# Patient Record
Sex: FEMALE | Race: BLACK OR AFRICAN AMERICAN | Employment: OTHER | URBAN - METROPOLITAN AREA
[De-identification: names, ages, dates, MRNs, and addresses within clinical notes are randomized per-mention and may not be internally consistent; named-entity substitution may affect disease eponyms.]

---

## 2017-02-01 ENCOUNTER — OFFICE VISIT (OUTPATIENT)
Dept: FAMILY MEDICINE CLINIC | Age: 67
End: 2017-02-01

## 2017-02-01 VITALS
OXYGEN SATURATION: 100 % | DIASTOLIC BLOOD PRESSURE: 76 MMHG | SYSTOLIC BLOOD PRESSURE: 159 MMHG | RESPIRATION RATE: 12 BRPM | BODY MASS INDEX: 20.93 KG/M2 | WEIGHT: 122.6 LBS | HEIGHT: 64 IN | TEMPERATURE: 96.4 F | HEART RATE: 65 BPM

## 2017-02-01 DIAGNOSIS — I10 ESSENTIAL HYPERTENSION: Primary | ICD-10-CM

## 2017-02-01 DIAGNOSIS — Z23 ENCOUNTER FOR IMMUNIZATION: ICD-10-CM

## 2017-02-01 DIAGNOSIS — I73.9 PAD (PERIPHERAL ARTERY DISEASE) (HCC): ICD-10-CM

## 2017-02-01 RX ORDER — IBUPROFEN 200 MG
200 TABLET ORAL
Qty: 40 TAB | Refills: 3 | Status: SHIPPED | OUTPATIENT
Start: 2017-02-01 | End: 2017-05-09 | Stop reason: ALTCHOICE

## 2017-02-01 RX ORDER — IBUPROFEN 200 MG
200 TABLET ORAL
COMMUNITY
End: 2017-02-01 | Stop reason: SDUPTHER

## 2017-02-01 RX ORDER — GABAPENTIN 100 MG/1
100 CAPSULE ORAL DAILY
Qty: 30 CAP | Refills: 6 | Status: SHIPPED | OUTPATIENT
Start: 2017-02-01 | End: 2017-05-22 | Stop reason: SDUPTHER

## 2017-02-01 NOTE — PROGRESS NOTES
HISTORY OF PRESENT ILLNESS  Lore An is a 77 y.o. female. HPI   DMtype II    Compliant w/ meds, having nodiabetic diet, and not doing much of daily exercise, obtains home glucose monitoring averaging  140's  . No Rf needed for today. Denies any tingling sensation, polyurea and polydipsia, last a1c was unknown . HTN  Today pt present for Bp check and the patient stating that so far there has been a Compliancy w/ the bp meds, having had the low salt diet and try to the best of pt's knowledge to avoid smoked meats, the patient has been active life style and does do the daily walking, today the pt denies Chest Pain, has no legs swelling no lightheadedness,    Hypercholestremia  Patient has no personal no family history of early heart disease with the patient's parents and siblings, unaware of patient's last lipoprotein profile, blood sugar, TSH, liver and renal function tests,    the patient eats out but not more than average,   and currently, there is no muscle nor abdominal pain, And patient fasting today, the patient is compliant w/ meds, in addition to the intake of daily baby aspirin      Current Outpatient Prescriptions   Medication Sig Dispense Refill    ibuprofen (MOTRIN) 200 mg tablet Take 1 Tab by mouth every eight (8) hours as needed for Pain. 40 Tab 3    gabapentin (NEURONTIN) 100 mg capsule Take 1 Cap by mouth daily. 30 Cap 6    pneumococcal 13 annalisa conj dip (PREVNAR-13) 0.5 mL syrg injection 0.5 mL by IntraMUSCular route once for 1 dose. 0.5 mL 0    clopidogrel (PLAVIX) 75 mg tab TAKE 1 TABLET EVERY DAY 30 Tab 0    glucose blood VI test strips (ACCU-CHEK SMARTVIEW TEST STRIP) strip Check blood sugar twice a day 200 Strip 3    Lancets (ACCU-CHEK FASTCLIX) misc Check blood sugar twice a day 200 Each 3    latanoprost (XALATAN) 0.005 % ophthalmic solution Administer 1 Drop to both eyes nightly.  2.5 mL 1    dorzolamide-timolol (COSOPT) 22.3-6.8 mg/mL ophthalmic solution   0    brimonidine (ALPHAGAN) 0.15 % ophthalmic solution Administer 1 Drop to both eyes two (2) times a day. 15 mL 6    hydrochlorothiazide (HYDRODIURIL) 25 mg tablet Take 1 Tab by mouth daily. 90 Tab 3    allopurinol (ZYLOPRIM) 300 mg tablet Take 1 Tab by mouth daily. 90 Tab 3    metFORMIN (GLUCOPHAGE) 1,000 mg tablet Take 1 Tab by mouth daily. 180 Tab 3    Blood-Glucose Meter monitoring kit accu chek gabino smartview meter 1 Kit 0    cilostazol (PLETAL) 100 mg tablet TAKE 1 TABLET BEFORE BREAKFAST AND DINNER 180 Tab 0    atorvastatin (LIPITOR) 80 mg tablet Take 1 Tab by mouth daily. 90 Tab 3    metoprolol (LOPRESSOR) 25 mg tablet Take 0.5 Tabs by mouth two (2) times a day.  80 Tab 3     No Known Allergies  Past Medical History   Diagnosis Date    [facility]     Diabetes mellitus type 2, controlled ([facility]     Gallstones      asymptomatic    GERD (gastroesophageal reflux disease)      dx with resolution of symptoms on ppi    Hypercholesterolemia     PAD (peripheral artery disease) ([facility] 2/20/2013     right SFA angioplasty and athrectomy    Primary open angle glaucoma     Type II diabetes mellitus, uncontrolled ([facility] 2/1/2017     Past Surgical History   Procedure Laterality Date    Vascular surgery procedure unlist  April 2016     Family History   Problem Relation Age of Onset    Hypertension Mother     Diabetes Mother     Diabetes Son      Social History   Substance Use Topics    Smoking status: Current Some Day Smoker     Types: Cigarettes     Last attempt to quit: 3/13/2013    Smokeless tobacco: Not on file      Comment: estimate is one pack per week    Alcohol use 12.6 oz/week     21 Cans of beer per week      Comment: 2 to 3 beers per day per patient      Lab Results  Component Value Date/Time   WBC 5.4 04/02/2014 12:48 PM   Hemoglobin (POC) 12.3 05/24/2016 10:53 AM   HGB 11.1 04/02/2014 12:48 PM   HCT 33.0 04/02/2014 12:48 PM   PLATELET 606 97/28/0920 12:48 PM   MCV 89.9 04/02/2014 12:48 PM       Lab Results  Component Value Date/Time   Hemoglobin A1c 4.9 05/24/2016 11:52 AM   Hemoglobin A1c 5.7 10/26/2015 10:42 AM   Hemoglobin A1c 5.7 09/15/2014 04:31 PM   Glucose 83 10/26/2015 10:42 AM   Glucose  non fasting 07/16/2015 09:33 AM   Microalbumin/Creat ratio (mg/g creat) 8 07/03/2014 10:16 AM   Microalb/Creat ratio (ug/mg creat.) 9.1 10/26/2015 10:42 AM   Microalbumin,urine random 0.68 07/03/2014 10:16 AM   LDL, calculated 97.6 05/24/2016 11:52 AM   Creatinine 0.81 10/26/2015 10:42 AM      Lab Results  Component Value Date/Time   Cholesterol, total 208 05/24/2016 11:52 AM   HDL Cholesterol 90 05/24/2016 11:52 AM   LDL, calculated 97.6 05/24/2016 11:52 AM   Triglyceride 102 05/24/2016 11:52 AM   CHOL/HDL Ratio 2.3 05/24/2016 11:52 AM       Lab Results  Component Value Date/Time   GFR est AA 88 10/26/2015 10:42 AM   GFR est non-AA 76 10/26/2015 10:42 AM   Creatinine 0.81 10/26/2015 10:42 AM   BUN 7 10/26/2015 10:42 AM   Sodium 132 10/26/2015 10:42 AM   Potassium 4.4 10/26/2015 10:42 AM   Chloride 91 10/26/2015 10:42 AM   CO2 23 10/26/2015 10:42 AM      Lab Results  Component Value Date/Time   TSH 0.76 04/02/2014 12:48 PM         Review of Systems   Constitutional: Negative for chills and fever. HENT: Negative for ear pain and nosebleeds. Eyes: Negative for blurred vision, pain and discharge. Respiratory: Negative for shortness of breath. Cardiovascular: Negative for chest pain and leg swelling. Gastrointestinal: Negative for constipation, diarrhea, nausea and vomiting. Genitourinary: Negative for frequency. Musculoskeletal: Positive for back pain and myalgias. Negative for joint pain. Skin: Negative for itching and rash. Neurological: Negative for headaches. Psychiatric/Behavioral: Negative for depression. The patient is not nervous/anxious. Physical Exam   Constitutional: She is oriented to person, place, and time.  She appears well-developed and well-nourished. HENT:   Head: Normocephalic and atraumatic. Eyes: Conjunctivae and EOM are normal.   Neck: Normal range of motion. Neck supple. Cardiovascular: Normal rate, regular rhythm and normal heart sounds. No murmur heard. Pulmonary/Chest: Effort normal and breath sounds normal.   Abdominal: Soft. Bowel sounds are normal. She exhibits no distension. Musculoskeletal: Normal range of motion. She exhibits tenderness. She exhibits no edema. Neurological: She is alert and oriented to person, place, and time. Skin: No erythema. Psychiatric: Her behavior is normal.   Nursing note and vitals reviewed. ASSESSMENT and PLAN  Shannon Tristan was seen today for establish care. Diagnoses and all orders for this visit:    Essential hypertension  -     CBC W/O DIFF  -     TSH 3RD GENERATION  -     METABOLIC PANEL, COMPREHENSIVE  -     LIPID PANEL  -     HEMOGLOBIN A1C WITH EAG  -     MICROALBUMIN, UR, RAND W/ MICROALBUMIN/CREA RATIO  -     pneumococcal 13 annalisa conj dip (PREVNAR-13) 0.5 mL syrg injection; 0.5 mL by IntraMUSCular route once for 1 dose. PAD (peripheral artery disease) ([facility])  -     ibuprofen (MOTRIN) 200 mg tablet; Take 1 Tab by mouth every eight (8) hours as needed for Pain.  -     REFERRAL TO VASCULAR SURGERY  -     CBC W/O DIFF  -     TSH 3RD GENERATION  -     METABOLIC PANEL, COMPREHENSIVE  -     LIPID PANEL  -     HEMOGLOBIN A1C WITH EAG  -     MICROALBUMIN, UR, RAND W/ MICROALBUMIN/CREA RATIO  -     pneumococcal 13 annalisa conj dip (PREVNAR-13) 0.5 mL syrg injection; 0.5 mL by IntraMUSCular route once for 1 dose. Uncontrolled type 2 diabetes mellitus with complication, without long-term current use of insulin ([facility])  -     gabapentin (NEURONTIN) 100 mg capsule;  Take 1 Cap by mouth daily.  -     CBC W/O DIFF  -     TSH 3RD GENERATION  -     METABOLIC PANEL, COMPREHENSIVE  -     LIPID PANEL  -     HEMOGLOBIN A1C WITH EAG  -     MICROALBUMIN, UR, RAND W/ MICROALBUMIN/CREA RATIO  - pneumococcal 13 annalisa conj dip (PREVNAR-13) 0.5 mL syrg injection; 0.5 mL by IntraMUSCular route once for 1 dose. Encounter for immunization  -     CBC W/O DIFF  -     TSH 3RD GENERATION  -     METABOLIC PANEL, COMPREHENSIVE  -     LIPID PANEL  -     HEMOGLOBIN A1C WITH EAG  -     MICROALBUMIN, UR, RAND W/ MICROALBUMIN/CREA RATIO  -     pneumococcal 13 annalisa conj dip (PREVNAR-13) 0.5 mL syrg injection; 0.5 mL by IntraMUSCular route once for 1 dose.   -     INFLUENZA VIRUS VACCINE, HIGH DOSE SEASONAL, PRESERVATIVE FREE    Other orders  -     Cancel: AMB POC URINALYSIS DIP STICK AUTO W/O MICRO  -     Cancel: AMB POC HEMOGLOBIN A1C

## 2017-02-01 NOTE — PATIENT INSTRUCTIONS

## 2017-02-01 NOTE — MR AVS SNAPSHOT
Visit Information Date & Time Provider Department Dept. Phone Encounter #  
 2/1/2017  2:00 PM Joe Moody MD [address] OFFICE-[facility] [phone] [id number] Upcoming Health Maintenance Date Due  
 GLAUCOMA SCREENING Q2Y 2/10/2015 OSTEOPOROSIS SCREENING (DEXA) 2/10/2015 COLONOSCOPY 6/1/2015 EYE EXAM RETINAL OR DILATED Q1 9/15/2015 INFLUENZA AGE 9 TO ADULT 8/1/2016 BREAST CANCER SCRN MAMMOGRAM 10/9/2016 MICROALBUMIN Q1 10/26/2016 HEMOGLOBIN A1C Q6M 11/24/2016 FOOT EXAM Q1 1/25/2017 Pneumococcal 65+ Low/Medium Risk (2 of 2 - PCV13) 2/4/2017 LIPID PANEL Q1 5/24/2017 MEDICARE YEARLY EXAM 5/25/2017 DTaP/Tdap/Td series (2 - Td) 5/8/2025 Allergies as of 2/1/2017  Review Complete On: 2/1/2017 By: Naseem Rose LPN No Known Allergies Current Immunizations  Reviewed on 2/1/2017 Name Date Influenza High Dose Vaccine PF  Incomplete Influenza Vaccine 10/26/2015 Influenza Vaccine (Quad) PF 10/26/2015 Pneumococcal Polysaccharide (PPSV-23) 2/4/2016, 10/26/2015 Tdap 5/8/2015 Zoster Vaccine, Live 2/1/2016 Reviewed by Joe Moody MD on 2/1/2017 at  2:49 PM  
 Reviewed by Joe Moody MD on 2/1/2017 at  2:51 PM  
 Reviewed by Joe Moody MD on 2/1/2017 at  2:51 PM  
You Were Diagnosed With   
  
 Codes Comments Essential hypertension    -  Primary ICD-10-CM: I10 
ICD-9-CM: 401.9 PAD (peripheral artery disease) (HCC)     ICD-10-CM: I73.9 ICD-9-CM: 443.9 Uncontrolled type 2 diabetes mellitus with complication, without long-term current use of insulin (HCC)     ICD-10-CM: E11.8, E11.65 ICD-9-CM: 250.82 Encounter for immunization     ICD-10-CM: T33 ICD-9-CM: V03.89 Vitals BP Pulse Temp Resp Height(growth percentile) Weight(growth percentile) 159/76 (BP 1 Location: Left arm, BP Patient Position: At rest) 65 96.4 °F (35.8 °C) (Oral) 12 5' 3.75\" (1.619 m) 122 lb 9.6 oz (55.6 kg) SpO2 BMI OB Status Smoking Status 100% 21.21 kg/m2 Postmenopausal Current Some Day Smoker Vitals History BMI and BSA Data Body Mass Index Body Surface Area  
 21.21 kg/m 2 1.58 m 2 Preferred Pharmacy Pharmacy Name Phone [facility]dal[facility] [address] [place], [place] [address] [facility] [address] [place] [address] [phone] Your Updated Medication List  
  
   
This list is accurate as of: 2/1/17  2:55 PM.  Always use your most recent med list.  
  
  
  
  
 allopurinol 300 mg tablet Commonly known as:  Omega Needle Take 1 Tab by mouth daily. atorvastatin 80 mg tablet Commonly known as:  LIPITOR Take 1 Tab by mouth daily. Blood-Glucose Meter monitoring kit  
accu chek gabino smartview meter  
  
 brimonidine 0.15 % ophthalmic solution Commonly known as:  Savannah Russ Administer 1 Drop to both eyes two (2) times a day. cilostazol 100 mg tablet Commonly known as:  PLETAL  
TAKE 1 TABLET BEFORE BREAKFAST AND DINNER  
  
 clopidogrel 75 mg Tab Commonly known as:  PLAVIX TAKE 1 TABLET EVERY DAY  
  
 dorzolamide-timolol 22.3-6.8 mg/mL ophthalmic solution Commonly known as:  COSOPT  
  
 gabapentin 100 mg capsule Commonly known as:  NEURONTIN Take 1 Cap by mouth daily. glucose blood VI test strips strip Commonly known as:  [address] Check blood sugar twice a day  
  
 hydroCHLOROthiazide 25 mg tablet Commonly known as:  HYDRODIURIL Take 1 Tab by mouth daily. ibuprofen 200 mg tablet Commonly known as:  MOTRIN Take 1 Tab by mouth every eight (8) hours as needed for Pain. Lancets Misc Commonly known as:  ACCU-CHEK FASTCLIX Check blood sugar twice a day  
  
 latanoprost 0.005 % ophthalmic solution Commonly known as:  Raf Pry Administer 1 Drop to both eyes nightly. metFORMIN 1,000 mg tablet Commonly known as:  GLUCOPHAGE Take 1 Tab by mouth daily. metoprolol tartrate 25 mg tablet Commonly known as:  LOPRESSOR Take 0.5 Tabs by mouth two (2) times a day. pneumococcal 13 annalisa conj dip 0.5 mL Syrg injection Commonly known as:  PREVNAR-13  
0.5 mL by IntraMUSCular route once for 1 dose. Prescriptions Printed Refills  
 pneumococcal 13 annalisa conj dip (PREVNAR-13) 0.5 mL syrg injection 0 Si.5 mL by IntraMUSCular route once for 1 dose. Class: Print Route: IntraMUSCular Prescriptions Sent to Pharmacy Refills  
 ibuprofen (MOTRIN) 200 mg tablet 3 Sig: Take 1 Tab by mouth every eight (8) hours as needed for Pain. Class: Normal  
 Pharmacy: [address] [place] [address] Ph #: [phone] Route: Oral  
 gabapentin (NEURONTIN) 100 mg capsule 6 Sig: Take 1 Cap by mouth daily. Class: Normal  
 Pharmacy: [address] [place] [address] Ph #: [phone] Route: Oral  
  
We Performed the Following CBC W/O DIFF [32501 CPT(R)] HEMOGLOBIN A1C WITH EAG [13348 CPT(R)] INFLUENZA VIRUS VACCINE, HIGH DOSE SEASONAL, PRESERVATIVE FREE [23189 CPT(R)] LIPID PANEL [44136 CPT(R)] METABOLIC PANEL, COMPREHENSIVE [92122 CPT(R)] MICROALBUMIN, UR, RAND W/ MICROALBUMIN/CREA RATIO [id number] CPT(R)] REFERRAL TO VASCULAR SURGERY [CNH425 Custom] Comments:  
 Please evaluate patient for stent placement follow up appoinment with Dr. Topher Alberts. TSH 3RD GENERATION [22392 CPT(R)] Referral Information Referral ID Referred By Referred To  
  
 [id number] Peggy Feliciano Not Available Visits Status Start Date End Date 1 New Request 17 If your referral has a status of pending review or denied, additional information will be sent to support the outcome of this decision. Patient Instructions Learning About Benefits From Quitting Smoking How does quitting smoking make you healthier? If you're thinking about quitting smoking, you may have a few reasons to be smoke-free. Your health may be one of them. · When you quit smoking, you lower your risks for cancer, lung disease, heart attack, stroke, blood vessel disease, and blindness from macular degeneration. · When you're smoke-free, you get sick less often, and you heal faster. You are less likely to get colds, flu, bronchitis, and pneumonia. · As a nonsmoker, you may find that your mood is better and you are less stressed. When and how will you feel healthier? Quitting has real health benefits that start from day 1 of being smoke-free. And the longer you stay smoke-free, the healthier you get and the better you feel. The first hours · After just 20 minutes, your blood pressure and heart rate go down. That means there's less stress on your heart and blood vessels. · Within 12 hours, the level of carbon monoxide in your blood drops back to normal. That makes room for more oxygen. With more oxygen in your body, you may notice that you have more energy than when you smoked. After 2 weeks · Your lungs start to work better. · Your risk of heart attack starts to drop. After 1 month · When your lungs are clear, you cough less and breathe deeper, so it's easier to be active. · Your sense of taste and smell return. That means you can enjoy food more than you have since you started smoking. Over the years · After 1 year, your risk of heart disease is half what it would be if you kept smoking. · After 5 years, your risk of stroke starts to shrink. Within a few years after that, it's about the same as if you'd never smoked. · After 10 years, your risk of dying from lung cancer is cut by about half. And your risk for many other types of cancer is lower too. How would quitting help others in your life? When you quit smoking, you improve the health of everyone who now breathes in your smoke. · Their heart, lung, and cancer risks drop, much like yours. · They are sick less. For babies and small children, living smoke-free means they're less likely to have ear infections, pneumonia, and bronchitis. · If you're a woman who is or will be pregnant someday, quitting smoking means a healthier . · Children who are close to you are less likely to become adult smokers. Where can you learn more? Go to http://ines-jojo.info/. Enter [phone] 72 11 in the search box to learn more about \"Learning About Benefits From Quitting Smoking. \" Current as of: May 26, 2016 Content Version: 11.1 © 8239-5387 [employer]. Care instructions adapted under license by [employer] (which disclaims liability or warranty for this information). If you have questions about a medical condition or this instruction, always ask your healthcare professional. [place] [address] any warranty or liability for your use of this information. Introducing [facility]! Ivy Che introduces [employer] patient portal. Now you can access parts of your medical record, email your doctor's office, and request medication refills online. 1. In your internet browser, go to [email or web address] 2. Click on the First Time User? Click Here link in the Sign In box. You will see the New Member Sign Up page. 3. Enter your [employer] Access Code exactly as it appears below. You will not need to use this code after youve completed the sign-up process. If you do not sign up before the expiration date, you must request a new code. · [employer] Access Code: Derek Trinidad Expires: 2017  2:55 PM 
 
4. Enter the last four digits of your Social Security Number (xxxx) and Date of Birth (mm/dd/yyyy) as indicated and click Submit. You will be taken to the next sign-up page. 5. Create a [employer] ID.  This will be your [employer] login ID and cannot be changed, so think of one that is secure and easy to remember. 6. Create a [address] password. You can change your password at any time. 7. Enter your Password Reset Question and Answer. This can be used at a later time if you forget your password. 8. Enter your e-mail address. You will receive e-mail notification when new information is available in [address] 9. Click Sign Up. You can now view and download portions of your medical record. 10. Click the Download Summary menu link to download a portable copy of your medical information. If you have questions, please visit the Frequently Asked Questions section of the [employer] website. Remember, [employer] [address] is NOT to be used for urgent needs. For medical emergencies, dial 911. Now available from your iPhone and Android! Please provide this summary of care documentation to your next provider. Your primary care clinician is listed as Luigi Rodriguez. If you have any questions after today's visit, please call [phone].

## 2017-02-01 NOTE — PROGRESS NOTES
Lakisha Oiler      Name and  verified      Chief Complaint   Patient presents with   [facility] Patient

## 2017-02-02 LAB
ALBUMIN SERPL-MCNC: 4.3 G/DL (ref 3.6–4.8)
ALBUMIN/CREAT UR: 14.3 MG/G CREAT (ref 0–30)
ALBUMIN/GLOB SERPL: 1.8 {RATIO} (ref 1.1–2.5)
ALP SERPL-CCNC: 73 IU/L (ref 39–117)
ALT SERPL-CCNC: 11 IU/L (ref 0–32)
AST SERPL-CCNC: 22 IU/L (ref 0–40)
BILIRUB SERPL-MCNC: 0.2 MG/DL (ref 0–1.2)
BUN SERPL-MCNC: 9 MG/DL (ref 8–27)
BUN/CREAT SERPL: 13 (ref 11–26)
CALCIUM SERPL-MCNC: 10.3 MG/DL (ref 8.7–10.3)
CHLORIDE SERPL-SCNC: 102 MMOL/L (ref 96–106)
CHOLEST SERPL-MCNC: 226 MG/DL (ref 100–199)
CO2 SERPL-SCNC: 22 MMOL/L (ref 18–29)
CREAT SERPL-MCNC: 0.7 MG/DL (ref 0.57–1)
CREAT UR-MCNC: 58.9 MG/DL
ERYTHROCYTE [DISTWIDTH] IN BLOOD BY AUTOMATED COUNT: 14.3 % (ref 12.3–15.4)
EST. AVERAGE GLUCOSE BLD GHB EST-MCNC: 103 MG/DL
GLOBULIN SER CALC-MCNC: 2.4 G/DL (ref 1.5–4.5)
GLUCOSE SERPL-MCNC: 93 MG/DL (ref 65–99)
HBA1C MFR BLD: 5.2 % (ref 4.8–5.6)
HCT VFR BLD AUTO: 35.7 % (ref 34–46.6)
HDLC SERPL-MCNC: 78 MG/DL
HGB BLD-MCNC: 11.6 G/DL (ref 11.1–15.9)
LDLC SERPL CALC-MCNC: 118 MG/DL (ref 0–99)
MCH RBC QN AUTO: 32.1 PG (ref 26.6–33)
MCHC RBC AUTO-ENTMCNC: 32.5 G/DL (ref 31.5–35.7)
MCV RBC AUTO: 99 FL (ref 79–97)
MICROALBUMIN UR-MCNC: 8.4 UG/ML
PLATELET # BLD AUTO: 277 X10E3/UL (ref 150–379)
POTASSIUM SERPL-SCNC: 4.2 MMOL/L (ref 3.5–5.2)
PROT SERPL-MCNC: 6.7 G/DL (ref 6–8.5)
RBC # BLD AUTO: 3.61 X10E6/UL (ref 3.77–5.28)
SODIUM SERPL-SCNC: 140 MMOL/L (ref 134–144)
TRIGL SERPL-MCNC: 149 MG/DL (ref 0–149)
TSH SERPL DL<=0.005 MIU/L-ACNC: 0.93 UIU/ML (ref 0.45–4.5)
VLDLC SERPL CALC-MCNC: 30 MG/DL (ref 5–40)
WBC # BLD AUTO: 6 X10E3/UL (ref 3.4–10.8)

## 2017-02-20 ENCOUNTER — OFFICE VISIT (OUTPATIENT)
Dept: FAMILY MEDICINE CLINIC | Age: 67
End: 2017-02-20

## 2017-02-20 VITALS
HEART RATE: 54 BPM | DIASTOLIC BLOOD PRESSURE: 64 MMHG | BODY MASS INDEX: 21.27 KG/M2 | RESPIRATION RATE: 12 BRPM | HEIGHT: 64 IN | TEMPERATURE: 96.4 F | WEIGHT: 124.6 LBS | SYSTOLIC BLOOD PRESSURE: 140 MMHG | OXYGEN SATURATION: 100 %

## 2017-02-20 DIAGNOSIS — E11.9 TYPE 2 DIABETES MELLITUS WITHOUT COMPLICATION, WITHOUT LONG-TERM CURRENT USE OF INSULIN (HCC): Primary | ICD-10-CM

## 2017-02-20 DIAGNOSIS — I10 ESSENTIAL HYPERTENSION: ICD-10-CM

## 2017-02-20 DIAGNOSIS — Z12.39 SCREENING FOR BREAST CANCER: ICD-10-CM

## 2017-02-20 DIAGNOSIS — Z72.0 TOBACCO ABUSE DISORDER: ICD-10-CM

## 2017-02-20 DIAGNOSIS — Z87.891 PERSONAL HISTORY OF NICOTINE DEPENDENCE: ICD-10-CM

## 2017-02-20 RX ORDER — IBUPROFEN 600 MG/1
600 TABLET ORAL
Qty: 60 TAB | Refills: 1 | Status: SHIPPED | OUTPATIENT
Start: 2017-02-20 | End: 2017-12-14 | Stop reason: SDUPTHER

## 2017-02-20 RX ORDER — METFORMIN HYDROCHLORIDE 500 MG/1
250 TABLET ORAL
Qty: 90 TAB | Refills: 3
Start: 2017-02-20 | End: 2018-04-05 | Stop reason: ALTCHOICE

## 2017-02-20 RX ORDER — TRAMADOL HYDROCHLORIDE AND ACETAMINOPHEN 37.5; 325 MG/1; MG/1
1 TABLET ORAL
Qty: 60 TAB | Refills: 0 | Status: SHIPPED | OUTPATIENT
Start: 2017-02-20 | End: 2017-10-16 | Stop reason: ALTCHOICE

## 2017-02-20 NOTE — PROGRESS NOTES
Nona Lares        Name and  verified        Chief Complaint   Patient presents with    Results     2 week f/u

## 2017-02-20 NOTE — MR AVS SNAPSHOT
Visit Information Date & Time Provider Department Dept. Phone Encounter #  
 2/20/2017 12:15 PM Junie Wong MD [address] [facility] [phone] [id number] Upcoming Health Maintenance Date Due  
 GLAUCOMA SCREENING Q2Y 2/10/2015 OSTEOPOROSIS SCREENING (DEXA) 2/10/2015 COLONOSCOPY 6/1/2015 EYE EXAM RETINAL OR DILATED Q1 9/15/2015 BREAST CANCER SCRN MAMMOGRAM 10/9/2016 FOOT EXAM Q1 1/25/2017 Pneumococcal 65+ Low/Medium Risk (2 of 2 - PCV13) 2/4/2017 MEDICARE YEARLY EXAM 5/25/2017 HEMOGLOBIN A1C Q6M 8/1/2017 MICROALBUMIN Q1 2/1/2018 LIPID PANEL Q1 2/1/2018 DTaP/Tdap/Td series (2 - Td) 5/8/2025 Allergies as of 2/20/2017  Review Complete On: 2/20/2017 By: Asia Evans LPN No Known Allergies Current Immunizations  Reviewed on 2/1/2017 Name Date Influenza High Dose Vaccine PF 2/1/2017 Influenza Vaccine 10/26/2015 Influenza Vaccine (Quad) PF 10/26/2015 Pneumococcal Polysaccharide (PPSV-23) 2/4/2016, 10/26/2015 Tdap 5/8/2015 Zoster Vaccine, Live 2/1/2016 Not reviewed this visit You Were Diagnosed With   
  
 Codes Comments Type 2 diabetes mellitus without complication, without long-term current use of insulin (HCC)    -  Primary ICD-10-CM: E11.9 ICD-9-CM: 250.00 Screening for breast cancer     ICD-10-CM: Z12.39 
ICD-9-CM: V76.10 Tobacco abuse disorder     ICD-10-CM: Z72.0 ICD-9-CM: 305.1 Essential hypertension     ICD-10-CM: I10 
ICD-9-CM: 401.9 Personal history of nicotine dependence     ICD-10-CM: M48.784 ICD-9-CM: V15.82 Vitals BP Pulse Temp Resp Height(growth percentile) Weight(growth percentile) 140/64 (BP 1 Location: Left arm, BP Patient Position: At rest) (!) 54 96.4 °F (35.8 °C) (Oral) 12 5' 3.75\" (1.619 m) 124 lb 9.6 oz (56.5 kg) SpO2 BMI OB Status Smoking Status 100% 21.56 kg/m2 Postmenopausal Current Some Day Smoker Vitals History BMI and BSA Data Body Mass Index Body Surface Area  
 21.56 kg/m 2 1.59 m 2 Preferred Pharmacy Pharmacy Name Phone [facility]dal[facility] [address] [place] [address] [phone] Your Updated Medication List  
  
   
This list is accurate as of: 2/20/17  1:41 PM.  Always use your most recent med list.  
  
  
  
  
 allopurinol 300 mg tablet Commonly known as:  Clementeen Hurt Take 1 Tab by mouth daily. atorvastatin 80 mg tablet Commonly known as:  LIPITOR Take 1 Tab by mouth daily. Blood-Glucose Meter monitoring kit  
accu chek gabino smartview meter  
  
 brimonidine 0.15 % ophthalmic solution Commonly known as:  Myrl Hockey Administer 1 Drop to both eyes two (2) times a day. cilostazol 100 mg tablet Commonly known as:  PLETAL  
TAKE 1 TABLET BEFORE BREAKFAST AND DINNER  
  
 clopidogrel 75 mg Tab Commonly known as:  PLAVIX TAKE 1 TABLET EVERY DAY  
  
 dorzolamide-timolol 22.3-6.8 mg/mL ophthalmic solution Commonly known as:  COSOPT  
  
 gabapentin 100 mg capsule Commonly known as:  NEURONTIN Take 1 Cap by mouth daily. glucose blood VI test strips strip Commonly known as:  [address] Check blood sugar twice a day  
  
 hydroCHLOROthiazide 25 mg tablet Commonly known as:  HYDRODIURIL Take 1 Tab by mouth daily. * ibuprofen 200 mg tablet Commonly known as:  MOTRIN Take 1 Tab by mouth every eight (8) hours as needed for Pain. * ibuprofen 600 mg tablet Commonly known as:  MOTRIN Take 1 Tab by mouth every eight (8) hours as needed for Pain. Lancets Misc Commonly known as:  ACCU-CHEK FASTCLIX Check blood sugar twice a day  
  
 latanoprost 0.005 % ophthalmic solution Commonly known as:  Miguelito Abreu Administer 1 Drop to both eyes nightly. * metFORMIN 1,000 mg tablet Commonly known as:  GLUCOPHAGE Take 1 Tab by mouth daily. * metFORMIN 500 mg tablet Commonly known as:  GLUCOPHAGE Take 0.5 Tabs by mouth daily (with breakfast). metoprolol tartrate 25 mg tablet Commonly known as:  LOPRESSOR Take 0.5 Tabs by mouth two (2) times a day. traMADol-acetaminophen 37.5-325 mg per tablet Commonly known as:  ULTRACET Take 1 Tab by mouth every eight (8) hours as needed for Pain. Max Daily Amount: 3 Tabs. * Notice: This list has 4 medication(s) that are the same as other medications prescribed for you. Read the directions carefully, and ask your doctor or other care provider to review them with you. Prescriptions Printed Refills  
 ibuprofen (MOTRIN) 600 mg tablet 1 Sig: Take 1 Tab by mouth every eight (8) hours as needed for Pain. Class: Print Route: Oral  
 traMADol-acetaminophen (ULTRACET) 37.5-325 mg per tablet 0 Sig: Take 1 Tab by mouth every eight (8) hours as needed for Pain. Max Daily Amount: 3 Tabs. Class: Print Route: Oral  
  
To-Do List   
 02/20/2017 Imaging:  CT LOW DOSE LUNG CANCER SCREENING   
  
 02/20/2017 Imaging:  BILL MAMMO BI SCREENING INCL CAD Referral Information Referral ID Referred By Referred To  
  
 [id number] Not Available Visits Status Start Date End Date 1 New Request 2/20/17 2/20/18 If your referral has a status of pending review or denied, additional information will be sent to support the outcome of this decision. Introducing \[facility]\"" & HEALTH SERVICES! Donna Neumann introduces [employer] patient portal. Now you can access parts of your medical record, email your doctor's office, and request medication refills online. 1. In your internet browser, go to [email or web address] 2. Click on the First Time User? Click Here link in the Sign In box. You will see the New Member Sign Up page. 3. Enter your [employer] Access Code exactly as it appears below.  You will not need to use this code after youve completed the sign-up process. If you do not sign up before the expiration date, you must request a new code. · DG[employer] Access Code: Jannette Turner Expires: 5/2/2017  2:55 PM 
 
4. Enter the last four digits of your Social Security Number (xxxx) and Date of Birth (mm/dd/yyyy) as indicated and click Submit. You will be taken to the next sign-up page. 5. Create a DGSE ID. This will be your DG[employer] login ID and cannot be changed, so think of one that is secure and easy to remember. 6. Create a DGSE password. You can change your password at any time. 7. Enter your Password Reset Question and Answer. This can be used at a later time if you forget your password. 8. Enter your e-mail address. You will receive e-mail notification when new information is available in [address] 9. Click Sign Up. You can now view and download portions of your medical record. 10. Click the Download Summary menu link to download a portable copy of your medical information. If you have questions, please visit the Frequently Asked Questions section of the DGSE website. Remember, [employer] is NOT to be used for urgent needs. For medical emergencies, dial 911. Now available from your iPhone and Android! Please provide this summary of care documentation to your next provider. Your primary care clinician is listed as Georgi Swan. If you have any questions after today's visit, please call [phone].

## 2017-02-20 NOTE — PROGRESS NOTES
HISTORY OF PRESENT ILLNESS  Gera Mendiola is a 79 y.o. female. HPI   Present with past medical history of hypertension,  not insulin dependent diabetic state hypercholesterolemia peripheral arterial disease,  in addition pt states that he had history of stroke in 2007, did not seek attention till few months after the slurred speech, but had a positive CT scan of the head indication of cerebral vascular accident in beginning of  2008,   2 yrs ago stopped , recently restarted 2 cigs/day,   Patient states that before 2015 she did choose to smoke more than 20 years but never exceeded a third of a pack per day during those. Willing to and trying so hard to get rid of the cigarettes as we speak,   Elevated MCV, low RBC, has if etoh use, 5x per weeks, 3 glasses of red anthony,     Patient with history of 2 years of diabetic state, patient currently taking 1000 mg of metformin once daily has been doing this for last 2 years, in addition patient had normal mitral albumin level,  last glucose reading fasting state was 93 in addition her hemoglobin A1c was checked which was at 5.2% patient denies any no polyuria no polydipsia at this time, tingling sensation numbness    Also patient was given Lipitor 80 mg once nightly unfortunately has not been taking statin for last couple of years her good cholesterol level was at 78 which is great for her in addition her bad cholesterol was at 112 also normal triglyceride level and slightly elevation of her total cholesterol stating that she has been taking her daily Plavix  Current Outpatient Prescriptions   Medication Sig Dispense Refill    ibuprofen (MOTRIN) 200 mg tablet Take 1 Tab by mouth every eight (8) hours as needed for Pain. 40 Tab 3    gabapentin (NEURONTIN) 100 mg capsule Take 1 Cap by mouth daily.  30 Cap 6    clopidogrel (PLAVIX) 75 mg tab TAKE 1 TABLET EVERY DAY 30 Tab 0    glucose blood VI test strips (ACCU-CHEK SMARTVIEW TEST STRIP) strip Check blood sugar twice a day 200 Strip 3    Lancets (ACCU-CHEK FASTCLIX) misc Check blood sugar twice a day 200 Each 3    latanoprost (XALATAN) 0.005 % ophthalmic solution Administer 1 Drop to both eyes nightly. 2.5 mL 1    dorzolamide-timolol (COSOPT) 22.3-6.8 mg/mL ophthalmic solution   0    brimonidine (ALPHAGAN) 0.15 % ophthalmic solution Administer 1 Drop to both eyes two (2) times a day. 15 mL 6    hydrochlorothiazide (HYDRODIURIL) 25 mg tablet Take 1 Tab by mouth daily. 90 Tab 3    allopurinol (ZYLOPRIM) 300 mg tablet Take 1 Tab by mouth daily. 90 Tab 3    metFORMIN (GLUCOPHAGE) 1,000 mg tablet Take 1 Tab by mouth daily. 180 Tab 3    Blood-Glucose Meter monitoring kit accu chek gabino smartview meter 1 Kit 0    cilostazol (PLETAL) 100 mg tablet TAKE 1 TABLET BEFORE BREAKFAST AND DINNER 180 Tab 0    atorvastatin (LIPITOR) 80 mg tablet Take 1 Tab by mouth daily. 90 Tab 3    metoprolol (LOPRESSOR) 25 mg tablet Take 0.5 Tabs by mouth two (2) times a day.  80 Tab 3     No Known Allergies  Past Medical History   Diagnosis Date    Cerebrovascular accident (stroke) ([address]     Diabetes mellitus type 2, controlled ([address]     Gallstones      asymptomatic    GERD (gastroesophageal reflux disease)      dx with resolution of symptoms on ppi    Hypercholesterolemia     PAD (peripheral artery disease) ([address] 2/20/2013     right SFA angioplasty and athrectomy    Primary open angle glaucoma     Type II diabetes mellitus, uncontrolled ([address] 2/1/2017     Past Surgical History   Procedure Laterality Date    Vascular surgery procedure unlist  April 2016     Family History   Problem Relation Age of Onset    Hypertension Mother     Diabetes Mother     Diabetes Son      Social History   Substance Use Topics    Smoking status: Current Some Day Smoker     Types: Cigarettes     Last attempt to quit: 3/13/2013    Smokeless tobacco: Not on file      Comment: estimate is one pack per week    Alcohol use 12.6 oz/week     21 Cans of beer per week      Comment: 2 to 3 beers per day per patient      Lab Results  Component Value Date/Time   WBC 6.0 02/01/2017 03:05 PM   Hemoglobin (POC) 12.3 05/24/2016 10:53 AM   HGB 11.6 02/01/2017 03:05 PM   HCT 35.7 02/01/2017 03:05 PM   PLATELET 076 16/51/0841 03:05 PM   MCV 99 02/01/2017 03:05 PM       Lab Results  Component Value Date/Time   Hemoglobin A1c 5.2 02/01/2017 03:05 PM   Hemoglobin A1c 4.9 05/24/2016 11:52 AM   Hemoglobin A1c 5.7 10/26/2015 10:42 AM   Glucose 93 02/01/2017 03:05 PM   Glucose  non fasting 07/16/2015 09:33 AM   Microalbumin/Creat ratio (mg/g creat) 8 07/03/2014 10:16 AM   Microalb/Creat ratio (ug/mg creat.) 14.3 02/01/2017 03:16 PM   Microalbumin,urine random 0.68 07/03/2014 10:16 AM   LDL, calculated 118 02/01/2017 03:05 PM   Creatinine 0.70 02/01/2017 03:05 PM      Lab Results  Component Value Date/Time   Cholesterol, total 226 02/01/2017 03:05 PM   HDL Cholesterol 78 02/01/2017 03:05 PM   LDL, calculated 118 02/01/2017 03:05 PM   Triglyceride 149 02/01/2017 03:05 PM   CHOL/HDL Ratio 2.3 05/24/2016 11:52 AM       Lab Results  Component Value Date/Time   ALT (SGPT) 11 02/01/2017 03:05 PM   AST (SGOT) 22 02/01/2017 03:05 PM   Alk. phosphatase 73 02/01/2017 03:05 PM   Bilirubin, direct 0.0 03/08/2011 10:16 AM   Bilirubin, total 0.2 02/01/2017 03:05 PM       Lab Results  Component Value Date/Time   GFR est  02/01/2017 03:05 PM   GFR est non-AA 91 02/01/2017 03:05 PM   Creatinine 0.70 02/01/2017 03:05 PM   BUN 9 02/01/2017 03:05 PM   Sodium 140 02/01/2017 03:05 PM   Potassium 4.2 02/01/2017 03:05 PM   Chloride 102 02/01/2017 03:05 PM   CO2 22 02/01/2017 03:05 PM      Lab Results  Component Value Date/Time   TSH 0.928 02/01/2017 03:05 PM         Review of Systems   Constitutional: Negative for chills and fever. HENT: Negative for ear pain and nosebleeds. Eyes: Negative for blurred vision, pain and discharge. Respiratory: Negative for shortness of breath.     Cardiovascular: Negative for chest pain and leg swelling. Gastrointestinal: Negative for constipation, diarrhea, nausea and vomiting. Genitourinary: Negative for frequency. Musculoskeletal: Negative for joint pain. Skin: Negative for itching and rash. Neurological: Negative for headaches. Psychiatric/Behavioral: Negative for depression. The patient is not nervous/anxious. Physical Exam   Constitutional: She is oriented to person, place, and time. She appears well-developed and well-nourished. HENT:   Head: Normocephalic and atraumatic. Eyes: Conjunctivae and EOM are normal.   Neck: Normal range of motion. Neck supple. No JVD present. Cardiovascular: Normal rate, regular rhythm and normal heart sounds. No murmur heard. Pulmonary/Chest: Effort normal and breath sounds normal. No stridor. Abdominal: Soft. Bowel sounds are normal. She exhibits no distension and no mass. There is no tenderness. Musculoskeletal: Normal range of motion. She exhibits no edema. Nl pulses, nl visual inspection nl monoF, +dystrophy and elongated Nails   Lymphadenopathy:     She has no cervical adenopathy. Neurological: She is alert and oriented to person, place, and time. Skin: No erythema. Psychiatric: Her behavior is normal.   Nursing note and vitals reviewed. ASSESSMENT and PLAN  Elvin Astudillo was seen today for results. Diagnoses and all orders for this visit:    Type 2 diabetes mellitus without complication, without long-term current use of insulin (HCC)  -     REFERRAL TO OPHTHALMOLOGY  -      DIABETES FOOT EXAM    Screening for breast cancer  -     [facility] MAMMO BI SCREENING INCL CAD; Future    Tobacco abuse disorder  -     CT LOW DOSE LUNG CANCER SCREENING; Future    Essential hypertension    Personal history of nicotine dependence   -     CT LOW DOSE LUNG CANCER SCREENING; Future    Other orders  -     metFORMIN (GLUCOPHAGE) 500 mg tablet; Take 0.5 Tabs by mouth daily (with breakfast).   -     ibuprofen (MOTRIN) 600 mg tablet; Take 1 Tab by mouth every eight (8) hours as needed for Pain. -     traMADol-acetaminophen (ULTRACET) 37.5-325 mg per tablet; Take 1 Tab by mouth every eight (8) hours as needed for Pain. Max Daily Amount: 3 Tabs. Contwith an active life style mod, for which includes to do list such as the light start of physical activities with a daily brisk walking 30 minutes most days of the week,    Try to avoid fatty fast foods, have and continue with low-fat low-cholesterol diet,   adding fatty fish such as Tuna, Mackerel, Shedd to the diet 3-4 times per week   and finally have a low-salt and K rich food intake,   all mentioned has to be done at least most days of the weeks for a better outcome,   The needed labs will be repeated in 3-6 months.     Handout also given to the patient for a better understanding,   Patient agreed with today's recommendation

## 2017-03-23 DIAGNOSIS — I10 ESSENTIAL HYPERTENSION: ICD-10-CM

## 2017-03-23 NOTE — TELEPHONE ENCOUNTER
Pt states she needs both of her eye drops refilled. Also needs her BP medication hydrochlorothiazide (HYDRODIURIL) 25 mg tablet as well. Send to [facility] on [address]     Pt# [phone]

## 2017-03-23 NOTE — TELEPHONE ENCOUNTER
Returned call to pt. bp medication pended at this time. Informed pt that she needs tocontact her eye doctor for her glaucoma eye drops.   Pt stated understanding

## 2017-03-24 RX ORDER — HYDROCHLOROTHIAZIDE 25 MG/1
25 TABLET ORAL DAILY
Qty: 90 TAB | Refills: 3 | Status: SHIPPED | OUTPATIENT
Start: 2017-03-24 | End: 2018-04-05 | Stop reason: SDUPTHER

## 2017-03-27 ENCOUNTER — HOSPITAL ENCOUNTER (OUTPATIENT)
Dept: MAMMOGRAPHY | Age: 67
Discharge: HOME | End: 2017-03-27
Attending: FAMILY MEDICINE
Payer: MEDICARE

## 2017-03-27 DIAGNOSIS — Z12.39 SCREENING FOR BREAST CANCER: ICD-10-CM

## 2017-03-27 PROCEDURE — 77067 SCR MAMMO BI INCL CAD: CPT

## 2017-04-18 ENCOUNTER — HOSPITAL ENCOUNTER (OUTPATIENT)
Dept: ULTRASOUND IMAGING | Age: 67
Discharge: HOME | End: 2017-04-18
Attending: FAMILY MEDICINE
Payer: MEDICARE

## 2017-04-18 ENCOUNTER — HOSPITAL ENCOUNTER (OUTPATIENT)
Dept: MAMMOGRAPHY | Age: 67
Discharge: HOME | End: 2017-04-18
Attending: FAMILY MEDICINE
Payer: MEDICARE

## 2017-04-18 DIAGNOSIS — R92.8 ABNORMAL MAMMOGRAM: ICD-10-CM

## 2017-04-18 DIAGNOSIS — N63.0 LUMP OR MASS IN BREAST: Primary | ICD-10-CM

## 2017-04-18 PROCEDURE — 76642 ULTRASOUND BREAST LIMITED: CPT

## 2017-04-18 PROCEDURE — 77065 DX MAMMO INCL CAD UNI: CPT

## 2017-04-18 NOTE — PROGRESS NOTES
4/18/2017 @ 9051 called and spoke with Wei Zuñiga to place pt. On ultrasound (room 3) schedule for 4/26/2017 at 11 a.m. - order in [facility] per Dr. Jenni Richmond. Called pt. To inform her about appt. Time and date (pt requested either next Wednesday, Thursday or Friday) and pt. ok'd appt.

## 2017-04-18 NOTE — PROGRESS NOTES
Received call from [facility] radiology,  Ultrasound needed for abn mammogram findings,  Left breast mass. Order placed for left breast ultrasound with biopsy, per Verbal Order from Dr. Christian Lewis on 4/18/2017 due to left breast mass.

## 2017-04-26 ENCOUNTER — HOSPITAL ENCOUNTER (OUTPATIENT)
Dept: ULTRASOUND IMAGING | Age: 67
Discharge: HOME | End: 2017-04-26
Attending: FAMILY MEDICINE
Payer: MEDICARE

## 2017-04-26 ENCOUNTER — HOSPITAL ENCOUNTER (OUTPATIENT)
Dept: MAMMOGRAPHY | Age: 67
Discharge: HOME | End: 2017-04-26
Attending: FAMILY MEDICINE
Payer: MEDICARE

## 2017-04-26 VITALS
HEART RATE: 70 BPM | HEIGHT: 63 IN | RESPIRATION RATE: 16 BRPM | TEMPERATURE: 98.5 F | WEIGHT: 121 LBS | BODY MASS INDEX: 21.44 KG/M2 | OXYGEN SATURATION: 100 %

## 2017-04-26 DIAGNOSIS — N63.0 LUMP OR MASS IN BREAST: ICD-10-CM

## 2017-04-26 DIAGNOSIS — Z87.898 HX OF ABNORMAL MAMMOGRAM: ICD-10-CM

## 2017-04-26 PROCEDURE — 88360 TUMOR IMMUNOHISTOCHEM/MANUAL: CPT | Performed by: RADIOLOGY

## 2017-04-26 PROCEDURE — 88305 TISSUE EXAM BY PATHOLOGIST: CPT | Performed by: RADIOLOGY

## 2017-04-26 PROCEDURE — 77065 DX MAMMO INCL CAD UNI: CPT

## 2017-04-26 PROCEDURE — 74011000250 HC RX REV CODE- 250: Performed by: FAMILY MEDICINE

## 2017-04-26 PROCEDURE — 19083 BX BREAST 1ST LESION US IMAG: CPT

## 2017-04-26 RX ORDER — LIDOCAINE HYDROCHLORIDE 10 MG/ML
5 INJECTION, SOLUTION EPIDURAL; INFILTRATION; INTRACAUDAL; PERINEURAL
Status: COMPLETED | OUTPATIENT
Start: 2017-04-26 | End: 2017-04-26

## 2017-04-26 RX ORDER — LIDOCAINE HYDROCHLORIDE AND EPINEPHRINE 10; 10 MG/ML; UG/ML
10 INJECTION, SOLUTION INFILTRATION; PERINEURAL ONCE
Status: COMPLETED | OUTPATIENT
Start: 2017-04-26 | End: 2017-04-26

## 2017-04-26 RX ADMIN — LIDOCAINE HYDROCHLORIDE AND EPINEPHRINE 10 ML: 10; 10 INJECTION, SOLUTION INFILTRATION; PERINEURAL at 11:00

## 2017-04-26 RX ADMIN — LIDOCAINE HYDROCHLORIDE 5 ML: 10 INJECTION, SOLUTION EPIDURAL; INFILTRATION; INTRACAUDAL; PERINEURAL at 11:00

## 2017-04-26 NOTE — PROGRESS NOTES
Pt. Ambulated to stereo ready room for procedure. Waiting for Dr. Fiona Em to talk with patient about procedure.

## 2017-04-26 NOTE — PROGRESS NOTES
Discharge instructions reviewed with patient with good understanding. Pt. Signed hard copy of discharge instructions and copy given to patient upon leaving. Steri strips, tegaderm and ice pack to biopsy site upon leaving. Pt. Discharge stable. Patient ambulate to waiting room with nurse at side.

## 2017-04-26 NOTE — PROGRESS NOTES
Dr. Das Qualia in to talk with patient about procedure. Dr. Yousif Dean assessed and evaluated patient for procedure. Consent signed.

## 2017-04-28 ENCOUNTER — TELEPHONE (OUTPATIENT)
Dept: FAMILY MEDICINE CLINIC | Age: 67
End: 2017-04-28

## 2017-04-28 NOTE — PROGRESS NOTES
Pt. Returned call. Pt. Confirmed new appointment set up for  5/2/2017 @ 9:00am  With Dr. Trae Conti, [address] [facility] [place] [address]. for new patient consult. Pt. Is to arrive @ 8:30am for new patient consult.

## 2017-04-28 NOTE — TELEPHONE ENCOUNTER
Result reviewed not pleasant please inform the patient that we will be available for her 24 hours a day 7 days a week if she has any concern or any question

## 2017-04-28 NOTE — PROGRESS NOTES
Final pathology report posted. Dr. Olive Carmen called and notified. Patient called by Dr. Olive Carmen regarding her positive pathology results. Will set up appointment with surgeon shortly.

## 2017-04-28 NOTE — PROGRESS NOTES
Spoke with [facility] @ [address] office. New patient appointment set up for Tuesday, 5/2/2017 @ 9am. Pt. Is to arrive by 8:40am for new patient paperwork, etc.Attempted to call patient, no answer; Left message for patient to call back to verify appointment.

## 2017-04-28 NOTE — TELEPHONE ENCOUNTER
Yumi Mosley phoned with result (Carcinoma) of Left Breast Biopsy and stated radiologists spoke with patient she stated will schedule appointment with vascular surge consuelo and inform patient.  Will sent to Dr. Mook Wiley

## 2017-05-02 ENCOUNTER — OFFICE VISIT (OUTPATIENT)
Dept: SURGERY | Age: 67
End: 2017-05-02

## 2017-05-02 VITALS
HEART RATE: 71 BPM | WEIGHT: 124 LBS | HEIGHT: 63 IN | SYSTOLIC BLOOD PRESSURE: 153 MMHG | BODY MASS INDEX: 21.97 KG/M2 | DIASTOLIC BLOOD PRESSURE: 75 MMHG | OXYGEN SATURATION: 97 %

## 2017-05-02 DIAGNOSIS — C50.312 BREAST CANCER OF LOWER-INNER QUADRANT OF LEFT FEMALE BREAST (HCC): Primary | ICD-10-CM

## 2017-05-02 NOTE — MR AVS SNAPSHOT
Visit Information Date & Time Provider Department Dept. Phone Encounter #  
 5/2/2017  9:00 AM Pamela Lucas MD [facility] [place] [id number] Your Appointments 5/22/2017  9:45 AM  
ROUTINE CARE with Long Funk MD  
[address] [facility] ([address]) Appt Note: 3 mo f/u  
 [address] [place] [address] [id number] [phone] [place] [phone] Upcoming Health Maintenance Date Due  
 GLAUCOMA SCREENING Q2Y 2/10/2015 OSTEOPOROSIS SCREENING (DEXA) 2/10/2015 COLONOSCOPY 6/1/2015 EYE EXAM RETINAL OR DILATED Q1 9/15/2015 MEDICARE YEARLY EXAM 5/25/2017 HEMOGLOBIN A1C Q6M 8/1/2017 INFLUENZA AGE 9 TO ADULT 8/1/2017 MICROALBUMIN Q1 2/1/2018 LIPID PANEL Q1 2/1/2018 FOOT EXAM Q1 2/20/2018 BREAST CANCER SCRN MAMMOGRAM 4/18/2019 DTaP/Tdap/Td series (2 - Td) 5/8/2025 Allergies as of 5/2/2017  Review Complete On: 5/2/2017 By: Pamela Lucas MD  
 No Known Allergies Current Immunizations  Reviewed on 2/1/2017 Name Date Influenza High Dose Vaccine PF 2/1/2017 Influenza Vaccine 10/26/2015 Influenza Vaccine (Quad) PF 10/26/2015 Pneumococcal Polysaccharide (PPSV-23) 2/4/2016, 10/26/2015 Tdap 5/8/2015 Zoster Vaccine, Live 2/1/2016 Not reviewed this visit You Were Diagnosed With   
  
 Codes Comments Breast cancer of lower-inner quadrant of left female [facility]    -  Primary ICD-10-CM: E96.001 ICD-9-CM: 174.3 Vitals BP Pulse Height(growth percentile) Weight(growth percentile) SpO2 BMI  
 153/75 (BP 1 Location: Right arm, BP Patient Position: Sitting) 71 5' 3\" (1.6 m) 124 lb (56.2 kg) 97% 21.97 kg/m2 OB Status Smoking Status Postmenopausal Current Some Day Smoker Vitals History BMI and BSA Data Body Mass Index Body Surface Area 21.97 kg/m 2 1.58 m 2 Preferred Pharmacy Pharmacy Name Phone [facility] [id number], [address] [facility] [phone] Your Updated Medication List  
  
   
This list is accurate as of: 5/2/17  9:47 AM.  Always use your most recent med list.  
  
  
  
  
 allopurinol 300 mg tablet Commonly known as:  Jackqulyn Dooley Take 1 Tab by mouth daily. atorvastatin 80 mg tablet Commonly known as:  LIPITOR Take 1 Tab by mouth daily. Blood-Glucose Meter monitoring kit  
accu chek gabino smartview meter  
  
 brimonidine 0.15 % ophthalmic solution Commonly known as:  Marlise Ruiz Administer 1 Drop to both eyes two (2) times a day. cilostazol 100 mg tablet Commonly known as:  PLETAL  
TAKE 1 TABLET BEFORE BREAKFAST AND DINNER  
  
 clopidogrel 75 mg Tab Commonly known as:  PLAVIX TAKE 1 TABLET EVERY DAY  
  
 dorzolamide-timolol 22.3-6.8 mg/mL ophthalmic solution Commonly known as:  COSOPT  
  
 gabapentin 100 mg capsule Commonly known as:  NEURONTIN Take 1 Cap by mouth daily. glucose blood VI test strips strip Commonly known as:  [address] Check blood sugar twice a day  
  
 hydroCHLOROthiazide 25 mg tablet Commonly known as:  HYDRODIURIL Take 1 Tab by mouth daily. * ibuprofen 200 mg tablet Commonly known as:  MOTRIN Take 1 Tab by mouth every eight (8) hours as needed for Pain. * ibuprofen 600 mg tablet Commonly known as:  MOTRIN Take 1 Tab by mouth every eight (8) hours as needed for Pain. Lancets Misc Commonly known as:  ACCU-CHEK FASTCLIX Check blood sugar twice a day  
  
 latanoprost 0.005 % ophthalmic solution Commonly known as:  Torres Marcos Administer 1 Drop to both eyes nightly. * metFORMIN 1,000 mg tablet Commonly known as:  GLUCOPHAGE Take 1 Tab by mouth daily. * metFORMIN 500 mg tablet Commonly known as:  GLUCOPHAGE  
 Take 0.5 Tabs by mouth daily (with breakfast). metoprolol tartrate 25 mg tablet Commonly known as:  LOPRESSOR Take 0.5 Tabs by mouth two (2) times a day. traMADol-acetaminophen 37.5-325 mg per tablet Commonly known as:  ULTRACET Take 1 Tab by mouth every eight (8) hours as needed for Pain. Max Daily Amount: 3 Tabs. * Notice: This list has 4 medication(s) that are the same as other medications prescribed for you. Read the directions carefully, and ask your doctor or other care provider to review them with you. To-Do List   
 05/02/2017 Imaging:  MRI BREAST BI W WO CONT   
  
 05/08/2017 10:00 AM  
  Appointment with [facility] CT 1 at [facility] RAD CT ([phone]) NON-CONTRAST STUDY: 1. Bring any non [facility] facility films/images pertaining to the area of interest with you on the day of appointment. 2. Check in at registration at least 30 minutes before appt time unless you were instructed to do otherwise. 3. If you have to drink oral contrast please pick it up any weekday prior to your appointment, if you cannot please check in 2 hrs  before appt time. Introducing [facility]! Emmie Douglas introduces [employer] patient portal. Now you can access parts of your medical record, email your doctor's office, and request medication refills online. 1. In your internet browser, go to [email or web address] 2. Click on the First Time User? Click Here link in the Sign In box. You will see the New Member Sign Up page. 3. Enter your [employer] Access Code exactly as it appears below. You will not need to use this code after youve completed the sign-up process. If you do not sign up before the expiration date, you must request a new code. · [employer] Access Code: [email or web address] Expires: 5/2/2017  3:55 PM 
 
4. Enter the last four digits of your Social Security Number (xxxx) and Date of Birth (mm/dd/yyyy) as indicated and click Submit.  You will be taken to the next sign-up page. 5. Create a [email or web address] ID. This will be your [email or web address] login ID and cannot be changed, so think of one that is secure and easy to remember. 6. Create a [email or web address] password. You can change your password at any time. 7. Enter your Password Reset Question and Answer. This can be used at a later time if you forget your password. 8. Enter your e-mail address. You will receive e-mail notification when new information is available in [address]. 9. Click Sign Up. You can now view and download portions of your medical record. 10. Click the Download Summary menu link to download a portable copy of your medical information. If you have questions, please visit the Frequently Asked Questions section of the [email or web address] website. Remember, [email or web address] is NOT to be used for urgent needs. For medical emergencies, dial 911. Now available from your iPhone and Android! Please provide this summary of care documentation to your next provider. Your primary care clinician is listed as Darion Olson. If you have any questions after today's visit, please call [phone].

## 2017-05-02 NOTE — PROGRESS NOTES
HISTORY OF PRESENT ILLNESS  Tray Chung is a 79 y.o. female who comes in for consultation by Bryan Meneses MD for a new breast cancer  HPI  She went for screening mammogram 3/27/2017 demonstrating a density in the medial aspect of the left breast.   She underwent dx mammogram and US demonstrating a 1.0 x 0.9 x 0.8 cm at 9:00, 2 cm medial to the nipple in the left breast.  US core bx demonstrated a G1 IDC ER pos MD pos Her2/jud unamplified tumor. She denies prior breast issues and had not had a mammogram in two years. She denies skin changes, feeling any masses, nipple changes or drainage, breast pain, unexplained weight loss or bone pain. She had menarche at 15, first of 5 pregnancies at 12, menopause at 48 and did not take HRT. She denies family hx of breast or ovarian cancer.        Past Medical History:   Diagnosis Date    Arthritis     Cerebrovascular accident (stroke) ([address]     Diabetes mellitus type 2, controlled ([address]     Gallstones     asymptomatic    GERD (gastroesophageal reflux disease)     dx with resolution of symptoms on ppi    Hypercholesterolemia     PAD (peripheral artery disease) ([address] 2/20/2013    right SFA angioplasty and athrectomy    Primary open angle glaucoma     Type II diabetes mellitus, uncontrolled ([address] 2/1/2017     Past Surgical History:   Procedure Laterality Date    VASCULAR SURGERY PROCEDURE UNLIST  April 2016     Family History   Problem Relation Age of Onset    Hypertension Mother     Diabetes Mother     Diabetes Son      Social History   Substance Use Topics    Smoking status: Current Some Day Smoker     Types: Cigarettes     Last attempt to quit: 3/13/2013    Smokeless tobacco: None      Comment: estimate is one pack per week    Alcohol use 12.6 oz/week     21 Cans of beer per week      Comment: 2 to 3 beers per day per patient     Current Outpatient Prescriptions   Medication Sig    hydroCHLOROthiazide (HYDRODIURIL) 25 mg tablet Take 1 Tab by mouth daily.  ibuprofen (MOTRIN) 600 mg tablet Take 1 Tab by mouth every eight (8) hours as needed for Pain.  traMADol-acetaminophen (ULTRACET) 37.5-325 mg per tablet Take 1 Tab by mouth every eight (8) hours as needed for Pain. Max Daily Amount: 3 Tabs.  ibuprofen (MOTRIN) 200 mg tablet Take 1 Tab by mouth every eight (8) hours as needed for Pain.  gabapentin (NEURONTIN) 100 mg capsule Take 1 Cap by mouth daily.  clopidogrel (PLAVIX) 75 mg tab TAKE 1 TABLET EVERY DAY    glucose blood VI test strips (ACCU-CHEK SMARTVIEW TEST STRIP) strip Check blood sugar twice a day    Lancets (ACCU-CHEK FASTCLIX) misc Check blood sugar twice a day    latanoprost (XALATAN) 0.005 % ophthalmic solution Administer 1 Drop to both eyes nightly.  brimonidine (ALPHAGAN) 0.15 % ophthalmic solution Administer 1 Drop to both eyes two (2) times a day.  allopurinol (ZYLOPRIM) 300 mg tablet Take 1 Tab by mouth daily.  Blood-Glucose Meter monitoring kit accu chek gabino smartview meter    metFORMIN (GLUCOPHAGE) 500 mg tablet Take 0.5 Tabs by mouth daily (with breakfast).  cilostazol (PLETAL) 100 mg tablet TAKE 1 TABLET BEFORE BREAKFAST AND DINNER    dorzolamide-timolol (COSOPT) 22.3-6.8 mg/mL ophthalmic solution     atorvastatin (LIPITOR) 80 mg tablet Take 1 Tab by mouth daily.  metoprolol (LOPRESSOR) 25 mg tablet Take 0.5 Tabs by mouth two (2) times a day.  metFORMIN (GLUCOPHAGE) 1,000 mg tablet Take 1 Tab by mouth daily. No current facility-administered medications for this visit. No Known Allergies    Review of Systems   Constitutional: Negative for chills, diaphoresis, fever, malaise/fatigue and weight loss. HENT: Negative for congestion, ear pain and sore throat. Eyes: Negative for blurred vision and pain. Respiratory: Negative for cough, hemoptysis, sputum production, shortness of breath, wheezing and stridor.     Cardiovascular: Negative for chest pain, palpitations, orthopnea, claudication, leg swelling and PND. Gastrointestinal: Negative for abdominal pain, blood in stool, constipation, diarrhea, heartburn, melena, nausea and vomiting. Genitourinary: Negative for dysuria, flank pain, frequency, hematuria and urgency. Musculoskeletal: Positive for joint pain. Negative for back pain, myalgias and neck pain. Skin: Negative for itching and rash. Neurological: Negative for dizziness, tremors, focal weakness, seizures, weakness and headaches. Hx stroke without residual effect   Endo/Heme/Allergies: Negative for polydipsia. Psychiatric/Behavioral: Negative for depression and memory loss. The patient is not nervous/anxious. Visit Vitals    /75 (BP 1 Location: Right arm, BP Patient Position: Sitting)    Pulse 71    Ht 5' 3\" (1.6 m)    Wt 56.2 kg (124 lb)    SpO2 97%    BMI 21.97 kg/m2       Physical Exam   Constitutional: She is oriented to person, place, and time. She appears well-developed and well-nourished. No distress. HENT:   Head: Normocephalic and atraumatic. Mouth/Throat: Oropharynx is clear and moist. No oropharyngeal exudate. Eyes: Conjunctivae and EOM are normal. Pupils are equal, round, and reactive to light. No scleral icterus. Neck: Normal range of motion. Neck supple. No JVD present. No tracheal deviation present. No thyromegaly present. Cardiovascular: Normal rate and regular rhythm. Exam reveals no gallop and no friction rub. No murmur heard. Pulmonary/Chest: Effort normal and breath sounds normal. No respiratory distress. She has no wheezes. She has no rales. Right breast exhibits no inverted nipple, no mass, no nipple discharge, no skin change and no tenderness. Left breast exhibits mass (fullness at 0900 ) and tenderness. Left breast exhibits no inverted nipple, no nipple discharge and no skin change. Breasts are symmetrical (small/medium sized). Abdominal: Soft.  Bowel sounds are normal. She exhibits no distension and no mass. There is no tenderness. There is no rebound and no guarding. Musculoskeletal: Normal range of motion. She exhibits no edema. Lymphadenopathy:     She has no cervical adenopathy. She has no axillary adenopathy. Right: No supraclavicular adenopathy present. Left: No supraclavicular adenopathy present. Neurological: She is alert and oriented to person, place, and time. No cranial nerve deficit. Skin: Skin is warm and dry. No rash noted. She is not diaphoretic. No erythema. No pallor. Psychiatric: She has a normal mood and affect. Her behavior is normal. Judgment and thought content normal.     I reviewed her mammogram and US  ASSESSMENT and PLAN  1. Newly diagnosed left breast cancer early stage, Clinically stage 1 ([id number]). I spent over 60 minutes discussing the anatomy and pathophysiology of breast cancer and treatment options, prognosis. We discussed breast conservation therapy vs mastectomy with and without reconstruction, sentinel lymph node biopsy and axillary dissection, various forms of radiation (whole vs accelerated partial breast), medical oncology therapy (SERM vs Aromatase inhibitors, chemotherapy, herceptin). I discussed about BRCA genetic testing and oncotype DX gene mapping of the tumor. I discussed the risks and benefits of surgery including bleeding, infection, paresthesias and numbness, cosmetic changes, lymphedema, seroma, chronic pain, and need for reoperation for positive margins/sentinel nodes. I discussed websites for her to review.   She desires breast MRI to look for occult second primaries  If MRI is negative, she is leaning toward breast conservation with a left lumpectomy with intraoperative US guidance and left axillary SLNB/possible ALND   She will need adjuvant radiation and likely just endocrine therapy  She will f/u after her MRI  Her sister was with her today      Donavan Lucas MD FACS

## 2017-05-08 ENCOUNTER — TELEPHONE (OUTPATIENT)
Dept: FAMILY MEDICINE CLINIC | Age: 67
End: 2017-05-08

## 2017-05-08 ENCOUNTER — HOSPITAL ENCOUNTER (OUTPATIENT)
Dept: MRI IMAGING | Age: 67
Discharge: HOME | End: 2017-05-08
Attending: SURGERY
Payer: MEDICARE

## 2017-05-08 ENCOUNTER — HOSPITAL ENCOUNTER (OUTPATIENT)
Dept: CT IMAGING | Age: 67
Discharge: HOME | End: 2017-05-08
Attending: FAMILY MEDICINE
Payer: MEDICARE

## 2017-05-08 DIAGNOSIS — Z87.891 PERSONAL HISTORY OF NICOTINE DEPENDENCE: ICD-10-CM

## 2017-05-08 DIAGNOSIS — Z72.0 TOBACCO ABUSE DISORDER: ICD-10-CM

## 2017-05-08 DIAGNOSIS — C50.312 BREAST CANCER OF LOWER-INNER QUADRANT OF LEFT FEMALE BREAST (HCC): ICD-10-CM

## 2017-05-08 PROCEDURE — G0297 LDCT FOR LUNG CA SCREEN: HCPCS

## 2017-05-08 NOTE — TELEPHONE ENCOUNTER
Received call from Dr Melissa Blanca, radiologist,  Stated pts low dose CT scan is positive for left lobe Cancer.    Message sent to Dr Darren Cintron

## 2017-05-09 DIAGNOSIS — R91.1 SOLITARY LUNG NODULE: Primary | ICD-10-CM

## 2017-05-09 DIAGNOSIS — Z86.73 HISTORY OF STROKE: ICD-10-CM

## 2017-05-09 RX ORDER — CLOPIDOGREL BISULFATE 75 MG/1
75 TABLET ORAL DAILY
Qty: 30 TAB | Refills: 6 | Status: SHIPPED | OUTPATIENT
Start: 2017-05-09 | End: 2018-01-07 | Stop reason: SDUPTHER

## 2017-05-09 NOTE — TELEPHONE ENCOUNTER
----- Message from Dyllan Garner sent at 5/9/2017  1:05 PM EDT -----  Regarding: /Refill  Pt is requesting a Refill Rx \"Clopidogrel 75 mg\". [facility] on [address] [phone]. Best contact number for pt  is [phone].

## 2017-05-15 ENCOUNTER — HOSPITAL ENCOUNTER (OUTPATIENT)
Dept: MRI IMAGING | Age: 67
Discharge: HOME | End: 2017-05-15
Attending: SURGERY
Payer: MEDICARE

## 2017-05-15 VITALS — WEIGHT: 124 LBS | BODY MASS INDEX: 21.97 KG/M2

## 2017-05-15 DIAGNOSIS — C50.312 MALIGNANT NEOPLASM OF LOWER-INNER QUADRANT OF LEFT FEMALE BREAST (HCC): ICD-10-CM

## 2017-05-15 PROCEDURE — 77059 MRI BREAST BI W WO CONT: CPT

## 2017-05-15 PROCEDURE — 74011250636 HC RX REV CODE- 250/636: Performed by: SURGERY

## 2017-05-15 PROCEDURE — A9585 GADOBUTROL INJECTION: HCPCS | Performed by: SURGERY

## 2017-05-15 RX ADMIN — GADOBUTROL 5.5 ML: 604.72 INJECTION INTRAVENOUS at 10:42

## 2017-05-16 ENCOUNTER — TELEPHONE (OUTPATIENT)
Dept: SURGERY | Age: 67
End: 2017-05-16

## 2017-05-16 NOTE — TELEPHONE ENCOUNTER
MRI  1. Left BI-RADS 6, known malignancy. Right BI-RADS 2, benign. 2. LEFT BREAST: Known invasive ductal carcinoma in the left inner breast at  9:00, 1.2 x 1.1 x 0.8 cm. No MRI evidence for multicentric disease. No  lymphadenopathy.   3. RIGHT BREAST: No MRI sign of malignancy    She is a good candidate for breast conservation  Needs office visit and will need US in office to assess if lesion is still visible    Would plan for a left lumpectomy with intraoperative US guidance and left SLNB/possible ALND under general anesthesia    Left message    Bunny Carpenter MD FACS

## 2017-05-22 ENCOUNTER — OFFICE VISIT (OUTPATIENT)
Dept: FAMILY MEDICINE CLINIC | Age: 67
End: 2017-05-22

## 2017-05-22 VITALS
WEIGHT: 125.2 LBS | OXYGEN SATURATION: 100 % | SYSTOLIC BLOOD PRESSURE: 124 MMHG | HEART RATE: 71 BPM | RESPIRATION RATE: 14 BRPM | DIASTOLIC BLOOD PRESSURE: 65 MMHG | BODY MASS INDEX: 22.18 KG/M2 | HEIGHT: 63 IN | TEMPERATURE: 96.9 F

## 2017-05-22 DIAGNOSIS — R82.90 URINE FINDINGS ABNORMAL: ICD-10-CM

## 2017-05-22 DIAGNOSIS — E11.9 DIABETES MELLITUS WITHOUT COMPLICATION (HCC): Primary | ICD-10-CM

## 2017-05-22 DIAGNOSIS — Z12.11 SCREEN FOR COLON CANCER: ICD-10-CM

## 2017-05-22 DIAGNOSIS — Z23 ENCOUNTER FOR IMMUNIZATION: ICD-10-CM

## 2017-05-22 DIAGNOSIS — I10 ESSENTIAL HYPERTENSION: ICD-10-CM

## 2017-05-22 DIAGNOSIS — Z13.820 SCREENING FOR OSTEOPOROSIS: ICD-10-CM

## 2017-05-22 LAB
BILIRUB UR QL STRIP: NEGATIVE
GLUCOSE UR-MCNC: NEGATIVE MG/DL
HBA1C MFR BLD HPLC: 5.3 %
KETONES P FAST UR STRIP-MCNC: NEGATIVE MG/DL
PH UR STRIP: 6.5 [PH] (ref 4.6–8)
PROT UR QL STRIP: NEGATIVE MG/DL
SP GR UR STRIP: 1.01 (ref 1–1.03)
UA UROBILINOGEN AMB POC: NORMAL (ref 0.2–1)
URINALYSIS CLARITY POC: NORMAL
URINALYSIS COLOR POC: YELLOW
URINE BLOOD POC: NORMAL
URINE LEUKOCYTES POC: NORMAL
URINE NITRITES POC: NEGATIVE

## 2017-05-22 RX ORDER — GABAPENTIN 100 MG/1
100 CAPSULE ORAL DAILY
Qty: 90 CAP | Refills: 4 | Status: SHIPPED | OUTPATIENT
Start: 2017-05-22 | End: 2018-04-05 | Stop reason: ALTCHOICE

## 2017-05-22 NOTE — MR AVS SNAPSHOT
Visit Information Date & Time Provider Department Dept. Phone Encounter #  
 5/22/2017  9:45 AM Inna Cardoso MD [address] [place] [address] OFFICE-ANNEX [phone] [id number] Follow-up Instructions Return in about 6 months (around 11/22/2017), or if symptoms worsen or fail to improve. Your Appointments 5/31/2017  2:40 PM  
ESTABLISHED PATIENT with Ever Infante MD  
[facility]) Appt Note: discuss surgery [address] [address]  
[address] [facility], [address], [address] [facility] [address] [phone] Upcoming Health Maintenance Date Due  
 GLAUCOMA SCREENING Q2Y 2/10/2015 OSTEOPOROSIS SCREENING (DEXA) 2/10/2015 COLONOSCOPY 6/1/2015 EYE EXAM RETINAL OR DILATED Q1 9/15/2015 Pneumococcal 65+ High/Highest Risk (2 of 2 - PCV13) 2/4/2017 MEDICARE YEARLY EXAM 5/25/2017 HEMOGLOBIN A1C Q6M 8/1/2017 INFLUENZA AGE 9 TO ADULT 8/1/2017 MICROALBUMIN Q1 2/1/2018 LIPID PANEL Q1 2/1/2018 FOOT EXAM Q1 2/20/2018 BREAST CANCER SCRN MAMMOGRAM 4/18/2019 DTaP/Tdap/Td series (2 - Td) 5/8/2025 Allergies as of 5/22/2017  Review Complete On: 5/22/2017 By: Inna Cardoso MD  
 No Known Allergies Current Immunizations  Reviewed on 5/22/2017 Name Date Influenza High Dose Vaccine PF 2/1/2017 Influenza Vaccine 10/26/2015 Influenza Vaccine (Quad) PF 10/26/2015 Pneumococcal Polysaccharide (PPSV-23) 2/4/2016, 10/26/2015 Tdap 5/8/2015 Zoster Vaccine, Live 2/1/2016 Reviewed by Inna Cardoso MD on 5/22/2017 at 10:17 AM  
You Were Diagnosed With   
  
 Codes Comments Diabetes mellitus without complication ([facility] [address]    -  Primary ICD-10-CM: E11.9 ICD-9-CM: 250.00 Essential hypertension     ICD-10-CM: I10 
ICD-9-CM: 401.9 Screening for osteoporosis     ICD-10-CM: Z13.820 ICD-9-CM: V82.81   
 Encounter for immunization     ICD-10-CM: K94 ICD-9-CM: V03.89 Screen for colon cancer     ICD-10-CM: Z12.11 ICD-9-CM: V76.51 Uncontrolled type 2 diabetes mellitus with complication, without long-term current use of insulin (HCC)     ICD-10-CM: E11.8, E11.65 ICD-9-CM: 250.82 Vitals BP Pulse Temp Resp Height(growth percentile) Weight(growth percentile) 124/65 (BP 1 Location: Left arm, BP Patient Position: At rest) 71 96.9 °F (36.1 °C) (Oral) 14 5' 3\" (1.6 m) 125 lb 3.2 oz (56.8 kg) SpO2 BMI OB Status Smoking Status 100% 22.18 kg/m2 Postmenopausal Current Some Day Smoker Vitals History BMI and BSA Data Body Mass Index Body Surface Area  
 22.18 kg/m 2 1.59 m 2 Preferred Pharmacy Pharmacy Name Phone [facility] [id number] [place] [phone] [place] [address] Your Updated Medication List  
  
   
This list is accurate as of: 5/22/17 10:29 AM.  Always use your most recent med list.  
  
  
  
  
 allopurinol 300 mg tablet Commonly known as:  Iva Marvin Take 1 Tab by mouth daily. atorvastatin 80 mg tablet Commonly known as:  LIPITOR Take 1 Tab by mouth daily. Blood-Glucose Meter monitoring kit  
accu chek gabino smartview meter  
  
 brimonidine 0.15 % ophthalmic solution Commonly known as:  Renato Callahan Administer 1 Drop to both eyes two (2) times a day. cilostazol 100 mg tablet Commonly known as:  PLETAL  
TAKE 1 TABLET BEFORE BREAKFAST AND DINNER  
  
 clopidogrel 75 mg Tab Commonly known as:  PLAVIX Take 1 Tab by mouth daily. dorzolamide-timolol 22.3-6.8 mg/mL ophthalmic solution Commonly known as:  COSOPT  
  
 gabapentin 100 mg capsule Commonly known as:  NEURONTIN Take 1 Cap by mouth daily. glucose blood VI test strips strip Commonly known as:  [address] Check blood sugar twice a day  
  
 hydroCHLOROthiazide 25 mg tablet Commonly known as:  HYDRODIURIL Take 1 Tab by mouth daily. ibuprofen 600 mg tablet Commonly known as:  MOTRIN Take 1 Tab by mouth every eight (8) hours as needed for Pain. Lancets Misc Commonly known as:  ACCU-CHEK FASTCLIX Check blood sugar twice a day  
  
 latanoprost 0.005 % ophthalmic solution Commonly known as:  Laney Hamming Administer 1 Drop to both eyes nightly. * metFORMIN 1,000 mg tablet Commonly known as:  GLUCOPHAGE Take 1 Tab by mouth daily. * metFORMIN 500 mg tablet Commonly known as:  GLUCOPHAGE Take 0.5 Tabs by mouth daily (with breakfast). metoprolol tartrate 25 mg tablet Commonly known as:  LOPRESSOR Take 0.5 Tabs by mouth two (2) times a day. traMADol-acetaminophen 37.5-325 mg per tablet Commonly known as:  ULTRACET Take 1 Tab by mouth every eight (8) hours as needed for Pain. Max Daily Amount: 3 Tabs. * Notice: This list has 2 medication(s) that are the same as other medications prescribed for you. Read the directions carefully, and ask your doctor or other care provider to review them with you. Follow-up Instructions Return in about 6 months (around 11/22/2017), or if symptoms worsen or fail to improve. Patient Instructions Learning About Benefits From Quitting Smoking How does quitting smoking make you healthier? If you're thinking about quitting smoking, you may have a few reasons to be smoke-free. Your health may be one of them. · When you quit smoking, you lower your risks for cancer, lung disease, heart attack, stroke, blood vessel disease, and blindness from macular degeneration. · When you're smoke-free, you get sick less often, and you heal faster. You are less likely to get colds, flu, bronchitis, and pneumonia. · As a nonsmoker, you may find that your mood is better and you are less stressed. When and how will you feel healthier? Quitting has real health benefits that start from day 1 of being smoke-free. And the longer you stay smoke-free, the healthier you get and the better you feel. The first hours · After just 20 minutes, your blood pressure and heart rate go down. That means there's less stress on your heart and blood vessels. · Within 12 hours, the level of carbon monoxide in your blood drops back to normal. That makes room for more oxygen. With more oxygen in your body, you may notice that you have more energy than when you smoked. After 2 weeks · Your lungs start to work better. · Your risk of heart attack starts to drop. After 1 month · When your lungs are clear, you cough less and breathe deeper, so it's easier to be active. · Your sense of taste and smell return. That means you can enjoy food more than you have since you started smoking. Over the years · After 1 year, your risk of heart disease is half what it would be if you kept smoking. · After 5 years, your risk of stroke starts to shrink. Within a few years after that, it's about the same as if you'd never smoked. · After 10 years, your risk of dying from lung cancer is cut by about half. And your risk for many other types of cancer is lower too. How would quitting help others in your life? When you quit smoking, you improve the health of everyone who now breathes in your smoke. · Their heart, lung, and cancer risks drop, much like yours. · They are sick less. For babies and small children, living smoke-free means they're less likely to have ear infections, pneumonia, and bronchitis. · If you're a woman who is or will be pregnant someday, quitting smoking means a healthier . · Children who are close to you are less likely to become adult smokers. Where can you learn more? Go to http://ines-jojo.info/. Enter 667 [id number] 72 11 in the search box to learn more about \"Learning About Benefits From Quitting Smoking. \" 
 Current as of: May 26, 2016 Content Version: 11.2 © 1944-9942 [employer]. Care instructions adapted under license by [employer] (which disclaims liability or warranty for this information). If you have questions about a medical condition or this instruction, always ask your healthcare professional. [address] any warranty or liability for your use of this information. Learning About Diabetes Food Guidelines Your Care Instructions Meal planning is important to manage diabetes. It helps keep your blood sugar at a target level (which you set with your doctor). You don't have to eat special foods. You can eat what your family eats, including sweets once in a while. But you do have to pay attention to how often you eat and how much you eat of certain foods. You may want to work with a dietitian or a certified diabetes educator (CDE) to help you plan meals and snacks. A dietitian or CDE can also help you lose weight if that is one of your goals. What should you know about eating carbs? Managing the amount of carbohydrate (carbs) you eat is an important part of healthy meals when you have diabetes. Carbohydrate is found in many foods. · Learn which foods have carbs. And learn the amounts of carbs in different foods. ¨ Bread, cereal, pasta, and rice have about 15 grams of carbs in a serving. A serving is 1 slice of bread (1 ounce), ½ cup of cooked cereal, or 1/3 cup of cooked pasta or rice. ¨ Fruits have 15 grams of carbs in a serving. A serving is 1 small fresh fruit, such as an apple or orange; ½ of a banana; ½ cup of cooked or canned fruit; ½ cup of fruit juice; 1 cup of melon or raspberries; or 2 tablespoons of dried fruit. ¨ Milk and no-sugar-added yogurt have 15 grams of carbs in a serving. A serving is 1 cup of milk or 2/3 cup of no-sugar-added yogurt. ¨ Starchy vegetables have 15 grams of carbs in a serving.  A serving is ½ cup of mashed potatoes or sweet potato; 1 cup winter squash; ½ of a small baked potato; ½ cup of cooked beans; or ½ cup cooked corn or green peas. · Learn how much carbs to eat each day and at each meal. A dietitian or CDE can teach you how to keep track of the amount of carbs you eat. This is called carbohydrate counting. · If you are not sure how to count carbohydrate grams, use the Plate Method to plan meals. It is a good, quick way to make sure that you have a balanced meal. It also helps you spread carbs throughout the day. ¨ Divide your plate by types of foods. Put non-starchy vegetables on half the plate, meat or other protein food on one-quarter of the plate, and a grain or starchy vegetable in the final quarter of the plate. To this you can add a small piece of fruit and 1 cup of milk or yogurt, depending on how many carbs you are supposed to eat at a meal. 
· Try to eat about the same amount of carbs at each meal. Do not \"save up\" your daily allowance of carbs to eat at one meal. 
· Proteins have very little or no carbs per serving. Examples of proteins are beef, chicken, turkey, fish, eggs, tofu, cheese, cottage cheese, and peanut butter. A serving size of meat is 3 ounces, which is about the size of a deck of cards. Examples of meat substitute serving sizes (equal to 1 ounce of meat) are 1/4 cup of cottage cheese, 1 egg, 1 tablespoon of peanut butter, and ½ cup of tofu. How can you eat out and still eat healthy? · Learn to estimate the serving sizes of foods that have carbohydrate. If you measure food at home, it will be easier to estimate the amount in a serving of restaurant food. · If the meal you order has too much carbohydrate (such as potatoes, corn, or baked beans), ask to have a low-carbohydrate food instead. Ask for a salad or green vegetables. · If you use insulin, check your blood sugar before and after eating out to help you plan how much to eat in the future. · If you eat more carbohydrate at a meal than you had planned, take a walk or do other exercise. This will help lower your blood sugar. What else should you know? · Limit saturated fat, such as the fat from meat and dairy products. This is a healthy choice because people who have diabetes are at higher risk of heart disease. So choose lean cuts of meat and nonfat or low-fat dairy products. Use olive or canola oil instead of butter or shortening when cooking. · Don't skip meals. Your blood sugar may drop too low if you skip meals and take insulin or certain medicines for diabetes. · Check with your doctor before you drink alcohol. Alcohol can cause your blood sugar to drop too low. Alcohol can also cause a bad reaction if you take certain diabetes medicines. Follow-up care is a key part of your treatment and safety. Be sure to make and go to all appointments, and call your doctor if you are having problems. It's also a good idea to know your test results and keep a list of the medicines you take. Where can you learn more? Go to http://ines-jojo.info/. Enter Z636 in the search box to learn more about \"Learning About Diabetes Food Guidelines. \" Current as of: May 23, 2016 Content Version: 11.2 © 9077-9747 [employer]. Care instructions adapted under license by [employer] (which disclaims liability or warranty for this information). If you have questions about a medical condition or this instruction, always ask your healthcare professional. [place] [address] any warranty or liability for your use of this information. Learning About High Blood Pressure What is high blood pressure? Blood pressure is a measure of how hard the blood pushes against the walls of your arteries. It's normal for blood pressure to go up and down throughout the day, but if it stays up, you have high blood pressure. Another name for high blood pressure is hypertension. Two numbers tell you your blood pressure. The first number is the systolic pressure. It shows how hard the blood pushes when your heart is pumping. The second number is the diastolic pressure. It shows how hard the blood pushes between heartbeats, when your heart is relaxed and filling with blood. A blood pressure of less than 120/80 (say \"120 over 80\") is ideal for an adult. High blood pressure is 140/90 or higher. You have high blood pressure if your top number is 140 or higher or your bottom number is 90 or higher, or both. Many people fall into the category in between, called prehypertension. People with prehypertension need to make lifestyle changes to bring their blood pressure down and help prevent or delay high blood pressure. What happens when you have high blood pressure? · Blood flows through your arteries with too much force. Over time, this damages the walls of your arteries. But you can't feel it. High blood pressure usually doesn't cause symptoms. · Fat and calcium start to build up in your arteries. This buildup is called plaque. Plaque makes your arteries narrower and stiffer. Blood can't flow through them as easily. · This lack of good blood flow starts to damage some of the organs in your body. This can lead to problems such as coronary artery disease and heart attack, heart failure, stroke, kidney failure, and eye damage. How can you prevent high blood pressure? · Stay at a healthy weight. · Try to limit how much sodium you eat to less than 2,300 milligrams (mg) a day. If you limit your sodium to 1,500 mg a day, you can lower your blood pressure even more. ¨ Buy foods that are labeled \"unsalted,\" \"sodium-free,\" or \"low-sodium. \" Foods labeled \"reduced-sodium\" and \"light sodium\" may still have too much sodium.  
¨ Flavor your food with garlic, lemon juice, onion, vinegar, herbs, and spices instead of salt. Do not use soy sauce, steak sauce, onion salt, garlic salt, mustard, or ketchup on your food. ¨ Use less salt (or none) when recipes call for it. You can often use half the salt a recipe calls for without losing flavor. · Be physically active. Get at least 30 minutes of exercise on most days of the week. Walking is a good choice. You also may want to do other activities, such as running, swimming, cycling, or playing tennis or team sports. · Limit alcohol to 2 drinks a day for men and 1 drink a day for women. · Eat plenty of fruits, vegetables, and low-fat dairy products. Eat less saturated and total fats. How is high blood pressure treated? · Your doctor will suggest making lifestyle changes. For example, your doctor may ask you to eat healthy foods, quit smoking, lose extra weight, and be more active. · If lifestyle changes don't help enough or your blood pressure is very high, you will have to take medicine every day. Follow-up care is a key part of your treatment and safety. Be sure to make and go to all appointments, and call your doctor if you are having problems. It's also a good idea to know your test results and keep a list of the medicines you take. Where can you learn more? Go to http://ines-jojo.info/. Enter P501 in the search box to learn more about \"Learning About High Blood Pressure. \" Current as of: March 23, 2016 Content Version: 11.2 © 9228-0630 [employer]. Care instructions adapted under license by [employer] (which disclaims liability or warranty for this information). If you have questions about a medical condition or this instruction, always ask your healthcare professional. [place] [address] any warranty or liability for your use of this information. Introducing [facility]!    
 Mily Jin introduces [employer] patient portal. Now you can access parts of your medical record, email your doctor's office, and request medication refills online. 1. In your internet browser, go to [email or web address] 2. Click on the First Time User? Click Here link in the Sign In box. You will see the New Member Sign Up page. 3. Enter your Genlot Access Code exactly as it appears below. You will not need to use this code after youve completed the sign-up process. If you do not sign up before the expiration date, you must request a new code. · Genlot Access Code: [email or web address] Expires: 8/2/2017  9:01 PM 
 
4. Enter the last four digits of your Social Security Number (xxxx) and Date of Birth (mm/dd/yyyy) as indicated and click Submit. You will be taken to the next sign-up page. 5. Create a Genlot ID. This will be your Genlot login ID and cannot be changed, so think of one that is secure and easy to remember. 6. Create a Genlot password. You can change your password at any time. 7. Enter your Password Reset Question and Answer. This can be used at a later time if you forget your password. 8. Enter your e-mail address. You will receive e-mail notification when new information is available in [address] 9. Click Sign Up. You can now view and download portions of your medical record. 10. Click the Download Summary menu link to download a portable copy of your medical information. If you have questions, please visit the Frequently Asked Questions section of the Genlot website. Remember, Genlot is NOT to be used for urgent needs. For medical emergencies, dial 911. Now available from your iPhone and Android! Please provide this summary of care documentation to your next provider. Your primary care clinician is listed as Jillian Sweeney. If you have any questions after today's visit, please call [phone].

## 2017-05-22 NOTE — PROGRESS NOTES
HISTORY OF PRESENT ILLNESS  Regis Garg is a 79 y.o. female. HPI   Hx of diabetic state  Currently on no meds, since jan of 2017 pt has not taken any diabetic meds, +fhxo f diabetic state, was on diabetic meds for the last 3 yrs, she is having no diabetic diet, and not doing much of daily exercise,  no home glucose monitoring, no Rf needed for today. Denies any tingling sensation, polyurea and polydipsia,   last a1c was at target 5.2%%    . Last podiatry visit 2015   And last eye exam was 2017 last week [facility],   Last urine microalbumin 2016 and was normal  .   Diabetes Latest Ref Rng & Units 2/1/2017 2/1/2017 5/24/2016 5/24/2016   Hgb A1c 4.8 - 5.6 % 5.2  4.9      Diabetes Latest Ref Rng & Units 2/4/2016 10/26/2015   Hgb A1c 4.8 - 5.6 %  5.7 (H)     Last colonoscopy was 10 yrs ago, ++ abnl hemoccult test recently  Never had a DEXA scan    Feeling better since the last visit. Current Outpatient Prescriptions   Medication Sig Dispense Refill    clopidogrel (PLAVIX) 75 mg tab Take 1 Tab by mouth daily. 30 Tab 6    hydroCHLOROthiazide (HYDRODIURIL) 25 mg tablet Take 1 Tab by mouth daily. 90 Tab 3    gabapentin (NEURONTIN) 100 mg capsule Take 1 Cap by mouth daily. 30 Cap 6    glucose blood VI test strips (ACCU-CHEK SMARTVIEW TEST STRIP) strip Check blood sugar twice a day 200 Strip 3    Lancets (ACCU-CHEK FASTCLIX) misc Check blood sugar twice a day 200 Each 3    latanoprost (XALATAN) 0.005 % ophthalmic solution Administer 1 Drop to both eyes nightly. 2.5 mL 1    dorzolamide-timolol (COSOPT) 22.3-6.8 mg/mL ophthalmic solution   0    brimonidine (ALPHAGAN) 0.15 % ophthalmic solution Administer 1 Drop to both eyes two (2) times a day. 15 mL 6    allopurinol (ZYLOPRIM) 300 mg tablet Take 1 Tab by mouth daily. 90 Tab 3    Blood-Glucose Meter monitoring kit accu chek gabino smartview meter 1 Kit 0    metFORMIN (GLUCOPHAGE) 500 mg tablet Take 0.5 Tabs by mouth daily (with breakfast).  90 Tab 3    ibuprofen (MOTRIN) 600 mg tablet Take 1 Tab by mouth every eight (8) hours as needed for Pain. 60 Tab 1    traMADol-acetaminophen (ULTRACET) 37.5-325 mg per tablet Take 1 Tab by mouth every eight (8) hours as needed for Pain. Max Daily Amount: 3 Tabs. 60 Tab 0    cilostazol (PLETAL) 100 mg tablet TAKE 1 TABLET BEFORE BREAKFAST AND DINNER 180 Tab 0    atorvastatin (LIPITOR) 80 mg tablet Take 1 Tab by mouth daily. 90 Tab 3    metoprolol (LOPRESSOR) 25 mg tablet Take 0.5 Tabs by mouth two (2) times a day. 90 Tab 3    metFORMIN (GLUCOPHAGE) 1,000 mg tablet Take 1 Tab by mouth daily.  180 Tab 3     No Known Allergies  Past Medical History:   Diagnosis Date    Arthritis     Cerebrovascular accident (stroke) ([address]     Diabetes mellitus type 2, controlled ([address]     Gallstones     asymptomatic    GERD (gastroesophageal reflux disease)     dx with resolution of symptoms on ppi    Hypercholesterolemia     PAD (peripheral artery disease) ([address] 2/20/2013    right SFA angioplasty and athrectomy    Primary open angle glaucoma     Solitary lung nodule 5/9/2017    Type II diabetes mellitus, uncontrolled ([address] 2/1/2017     Past Surgical History:   Procedure Laterality Date    VASCULAR SURGERY PROCEDURE UNLIST  April 2016     Family History   Problem Relation Age of Onset    Hypertension Mother     Diabetes Mother     Diabetes Son      Social History   Substance Use Topics    Smoking status: Current Some Day Smoker     Types: Cigarettes     Last attempt to quit: 3/13/2013    Smokeless tobacco: Not on file      Comment: estimate is one pack per week    Alcohol use 12.6 oz/week     21 Cans of beer per week      Comment: 2 to 3 beers per day per patient      Lab Results  Component Value Date/Time   WBC 6.0 02/01/2017 03:05 PM   Hemoglobin (POC) 12.3 05/24/2016 10:53 AM   HGB 11.6 02/01/2017 03:05 PM   HCT 35.7 02/01/2017 03:05 PM   PLATELET 063 23/53/6272 03:05 PM   MCV 99 02/01/2017 03:05 PM       Lab Results  Component Value Date/Time   Hemoglobin A1c 5.2 02/01/2017 03:05 PM   Hemoglobin A1c 4.9 05/24/2016 11:52 AM   Hemoglobin A1c 5.7 10/26/2015 10:42 AM   Glucose 93 02/01/2017 03:05 PM   Glucose  non fasting 07/16/2015 09:33 AM   Microalbumin/Creat ratio (mg/g creat) 8 07/03/2014 10:16 AM   Microalb/Creat ratio (ug/mg creat.) 14.3 02/01/2017 03:16 PM   Microalbumin,urine random 0.68 07/03/2014 10:16 AM   LDL, calculated 118 02/01/2017 03:05 PM   Creatinine 0.70 02/01/2017 03:05 PM      Lab Results  Component Value Date/Time   Cholesterol, total 226 02/01/2017 03:05 PM   HDL Cholesterol 78 02/01/2017 03:05 PM   LDL, calculated 118 02/01/2017 03:05 PM   Triglyceride 149 02/01/2017 03:05 PM   CHOL/HDL Ratio 2.3 05/24/2016 11:52 AM       Lab Results  Component Value Date/Time   ALT (SGPT) 11 02/01/2017 03:05 PM   AST (SGOT) 22 02/01/2017 03:05 PM   Alk. phosphatase 73 02/01/2017 03:05 PM   Bilirubin, direct 0.0 03/08/2011 10:16 AM   Bilirubin, total 0.2 02/01/2017 03:05 PM       Lab Results  Component Value Date/Time   GFR est  02/01/2017 03:05 PM   GFR est non-AA 91 02/01/2017 03:05 PM   Creatinine 0.70 02/01/2017 03:05 PM   BUN 9 02/01/2017 03:05 PM   Sodium 140 02/01/2017 03:05 PM   Potassium 4.2 02/01/2017 03:05 PM   Chloride 102 02/01/2017 03:05 PM   CO2 22 02/01/2017 03:05 PM         Review of Systems   Constitutional: Negative for chills and fever. HENT: Negative for ear pain and nosebleeds. Eyes: Negative for blurred vision, pain and discharge. Respiratory: Negative for shortness of breath. Cardiovascular: Negative for chest pain and leg swelling. Gastrointestinal: Negative for constipation, diarrhea, nausea and vomiting. Genitourinary: Negative for frequency. Musculoskeletal: Negative for joint pain. Skin: Negative for itching and rash. Neurological: Negative for headaches. Psychiatric/Behavioral: Negative for depression. The patient is not nervous/anxious. Physical Exam   Constitutional: She is oriented to person, place, and time. She appears well-developed and well-nourished. HENT:   Head: Normocephalic and atraumatic. Eyes: Conjunctivae and EOM are normal.   Neck: Normal range of motion. Neck supple. Cardiovascular: Normal rate, regular rhythm and normal heart sounds. No murmur heard. Pulmonary/Chest: Effort normal and breath sounds normal.   Abdominal: Soft. Bowel sounds are normal. She exhibits no distension. Musculoskeletal: Normal range of motion. She exhibits no edema. Neurological: She is alert and oriented to person, place, and time. Skin: No erythema. Psychiatric: Her behavior is normal.   Nursing note and vitals reviewed. ASSESSMENT and PLAN  Dez Loya was seen today for hypertension. Diagnoses and all orders for this visit:    Diabetes mellitus without complication (HCC)  -     CBC W/O DIFF  -     AMB POC HEMOGLOBIN A1C  -     AMB POC URINALYSIS DIP STICK AUTO W/O MICRO  -     TSH 3RD GENERATION  -     LIPID PANEL  -     METABOLIC PANEL, COMPREHENSIVE    Essential hypertension  -     CBC W/O DIFF  -     TSH 3RD GENERATION    Screening for osteoporosis  -     DEXA BONE DENSITY STUDY AXIAL; Future    Encounter for immunization  -     pneumococcal 13 annalisa conj dip (PREVNAR-13) 0.5 mL syrg injection; 0.5 mL by IntraMUSCular route once for 1 dose. Screen for colon cancer  -     REFERRAL TO GASTROENTEROLOGY    Uncontrolled type 2 diabetes mellitus with complication, without long-term current use of insulin (HCC)  -     gabapentin (NEURONTIN) 100 mg capsule; Take 1 Cap by mouth daily.     Urine findings abnormal  -     CULTURE, URINE

## 2017-05-22 NOTE — PATIENT INSTRUCTIONS
Learning About Benefits From Quitting Smoking  How does quitting smoking make you healthier? If you're thinking about quitting smoking, you may have a few reasons to be smoke-free. Your health may be one of them. · When you quit smoking, you lower your risks for cancer, lung disease, heart attack, stroke, blood vessel disease, and blindness from macular degeneration. · When you're smoke-free, you get sick less often, and you heal faster. You are less likely to get colds, flu, bronchitis, and pneumonia. · As a nonsmoker, you may find that your mood is better and you are less stressed. When and how will you feel healthier? Quitting has real health benefits that start from day 1 of being smoke-free. And the longer you stay smoke-free, the healthier you get and the better you feel. The first hours  · After just 20 minutes, your blood pressure and heart rate go down. That means there's less stress on your heart and blood vessels. · Within 12 hours, the level of carbon monoxide in your blood drops back to normal. That makes room for more oxygen. With more oxygen in your body, you may notice that you have more energy than when you smoked. After 2 weeks  · Your lungs start to work better. · Your risk of heart attack starts to drop. After 1 month  · When your lungs are clear, you cough less and breathe deeper, so it's easier to be active. · Your sense of taste and smell return. That means you can enjoy food more than you have since you started smoking. Over the years  · After 1 year, your risk of heart disease is half what it would be if you kept smoking. · After 5 years, your risk of stroke starts to shrink. Within a few years after that, it's about the same as if you'd never smoked. · After 10 years, your risk of dying from lung cancer is cut by about half. And your risk for many other types of cancer is lower too. How would quitting help others in your life?   When you quit smoking, you improve the health of everyone who now breathes in your smoke. · Their heart, lung, and cancer risks drop, much like yours. · They are sick less. For babies and small children, living smoke-free means they're less likely to have ear infections, pneumonia, and bronchitis. · If you're a woman who is or will be pregnant someday, quitting smoking means a healthier . · Children who are close to you are less likely to become adult smokers. Where can you learn more? Go to http://ines-jojo.info/. Enter 052 [id number] 72 11 in the search box to learn more about \"Learning About Benefits From Quitting Smoking. \"  Current as of: May 26, 2016  Content Version: 11.2  © 2946-0274 [employer]. Care instructions adapted under license by [employer] (which disclaims liability or warranty for this information). If you have questions about a medical condition or this instruction, always ask your healthcare professional. [place] [address] any warranty or liability for your use of this information. Learning About Diabetes Food Guidelines  Your Care Instructions  Meal planning is important to manage diabetes. It helps keep your blood sugar at a target level (which you set with your doctor). You don't have to eat special foods. You can eat what your family eats, including sweets once in a while. But you do have to pay attention to how often you eat and how much you eat of certain foods. You may want to work with a dietitian or a certified diabetes educator (CDE) to help you plan meals and snacks. A dietitian or CDE can also help you lose weight if that is one of your goals. What should you know about eating carbs? Managing the amount of carbohydrate (carbs) you eat is an important part of healthy meals when you have diabetes. Carbohydrate is found in many foods. · Learn which foods have carbs. And learn the amounts of carbs in different foods.   ¨ Bread, cereal, pasta, and rice have about 15 grams of carbs in a serving. A serving is 1 slice of bread (1 ounce), ½ cup of cooked cereal, or 1/3 cup of cooked pasta or rice. ¨ Fruits have 15 grams of carbs in a serving. A serving is 1 small fresh fruit, such as an apple or orange; ½ of a banana; ½ cup of cooked or canned fruit; ½ cup of fruit juice; 1 cup of melon or raspberries; or 2 tablespoons of dried fruit. ¨ Milk and no-sugar-added yogurt have 15 grams of carbs in a serving. A serving is 1 cup of milk or 2/3 cup of no-sugar-added yogurt. ¨ Starchy vegetables have 15 grams of carbs in a serving. A serving is ½ cup of mashed potatoes or sweet potato; 1 cup winter squash; ½ of a small baked potato; ½ cup of cooked beans; or ½ cup cooked corn or green peas. · Learn how much carbs to eat each day and at each meal. A dietitian or CDE can teach you how to keep track of the amount of carbs you eat. This is called carbohydrate counting. · If you are not sure how to count carbohydrate grams, use the Plate Method to plan meals. It is a good, quick way to make sure that you have a balanced meal. It also helps you spread carbs throughout the day. ¨ Divide your plate by types of foods. Put non-starchy vegetables on half the plate, meat or other protein food on one-quarter of the plate, and a grain or starchy vegetable in the final quarter of the plate. To this you can add a small piece of fruit and 1 cup of milk or yogurt, depending on how many carbs you are supposed to eat at a meal.  · Try to eat about the same amount of carbs at each meal. Do not \"save up\" your daily allowance of carbs to eat at one meal.  · Proteins have very little or no carbs per serving. Examples of proteins are beef, chicken, turkey, fish, eggs, tofu, cheese, cottage cheese, and peanut butter. A serving size of meat is 3 ounces, which is about the size of a deck of cards.  Examples of meat substitute serving sizes (equal to 1 ounce of meat) are 1/4 cup of cottage cheese, 1 egg, 1 tablespoon of peanut butter, and ½ cup of tofu. How can you eat out and still eat healthy? · Learn to estimate the serving sizes of foods that have carbohydrate. If you measure food at home, it will be easier to estimate the amount in a serving of restaurant food. · If the meal you order has too much carbohydrate (such as potatoes, corn, or baked beans), ask to have a low-carbohydrate food instead. Ask for a salad or green vegetables. · If you use insulin, check your blood sugar before and after eating out to help you plan how much to eat in the future. · If you eat more carbohydrate at a meal than you had planned, take a walk or do other exercise. This will help lower your blood sugar. What else should you know? · Limit saturated fat, such as the fat from meat and dairy products. This is a healthy choice because people who have diabetes are at higher risk of heart disease. So choose lean cuts of meat and nonfat or low-fat dairy products. Use olive or canola oil instead of butter or shortening when cooking. · Don't skip meals. Your blood sugar may drop too low if you skip meals and take insulin or certain medicines for diabetes. · Check with your doctor before you drink alcohol. Alcohol can cause your blood sugar to drop too low. Alcohol can also cause a bad reaction if you take certain diabetes medicines. Follow-up care is a key part of your treatment and safety. Be sure to make and go to all appointments, and call your doctor if you are having problems. It's also a good idea to know your test results and keep a list of the medicines you take. Where can you learn more? Go to http://ines-jojo.info/. Enter D659 in the search box to learn more about \"Learning About Diabetes Food Guidelines. \"  Current as of: May 23, 2016  Content Version: 11.2  © 1515-0829 [employer].  Care instructions adapted under license by [employer] (which disclaims liability or warranty for this information). If you have questions about a medical condition or this instruction, always ask your healthcare professional. [address] any warranty or liability for your use of this information. Learning About High Blood Pressure  What is high blood pressure? Blood pressure is a measure of how hard the blood pushes against the walls of your arteries. It's normal for blood pressure to go up and down throughout the day, but if it stays up, you have high blood pressure. Another name for high blood pressure is hypertension. Two numbers tell you your blood pressure. The first number is the systolic pressure. It shows how hard the blood pushes when your heart is pumping. The second number is the diastolic pressure. It shows how hard the blood pushes between heartbeats, when your heart is relaxed and filling with blood. A blood pressure of less than 120/80 (say \"120 over 80\") is ideal for an adult. High blood pressure is 140/90 or higher. You have high blood pressure if your top number is 140 or higher or your bottom number is 90 or higher, or both. Many people fall into the category in between, called prehypertension. People with prehypertension need to make lifestyle changes to bring their blood pressure down and help prevent or delay high blood pressure. What happens when you have high blood pressure? · Blood flows through your arteries with too much force. Over time, this damages the walls of your arteries. But you can't feel it. High blood pressure usually doesn't cause symptoms. · Fat and calcium start to build up in your arteries. This buildup is called plaque. Plaque makes your arteries narrower and stiffer. Blood can't flow through them as easily. · This lack of good blood flow starts to damage some of the organs in your body.  This can lead to problems such as coronary artery disease and heart attack, heart failure, stroke, kidney failure, and eye damage. How can you prevent high blood pressure? · Stay at a healthy weight. · Try to limit how much sodium you eat to less than 2,300 milligrams (mg) a day. If you limit your sodium to 1,500 mg a day, you can lower your blood pressure even more. ¨ Buy foods that are labeled \"unsalted,\" \"sodium-free,\" or \"low-sodium. \" Foods labeled \"reduced-sodium\" and \"light sodium\" may still have too much sodium. ¨ Flavor your food with garlic, lemon juice, onion, vinegar, herbs, and spices instead of salt. Do not use soy sauce, steak sauce, onion salt, garlic salt, mustard, or ketchup on your food. ¨ Use less salt (or none) when recipes call for it. You can often use half the salt a recipe calls for without losing flavor. · Be physically active. Get at least 30 minutes of exercise on most days of the week. Walking is a good choice. You also may want to do other activities, such as running, swimming, cycling, or playing tennis or team sports. · Limit alcohol to 2 drinks a day for men and 1 drink a day for women. · Eat plenty of fruits, vegetables, and low-fat dairy products. Eat less saturated and total fats. How is high blood pressure treated? · Your doctor will suggest making lifestyle changes. For example, your doctor may ask you to eat healthy foods, quit smoking, lose extra weight, and be more active. · If lifestyle changes don't help enough or your blood pressure is very high, you will have to take medicine every day. Follow-up care is a key part of your treatment and safety. Be sure to make and go to all appointments, and call your doctor if you are having problems. It's also a good idea to know your test results and keep a list of the medicines you take. Where can you learn more? Go to http://ines-jojo.info/. Enter P501 in the search box to learn more about \"Learning About High Blood Pressure. \"  Current as of: March 23, 2016  Content Version: 11.2  © 5363-6794 [employer]. Care instructions adapted under license by [employer] (which disclaims liability or warranty for this information). If you have questions about a medical condition or this instruction, always ask your healthcare professional. [address] any warranty or liability for your use of this information.

## 2017-05-22 NOTE — PROGRESS NOTES
Elvia Sanchez      Name and  verified          Health Maintenance reviewed-discussed with patient.         Chief Complaint   Patient presents with    Hypertension     3 month f/u

## 2017-05-23 LAB
ALBUMIN SERPL-MCNC: 4.3 G/DL (ref 3.6–4.8)
ALBUMIN/GLOB SERPL: 1.6 {RATIO} (ref 1.2–2.2)
ALP SERPL-CCNC: 73 IU/L (ref 39–117)
ALT SERPL-CCNC: 9 IU/L (ref 0–32)
AST SERPL-CCNC: 24 IU/L (ref 0–40)
BILIRUB SERPL-MCNC: 0.2 MG/DL (ref 0–1.2)
BUN SERPL-MCNC: 21 MG/DL (ref 8–27)
BUN/CREAT SERPL: 24 (ref 12–28)
CALCIUM SERPL-MCNC: 9.9 MG/DL (ref 8.7–10.3)
CHLORIDE SERPL-SCNC: 98 MMOL/L (ref 96–106)
CHOLEST SERPL-MCNC: 247 MG/DL (ref 100–199)
CO2 SERPL-SCNC: 21 MMOL/L (ref 18–29)
CREAT SERPL-MCNC: 0.87 MG/DL (ref 0.57–1)
ERYTHROCYTE [DISTWIDTH] IN BLOOD BY AUTOMATED COUNT: 14.6 % (ref 12.3–15.4)
GLOBULIN SER CALC-MCNC: 2.7 G/DL (ref 1.5–4.5)
GLUCOSE SERPL-MCNC: 96 MG/DL (ref 65–99)
HCT VFR BLD AUTO: 31.8 % (ref 34–46.6)
HDLC SERPL-MCNC: 79 MG/DL
HGB BLD-MCNC: 10.7 G/DL (ref 11.1–15.9)
LDLC SERPL CALC-MCNC: 150 MG/DL (ref 0–99)
MCH RBC QN AUTO: 31.2 PG (ref 26.6–33)
MCHC RBC AUTO-ENTMCNC: 33.6 G/DL (ref 31.5–35.7)
MCV RBC AUTO: 93 FL (ref 79–97)
PLATELET # BLD AUTO: 277 X10E3/UL (ref 150–379)
POTASSIUM SERPL-SCNC: 4.4 MMOL/L (ref 3.5–5.2)
PROT SERPL-MCNC: 7 G/DL (ref 6–8.5)
RBC # BLD AUTO: 3.43 X10E6/UL (ref 3.77–5.28)
SODIUM SERPL-SCNC: 136 MMOL/L (ref 134–144)
TRIGL SERPL-MCNC: 91 MG/DL (ref 0–149)
TSH SERPL DL<=0.005 MIU/L-ACNC: 0.77 UIU/ML (ref 0.45–4.5)
VLDLC SERPL CALC-MCNC: 18 MG/DL (ref 5–40)
WBC # BLD AUTO: 7.7 X10E3/UL (ref 3.4–10.8)

## 2017-05-24 LAB
BACTERIA UR CULT: ABNORMAL
BACTERIA UR CULT: ABNORMAL

## 2017-05-31 ENCOUNTER — OFFICE VISIT (OUTPATIENT)
Dept: SURGERY | Age: 67
End: 2017-05-31

## 2017-05-31 VITALS
HEART RATE: 65 BPM | WEIGHT: 122.5 LBS | OXYGEN SATURATION: 100 % | DIASTOLIC BLOOD PRESSURE: 73 MMHG | BODY MASS INDEX: 21.71 KG/M2 | HEIGHT: 63 IN | SYSTOLIC BLOOD PRESSURE: 166 MMHG

## 2017-05-31 DIAGNOSIS — C50.212 BREAST CANCER OF UPPER-INNER QUADRANT OF LEFT FEMALE BREAST (HCC): Primary | ICD-10-CM

## 2017-05-31 NOTE — PATIENT INSTRUCTIONS
Surgery Instruction Sheet    You have been scheduled for surgery on 6/22/17 at 11:30 at [facility]. Please report to the Surgery Center at 7:30, this is approximately 2 hours prior to your surgery time. The Surgery Center is located on the [facility] [address] side of the hospital, just next to the Emergency Room. Reserved parking is available and  parking if lot is full. You will need to have a Pre-op Visit prior to your surgery. Report to the Surgery Center on 6/16/17 at 1:00. Bring a list of medications and your insurance cards with you. You may eat/drink prior to this visit. Call your physician immediately if you notice a change in your health between the time you saw your physician and the day of surgery. If you take a blood thinner, please let us know. Call your ordering Doctor to make sure you can stop taking it prior to your surgery. STOP YOUR ASPIRIN 10 DAYS PRIOR TO SURGERY. DO NOT TAKE  IBUPROFEN, ADVIL, MOTRIN, ALEVE, EXCEDRIN, BC POWDER, GOODIES, FISH OIL OR ANY MEDICATION CONTAINING ASPIRIN 10 DAYS PRIOR TO YOUR SURGERY. MAY TAKE TYLENOL. Eat a light dinner the evening before your surgery. DO NOT EAT OR DRINK ANYTHING AFTER MIDNIGHT THE NIGHT BEFORE YOUR SURGERY. This includes water, chewing gum, lifesavers, etc.  The Pre op nurse will check with you about any medication that you may need to take the morning of surgery. Shower with a new bar of anti-bacterial soap (Dial, Safeguard) or solution given to you by Pre-op, the night before surgery. Do not use lotion, powder, deodorant on the skin after showering. Wear loose, comfortable clothing the day of surgery and bring a container to store your contacts, eyeglasses, dentures, hearing aid, etc.  Do not bring money, valuables, jewelry, etc. to the hospital.      If you are having outpatient surgery, someone must come with you the morning of surgery to drive you home.   You can not drive for 24 hours after any anesthesia. Sometimes it is necessary to stay overnight and leave the next morning. This is still considered outpatient for most insurance deductibles. Someone will still need to drive you home. If you have questions or concerns, please feel free to call Dr Talha Calhoun at [phone]. If you need to cancel your surgery, please call as soon as possible.

## 2017-05-31 NOTE — MR AVS SNAPSHOT
Visit Information Date & Time Provider Department Dept. Phone Encounter #  
 5/31/2017  2:40 PM Ever Infante MD [facility] [address] [id number] Your Appointments 11/22/2017  8:30 AM  
ROUTINE CARE with Inna Cardoso MD  
[address] OFFICE-ANNEX ([facility]) Appt Note: 6 mo f/u  
 [address] [facility] [place] [facility] [address] [phone] [facility] [id number] Upcoming Health Maintenance Date Due  
 GLAUCOMA SCREENING Q2Y 2/10/2015 OSTEOPOROSIS SCREENING (DEXA) 2/10/2015 COLONOSCOPY 6/1/2015 EYE EXAM RETINAL OR DILATED Q1 9/15/2015 Pneumococcal 65+ High/Highest Risk (2 of 2 - PCV13) 2/4/2017 MEDICARE YEARLY EXAM 5/25/2017 INFLUENZA AGE 9 TO ADULT 8/1/2017 HEMOGLOBIN A1C Q6M 11/22/2017 MICROALBUMIN Q1 2/1/2018 FOOT EXAM Q1 2/20/2018 LIPID PANEL Q1 5/22/2018 BREAST CANCER SCRN MAMMOGRAM 4/18/2019 DTaP/Tdap/Td series (2 - Td) 5/8/2025 Allergies as of 5/31/2017  Review Complete On: 5/31/2017 By: Ever Infante MD  
 No Known Allergies Current Immunizations  Reviewed on 5/22/2017 Name Date Influenza High Dose Vaccine PF 2/1/2017 Influenza Vaccine 10/26/2015 Influenza Vaccine (Quad) PF 10/26/2015 Pneumococcal Polysaccharide (PPSV-23) 2/4/2016, 10/26/2015 Tdap 5/8/2015 Zoster Vaccine, Live 2/1/2016 Not reviewed this visit Vitals BP Pulse Height(growth percentile) Weight(growth percentile) SpO2 BMI  
 166/73 (BP 1 Location: Right arm, BP Patient Position: Sitting) 65 5' 3\" (1.6 m) 122 lb 8 oz (55.6 kg) 100% 21.7 kg/m2 OB Status Smoking Status Postmenopausal Current Some Day Smoker BMI and BSA Data Body Mass Index Body Surface Area 21.7 kg/m 2 1.57 m 2 Preferred Pharmacy Pharmacy Name Phone [id number]. Todd [place]za, [address] [phone] Your Updated Medication List  
  
   
This list is accurate as of: 5/31/17  4:22 PM.  Always use your most recent med list.  
  
  
  
  
 allopurinol 300 mg tablet Commonly known as:  Mordecai Clayman Take 1 Tab by mouth daily. atorvastatin 80 mg tablet Commonly known as:  LIPITOR Take 1 Tab by mouth daily. Blood-Glucose Meter monitoring kit  
accu chek gabino smartview meter  
  
 brimonidine 0.15 % ophthalmic solution Commonly known as:  Brendon Stapler Administer 1 Drop to both eyes two (2) times a day. cilostazol 100 mg tablet Commonly known as:  PLETAL  
TAKE 1 TABLET BEFORE BREAKFAST AND DINNER  
  
 clopidogrel 75 mg Tab Commonly known as:  PLAVIX Take 1 Tab by mouth daily. dorzolamide-timolol 22.3-6.8 mg/mL ophthalmic solution Commonly known as:  COSOPT  
  
 gabapentin 100 mg capsule Commonly known as:  NEURONTIN Take 1 Cap by mouth daily. glucose blood VI test strips strip Commonly known as:  [address] Check blood sugar twice a day  
  
 hydroCHLOROthiazide 25 mg tablet Commonly known as:  HYDRODIURIL Take 1 Tab by mouth daily. ibuprofen 600 mg tablet Commonly known as:  MOTRIN Take 1 Tab by mouth every eight (8) hours as needed for Pain. Lancets Misc Commonly known as:  ACCU-CHEK FASTCLIX Check blood sugar twice a day  
  
 latanoprost 0.005 % ophthalmic solution Commonly known as:  Mathew Mountain Administer 1 Drop to both eyes nightly. * metFORMIN 1,000 mg tablet Commonly known as:  GLUCOPHAGE Take 1 Tab by mouth daily. * metFORMIN 500 mg tablet Commonly known as:  GLUCOPHAGE Take 0.5 Tabs by mouth daily (with breakfast). metoprolol tartrate 25 mg tablet Commonly known as:  LOPRESSOR Take 0.5 Tabs by mouth two (2) times a day. traMADol-acetaminophen 37.5-325 mg per tablet Commonly known as:  ULTRACET Take 1 Tab by mouth every eight (8) hours as needed for Pain. Max Daily Amount: 3 Tabs. * Notice: This list has 2 medication(s) that are the same as other medications prescribed for you. Read the directions carefully, and ask your doctor or other care provider to review them with you. To-Do List   
 06/13/2017 9:30 AM  
  Appointment with [facility] DEXLISA 1 at [address] [place] ([phone]) Please, no calcium supplements or antacids that coat the stomach (ex: Tums, Mylanta) 24 hours prior to procedure. Maintain normal diet and medications. Dairy products are allowed. Wear an outfit with an elastic waistband (no zipper or metal snaps). Check in at registration 15min before your appointment time unless you were instructed to do otherwise.  
  
 06/16/2017 1:00 PM  
  Appointment with [facility] PAT ROOM P3 at [address] [place] [address] ([phone]) Patient Instructions Surgery Instruction Sheet You have been scheduled for surgery on 6/22/17 at 11:30 at [facility] 76.. Please report to the Surgery Center at 7:30, this is approximately 2 hours prior to your surgery time. The Surgery Center is located on the [address] [place] [address] Street side of the hospital, just next to the Emergency Room. Reserved parking is available and  parking if lot is full. You will need to have a Pre-op Visit prior to your surgery. Report to the Surgery Center on 6/16/17 at 1:00. Bring a list of medications and your insurance cards with you. You may eat/drink prior to this visit. Call your physician immediately if you notice a change in your health between the time you saw your physician and the day of surgery. If you take a blood thinner, please let us know. Call your ordering Doctor to make sure you can stop taking it prior to your surgery. STOP YOUR ASPIRIN 10 DAYS PRIOR TO SURGERY. DO NOT TAKE  IBUPROFEN, ADVIL, MOTRIN, ALEVE, EXCEDRIN, BC POWDER, GOODIES, FISH OIL OR ANY MEDICATION CONTAINING ASPIRIN 10 DAYS PRIOR TO YOUR SURGERY. MAY TAKE TYLENOL. Eat a light dinner the evening before your surgery. DO NOT EAT OR DRINK ANYTHING AFTER MIDNIGHT THE NIGHT BEFORE YOUR SURGERY. This includes water, chewing gum, lifesavers, etc.  The Pre op nurse will check with you about any medication that you may need to take the morning of surgery. Shower with a new bar of anti-bacterial soap (Dial, Safeguard) or solution given to you by Pre-op, the night before surgery. Do not use lotion, powder, deodorant on the skin after showering. Wear loose, comfortable clothing the day of surgery and bring a container to store your contacts, eyeglasses, dentures, hearing aid, etc.  Do not bring money, valuables, jewelry, etc. to the hospital.   
 
If you are having outpatient surgery, someone must come with you the morning of surgery to drive you home. You can not drive for 24 hours after any anesthesia. Sometimes it is necessary to stay overnight and leave the next morning. This is still considered outpatient for most insurance deductibles. Someone will still need to drive you home. If you have questions or concerns, please feel free to call Dr Ava Grullon at [phone]. If you need to cancel your surgery, please call as soon as possible. Introducing [facility]! Kavya Chaudhari introduces [employer] patient portal. Now you can access parts of your medical record, email your doctor's office, and request medication refills online. 1. In your internet browser, go to [email or web address] 2. Click on the First Time User? Click Here link in the Sign In box. You will see the New Member Sign Up page. 3. Enter your [employer] Access Code exactly as it appears below. You will not need to use this code after youve completed the sign-up process.  If you do not sign up before the expiration date, you must request a new code. · M[employer] Party Access Code: [email or web address] Expires: 8/2/2017  9:01 PM 
 
4. Enter the last four digits of your Social Security Number (xxxx) and Date of Birth (mm/dd/yyyy) as indicated and click Submit. You will be taken to the next sign-up page. 5. Create a Mouth Party ID. This will be your M[employer] Party login ID and cannot be changed, so think of one that is secure and easy to remember. 6. Create a M[employer] Party password. You can change your password at any time. 7. Enter your Password Reset Question and Answer. This can be used at a later time if you forget your password. 8. Enter your e-mail address. You will receive e-mail notification when new information is available in [address] 9. Click Sign Up. You can now view and download portions of your medical record. 10. Click the Download Summary menu link to download a portable copy of your medical information. If you have questions, please visit the Frequently Asked Questions section of the M[employer] website. Remember, [employer] is NOT to be used for urgent needs. For medical emergencies, dial 911. Now available from your iPhone and Android! Please provide this summary of care documentation to your next provider. Your primary care clinician is listed as George Ware. If you have any questions after today's visit, please call [phone].

## 2017-05-31 NOTE — PROGRESS NOTES
HISTORY OF PRESENT ILLNESS  Delfino Myers is a 79 y.o. female who comes in for follow up for her left breast cancer  Other   Pertinent negatives include no chest pain, no abdominal pain, no headaches and no shortness of breath. She went for screening mammogram 3/27/2017 demonstrating a density in the medial aspect of the left breast.   She underwent dx mammogram and US demonstrating a 1.0 x 0.9 x 0.8 cm at 9:00, 2 cm medial to the nipple in the left breast.  US core bx demonstrated a G1 IDC ER pos OR pos Her2/jud unamplified tumor. She denies prior breast issues and had not had a mammogram in two years. She denies skin changes, feeling any masses, nipple changes or drainage, breast pain, unexplained weight loss or bone pain. She had menarche at 15, first of 5 pregnancies at 12, menopause at 48 and did not take HRT. She denies family hx of breast or ovarian cancer. Breast MRI 5/13/2017 demonstrated 1. Left BI-RADS 6, known malignancy. Right BI-RADS 2, benign. 2. LEFT BREAST: Known invasive ductal carcinoma in the left inner breast at 9:00, 1.2 x 1.1 x 0.8 cm. No MRI evidence for multicentric disease. No lymphadenopathy. 3. RIGHT BREAST: No MRI sign of malignancy.     Past Medical History:   Diagnosis Date    Arthritis     Cerebrovascular accident (stroke) ([address]     Diabetes mellitus type 2, controlled ([address]     Gallstones     asymptomatic    GERD (gastroesophageal reflux disease)     dx with resolution of symptoms on ppi    Hypercholesterolemia     PAD (peripheral artery disease) ([address] 2/20/2013    right SFA angioplasty and athrectomy    Primary open angle glaucoma     Solitary lung nodule 5/9/2017    Type II diabetes mellitus, uncontrolled ([address] 2/1/2017     Past Surgical History:   Procedure Laterality Date    VASCULAR SURGERY PROCEDURE UNLIST  April 2016     Family History   Problem Relation Age of Onset    Hypertension Mother     Diabetes Mother     Diabetes Son      Social History Substance Use Topics    Smoking status: Current Some Day Smoker     Types: Cigarettes     Last attempt to quit: 3/13/2013    Smokeless tobacco: None      Comment: estimate is one pack per week    Alcohol use 12.6 oz/week     21 Cans of beer per week      Comment: 2 to 3 beers per day per patient     Current Outpatient Prescriptions   Medication Sig    gabapentin (NEURONTIN) 100 mg capsule Take 1 Cap by mouth daily.  clopidogrel (PLAVIX) 75 mg tab Take 1 Tab by mouth daily.  hydroCHLOROthiazide (HYDRODIURIL) 25 mg tablet Take 1 Tab by mouth daily.  metFORMIN (GLUCOPHAGE) 500 mg tablet Take 0.5 Tabs by mouth daily (with breakfast).  ibuprofen (MOTRIN) 600 mg tablet Take 1 Tab by mouth every eight (8) hours as needed for Pain.  traMADol-acetaminophen (ULTRACET) 37.5-325 mg per tablet Take 1 Tab by mouth every eight (8) hours as needed for Pain. Max Daily Amount: 3 Tabs.  glucose blood VI test strips (ACCU-CHEK SMARTVIEW TEST STRIP) strip Check blood sugar twice a day    Lancets (ACCU-CHEK FASTCLIX) misc Check blood sugar twice a day    latanoprost (XALATAN) 0.005 % ophthalmic solution Administer 1 Drop to both eyes nightly.  dorzolamide-timolol (COSOPT) 22.3-6.8 mg/mL ophthalmic solution     brimonidine (ALPHAGAN) 0.15 % ophthalmic solution Administer 1 Drop to both eyes two (2) times a day.  Blood-Glucose Meter monitoring kit accu chek gabino smartview meter    cilostazol (PLETAL) 100 mg tablet TAKE 1 TABLET BEFORE BREAKFAST AND DINNER    atorvastatin (LIPITOR) 80 mg tablet Take 1 Tab by mouth daily.  metoprolol (LOPRESSOR) 25 mg tablet Take 0.5 Tabs by mouth two (2) times a day.  allopurinol (ZYLOPRIM) 300 mg tablet Take 1 Tab by mouth daily.  metFORMIN (GLUCOPHAGE) 1,000 mg tablet Take 1 Tab by mouth daily. No current facility-administered medications for this visit.       No Known Allergies    Review of Systems   Constitutional: Negative for chills, diaphoresis, fever, malaise/fatigue and weight loss. HENT: Negative for congestion, ear pain and sore throat. Eyes: Negative for blurred vision and pain. Respiratory: Negative for cough, hemoptysis, sputum production, shortness of breath, wheezing and stridor. Cardiovascular: Negative for chest pain, palpitations, orthopnea, claudication, leg swelling and PND. Gastrointestinal: Negative for abdominal pain, blood in stool, constipation, diarrhea, heartburn, melena, nausea and vomiting. Genitourinary: Negative for dysuria, flank pain, frequency, hematuria and urgency. Musculoskeletal: Positive for joint pain. Negative for back pain, myalgias and neck pain. Skin: Negative for itching and rash. Neurological: Negative for dizziness, tremors, focal weakness, seizures, weakness and headaches. Hx stroke without residual effect   Endo/Heme/Allergies: Negative for polydipsia. Psychiatric/Behavioral: Negative for depression and memory loss. The patient is not nervous/anxious. Visit Vitals    /73 (BP 1 Location: Right arm, BP Patient Position: Sitting)    Pulse 65    Ht 5' 3\" (1.6 m)    Wt 55.6 kg (122 lb 8 oz)    SpO2 100%    BMI 21.7 kg/m2       Physical Exam   Constitutional: She is oriented to person, place, and time. She appears well-developed and well-nourished. No distress. HENT:   Head: Normocephalic and atraumatic. Mouth/Throat: Oropharynx is clear and moist. No oropharyngeal exudate. Eyes: Conjunctivae and EOM are normal. Pupils are equal, round, and reactive to light. No scleral icterus. Neck: Normal range of motion. Neck supple. No JVD present. No tracheal deviation present. No thyromegaly present. Cardiovascular: Normal rate and regular rhythm. Exam reveals no gallop and no friction rub. No murmur heard. Pulmonary/Chest: Effort normal and breath sounds normal. No respiratory distress. She has no wheezes. She has no rales.  Right breast exhibits no inverted nipple, no mass, no nipple discharge, no skin change and no tenderness. Left breast exhibits mass (fullness at 0900 ) and tenderness. Left breast exhibits no inverted nipple, no nipple discharge and no skin change. Breasts are symmetrical (small/medium sized). Abdominal: Soft. Bowel sounds are normal. She exhibits no distension and no mass. There is no tenderness. There is no rebound and no guarding. Musculoskeletal: Normal range of motion. She exhibits no edema. Lymphadenopathy:     She has no cervical adenopathy. She has no axillary adenopathy. Right: No supraclavicular adenopathy present. Left: No supraclavicular adenopathy present. Neurological: She is alert and oriented to person, place, and time. No cranial nerve deficit. Skin: Skin is warm and dry. No rash noted. She is not diaphoretic. No erythema. No pallor. Psychiatric: She has a normal mood and affect. Her behavior is normal. Judgment and thought content normal.     I reviewed her mammogram and US  ASSESSMENT and PLAN  1. Newly diagnosed left breast cancer early stage, Clinically stage 1 ([id number]). I spent over 60 minutes discussing the anatomy and pathophysiology of breast cancer and treatment options, prognosis. We discussed breast conservation therapy vs mastectomy with and without reconstruction, sentinel lymph node biopsy and axillary dissection, various forms of radiation (whole vs accelerated partial breast), medical oncology therapy (SERM vs Aromatase inhibitors, chemotherapy, herceptin). I discussed about BRCA genetic testing and oncotype DX gene mapping of the tumor. I discussed the risks and benefits of surgery including bleeding, infection, paresthesias and numbness, cosmetic changes, lymphedema, seroma, chronic pain, and need for reoperation for positive margins/sentinel nodes. I discussed websites for her to review.   She desires a left lumpectomy with intraoperative US guidance and left axillary SLNB/possible ALND under general anesthesia as an outpatient  She will need adjuvant radiation and likely just endocrine therapy        Marian Oscar MD FACS

## 2017-06-01 NOTE — PROGRESS NOTES
Limited Ultrasound of the breast    Procedure:  Ultrasound of left breast  Indication:  left breast cancer at   Surgeon:  Tere Cox MD FACS  Procedure:  Utilizing a [employer] Nemio 20 high frequency linear array transducer the left  breast was scanned and images obtained with special attention to the 9 o'clock position  Findings:  Hypoechoic 1.0 x 0.9 x 1.2 cm irregular mass superficial depth at 9 o'clock 3 centimeters from the nipple  Impression:  Known malignancy BIRADS: 6  Recommend:  Excision with US guidance in the operating room    Tere Cox MD FACS

## 2017-06-12 DIAGNOSIS — C50.212 BREAST CANCER OF UPPER-INNER QUADRANT OF LEFT FEMALE BREAST (HCC): Primary | ICD-10-CM

## 2017-06-13 ENCOUNTER — HOSPITAL ENCOUNTER (OUTPATIENT)
Dept: BONE DENSITY | Age: 67
Discharge: HOME | End: 2017-06-13
Attending: FAMILY MEDICINE
Payer: MEDICARE

## 2017-06-13 ENCOUNTER — TELEPHONE (OUTPATIENT)
Dept: FAMILY MEDICINE CLINIC | Age: 67
End: 2017-06-13

## 2017-06-13 DIAGNOSIS — Z13.820 SCREENING FOR OSTEOPOROSIS: ICD-10-CM

## 2017-06-13 PROCEDURE — 77080 DXA BONE DENSITY AXIAL: CPT

## 2017-06-13 NOTE — TELEPHONE ENCOUNTER
Received call from pt. Requesting to stop plavix 10 days prior to left breast lumpectomy,  Scheduled for 6/22/17 by Dr Mike Chaudhry.        Message sent to Dr Hussain Thomsa

## 2017-06-15 NOTE — TELEPHONE ENCOUNTER
Patient talk to 10 days to long was changed 5 days before invasive surgical procedure patient agreed

## 2017-06-16 ENCOUNTER — HOSPITAL ENCOUNTER (OUTPATIENT)
Dept: PREADMISSION TESTING | Age: 67
Discharge: HOME | End: 2017-06-16
Attending: SURGERY
Payer: MEDICARE

## 2017-06-16 ENCOUNTER — HOSPITAL ENCOUNTER (OUTPATIENT)
Dept: GENERAL RADIOLOGY | Age: 67
Discharge: HOME | End: 2017-06-16
Attending: SURGERY
Payer: MEDICARE

## 2017-06-16 VITALS
TEMPERATURE: 98.6 F | OXYGEN SATURATION: 99 % | RESPIRATION RATE: 16 BRPM | HEIGHT: 63 IN | BODY MASS INDEX: 21.95 KG/M2 | WEIGHT: 123.9 LBS | HEART RATE: 65 BPM | SYSTOLIC BLOOD PRESSURE: 144 MMHG | DIASTOLIC BLOOD PRESSURE: 61 MMHG

## 2017-06-16 LAB
ALBUMIN SERPL BCP-MCNC: 3.7 G/DL (ref 3.5–5)
ALBUMIN/GLOB SERPL: 1 {RATIO} (ref 1.1–2.2)
ALP SERPL-CCNC: 66 U/L (ref 45–117)
ALT SERPL-CCNC: 14 U/L (ref 12–78)
ANION GAP BLD CALC-SCNC: 6 MMOL/L (ref 5–15)
AST SERPL W P-5'-P-CCNC: 18 U/L (ref 15–37)
BILIRUB SERPL-MCNC: 0.4 MG/DL (ref 0.2–1)
BUN SERPL-MCNC: 19 MG/DL (ref 6–20)
BUN/CREAT SERPL: 19 (ref 12–20)
CALCIUM SERPL-MCNC: 9.6 MG/DL (ref 8.5–10.1)
CHLORIDE SERPL-SCNC: 106 MMOL/L (ref 97–108)
CO2 SERPL-SCNC: 25 MMOL/L (ref 21–32)
CREAT SERPL-MCNC: 1.02 MG/DL (ref 0.55–1.02)
ERYTHROCYTE [DISTWIDTH] IN BLOOD BY AUTOMATED COUNT: 14.4 % (ref 11.5–14.5)
GLOBULIN SER CALC-MCNC: 3.6 G/DL (ref 2–4)
GLUCOSE SERPL-MCNC: 97 MG/DL (ref 65–100)
HCT VFR BLD AUTO: 30.7 % (ref 35–47)
HGB BLD-MCNC: 10.4 G/DL (ref 11.5–16)
MCH RBC QN AUTO: 31.4 PG (ref 26–34)
MCHC RBC AUTO-ENTMCNC: 33.9 G/DL (ref 30–36.5)
MCV RBC AUTO: 92.7 FL (ref 80–99)
PLATELET # BLD AUTO: 256 K/UL (ref 150–400)
POTASSIUM SERPL-SCNC: 3.4 MMOL/L (ref 3.5–5.1)
PROT SERPL-MCNC: 7.3 G/DL (ref 6.4–8.2)
RBC # BLD AUTO: 3.31 M/UL (ref 3.8–5.2)
SODIUM SERPL-SCNC: 137 MMOL/L (ref 136–145)
WBC # BLD AUTO: 5.1 K/UL (ref 3.6–11)

## 2017-06-16 PROCEDURE — 93005 ELECTROCARDIOGRAM TRACING: CPT

## 2017-06-16 PROCEDURE — 71020 XR CHEST PA LAT: CPT

## 2017-06-16 PROCEDURE — 80053 COMPREHEN METABOLIC PANEL: CPT | Performed by: SURGERY

## 2017-06-16 PROCEDURE — 36415 COLL VENOUS BLD VENIPUNCTURE: CPT | Performed by: SURGERY

## 2017-06-16 PROCEDURE — 85027 COMPLETE CBC AUTOMATED: CPT | Performed by: SURGERY

## 2017-06-16 NOTE — PERIOP NOTES
[facility]  Preoperative Instructions        Surgery Date 06/22/2017          Time of Arrival 0700 am Contact # [phone]    1. On the day of your surgery, please report to the Surgical Services Registration Desk and sign in at your designated time. The Surgery Center is located to the right of the Emergency Room. 2. You must have someone with you to drive you home. You should not drive a car for 24 hours following surgery. Please make arrangements for a friend or family member to stay with you for the first 24 hours after your surgery. 3. Do not have anything to eat or drink (including water, gum, mints, coffee, juice) after midnight 06/21/2017 . This may not apply to medications prescribed by your physician. Please note special instructions, if applicable. If you are currently taking Plavix, Coumadin, or other blood-thinning agents, contact your surgeon for instructions. 4. We recommend you do not drink any alcoholic beverages for 24 hours before and after your surgery. 5. Have a list of all current medications, including vitamins, herbal supplements and any other over the counter medications. Stop all Aspirin and non-steroidal anti-inflammatory drugs (I.e. Advil, Aleve), as directed by your surgeon's office. Stop all vitamins and herbal supplements seven days prior to your surgery. 6. Wear comfortable clothes. Wear glasses instead of contacts. Do not bring any money or jewelry. Please bring picture ID, insurance card, and any prearranged co-payment or hospital payment. Do not wear make-up, particularly mascara the morning of your surgery. Do not wear nail polish, particularly if you are having foot /hand surgery. Wear your hair loose or down, no ponytails, buns, terrance pins or clips. All body piercings must be removed.   Please shower with antibacterial soap for three consecutive days before and on the morning of surgery, but do not apply any lotions, powders or deodorants after the shower on the day of surgery. Please use a fresh towels after each shower. Please sleep in clean clothes and change bed linens the night before surgery. Please do not shave for 48 hours prior to surgery. Shaving of the face is acceptable. 7. You should understand that if you do not follow these instructions your surgery may be cancelled. If your physical condition changes (I.e. fever, cold or flu) please contact your surgeon as soon as possible. 8. It is important that you be on time. If a situation occurs where you may be late, please call [phone] (OR Holding Area). 9. If you have any questions and or problems, please call [phone] (Pre-admission Testing). 10. Your surgery time may be subject to change. You will receive a phone call the evening prior if your time changes. 11.  If having outpatient surgery, you must have someone to drive you here, stay with you during the duration of your stay, and to drive you home at time of discharge. Special Instructions:    MEDICATIONS TO TAKE THE MORNING OF SURGERY WITH A SIP OF WATER:allopurinol, alphagan eye drops, cosopt eye drops, gabapentin, ultracet (tramadol/tylenol) if needed      I understand a pre-operative phone call will be made to verify my surgery time. In the event that I am not available, I give permission for a message to be left on my answering service and/or with another person?   yes         ___________________      __________   _________    (Signature of Patient)             (Witness)                (Date and Time)

## 2017-06-17 LAB
ATRIAL RATE: 64 BPM
CALCULATED P AXIS, ECG09: 59 DEGREES
CALCULATED R AXIS, ECG10: 11 DEGREES
CALCULATED T AXIS, ECG11: 42 DEGREES
DIAGNOSIS, 93000: NORMAL
P-R INTERVAL, ECG05: 194 MS
Q-T INTERVAL, ECG07: 416 MS
QRS DURATION, ECG06: 70 MS
QTC CALCULATION (BEZET), ECG08: 429 MS
VENTRICULAR RATE, ECG03: 64 BPM

## 2017-06-19 ENCOUNTER — TELEPHONE (OUTPATIENT)
Dept: SURGERY | Age: 67
End: 2017-06-19

## 2017-06-19 NOTE — TELEPHONE ENCOUNTER
Left message for patient  Making sure she has follow up with pulmonary regarding the pulmonary nodule.     She has appt with pulmonary to work up the lung nodule  She desires to proceed with breast cancer surgery as planned on 7/22  Will get her to see pulmonary as this looks suspicious    Jaxson Tee MD FACS

## 2017-06-19 NOTE — PERIOP NOTES
Spoke with Dr Dakota Tracy office. Dr Elyssa Connell will look at lab results and chest xray this afternnon. Will fax follow up after.

## 2017-06-20 RX ORDER — ALLOPURINOL 300 MG/1
300 TABLET ORAL DAILY
Qty: 30 TAB | Refills: 0 | Status: SHIPPED | OUTPATIENT
Start: 2017-06-20 | End: 2017-07-27 | Stop reason: SDUPTHER

## 2017-06-20 NOTE — PERIOP NOTES
Received fax from Dr Christoph Corrales office and labs reviewed and no treatment. Pt to see pulmonary for abnormal CXR.

## 2017-06-20 NOTE — TELEPHONE ENCOUNTER
----- Message from Jaquan Greco sent at 6/20/2017 12:24 PM EDT -----  Regarding: Dr. Thania Baez  Pt request to have prescription refill on Allopurinol for gout. Best contact number to reach pt is [phone].

## 2017-06-22 ENCOUNTER — HOSPITAL ENCOUNTER (OUTPATIENT)
Age: 67
Setting detail: OUTPATIENT SURGERY
Discharge: HOME | End: 2017-06-22
Attending: SURGERY | Admitting: SURGERY
Payer: MEDICARE

## 2017-06-22 ENCOUNTER — APPOINTMENT (OUTPATIENT)
Dept: NUCLEAR MEDICINE | Age: 67
End: 2017-06-22
Attending: SURGERY
Payer: MEDICARE

## 2017-06-22 ENCOUNTER — ANESTHESIA EVENT (OUTPATIENT)
Dept: SURGERY | Age: 67
End: 2017-06-22
Payer: MEDICARE

## 2017-06-22 ENCOUNTER — ANESTHESIA (OUTPATIENT)
Dept: SURGERY | Age: 67
End: 2017-06-22
Payer: MEDICARE

## 2017-06-22 VITALS
HEART RATE: 50 BPM | WEIGHT: 123.24 LBS | DIASTOLIC BLOOD PRESSURE: 56 MMHG | BODY MASS INDEX: 21.84 KG/M2 | TEMPERATURE: 97.8 F | OXYGEN SATURATION: 100 % | HEIGHT: 63 IN | RESPIRATION RATE: 12 BRPM | SYSTOLIC BLOOD PRESSURE: 162 MMHG

## 2017-06-22 DIAGNOSIS — C50.212 BREAST CANCER OF UPPER-INNER QUADRANT OF LEFT FEMALE BREAST (HCC): ICD-10-CM

## 2017-06-22 LAB
ANION GAP BLD CALC-SCNC: 16 MMOL/L (ref 5–15)
BUN BLD-MCNC: 15 MG/DL (ref 9–20)
CA-I BLD-MCNC: 1.15 MMOL/L (ref 1.12–1.32)
CHLORIDE BLD-SCNC: 109 MMOL/L (ref 98–107)
CO2 BLD-SCNC: 19 MMOL/L (ref 21–32)
CREAT BLD-MCNC: 0.9 MG/DL (ref 0.6–1.3)
GLUCOSE BLD STRIP.AUTO-MCNC: 121 MG/DL (ref 65–100)
GLUCOSE BLD-MCNC: 95 MG/DL (ref 65–100)
HCT VFR BLD CALC: 31 % (ref 35–47)
HGB BLD-MCNC: 10.5 GM/DL (ref 11.5–16)
POTASSIUM BLD-SCNC: 4.3 MMOL/L (ref 3.5–5.1)
SERVICE CMNT-IMP: ABNORMAL
SERVICE CMNT-IMP: ABNORMAL
SODIUM BLD-SCNC: 139 MMOL/L (ref 136–145)

## 2017-06-22 PROCEDURE — 88307 TISSUE EXAM BY PATHOLOGIST: CPT | Performed by: SURGERY

## 2017-06-22 PROCEDURE — 77030003029 HC SUT VCRL J&J -B: Performed by: SURGERY

## 2017-06-22 PROCEDURE — 77030020143 HC AIRWY LARYN INTUB CGAS -A: Performed by: ANESTHESIOLOGY

## 2017-06-22 PROCEDURE — 77030031139 HC SUT VCRL2 J&J -A: Performed by: SURGERY

## 2017-06-22 PROCEDURE — 88342 IMHCHEM/IMCYTCHM 1ST ANTB: CPT | Performed by: SURGERY

## 2017-06-22 PROCEDURE — 76010000149 HC OR TIME 1 TO 1.5 HR: Performed by: SURGERY

## 2017-06-22 PROCEDURE — 76060000033 HC ANESTHESIA 1 TO 1.5 HR: Performed by: SURGERY

## 2017-06-22 PROCEDURE — 74011250636 HC RX REV CODE- 250/636: Performed by: SURGERY

## 2017-06-22 PROCEDURE — 76210000026 HC REC RM PH II 1 TO 1.5 HR: Performed by: SURGERY

## 2017-06-22 PROCEDURE — A9541 TC99M SULFUR COLLOID: HCPCS

## 2017-06-22 PROCEDURE — 77030032490 HC SLV COMPR SCD KNE COVD -B: Performed by: SURGERY

## 2017-06-22 PROCEDURE — 74011250636 HC RX REV CODE- 250/636: Performed by: ANESTHESIOLOGY

## 2017-06-22 PROCEDURE — 80047 BASIC METABLC PNL IONIZED CA: CPT

## 2017-06-22 PROCEDURE — 77030010516 HC APPL HEMA CLP TELE -B: Performed by: SURGERY

## 2017-06-22 PROCEDURE — 74011000250 HC RX REV CODE- 250

## 2017-06-22 PROCEDURE — 77030018836 HC SOL IRR NACL ICUM -A: Performed by: SURGERY

## 2017-06-22 PROCEDURE — 77030010507 HC ADH SKN DERMBND J&J -B: Performed by: SURGERY

## 2017-06-22 PROCEDURE — 74011250636 HC RX REV CODE- 250/636

## 2017-06-22 PROCEDURE — 82962 GLUCOSE BLOOD TEST: CPT

## 2017-06-22 PROCEDURE — 76210000006 HC OR PH I REC 0.5 TO 1 HR: Performed by: SURGERY

## 2017-06-22 PROCEDURE — 88331 PATH CONSLTJ SURG 1 BLK 1SPC: CPT | Performed by: SURGERY

## 2017-06-22 PROCEDURE — 77030011640 HC PAD GRND REM COVD -A: Performed by: SURGERY

## 2017-06-22 PROCEDURE — 77030002996 HC SUT SLK J&J -A: Performed by: SURGERY

## 2017-06-22 RX ORDER — HYDROMORPHONE HYDROCHLORIDE 2 MG/ML
INJECTION, SOLUTION INTRAMUSCULAR; INTRAVENOUS; SUBCUTANEOUS AS NEEDED
Status: DISCONTINUED | OUTPATIENT
Start: 2017-06-22 | End: 2017-06-22 | Stop reason: HOSPADM

## 2017-06-22 RX ORDER — CEFAZOLIN SODIUM IN 0.9 % NACL 2 G/100 ML
2 PLASTIC BAG, INJECTION (ML) INTRAVENOUS
Status: COMPLETED | OUTPATIENT
Start: 2017-06-22 | End: 2017-06-22

## 2017-06-22 RX ORDER — FENTANYL CITRATE 50 UG/ML
25 INJECTION, SOLUTION INTRAMUSCULAR; INTRAVENOUS
Status: DISCONTINUED | OUTPATIENT
Start: 2017-06-22 | End: 2017-06-22 | Stop reason: HOSPADM

## 2017-06-22 RX ORDER — PROPOFOL 10 MG/ML
INJECTION, EMULSION INTRAVENOUS AS NEEDED
Status: DISCONTINUED | OUTPATIENT
Start: 2017-06-22 | End: 2017-06-22 | Stop reason: HOSPADM

## 2017-06-22 RX ORDER — ONDANSETRON 2 MG/ML
4 INJECTION INTRAMUSCULAR; INTRAVENOUS AS NEEDED
Status: DISCONTINUED | OUTPATIENT
Start: 2017-06-22 | End: 2017-06-22 | Stop reason: HOSPADM

## 2017-06-22 RX ORDER — FENTANYL CITRATE 50 UG/ML
50 INJECTION, SOLUTION INTRAMUSCULAR; INTRAVENOUS AS NEEDED
Status: DISCONTINUED | OUTPATIENT
Start: 2017-06-22 | End: 2017-06-22 | Stop reason: HOSPADM

## 2017-06-22 RX ORDER — HYDROMORPHONE HYDROCHLORIDE 1 MG/ML
.2-.5 INJECTION, SOLUTION INTRAMUSCULAR; INTRAVENOUS; SUBCUTANEOUS
Status: DISCONTINUED | OUTPATIENT
Start: 2017-06-22 | End: 2017-06-22 | Stop reason: HOSPADM

## 2017-06-22 RX ORDER — SODIUM CHLORIDE, SODIUM LACTATE, POTASSIUM CHLORIDE, CALCIUM CHLORIDE 600; 310; 30; 20 MG/100ML; MG/100ML; MG/100ML; MG/100ML
25 INJECTION, SOLUTION INTRAVENOUS CONTINUOUS
Status: DISCONTINUED | OUTPATIENT
Start: 2017-06-22 | End: 2017-06-22 | Stop reason: HOSPADM

## 2017-06-22 RX ORDER — ONDANSETRON 2 MG/ML
INJECTION INTRAMUSCULAR; INTRAVENOUS AS NEEDED
Status: DISCONTINUED | OUTPATIENT
Start: 2017-06-22 | End: 2017-06-22 | Stop reason: HOSPADM

## 2017-06-22 RX ORDER — MIDAZOLAM HYDROCHLORIDE 1 MG/ML
INJECTION, SOLUTION INTRAMUSCULAR; INTRAVENOUS AS NEEDED
Status: DISCONTINUED | OUTPATIENT
Start: 2017-06-22 | End: 2017-06-22 | Stop reason: HOSPADM

## 2017-06-22 RX ORDER — LIDOCAINE HYDROCHLORIDE 10 MG/ML
0.1 INJECTION, SOLUTION EPIDURAL; INFILTRATION; INTRACAUDAL; PERINEURAL AS NEEDED
Status: DISCONTINUED | OUTPATIENT
Start: 2017-06-22 | End: 2017-06-22 | Stop reason: HOSPADM

## 2017-06-22 RX ORDER — DEXAMETHASONE SODIUM PHOSPHATE 4 MG/ML
INJECTION, SOLUTION INTRA-ARTICULAR; INTRALESIONAL; INTRAMUSCULAR; INTRAVENOUS; SOFT TISSUE AS NEEDED
Status: DISCONTINUED | OUTPATIENT
Start: 2017-06-22 | End: 2017-06-22 | Stop reason: HOSPADM

## 2017-06-22 RX ORDER — HYDROCODONE BITARTRATE AND ACETAMINOPHEN 5; 325 MG/1; MG/1
1-2 TABLET ORAL
Qty: 30 TAB | Refills: 0 | Status: SHIPPED | OUTPATIENT
Start: 2017-06-22 | End: 2017-10-16 | Stop reason: ALTCHOICE

## 2017-06-22 RX ORDER — FENTANYL CITRATE 50 UG/ML
INJECTION, SOLUTION INTRAMUSCULAR; INTRAVENOUS AS NEEDED
Status: DISCONTINUED | OUTPATIENT
Start: 2017-06-22 | End: 2017-06-22 | Stop reason: HOSPADM

## 2017-06-22 RX ORDER — DIPHENHYDRAMINE HYDROCHLORIDE 50 MG/ML
12.5 INJECTION, SOLUTION INTRAMUSCULAR; INTRAVENOUS AS NEEDED
Status: DISCONTINUED | OUTPATIENT
Start: 2017-06-22 | End: 2017-06-22 | Stop reason: HOSPADM

## 2017-06-22 RX ORDER — LIDOCAINE HYDROCHLORIDE 20 MG/ML
INJECTION, SOLUTION EPIDURAL; INFILTRATION; INTRACAUDAL; PERINEURAL AS NEEDED
Status: DISCONTINUED | OUTPATIENT
Start: 2017-06-22 | End: 2017-06-22 | Stop reason: HOSPADM

## 2017-06-22 RX ORDER — MIDAZOLAM HYDROCHLORIDE 1 MG/ML
1 INJECTION, SOLUTION INTRAMUSCULAR; INTRAVENOUS AS NEEDED
Status: DISCONTINUED | OUTPATIENT
Start: 2017-06-22 | End: 2017-06-22 | Stop reason: HOSPADM

## 2017-06-22 RX ADMIN — HYDROMORPHONE HYDROCHLORIDE 0.2 MG: 2 INJECTION, SOLUTION INTRAMUSCULAR; INTRAVENOUS; SUBCUTANEOUS at 11:41

## 2017-06-22 RX ADMIN — LIDOCAINE HYDROCHLORIDE 60 MG: 20 INJECTION, SOLUTION EPIDURAL; INFILTRATION; INTRACAUDAL; PERINEURAL at 10:58

## 2017-06-22 RX ADMIN — CEFAZOLIN 2 G: 10 INJECTION, POWDER, FOR SOLUTION INTRAVENOUS; PARENTERAL at 10:55

## 2017-06-22 RX ADMIN — MIDAZOLAM HYDROCHLORIDE 2 MG: 1 INJECTION, SOLUTION INTRAMUSCULAR; INTRAVENOUS at 10:49

## 2017-06-22 RX ADMIN — PROPOFOL 130 MG: 10 INJECTION, EMULSION INTRAVENOUS at 10:58

## 2017-06-22 RX ADMIN — FENTANYL CITRATE 50 MCG: 50 INJECTION, SOLUTION INTRAMUSCULAR; INTRAVENOUS at 10:58

## 2017-06-22 RX ADMIN — DEXAMETHASONE SODIUM PHOSPHATE 4 MG: 4 INJECTION, SOLUTION INTRA-ARTICULAR; INTRALESIONAL; INTRAMUSCULAR; INTRAVENOUS; SOFT TISSUE at 11:07

## 2017-06-22 RX ADMIN — SODIUM CHLORIDE, POTASSIUM CHLORIDE, SODIUM LACTATE AND CALCIUM CHLORIDE: 600; 310; 30; 20 INJECTION, SOLUTION INTRAVENOUS at 10:50

## 2017-06-22 RX ADMIN — ONDANSETRON 4 MG: 2 INJECTION INTRAMUSCULAR; INTRAVENOUS at 11:07

## 2017-06-22 RX ADMIN — HYDROMORPHONE HYDROCHLORIDE 0.2 MG: 2 INJECTION, SOLUTION INTRAMUSCULAR; INTRAVENOUS; SUBCUTANEOUS at 11:37

## 2017-06-22 RX ADMIN — PROPOFOL 70 MG: 10 INJECTION, EMULSION INTRAVENOUS at 11:15

## 2017-06-22 RX ADMIN — FENTANYL CITRATE 50 MCG: 50 INJECTION, SOLUTION INTRAMUSCULAR; INTRAVENOUS at 11:15

## 2017-06-22 NOTE — ANESTHESIA PREPROCEDURE EVALUATION
Anesthetic History   No history of anesthetic complications            Review of Systems / Medical History  Patient summary reviewed, nursing notes reviewed and pertinent labs reviewed    Pulmonary          Smoker         Neuro/Psych       CVA    Pertinent negatives: No TIA   Cardiovascular    Hypertension          PAD and hyperlipidemia    Exercise tolerance: >4 METS     GI/Hepatic/Renal             Pertinent negatives: No GERD (patient denies)   Endo/Other    Diabetes    Arthritis, cancer (L breast CA) and anemia     Other Findings   Comments: open angle glaucoma     Solitary lung nodule    Gout          Physical Exam    Airway  Mallampati: II  TM Distance: 4 - 6 cm  Neck ROM: normal range of motion   Mouth opening: Normal     Cardiovascular  Regular rate and rhythm,  S1 and S2 normal,  no murmur, click, rub, or gallop             Dental    Dentition: Poor dentition     Pulmonary  Breath sounds clear to auscultation               Abdominal  GI exam deferred       Other Findings            Anesthetic Plan    ASA: 3  Anesthesia type: general    Monitoring Plan: BIS      Induction: Intravenous  Anesthetic plan and risks discussed with: Patient

## 2017-06-22 NOTE — ANESTHESIA POSTPROCEDURE EVALUATION
Post-Anesthesia Evaluation and Assessment    Patient: Roberth Morales MRN: [id number]  SSN: [id number]    YOB: 1950  Age: 79 y.o. Sex: female       Cardiovascular Function/Vital Signs  Visit Vitals    /66    Pulse 61    Temp 36.5 °C (97.7 °F)    Resp 15    Ht 5' 3\" (1.6 m)    Wt 55.9 kg (123 lb 3.8 oz)    SpO2 100%    BMI 21.83 kg/m2       Patient is status post general anesthesia for Procedure(s):  LEFT LUMPECTOMY WITH INTRAOPERATIVE ULTRASOUND GUIDED, LEFT AXILLARY SENTINEL LYMPH NODE BIOPSY, POSSIBLE AXILLARY DISECTION  SENTINEL NODE BIOPSY. Nausea/Vomiting: None    Postoperative hydration reviewed and adequate. Pain:  Pain Scale 1: Numeric (0 - 10) (06/22/17 1230)  Pain Intensity 1: 2 (06/22/17 1230)   Managed    Neurological Status:   Neuro (WDL): Exceptions to WDL (06/22/17 1204)  Neuro  Neurologic State: Drowsy; Eyes open spontaneously (06/22/17 1204)  Orientation Level: Oriented to person;Oriented to place (06/22/17 1204)  Cognition: Follows commands (06/22/17 1204)  Assessment L Pupil: Deferred (06/22/17 0805)  Assessment R Pupil: Deferred (06/22/17 0805)  LUE Motor Response: Purposeful (06/22/17 1204)  LLE Motor Response: Purposeful (06/22/17 1204)  RUE Motor Response: Purposeful (06/22/17 1204)  RLE Motor Response: Purposeful (06/22/17 1204)   At baseline    Mental Status and Level of Consciousness: Arousable    Pulmonary Status:   O2 Device: Room air (06/22/17 1220)   Adequate oxygenation and airway patent    Complications related to anesthesia: None    Post-anesthesia assessment completed.  No concerns    Signed By: Isamar Guardado MD     June 22, 2017

## 2017-06-22 NOTE — IP AVS SNAPSHOT
Current Discharge Medication List  
  
START taking these medications Dose & Instructions Dispensing Information Comments Morning Noon Evening Bedtime HYDROcodone-acetaminophen 5-325 mg per tablet Commonly known as:  [facility] Your last dose was: Your next dose is:    
   
   
 Dose:  1-2 Tab Take 1-2 Tabs by mouth every six (6) hours as needed for Pain. Max Daily Amount: 8 Tabs. Quantity:  30 Tab Refills:  0 CONTINUE these medications which have NOT CHANGED Dose & Instructions Dispensing Information Comments Morning Noon Evening Bedtime  
 allopurinol 300 mg tablet Commonly known as:  Venancio Penn Your last dose was: Your next dose is:    
   
   
 Dose:  300 mg Take 1 Tab by mouth daily. Quantity:  30 Tab Refills:  0  
     
   
   
   
  
 atorvastatin 80 mg tablet Commonly known as:  LIPITOR Your last dose was: Your next dose is:    
   
   
 Dose:  80 mg Take 1 Tab by mouth daily. Quantity:  90 Tab Refills:  3 Blood-Glucose Meter monitoring kit Your last dose was: Your next dose is:    
   
   
 accu chek gabino smartview meter Quantity:  1 Kit Refills:  0  
     
   
   
   
  
 brimonidine 0.15 % ophthalmic solution Commonly known as:  Bevelyn Honor Your last dose was: Your next dose is:    
   
   
 Dose:  1 Drop Administer 1 Drop to both eyes two (2) times a day. Quantity:  15 mL Refills:  6  
     
   
   
   
  
 cilostazol 100 mg tablet Commonly known as:  PLETAL Your last dose was: Your next dose is: TAKE 1 TABLET BEFORE BREAKFAST AND DINNER Quantity:  180 Tab Refills:  0  
     
   
   
   
  
 clopidogrel 75 mg Tab Commonly known as:  PLAVIX Your last dose was: Your next dose is:    
   
   
 Dose:  75 mg Take 1 Tab by mouth daily. Quantity:  30 Tab Refills:  6 dorzolamide-timolol 22.3-6.8 mg/mL ophthalmic solution Commonly known as:  COSOPT Your last dose was: Your next dose is:    
   
   
 Dose:  1 Drop Administer 1 Drop to both eyes two (2) times a day. Refills:  0  
     
   
   
   
  
 gabapentin 100 mg capsule Commonly known as:  NEURONTIN Your last dose was: Your next dose is:    
   
   
 Dose:  100 mg Take 1 Cap by mouth daily. Quantity:  90 Cap Refills:  4  
     
   
   
   
  
 glucose blood VI test strips strip Commonly known as:  [address] Your last dose was: Your next dose is:    
   
   
 Check blood sugar twice a day Quantity:  200 Strip Refills:  3  
     
   
   
   
  
 hydroCHLOROthiazide 25 mg tablet Commonly known as:  HYDRODIURIL Your last dose was: Your next dose is:    
   
   
 Dose:  25 mg Take 1 Tab by mouth daily. Quantity:  90 Tab Refills:  3  
     
   
   
   
  
 ibuprofen 600 mg tablet Commonly known as:  MOTRIN Your last dose was: Your next dose is:    
   
   
 Dose:  600 mg Take 1 Tab by mouth every eight (8) hours as needed for Pain. Quantity:  60 Tab Refills:  1 Lancets Misc Commonly known as:  ACCU-CHEK FASTCLIX Your last dose was: Your next dose is:    
   
   
 Check blood sugar twice a day Quantity:  200 Each Refills:  3  
     
   
   
   
  
 latanoprost 0.005 % ophthalmic solution Commonly known as:  Chris Cooney Your last dose was: Your next dose is:    
   
   
 Dose:  1 Drop Administer 1 Drop to both eyes nightly. Quantity:  2.5 mL Refills:  1  
     
   
   
   
  
 * metFORMIN 1,000 mg tablet Commonly known as:  GLUCOPHAGE Your last dose was: Your next dose is:    
   
   
 Dose:  1000 mg Take 1 Tab by mouth daily. Quantity:  180 Tab Refills:  3 * metFORMIN 500 mg tablet Commonly known as:  GLUCOPHAGE Your last dose was: Your next dose is:    
   
   
 Dose:  250 mg Take 0.5 Tabs by mouth daily (with breakfast). Quantity:  90 Tab Refills:  3  
     
   
   
   
  
 metoprolol tartrate 25 mg tablet Commonly known as:  LOPRESSOR Your last dose was: Your next dose is:    
   
   
 Dose:  12.5 mg Take 0.5 Tabs by mouth two (2) times a day. Quantity:  90 Tab Refills:  3  
     
   
   
   
  
 traMADol-acetaminophen 37.5-325 mg per tablet Commonly known as:  ULTRACET Your last dose was: Your next dose is:    
   
   
 Dose:  1 Tab Take 1 Tab by mouth every eight (8) hours as needed for Pain. Max Daily Amount: 3 Tabs. Quantity:  60 Tab Refills:  0  
     
   
   
   
  
 * Notice: This list has 2 medication(s) that are the same as other medications prescribed for you. Read the directions carefully, and ask your doctor or other care provider to review them with you. Where to Get Your Medications Information on where to get these meds will be given to you by the nurse or doctor. ! Ask your nurse or doctor about these medications HYDROcodone-acetaminophen 5-325 mg per tablet

## 2017-06-22 NOTE — BRIEF OP NOTE
BRIEF OPERATIVE NOTE    Date of Procedure: 6/22/2017   Preoperative Diagnosis: LEFT BREAST CANCER  Postoperative Diagnosis: LEFT BREAST CANCER    Procedure(s):  LEFT LUMPECTOMY WITH INTRAOPERATIVE ULTRASOUND GUIDED, LEFT AXILLARY SENTINEL LYMPH NODE BIOPSY,   SENTINEL NODE BIOPSY  Surgeon(s) and Role:     * Lior Guido MD - Primary         Assistant Staff:       Surgical Staff:  Circ-1: Chetan Fernandes RN  Scrub Tech-2: Elzbieta Golden  Scrub RN-1: Ava Oliva RN  Surg Asst-1: Felicita Pinto  Event Time In   Incision Start 1115   Incision Close      Anesthesia: General   Estimated Blood Loss: 25 ml  Specimens:   ID Type Source Tests Collected by Time Destination   1 : left axillary sentinal node  Frozen Section Axillary  Lior Guido MD 6/22/2017 1118 Pathology   2 : left breast mass @ 9:00 Preservative Breast  Lior Guido MD 6/22/2017 1126 Pathology      Findings: SLNB benign x 2   Complications: none  Implants: * No implants in log *

## 2017-06-22 NOTE — IP AVS SNAPSHOT
[address] [facility] 
[phone] Patient: Ashia Vanessa MRN: [id number] RTT:[id number] You are allergic to the following No active allergies Recent Documentation Height Weight BMI OB Status Smoking Status 1.6 m 55.9 kg 21.83 kg/m2 Postmenopausal Current Some Day Smoker Emergency Contacts Name Discharge Info Relation Home Work Mobile Neil Yanez CAREGIVER [3] Friend [5] [phone] Mabel Goldman DISCHARGE CAREGIVER [3] Other Relative [6] [phone] About your hospitalization You were admitted on:  June 22, 2017 You last received care in the:  [facility] You were discharged on:  June 22, 2017 Unit phone number:  [phone] Why you were hospitalized Your primary diagnosis was:  Not on File Providers Seen During Your Hospitalizations Provider Role Specialty Primary office phone Abelardo Govea MD Attending Provider General Surgery [phone] Your Primary Care Physician (PCP) Primary Care Physician Office Phone Office Fax Paolo Thomson [phone] Follow-up Information Follow up With Details Comments Contact Info Mónica Turk MD   [address] [place] [phone] 
[phone] Current Discharge Medication List  
  
START taking these medications Dose & Instructions Dispensing Information Comments Morning Noon Evening Bedtime HYDROcodone-acetaminophen 5-325 mg per tablet Commonly known as:  Phillip Bryant Your last dose was: Your next dose is:    
   
   
 Dose:  1-2 Tab Take 1-2 Tabs by mouth every six (6) hours as needed for Pain. Max Daily Amount: 8 Tabs. Quantity:  30 Tab Refills:  0 CONTINUE these medications which have NOT CHANGED Dose & Instructions Dispensing Information Comments Morning Noon Evening Bedtime  
 allopurinol 300 mg tablet Commonly known as:  Sousa Chimera Your last dose was: Your next dose is:    
   
   
 Dose:  300 mg Take 1 Tab by mouth daily. Quantity:  30 Tab Refills:  0  
     
   
   
   
  
 atorvastatin 80 mg tablet Commonly known as:  LIPITOR Your last dose was: Your next dose is:    
   
   
 Dose:  80 mg Take 1 Tab by mouth daily. Quantity:  90 Tab Refills:  3 Blood-Glucose Meter monitoring kit Your last dose was: Your next dose is:    
   
   
 accu chek gabino smartview meter Quantity:  1 Kit Refills:  0  
     
   
   
   
  
 brimonidine 0.15 % ophthalmic solution Commonly known as:  Jose Curlin Your last dose was: Your next dose is:    
   
   
 Dose:  1 Drop Administer 1 Drop to both eyes two (2) times a day. Quantity:  15 mL Refills:  6  
     
   
   
   
  
 cilostazol 100 mg tablet Commonly known as:  PLETAL Your last dose was: Your next dose is: TAKE 1 TABLET BEFORE BREAKFAST AND DINNER Quantity:  180 Tab Refills:  0  
     
   
   
   
  
 clopidogrel 75 mg Tab Commonly known as:  PLAVIX Your last dose was: Your next dose is:    
   
   
 Dose:  75 mg Take 1 Tab by mouth daily. Quantity:  30 Tab Refills:  6  
     
   
   
   
  
 dorzolamide-timolol 22.3-6.8 mg/mL ophthalmic solution Commonly known as:  COSOPT Your last dose was: Your next dose is:    
   
   
 Dose:  1 Drop Administer 1 Drop to both eyes two (2) times a day. Refills:  0  
     
   
   
   
  
 gabapentin 100 mg capsule Commonly known as:  NEURONTIN Your last dose was: Your next dose is:    
   
   
 Dose:  100 mg Take 1 Cap by mouth daily. Quantity:  90 Cap Refills:  4  
     
   
   
   
  
 glucose blood VI test strips strip Commonly known as:  [address] Your last dose was: Your next dose is:    
   
   
 Check blood sugar twice a day Quantity:  200 Strip Refills:  3  
     
   
   
   
  
 hydroCHLOROthiazide 25 mg tablet Commonly known as:  HYDRODIURIL Your last dose was: Your next dose is:    
   
   
 Dose:  25 mg Take 1 Tab by mouth daily. Quantity:  90 Tab Refills:  3  
     
   
   
   
  
 ibuprofen 600 mg tablet Commonly known as:  MOTRIN Your last dose was: Your next dose is:    
   
   
 Dose:  600 mg Take 1 Tab by mouth every eight (8) hours as needed for Pain. Quantity:  60 Tab Refills:  1 Lancets Misc Commonly known as:  ACCU-CHEK FASTCLIX Your last dose was: Your next dose is:    
   
   
 Check blood sugar twice a day Quantity:  200 Each Refills:  3  
     
   
   
   
  
 latanoprost 0.005 % ophthalmic solution Commonly known as:  Melvenyaquelin Hurts Your last dose was: Your next dose is:    
   
   
 Dose:  1 Drop Administer 1 Drop to both eyes nightly. Quantity:  2.5 mL Refills:  1  
     
   
   
   
  
 * metFORMIN 1,000 mg tablet Commonly known as:  GLUCOPHAGE Your last dose was: Your next dose is:    
   
   
 Dose:  1000 mg Take 1 Tab by mouth daily. Quantity:  180 Tab Refills:  3  
     
   
   
   
  
 * metFORMIN 500 mg tablet Commonly known as:  GLUCOPHAGE Your last dose was: Your next dose is:    
   
   
 Dose:  250 mg Take 0.5 Tabs by mouth daily (with breakfast). Quantity:  90 Tab Refills:  3  
     
   
   
   
  
 metoprolol tartrate 25 mg tablet Commonly known as:  LOPRESSOR Your last dose was: Your next dose is:    
   
   
 Dose:  12.5 mg Take 0.5 Tabs by mouth two (2) times a day. Quantity:  90 Tab Refills:  3  
     
   
   
   
  
 traMADol-acetaminophen 37.5-325 mg per tablet Commonly known as:  ULTRACET Your last dose was: Your next dose is:    
   
   
 Dose:  1 Tab Take 1 Tab by mouth every eight (8) hours as needed for Pain. Max Daily Amount: 3 Tabs. Quantity:  60 Tab Refills:  0  
     
   
   
   
  
 * Notice: This list has 2 medication(s) that are the same as other medications prescribed for you. Read the directions carefully, and ask your doctor or other care provider to review them with you. Where to Get Your Medications Information on where to get these meds will be given to you by the nurse or doctor. ! Ask your nurse or doctor about these medications HYDROcodone-acetaminophen 5-325 mg per tablet Discharge Instructions Discharge Instructions:  Lumpectomy/Partial Mastectomy Dr. Rochelle Hughes Call for appointment for follow up in 1 week [phone] Activity: 
 
Walk regularly. You may resume driving in 24 hours unless still requiring narcotics for pain. It is ok to use the arm on side of surgery but do not over do it. Perform range of motion exercises to arm (less than 90 degrees if drain in place) three times daily. Work: 
 
You may return to work in 4 to 6 days to light activity. No lifting more than 10 pounds for three weeks. Diet: 
 
You may resume normal diet after 24 hours. Anesthesia and narcotics may cause nausea and vomiting. If persistent please call the office. Wound Care: You have a special dressing called Dermabond. It is okay to shower and let the water run over the incision but do not scrub the area or soak in a tub. If you have a drain cover the area prior to showering with clear plastic and tape. IIf you have a small amount of drainage you may place a dry bandage over the wound and change it daily. If you experience a lot of drainage, develop redness around the wound, or a fever over 101 F occurs please call the office.   Wear bra to support breast even at night to support the breast during the first week after surgery. Medications: 
 
Resume home medications as indicated on the Medical Reconciliation form. Aspirin, Coumadin, and Plavix can be restarted on post operative day 2 if you were taking them preoperatively. Pain medications:  Non steroidal antiinflammatories seem to work best for post surgical pain. Try these first as prescribed. A narcotic prescription will also be given for breakthrough pain. Over the counter stool softeners and laxatives may be used if needed. Do not hesitate to call with questions or concerns. There is a lifetime risk of lymphedema if nodes were removed, so no blood pressures or needle sticks should be performed to arm on side of procedure. How to Care for Your Wound After Its Treated With DERMABOND* Topical Skin Adhesive DERMABOND* Topical Skin Adhesive (2-octyl cyanoacrylate) is a sterile, liquid skin adhesive 
that holds wound edges together. The film will usually remain in place for 5 to 10 days, then 
naturally fall off your skin. The following will answer some of your questions and provide instructions for proper care for your 
wound while it is healing: CHECK WOUND APPEARANCE 
 Some swelling, redness, and pain are common with all wounds and normally will go away as the 
wound heals. If swelling, redness, or pain increases or if the wound feels warm to the touch, 
contact a doctor. Also contact a doctor if the wound edges reopen or separate. REPLACE BANDAGES 
 If your wound is bandaged, keep the bandage dry.  Replace the dressing daily until the adhesive film has fallen off or if the 
bandage should become wet, unless otherwise instructed by your 
physician.  When changing the dressing, do not place tape directly over the DERMABOND adhesive film, because removing the tape later may also 
remove the film. AVOID TOPICAL MEDICATIONS  Do not apply liquid or ointment medications or any other product to your wound while the DERMABOND adhesive film is in place. These may loosen the film before your wound is healed. KEEP WOUND DRY AND PROTECTED  You may occasionally and briefly wet your wound in the shower or bath. Do not soak or scrub 
your wound, do not swim, and avoid periods of heavy perspiration until the DERMABOND 
adhesive has naturally fallen off. After showering or bathing, gently blot your wound dry with a 
soft towel. If a protective dressing is being used, apply a fresh, dry bandage, being sure to keep 
the tape off the DERMABOND adhesive film.  Apply a clean, dry bandage over the wound if necessary to protect it.  Protect your wound from injury until the skin has had sufficient time to heal. 
 Do not scratch, rub, or pick at the DERMABOND adhesive film. This may loosen the film before 
your wound is healed.  Protect the wound from prolonged exposure to sunlight or tanning lamps while the film is in 
place. If you have any questions or concerns about this product, please consult your doctor. *Trademark ©[employer] 2002 A common side effect of anesthesia following surgery is nausea and/or vomiting. In order to decrease symptoms, it is wise to avoid foods that are high in fat, greasy foods, milk products, and spicy foods for the first 24 hours. Acceptable foods for the first 24 hours following surgery include but are not limited to: 
 
? soup 
? broth 
?  toast  
? crackers ? applesauce 
? bananas  
? mashed potatoes, 
? soft or scrambled eggs 
? oatmeal 
?  jello It is important to eat when taking your pain medication. This will help to prevent nausea. If possible, please try to time your meals with your medications. It is very important to stay hydrated following surgery. Sip fluids frequently while awake.  Avoid acidic drinks such as citrus juices and soda for 24 hours. Carbonated beverages may cause bloating and gas. Acceptable fluids include: 
 
? water (flavor packets may add variety) ? coffee or tea (in moderation) ? Gatorade ? Cynda Standing ? apple juice 
? cranberry juice You are encouraged to cough and deep breathe every hour when awake. This will help to prevent respiratory complications following anesthesia. You may want to hug a pillow when coughing and sneezing to add additional support to the surgical area and to decrease discomfort if you had abdominal or chest surgery. If you are discharged home with support stockings, you may remove them after 24 hours. Support stockings are used to help prevent blood clots in the legs following surgery. Please take time to review all of your Home Care Instructions and Medication Information sheets provided in your discharge packet. If you have any questions, please contact your surgeons office. Thank you. DISCHARGE SUMMARY from Nurse The following personal items are in your possession at time of discharge: 
 
Dental Appliances: None Home Medications: None Jewelry: None Clothing: Other (comment) (street clothes) Other Valuables: Donis Bryant Personal Items Sent to Safe: none PATIENT INSTRUCTIONS: 
 
 
F-face looks uneven A-arms unable to move or move unevenly S-speech slurred or non-existent T-time-call 911 as soon as signs and symptoms begin-DO NOT go Back to bed or wait to see if you get better-TIME IS BRAIN. Warning Signs of HEART ATTACK Call 911 if you have these symptoms: 
? Chest discomfort. Most heart attacks involve discomfort in the center of the chest that lasts more than a few minutes, or that goes away and comes back. It can feel like uncomfortable pressure, squeezing, fullness, or pain. ? Discomfort in other areas of the upper body.  Symptoms can include pain or discomfort in one or both arms, the back, neck, jaw, or stomach. ? Shortness of breath with or without chest discomfort. ? Other signs may include breaking out in a cold sweat, nausea, or lightheadedness. Don't wait more than five minutes to call [address]! Fast action can save your life. Calling 911 is almost always the fastest way to get lifesaving treatment. Emergency Medical Services staff can begin treatment when they arrive  up to an hour sooner than if someone gets to the hospital by car. The discharge information has been reviewed with the {PATIENT/Caregiver). The {PATIENT/Caregiver) verbalized understanding. Discharge medications reviewed with the (Dishcarge meds reviewed [id number]) and appropriate educational materials and side effects teaching were provided. Hydrocodone/Acetaminophen (Vicodin, Norco, Lortab) - (By mouth) Why this medicine is used:  
Treats pain. Contact a nurse or doctor right away if you have: · Blistering, peeling, red skin rash · Fast or slow heartbeat, shallow breathing, blue lips, fingernails, or skin · Anxiety, restlessness, muscle spasms, twitching, seeing or hearing things that are not there · Dark urine or pale stools, yellow skin or eyes · Extreme weakness, sweating, seizures, cold or clammy skin · Lightheadedness, dizziness, fainting, fever, sweating Common side effects: 
· Constipation, nausea, vomiting, loss of appetite, stomach pain · Tiredness or sleepiness © 2017 [address]  Information is for End User's use only and may not be sold, redistributed or otherwise used for commercial purposes. Discharge Orders None Introducing [facility]! [facility] introduces [employer] patient portal. Now you can access parts of your medical record, email your doctor's office, and request medication refills online. 1. In your internet browser, go to [email or web address] 2. Click on the First Time User? Click Here link in the Sign In box. You will see the New Member Sign Up page. 3. Enter your [employer] Access Code exactly as it appears below. You will not need to use this code after youve completed the sign-up process. If you do not sign up before the expiration date, you must request a new code. · [employer] Access Code: [email or web address] Expires: 8/2/2017  9:01 PM 
 
4. Enter the last four digits of your Social Security Number (xxxx) and Date of Birth (mm/dd/yyyy) as indicated and click Submit. You will be taken to the next sign-up page. 5. Create a [employer] ID. This will be your [employer] login ID and cannot be changed, so think of one that is secure and easy to remember. 6. Create a [employer] password. You can change your password at any time. 7. Enter your Password Reset Question and Answer. This can be used at a later time if you forget your password. 8. Enter your e-mail address. You will receive e-mail notification when new information is available in [address] 9. Click Sign Up. You can now view and download portions of your medical record. 10. Click the Download Summary menu link to download a portable copy of your medical information. If you have questions, please visit the Frequently Asked Questions section of the [employer] website. Remember, [employer] is NOT to be used for urgent needs. For medical emergencies, dial 911. Now available from your iPhone and Android! General Information Please provide this summary of care documentation to your next provider. Patient Signature:  ____________________________________________________________ Date:  ____________________________________________________________  
  
Fiorella Castro Provider Signature:  ____________________________________________________________ Date:  ____________________________________________________________

## 2017-06-22 NOTE — ROUTINE PROCESS
Patient: Cliff Landry MRN: [id number]  SSN: [id number]   YOB: 1950  Age: 79 y.o. Sex: female     Patient is status post Procedure(s):  LEFT LUMPECTOMY WITH INTRAOPERATIVE ULTRASOUND GUIDED, LEFT AXILLARY SENTINEL LYMPH NODE BIOPSY, POSSIBLE AXILLARY DISECTION  SENTINEL NODE BIOPSY.     Surgeon(s) and Role:     * Vidal Mayes MD - Primary    Local/Dose/Irrigation:  16cc .5% sensorcaine with epi                Peripheral IV 06/22/17 Left Hand (Active)   Site Assessment Clean, dry, & intact 6/22/2017  8:00 AM   Phlebitis Assessment 0 6/22/2017  8:00 AM   Infiltration Assessment 0 6/22/2017  8:00 AM   Dressing Status Clean, dry, & intact 6/22/2017  8:00 AM   Dressing Type Transparent 6/22/2017  8:00 AM   Hub Color/Line Status Pink;Flushed 6/22/2017  8:00 AM                           Dressing/Packing:  Wound Breast Left-DRESSING TYPE: Topical skin adhesive/glue (06/22/17 1100)  Wound Axilla Left-DRESSING TYPE: Topical skin adhesive/glue (06/22/17 1100)  Splint/Cast:  ]    Other:

## 2017-06-22 NOTE — DISCHARGE INSTRUCTIONS
Discharge Instructions:  Lumpectomy/Partial Mastectomy  Dr. Morgan Gauthier    Call for appointment for follow up in 1 week [phone]    Activity:    Walk regularly. You may resume driving in 24 hours unless still requiring narcotics for pain. It is ok to use the arm on side of surgery but do not over do it. Perform range of motion exercises to arm (less than 90 degrees if drain in place) three times daily. Work:    You may return to work in 4 to 6 days to light activity. No lifting more than 10 pounds for three weeks. Diet:    You may resume normal diet after 24 hours. Anesthesia and narcotics may cause nausea and vomiting. If persistent please call the office. Wound Care: You have a special dressing called Dermabond. It is okay to shower and let the water run over the incision but do not scrub the area or soak in a tub. If you have a drain cover the area prior to showering with clear plastic and tape. IIf you have a small amount of drainage you may place a dry bandage over the wound and change it daily. If you experience a lot of drainage, develop redness around the wound, or a fever over 101 F occurs please call the office. Wear bra to support breast even at night to support the breast during the first week after surgery. Medications:    Resume home medications as indicated on the Medical Reconciliation form. Aspirin, Coumadin, and Plavix can be restarted on post operative day 2 if you were taking them preoperatively. Pain medications:  Non steroidal antiinflammatories seem to work best for post surgical pain. Try these first as prescribed. A narcotic prescription will also be given for breakthrough pain. Over the counter stool softeners and laxatives may be used if needed. Do not hesitate to call with questions or concerns.     There is a lifetime risk of lymphedema if nodes were removed, so no blood pressures or needle sticks should be performed to arm on side of procedure. How to Care for Your Wound After Its Treated With  DERMABOND* Topical Skin Adhesive  DERMABOND* Topical Skin Adhesive (2-octyl cyanoacrylate) is a sterile, liquid skin adhesive  that holds wound edges together. The film will usually remain in place for 5 to 10 days, then  naturally fall off your skin. The following will answer some of your questions and provide instructions for proper care for your  wound while it is healing:    CHECK WOUND APPEARANCE   Some swelling, redness, and pain are common with all wounds and normally will go away as the  wound heals. If swelling, redness, or pain increases or if the wound feels warm to the touch,  contact a doctor. Also contact a doctor if the wound edges reopen or separate. REPLACE BANDAGES   If your wound is bandaged, keep the bandage dry.  Replace the dressing daily until the adhesive film has fallen off or if the  bandage should become wet, unless otherwise instructed by your  physician.  When changing the dressing, do not place tape directly over the  DERMABOND adhesive film, because removing the tape later may also  remove the film. AVOID TOPICAL MEDICATIONS   Do not apply liquid or ointment medications or any other product to your wound while the  DERMABOND adhesive film is in place. These may loosen the film before your wound is healed. KEEP WOUND DRY AND PROTECTED   You may occasionally and briefly wet your wound in the shower or bath. Do not soak or scrub  your wound, do not swim, and avoid periods of heavy perspiration until the DERMABOND  adhesive has naturally fallen off. After showering or bathing, gently blot your wound dry with a  soft towel. If a protective dressing is being used, apply a fresh, dry bandage, being sure to keep  the tape off the DERMABOND adhesive film.  Apply a clean, dry bandage over the wound if necessary to protect it.    Protect your wound from injury until the skin has had sufficient time to heal.   Do not scratch, rub, or pick at the DERMABOND adhesive film. This may loosen the film before  your wound is healed.  Protect the wound from prolonged exposure to sunlight or tanning lamps while the film is in  place. If you have any questions or concerns about this product, please consult your doctor. *Trademark ©[employer] 2002      A common side effect of anesthesia following surgery is nausea and/or vomiting. In order to decrease symptoms, it is wise to avoid foods that are high in fat, greasy foods, milk products, and spicy foods for the first 24 hours. Acceptable foods for the first 24 hours following surgery include but are not limited to:     soup   broth    toast    crackers    applesauce    bananas    mashed potatoes,   soft or scrambled eggs   oatmeal    jello    It is important to eat when taking your pain medication. This will help to prevent nausea. If possible, please try to time your meals with your medications. It is very important to stay hydrated following surgery. Sip fluids frequently while awake. Avoid acidic drinks such as citrus juices and soda for 24 hours. Carbonated beverages may cause bloating and gas. Acceptable fluids include:    - water (flavor packets may add variety)  - coffee or tea (in moderation)  - Gatorade  - Carlos-aid  - apple juice  - cranberry juice    You are encouraged to cough and deep breathe every hour when awake. This will help to prevent respiratory complications following anesthesia. You may want to hug a pillow when coughing and sneezing to add additional support to the surgical area and to decrease discomfort if you had abdominal or chest surgery. If you are discharged home with support stockings, you may remove them after 24 hours. Support stockings are used to help prevent blood clots in the legs following surgery.     Please take time to review all of your Home Care Instructions and Medication Information sheets provided in your discharge packet. If you have any questions, please contact your surgeons office. Thank you. DISCHARGE SUMMARY from Nurse    The following personal items are in your possession at time of discharge:    Dental Appliances: None        Home Medications: None  Jewelry: None  Clothing: Other (comment) (street clothes)  Other Valuables: Purse  Personal Items Sent to Safe: none          PATIENT INSTRUCTIONS:    After general anesthesia or intravenous sedation, for 24 hours or while taking prescription Narcotics:  · Limit your activities  · Do not drive and operate hazardous machinery  · Do not make important personal or business decisions  · Do  not drink alcoholic beverages  · If you have not urinated within 8 hours after discharge, please contact your surgeon on call. Report the following to your surgeon:  · Excessive pain, swelling, redness or odor of or around the surgical area  · Temperature over 100.5  · Nausea and vomiting lasting longer than 4 hours or if unable to take medications  · Any signs of decreased circulation or nerve impairment to extremity: change in color, persistent  numbness, tingling, coldness or increase pain  · Any questions      *  Please give a list of your current medications to your Primary Care Provider. *  Please update this list whenever your medications are discontinued, doses are      changed, or new medications (including over-the-counter products) are added. *  Please carry medication information at all times in case of emergency situations. These are general instructions for a healthy lifestyle:    No smoking/ No tobacco products/ Avoid exposure to second hand smoke    Surgeon General's Warning:  Quitting smoking now greatly reduces serious risk to your health.     Obesity, smoking, and sedentary lifestyle greatly increases your risk for illness    A healthy diet, regular physical exercise & weight monitoring are important for maintaining a healthy lifestyle    You may be retaining fluid if you have a history of heart failure or if you experience any of the following symptoms:  Weight gain of 3 pounds or more overnight or 5 pounds in a week, increased swelling in our hands or feet or shortness of breath while lying flat in bed. Please call your doctor as soon as you notice any of these symptoms; do not wait until your next office visit. Recognize signs and symptoms of STROKE:    F-face looks uneven    A-arms unable to move or move unevenly    S-speech slurred or non-existent    T-time-call 911 as soon as signs and symptoms begin-DO NOT go       Back to bed or wait to see if you get better-TIME IS BRAIN. Warning Signs of HEART ATTACK     Call 911 if you have these symptoms:   Chest discomfort. Most heart attacks involve discomfort in the center of the chest that lasts more than a few minutes, or that goes away and comes back. It can feel like uncomfortable pressure, squeezing, fullness, or pain.  Discomfort in other areas of the upper body. Symptoms can include pain or discomfort in one or both arms, the back, neck, jaw, or stomach.  Shortness of breath with or without chest discomfort.  Other signs may include breaking out in a cold sweat, nausea, or lightheadedness. Don't wait more than five minutes to call 911 - MINUTES MATTER! Fast action can save your life. Calling 911 is almost always the fastest way to get lifesaving treatment. Emergency Medical Services staff can begin treatment when they arrive -- up to an hour sooner than if someone gets to the hospital by car. The discharge information has been reviewed with the {PATIENT/Caregiver). The {PATIENT/Caregiver) verbalized understanding. Discharge medications reviewed with the ([facility] meds reviewed [id number]) and appropriate educational materials and side effects teaching were provided.        Hydrocodone/Acetaminophen (Vicodin, Norco, Lortab) - (By mouth)   Why this medicine is used:   Treats pain. Contact a nurse or doctor right away if you have:  · Blistering, peeling, red skin rash  · Fast or slow heartbeat, shallow breathing, blue lips, fingernails, or skin  · Anxiety, restlessness, muscle spasms, twitching, seeing or hearing things that are not there  · Dark urine or pale stools, yellow skin or eyes  · Extreme weakness, sweating, seizures, cold or clammy skin  · Lightheadedness, dizziness, fainting, fever, sweating     Common side effects:  · Constipation, nausea, vomiting, loss of appetite, stomach pain  · Tiredness or sleepiness  © 2017 [address]  Information is for End User's use only and may not be sold, redistributed or otherwise used for commercial purposes.

## 2017-06-22 NOTE — PERIOP NOTES
Handoff Report from Operating Room to PACU    Report received from Rylie Walters RN and Tevin Lozano CRNA regarding Tray Chung. Surgeon(s):  Jaxson Tee MD  And Procedure(s) (LRB):  LEFT LUMPECTOMY WITH INTRAOPERATIVE ULTRASOUND GUIDED, LEFT AXILLARY SENTINEL LYMPH NODE BIOPSY, POSSIBLE AXILLARY DISECTION (Left)  SENTINEL NODE BIOPSY (Left)  confirmed   with dressings discussed. Anesthesia type, drugs, patient history, complications, estimated blood loss, vital signs, intake and output, and last pain medication, lines and temperature were reviewed.

## 2017-06-22 NOTE — OP NOTES
[address]   OP NOTE       Name:  Aislinn Black   MR#:  [id number]   :  1950   Account #:  [de-identified]    Surgery Date:     Date of Adm:  2017       PREOPERATIVE DIAGNOSIS: Left breast carcinoma at 9 o'clock. POSTOPERATIVE DIAGNOSIS: Left breast carcinoma at 9 o'clock. PROCEDURES PERFORMED: Left breast lumpectomy, with   intraoperative ultrasound, post-excision intraoperative specimen   radiograph, and left axillary sentinel lymph node biopsy. SURGEON: Germaine Batista MD    ANESTHESIA: General.    ESTIMATED BLOOD LOSS: 20 mL. COMPLICATIONS: None. SPECIMENS REMOVED   1. Left axillary sentinel lymph nodes. 2. Left breast tissue at 9 o'clock. FINDINGS    1. [place] lymph node biopsy, 2 lymph nodes benign, negative for   malignant metastatic disease on frozen section. 2. Left beast mass at 9 o'clock, with lesion identified in the specimen. BRIEF HISTORY: The patient is a 35-year-old with a left breast   carcinoma. She wishes to undergo breast conservation. She understands the   risks and benefits and wishes to proceed. She now presents for that. PROCEDURE: The patient was taken to the operating room, placed on   the operating table in the supine position, and underwent general   anesthesia, after which the left breast and axilla were prepped and   draped in the usual sterile fashion. She had previously been injected   with technetium sulfur colloid to help identify the sentinel lymph node. After appropriate time-out and antibiotics were given, utilizing a   Neoprobe, we identified the region of hot activity in the left axilla.    Anesthetized the skin and subcutaneous tissues with 1% lidocaine with   epinephrine, made an incision and dissected down to the deeper   tissues, found the hot roseanna tissue, and put Ligaclips on the   lymphovascular channels and removed 2 lymph nodes, both of which   were benign on frozen section. Interrupted 3-0 Vicryl was used to   approximate the deep tissues and deep dermis, running 4-0 Vicryl   used to close the skin, and then we worked toward the breast. The   lesion was identified with ultrasound in the medial aspect of the breast,   fairly superficial to medium depth approximately 2 cm from the nipple. We placed a twenty-gauge spinal needle through the tumor with US guidance and injected methylene blue around the   tumor. I then made a curvilinear incision around the areola edge,   sharply dissected out the mass, and labeled the specimen 2 long   sutures superior, 2 short sutures medially. Post-excision, we looked at   it with ultrasound. The lesion was identified with good margins. Then post-  excision specimen radiograph confirmed the clip to be present in the   specimen. Once that was completed, electrocautery was used to   control bleeding in the wound. Interrupted 3-0 Vicryl was used to   approximate the deep tissue and deep dermis, running 4-0 Vicryl used   to close the skin and a Dermabond dressing applied. Upon completion   of the operation, the needle, sponge and instrument counts were   correct x2. The patient had tolerated the procedure well, and was   extubated and brought to recovery room in stable condition. EMERSON Yuen MD      [facility]   D:  06/22/2017   12:04   T:  06/22/2017   12:32   Job #:  [id number]

## 2017-06-22 NOTE — PERIOP NOTES
0730: Pt checked in./escorted to preop [place] staff. VS. Reviewed current meds and PMH obtained and IV started. Called for family, unable to locate.   0800: Pt to NM

## 2017-06-28 ENCOUNTER — NURSE NAVIGATOR (OUTPATIENT)
Dept: CASE MANAGEMENT | Age: 67
End: 2017-06-28

## 2017-06-28 ENCOUNTER — OFFICE VISIT (OUTPATIENT)
Dept: SURGERY | Age: 67
End: 2017-06-28

## 2017-06-28 VITALS
HEIGHT: 63 IN | WEIGHT: 123.5 LBS | SYSTOLIC BLOOD PRESSURE: 141 MMHG | BODY MASS INDEX: 21.88 KG/M2 | OXYGEN SATURATION: 100 % | TEMPERATURE: 96.4 F | HEART RATE: 65 BPM | RESPIRATION RATE: 18 BRPM | DIASTOLIC BLOOD PRESSURE: 62 MMHG

## 2017-06-28 DIAGNOSIS — Z09 POSTOPERATIVE EXAMINATION: Primary | ICD-10-CM

## 2017-06-28 NOTE — MR AVS SNAPSHOT
Visit Information Date & Time Provider Department Dept. Phone Encounter #  
 6/28/2017  3:40 PM Marian Oscar MD [facility] [address] [phone] [id number] Your Appointments 11/22/2017  8:30 AM  
ROUTINE CARE with Samuel Matt MD  
[address] [place] [address] OFFICE-[facility] ([address]) Appt Note: 6 mo f/u  
 [address] [place] [address] [id number] [phone] [place] [id number] Upcoming Health Maintenance Date Due COLONOSCOPY 6/1/2015 MEDICARE YEARLY EXAM 5/25/2017 INFLUENZA AGE 9 TO ADULT 8/1/2017 HEMOGLOBIN A1C Q6M 11/22/2017 MICROALBUMIN Q1 2/1/2018 FOOT EXAM Q1 2/20/2018 EYE EXAM RETINAL OR DILATED Q1 4/20/2018 LIPID PANEL Q1 5/22/2018 BREAST CANCER SCRN MAMMOGRAM 4/18/2019 GLAUCOMA SCREENING Q2Y 4/20/2019 DTaP/Tdap/Td series (2 - Td) 5/8/2025 Allergies as of 6/28/2017  Review Complete On: 6/28/2017 By: Marian Oscar MD  
 No Known Allergies Current Immunizations  Reviewed on 5/22/2017 Name Date Influenza High Dose Vaccine PF 2/1/2017 Influenza Vaccine 10/26/2015 Influenza Vaccine (Quad) PF 10/26/2015 Pneumococcal Conjugate (PCV-13) 6/2/2017 Pneumococcal Polysaccharide (PPSV-23) 2/4/2016, 10/26/2015 Tdap 5/8/2015 Zoster Vaccine, Live 2/1/2016 Not reviewed this visit You Were Diagnosed With   
  
 Codes Comments Postoperative examination    -  Primary ICD-10-CM: Q86 ICD-9-CM: V67.00 Vitals BP Pulse Temp Resp Height(growth percentile) Weight(growth percentile) 141/62 (BP 1 Location: Right arm, BP Patient Position: Sitting) 65 96.4 °F (35.8 °C) (Oral) 18 5' 3\" (1.6 m) 123 lb 8 oz (56 kg) SpO2 BMI OB Status Smoking Status 100% 21.88 kg/m2 Postmenopausal Current Some Day Smoker BMI and BSA Data  Body Mass Index Body Surface Area  
 21.88 kg/m 2 1.58 m 2  
  
  
 Preferred Pharmacy Pharmacy Name Phone [facility] [id number] Tanna Naik, [address] [phone] Your Updated Medication List  
  
   
This list is accurate as of: 6/28/17  4:56 PM.  Always use your most recent med list.  
  
  
  
  
 allopurinol 300 mg tablet Commonly known as:  Ramo Calender Take 1 Tab by mouth daily. atorvastatin 80 mg tablet Commonly known as:  LIPITOR Take 1 Tab by mouth daily. Blood-Glucose Meter monitoring kit  
accu chek gabino smartview meter  
  
 brimonidine 0.15 % ophthalmic solution Commonly known as:  [place] Dates Administer 1 Drop to both eyes two (2) times a day. cilostazol 100 mg tablet Commonly known as:  PLETAL  
TAKE 1 TABLET BEFORE BREAKFAST AND DINNER  
  
 clopidogrel 75 mg Tab Commonly known as:  PLAVIX Take 1 Tab by mouth daily. dorzolamide-timolol 22.3-6.8 mg/mL ophthalmic solution Commonly known as:  COSOPT Administer 1 Drop to both eyes two (2) times a day.  
  
 gabapentin 100 mg capsule Commonly known as:  NEURONTIN Take 1 Cap by mouth daily. glucose blood VI test strips strip Commonly known as:  [address] Check blood sugar twice a day  
  
 hydroCHLOROthiazide 25 mg tablet Commonly known as:  HYDRODIURIL Take 1 Tab by mouth daily. HYDROcodone-acetaminophen 5-325 mg per tablet Commonly known as:  Johana Willem Take 1-2 Tabs by mouth every six (6) hours as needed for Pain. Max Daily Amount: 8 Tabs. ibuprofen 600 mg tablet Commonly known as:  MOTRIN Take 1 Tab by mouth every eight (8) hours as needed for Pain. Lancets Misc Commonly known as:  ACCU-CHEK FASTCLIX Check blood sugar twice a day  
  
 latanoprost 0.005 % ophthalmic solution Commonly known as:  Lizet Nap Administer 1 Drop to both eyes nightly. * metFORMIN 1,000 mg tablet Commonly known as:  GLUCOPHAGE Take 1 Tab by mouth daily. * metFORMIN 500 mg tablet Commonly known as:  GLUCOPHAGE Take 0.5 Tabs by mouth daily (with breakfast). metoprolol tartrate 25 mg tablet Commonly known as:  LOPRESSOR Take 0.5 Tabs by mouth two (2) times a day. traMADol-acetaminophen 37.5-325 mg per tablet Commonly known as:  ULTRACET Take 1 Tab by mouth every eight (8) hours as needed for Pain. Max Daily Amount: 3 Tabs. * Notice: This list has 2 medication(s) that are the same as other medications prescribed for you. Read the directions carefully, and ask your doctor or other care provider to review them with you. Introducing [facility]! [employer] introduces [employer] patient portal. Now you can access parts of your medical record, email your doctor's office, and request medication refills online. 1. In your internet browser, go to [email or web address] 2. Click on the First Time User? Click Here link in the Sign In box. You will see the New Member Sign Up page. 3. Enter your [employer] Access Code exactly as it appears below. You will not need to use this code after youve completed the sign-up process. If you do not sign up before the expiration date, you must request a new code. · [employer] Access Code: [email or web address] Expires: 8/2/2017  9:01 PM 
 
4. Enter the last four digits of your Social Security Number (xxxx) and Date of Birth (mm/dd/yyyy) as indicated and click Submit. You will be taken to the next sign-up page. 5. Create a [employer] ID. This will be your [employer] login ID and cannot be changed, so think of one that is secure and easy to remember. 6. Create a [employer] password. You can change your password at any time. 7. Enter your Password Reset Question and Answer. This can be used at a later time if you forget your password. 8. Enter your e-mail address. You will receive e-mail notification when new information is available in [address] 9. Click Sign Up.  You can now view and download portions of your medical record. 10. Click the Download Summary menu link to download a portable copy of your medical information. If you have questions, please visit the Frequently Asked Questions section of the [employer] website. Remember, [employer] is NOT to be used for urgent needs. For medical emergencies, dial 911. Now available from your iPhone and Android! Please provide this summary of care documentation to your next provider. Your primary care clinician is listed as Rudy Bowen. If you have any questions after today's visit, please call [phone].

## 2017-06-28 NOTE — PROGRESS NOTES
Nakia Reyes is a 79 y.o. female  Chief Complaint   Patient presents with    Breast Cancer     1 wk po lumpectomy

## 2017-06-28 NOTE — NURSE NAVIGATOR
6/28/17 4:00pm    Tresa Pillai 80 yo    Lives w/ significant other, sister lives 2 doors up street (present at office vs today)    Dx: L Breast IDC ER/VT+ Her2-, post lumpectomy    Met pt in surgeon office for post surgical vs, introduced self and role of navigator and provided with contact information. Have been unable to meet pt prior to vs today. Barriers to care: [facility], no supplemental policy. Provided with Care Card questionnaire. Has lung nodule, missed pulmonary appt, referral to [facility] thoracic surgeon    Provided with breast cancer support group information.  States she has not told her sons about her dx yet, Basilio Elías are crazy, I just can't handle them right now\"      Pt referred to the following:    Dr. Bryanna Hughes radiation oncology Briana Kauffman LPN called for appt, do not accept pt's insurance)  Message sent to [employer] Doing [facility] rad onc SW to discuss as will need referral to thoracic and has been seen @ [facility] in past  Dr. David Lieberman oncology  Dr. Alice Cortes thoracic surgeon  Dr. Yvan Sagastume:    -assist w/ barriers to care  -provide w/ education/resources  -emotional support  -pt advocacy        Total Time Spent 45 minutes    Delores Hensely RN Oncology Nurse Navigator

## 2017-06-28 NOTE — PROGRESS NOTES
Surgery  Follow up  Procedure: left lumpectomy and SLNB  OR date:  6/22  Path:       FINAL PATHOLOGIC DIAGNOSIS   1. Left axillary sentinel node, biopsy:   Two (2) lymph nodes, negative for carcinoma (levels and cytokeratin stain examined)   2. Left breast mass at 9:00, lumpectomy without wire-guided localization:   Invasive ductal carcinoma, 1.0 cm, grade 1   Limited associated ductal carcinoma in situ, nuclear grade 1   Biopsy site   Fibrocystic changes including usual ductal hyperplasia   BREAST, Invasive Carcinoma   SYNOPTIC REPORT   SPECIMEN   Procedure: Excision without image-guided localization   Lymph Node Sampling: Lilly lymph node(s)   Specimen Laterality: Left   TUMOR   Specify Clock Position of Tumor Site: 9 o'clock   Histologic Type:  Invasive mammary carcinoma of no special type (ductal, not otherwise specified)   Glandular (Acinar) / Tubular Differentiation: Score 2 (10 to 75% of tumor area forming glandular / tubular structures)   Nuclear Pleomorphism: Score 2 (Cells larger than normal with open vesicular nuclei, visible nucleoli, and moderate variability in both size and shape)   Mitotic Rate: Score 1 (<=3 mitoses per mm2)   Diameter of Microscope Field: 0.54 mm   Overall Grade: Grade 1 (scores of 3, 4 or 5)   Ductal Carcinoma In Situ (DCIS): DCIS is present   Ductal Carcinoma In Situ (DCIS): Negative for extensive intraductal component (EIC)   Estimated Size (extent) of DCIS (greatest dimension using gross and microscopic evaluation) is at Least: 2 mm   Number of Blocks with DCIS: 2   Number of Blocks Examined: 9   Architectural Patterns: Cribriform   Nuclear Grade (see Table 2 in CAP Protocol): Grade I (low)   Necrosis: Not identified   Lobular Carcinoma In Situ (LCIS): Not identified   Tumor Size: Size of Largest Invasive Carcinoma: Greatest dimension of largest focus of invasion > 1 mm   Greatest Dimension: 10 x 8 x 7 mm   Tumor Extent   Macroscopic and Microscopic Extent of Tumor   Accessory Tumor Findings   Lymph-Vascular Invasion: Not identified   Dermal Lymph-Vascular Invasion: No skin present   Microcalcifications: Present in invasive carcinoma, Present in nonneoplastic tissue   Treatment Effect: Response to Presurgical (Neoadjuvant) Therapy: No known presurgical therapy   MARGINS   Invasive Carcinoma: Margins uninvolved by invasive carcinoma   Anterior: 3 mm   Superior: 7 mm   Distance from Closest Margin: Distance: 3 mm   Closest Uninvolved Margin: Anterior   Ductal Carcinoma In Situ (DCIS): Margins uninvolved by DCIS (DCIS present in specimen)   Anterior: 4 mm   Distance of DCIS from Closest Margin: Distance: 4 mm   Closest Uninvolved Margin(s): Anterior   LYMPH NODES   Regional Lymph Nodes:   Gregory Node Status: Gregory lymph node biopsy performed   Number of Gregory Nodes Examined: 2   Method of Evaluation of Gregory Lymph Node(s): H&E, multiple levels, Immunohistochemistry   Number of Lymph Node(s) Examined (sentinel and nonsentinel): 2   STAGE (pTNM)   Primary Tumor (Invasive Carcinoma) (pT): pT1b   Modifier: (sn)   Category (pN): pN0 (i-)   HORMONE RECEPTOR and HER-2 status from [id number]:   Estrogen receptor: Positive   Progesterone receptor: Positive   HER-2 IHC: Negative     S I feel fine, no drainage    Visit Vitals    /62 (BP 1 Location: Right arm, BP Patient Position: Sitting)    Pulse 65    Temp 96.4 °F (35.8 °C) (Oral)    Resp 18    Ht 5' 3\" (1.6 m)    Wt 56 kg (123 lb 8 oz)    SpO2 100%    BMI 21.88 kg/m2       O Incisions healing well without infection   Mild swelling   ROM normal    A/P Doing well   Discussed path   Defer gene profiling (Oncotype DX) to Dr Francoise Aguayo   Refer to Dr Francoise Aguayo for ?endocrine only vs checking oncotype DX   Refer to Dr Stevan Morales for adjuvant radiation     Lung nodule highly suspicious for lung cancer.       She missed her appt with pulmonologist.  Will try to get it rescheduled   Will also refer to Dr Kwaku Hurst at [facility] for possible resection   RTC 4 weeks    Shyla Castellanos MD FACS

## 2017-06-30 ENCOUNTER — TELEPHONE (OUTPATIENT)
Dept: SURGERY | Age: 67
End: 2017-06-30

## 2017-06-30 ENCOUNTER — NURSE NAVIGATOR (OUTPATIENT)
Dept: CASE MANAGEMENT | Age: 67
End: 2017-06-30

## 2017-06-30 NOTE — NURSE NAVIGATOR
6/30/17 11:40am    Erin Garcia made aware 6/28/17 that [facility] did not accept [facility]. ONN did speak to Augusta Braden  6/29/17 to verify as pt was seen for stent placement 2013 @ [facility]. Did not hear back  So proceeded w/ referral to Dr. Nathalie Lincoln Radiation Oncology. Spoke to [facility] appt 7/11/17 9:30am, allow 2 hrs for vs, bring current medication list. Report to outpt registration in the [facility] [address] [facility] in front of [facility], in designated radiation oncology parking. Message left w/ pt to call ONN for appt time. Will verify w/ pt if she needs to obtain referral from PCP    Spoke to Bo Garcia office, aware of appt. She is working on scheduling thoracic surgeon appt. Dr. Callie Garcia wishes pt to see thoracic ASAP, as CT Lung Screening 5/8/17 revealed L upper lung nodule.     Total Time Spent 30 minutes     Denise Palmer RN Oncology Nurse Navigator

## 2017-06-30 NOTE — TELEPHONE ENCOUNTER
Spoke with patient informed her that I scheduled her appt with Dr. Gemma Downs 07/13/2017 at 1pm, appt with Dr. Petty Chisholm 07/03/2017 at 2pm. She requested that I call back and leave the information on her home voicemail. Called back and left detailed message on patient's home voicemail with providers name,date, time, address and phone numbers of the doctors I has scheduled her appt with.

## 2017-07-05 ENCOUNTER — TELEPHONE (OUTPATIENT)
Dept: CASE MANAGEMENT | Age: 67
End: 2017-07-05

## 2017-07-05 NOTE — TELEPHONE ENCOUNTER
7/5/17 8:45am    TC to pt to inform of radiation oncology appt 7/11/17 9:30am.  Instructions given to pt for parking and registration. Referral faxed to [employer] for transportation assistance 7/11/17. Pt also given contact information for [employer], as alternate transportation, instructed to contact to begin paperwork. Sister is able to help w/ transportation if she is not working; states she works nights. Had appt w/ Dr. Lorri Joya on 7/3, states he told her she needed PET Scan, pt is not sure if this is being scheduled. ONN contacted office; voice mail left with   to coordinate PET scan w/ office vs.    Pt states she has not received CareCard application in mail, gave financial counselor contact, instructed to call and find out status. Meeting w/ her  tomorrow (Mounika Clay) who will assist her w/ this paperwork.      Total Time Spent 20 minutes    Leslie Lopez RN Oncology Nurse Navigator

## 2017-07-10 ENCOUNTER — TELEPHONE (OUTPATIENT)
Dept: SURGERY | Age: 67
End: 2017-07-10

## 2017-07-10 NOTE — TELEPHONE ENCOUNTER
Patient insurance does not cover [facility] services so patient can not see Dr. Leo Dias or Dr. Briana Bernal.  Phoenix Austin RN Nurse Navigator suggest Dr. Henry Counter surgeon [facility]. Please advise.

## 2017-07-11 ENCOUNTER — HOSPITAL ENCOUNTER (OUTPATIENT)
Dept: PET IMAGING | Age: 67
Discharge: HOME | End: 2017-07-11
Attending: INTERNAL MEDICINE
Payer: MEDICARE

## 2017-07-11 ENCOUNTER — HOSPITAL ENCOUNTER (OUTPATIENT)
Dept: RADIATION THERAPY | Age: 67
Discharge: HOME | End: 2017-07-11

## 2017-07-11 VITALS — BODY MASS INDEX: 21.62 KG/M2 | WEIGHT: 122 LBS | HEIGHT: 63 IN

## 2017-07-11 DIAGNOSIS — R91.1 SOLITARY PULMONARY NODULE: ICD-10-CM

## 2017-07-11 PROCEDURE — 78815 PET IMAGE W/CT SKULL-THIGH: CPT

## 2017-07-11 RX ORDER — SODIUM CHLORIDE 0.9 % (FLUSH) 0.9 %
10 SYRINGE (ML) INJECTION
Status: COMPLETED | OUTPATIENT
Start: 2017-07-11 | End: 2017-07-11

## 2017-07-11 RX ADMIN — Medication 10 ML: at 06:58

## 2017-07-13 ENCOUNTER — OFFICE VISIT (OUTPATIENT)
Dept: ONCOLOGY | Age: 67
End: 2017-07-13

## 2017-07-13 VITALS
BODY MASS INDEX: 21.97 KG/M2 | OXYGEN SATURATION: 100 % | TEMPERATURE: 98.1 F | HEIGHT: 63 IN | WEIGHT: 124 LBS | SYSTOLIC BLOOD PRESSURE: 169 MMHG | RESPIRATION RATE: 18 BRPM | HEART RATE: 65 BPM | DIASTOLIC BLOOD PRESSURE: 80 MMHG

## 2017-07-13 DIAGNOSIS — C50.212 BREAST CANCER OF UPPER-INNER QUADRANT OF LEFT FEMALE BREAST (HCC): Primary | ICD-10-CM

## 2017-07-13 DIAGNOSIS — C34.02 LUNG CANCER, MAIN BRONCHUS, LEFT (HCC): ICD-10-CM

## 2017-07-13 NOTE — TELEPHONE ENCOUNTER
Spoke with Mavis scheduled appt for patient to see Dr. Linette Caceres (thoracic surgeon) 07/17/2017 at 4:30pm. Called patient informed her I scheduled her appt to see Dr. Linette Caceres address given along with phone. Also advised her will need to have CT scan, PET scan on a disk and bring with her to her appt. Informed her will have it put on CD and she will need to come by the office to  prior to her appt. Patient verbalized understanding. Record were faxed to Dr. Linette Caceres office .

## 2017-07-13 NOTE — PROGRESS NOTES
Raleigh Estrada 79year old female here to establish care for Left  Breast cancer and lung nodule , currently a smoker and drinker,  Ms. Machelle Barker has a reminder for a \"due or due soon\" health maintenance. I have asked that she contact her primary care provider for follow-up on this health maintenance.

## 2017-07-14 NOTE — PROGRESS NOTES
Oncology Consultation Note        Patient: Marcelle Oropeza MRN: [id number]  SSN: [id number]    YOB: 1950  Age: 79 y.o. Sex: female      Subjective:      Marcelle Oropeza is a 79 y.o. female who I am seeing for a new diagnosis of left sided invasive ductal carcinoma of the breast. She has been referred by Dr. Jeffrey Armstrong. She underwent a screening mammogram on 3/27/2017 which demonstrated a density in the medial aspect of the left breast.   She underwent dx mammogram and US demonstrating a 1.0 x 0.9 x 0.8 cm at 9:00, 2 cm medial to the nipple in the left breast.  US core bx demonstrated a G1 IDC ER pos TX pos Her2/jud unamplified tumor. She underwent a lumpectomy on 06/22/2017. She was also noted to have a JUDSON FDG avid lung mass. She has been referred to Dr. Iris Sampson at [facility]. She is asymptomatic.        Review of Systems:    Constitutional: negative  Eyes: negative  Ears, Nose, Mouth, Throat, and Face: negative  Respiratory: negative  Cardiovascular: negative  Gastrointestinal: negative  Genitourinary:negative  Integument/Breast: negative  Hematologic/Lymphatic: negative  Musculoskeletal:negative  Neurological: negative        Past Medical History:   Diagnosis Date    Arthritis     Cancer ([address]     breast cancer- left    Cerebrovascular accident (stroke) ([address]     no deficiets    Diabetes mellitus type 2, controlled ([address]     Gallstones     asymptomatic, pt denies having    GERD (gastroesophageal reflux disease)     dx with resolution of symptoms on ppi- pt denies having    Hypercholesterolemia     Hypertension     Ill-defined condition     gout    Non-nicotine vapor product user     used to use    PAD (peripheral artery disease) ([address] 2/20/2013    right SFA angioplasty and athrectomy    Primary open angle glaucoma     Solitary lung nodule 5/9/2017    Type II diabetes mellitus, uncontrolled ([address] 2/1/2017     Past Surgical History:   Procedure Laterality Date  HX BREAST LUMPECTOMY Left 6/22/2017    LEFT LUMPECTOMY WITH INTRAOPERATIVE ULTRASOUND GUIDED, LEFT AXILLARY SENTINEL LYMPH NODE BIOPSY, POSSIBLE AXILLARY DISECTION performed by Luis Eduardo Mora MD at [facility] MAIN OR    HX HEENT Bilateral     eye lids surgery    VASCULAR SURGERY PROCEDURE UNLIST  April 2016    right leg stent      Family History   Problem Relation Age of Onset    Hypertension Mother     Diabetes Mother     Diabetes Son      Social History   Substance Use Topics    Smoking status: Current Some Day Smoker     Types: Cigarettes     Last attempt to quit: 3/13/2013    Smokeless tobacco: Never Used      Comment: estimate is one pack per week    Alcohol use 12.6 oz/week     21 Cans of beer per week      Comment: 2 to 3 beers per day per patient      Prior to Admission medications    Medication Sig Start Date End Date Taking? Authorizing Provider   HYDROcodone-acetaminophen (NORCO) 5-325 mg per tablet Take 1-2 Tabs by mouth every six (6) hours as needed for Pain. Max Daily Amount: 8 Tabs. 6/22/17  Yes Luis Eduardo Mora MD   allopurinol (ZYLOPRIM) 300 mg tablet Take 1 Tab by mouth daily. 6/20/17  Yes Estephanie Oscar MD   gabapentin (NEURONTIN) 100 mg capsule Take 1 Cap by mouth daily. 5/22/17  Yes Estephanie Oscar MD   clopidogrel (PLAVIX) 75 mg tab Take 1 Tab by mouth daily. 5/9/17  Yes Estephanie Oscar MD   hydroCHLOROthiazide (HYDRODIURIL) 25 mg tablet Take 1 Tab by mouth daily. 3/24/17  Yes Estephanie Oscar MD   ibuprofen (MOTRIN) 600 mg tablet Take 1 Tab by mouth every eight (8) hours as needed for Pain. 2/20/17  Yes Estephanie Oscar MD   traMADol-acetaminophen (ULTRACET) 37.5-325 mg per tablet Take 1 Tab by mouth every eight (8) hours as needed for Pain. Max Daily Amount: 3 Tabs.  2/20/17  Yes Estephanie Oscar MD   cilostazol (PLETAL) 100 mg tablet TAKE 1 TABLET BEFORE BREAKFAST AND DINNER 9/27/16  Yes Laurel Billingsley MD   glucose blood VI test strips (ACCU-CHEK SMARTVIEW TEST STRIP) strip Check blood sugar twice a day 9/12/16  Yes Ondina Noel MD   latanoprost (XALATAN) 0.005 % ophthalmic solution Administer 1 Drop to both eyes nightly. 5/17/16  Yes Bebeto Johnson MD   dorzolamide-timolol (COSOPT) 22.3-6.8 mg/mL ophthalmic solution Administer 1 Drop to both eyes two (2) times a day. 2/25/16  Yes Historical Provider   brimonidine (ALPHAGAN) 0.15 % ophthalmic solution Administer 1 Drop to both eyes two (2) times a day. 10/26/15  Yes Bebeto Johnson MD   atorvastatin (LIPITOR) 80 mg tablet Take 1 Tab by mouth daily. 4/30/15  Yes Bebeto Johnson MD   metoprolol (LOPRESSOR) 25 mg tablet Take 0.5 Tabs by mouth two (2) times a day. 4/30/15  Yes Bebeto Johnson MD   metFORMIN (GLUCOPHAGE) 1,000 mg tablet Take 1 Tab by mouth daily. 4/30/15  Yes Bebeto Johnson MD   metFORMIN (GLUCOPHAGE) 500 mg tablet Take 0.5 Tabs by mouth daily (with breakfast). 2/20/17   Asia Aguilera MD   [facility] Check blood sugar twice a day 9/12/16   Ondina Noel MD   Blood-Glucose Meter monitoring kit accu chek gabino smartview meter 4/30/15   Bebeto Johnson MD              No Known Allergies        Objective:     Vitals:    07/13/17 1315   BP: 169/80   Pulse: 65   Resp: 18   Temp: 98.1 °F (36.7 °C)   TempSrc: Oral   SpO2: 100%   Weight: 124 lb (56.2 kg)   Height: 5' 3\" (1.6 m)            Physical Exam:    GENERAL: alert, cooperative, appears stated age  EYE: negative  LYMPHATIC: Cervical, supraclavicular, and axillary nodes normal.   THROAT & NECK: normal and no erythema or exudates noted. LUNG: clear to auscultation bilaterally  HEART: regular rate and rhythm  ABDOMEN: soft, non-tender  EXTREMITIES:  no edema  SKIN: Normal.  NEUROLOGIC: negative            Assessment:     1.  Left breast carcinoma:  T1b N0 M0 (Stage IA) infiltrating ductal carcinoma, Tumor size 1.0 cm, LN -ve, grade 1, %, CT 70%, Her 2 -ve    ECOG PS 0  Intent of treatment - curative    S/P left sided lumpectomy and sentinel LN excision. Patient sent for consideration of adjuvant therapy. I spent significant time in explaining the rationale of adjuvant therapy is to reduce the risk of recurrence with a hope to cure the disease. Since the tumor is small, highly ER +ve, Her 2 -ve and histologic grade 1, she would not benefit from adjuvant chemotherapy. She will however benefit from adjuvant aromatase inhibitor. Aromatase inhibitor reduces the risk of recurrence of breast cancer by at least 50%. I counseled the patient regarding the hormonal therapy. Discussions included side-effect and benefit of hormonal therapy. She understood the expected side-effect which includes hot flashes, myalgias/arthralgias, osteopenia/osteoporosis with aromatase inhibitor. She agrees to take Letrozole. She understands the alternative to this is observation alone. She will first undergo resection of the JUDSON lung lesion and then receive adjuvant radiation to the left breast and then start AI. If the radiation treatment is delayed due to wound healing issued, I will start AI sooner. I spent 65 minute with the patient in a face-to-face encounter. I explained her the stage of the disease, pathophysiology of the disease and the treatment approaches. I answered all her questions. More than 50% of the time was utilized in education, counseling and co-ordination of care. Patient will be meeting with navigation services to discuss any financial barriers to care/estimated cost of care. The patient's emotional well being was addressed during this office visit and patient seems to be coping well with the diagnosis and the treatment. 2. JUDSON lung lesion    Suspect primary lung cancer. She has an appointment to see Dr. Linette Caceres at [facility]. Plan:       1. Lung surgery per Dr. Linette Caceres   2. Return in 8 weeks  3. Breast radiation per Dr. Jose Martin Slade  4.  Aromatase inhibitor after completion of radiation        Signed By: Devin Wilkinson Rosanne Sorenson MD     July 13, 2017             Ashok Goltz, MD  CC.  MD Esther Babb MD Lucio Rm, MD

## 2017-07-31 RX ORDER — ALLOPURINOL 300 MG/1
TABLET ORAL
Qty: 30 TAB | Refills: 0 | Status: SHIPPED | OUTPATIENT
Start: 2017-07-31 | End: 2018-04-05 | Stop reason: ALTCHOICE

## 2017-09-25 ENCOUNTER — PATIENT OUTREACH (OUTPATIENT)
Dept: FAMILY MEDICINE CLINIC | Age: 67
End: 2017-09-25

## 2017-09-26 ENCOUNTER — TELEPHONE (OUTPATIENT)
Dept: FAMILY MEDICINE CLINIC | Age: 67
End: 2017-09-26

## 2017-09-26 DIAGNOSIS — C50.212 BREAST CANCER OF UPPER-INNER QUADRANT OF LEFT FEMALE BREAST (HCC): Primary | ICD-10-CM

## 2017-09-26 DIAGNOSIS — H40.9 GLAUCOMA, UNSPECIFIED GLAUCOMA, UNSPECIFIED LATERALITY: ICD-10-CM

## 2017-09-26 NOTE — TELEPHONE ENCOUNTER
Received call to pt.   Requesting oncology and eye referral.  Order placed for oncology and eye referral , per Verbal Order from Dr. Yuli Fernandez on 9/26/2017 due to pt request    Message sent to [facility], referrals

## 2017-09-27 ENCOUNTER — OFFICE VISIT (OUTPATIENT)
Dept: SURGERY | Age: 67
End: 2017-09-27

## 2017-09-27 VITALS
BODY MASS INDEX: 21.44 KG/M2 | TEMPERATURE: 69 F | HEIGHT: 63 IN | OXYGEN SATURATION: 99 % | WEIGHT: 121 LBS | DIASTOLIC BLOOD PRESSURE: 63 MMHG | SYSTOLIC BLOOD PRESSURE: 130 MMHG

## 2017-09-27 DIAGNOSIS — C50.212 BREAST CANCER OF UPPER-INNER QUADRANT OF LEFT FEMALE BREAST (HCC): Primary | ICD-10-CM

## 2017-09-27 NOTE — MR AVS SNAPSHOT
Visit Information Date & Time Provider Department Dept. Phone Encounter #  
 9/27/2017  2:00 PM Adelso Mejía MD [facility] [id number] Your Appointments 9/28/2017  1:30 PM  
ESTABLISHED PATIENT with Ela Dias MD  
[address] [facility] Oncology at [facility]) Appt Note: breast 2 mth f/u; breast 2 mth f/u  
 [address] [facility]  
[phone]  
  
   
 [address] [id number]  
  
    
  
 10/16/2017 11:30 AM  
TRANSITIONAL CARE MANAGEMENT with Lise Agee MD  
[address] [facility] OFFICE-ANN[facility] ([address]) Appt Note: lobectomy 9/20 [phone]  
[phone] [phone] Upcoming Health Maintenance Date Due COLONOSCOPY 6/1/2015 MEDICARE YEARLY EXAM 5/25/2017 INFLUENZA AGE 9 TO ADULT 8/1/2017 HEMOGLOBIN A1C Q6M 11/22/2017 MICROALBUMIN Q1 2/1/2018 FOOT EXAM Q1 2/20/2018 EYE EXAM RETINAL OR DILATED Q1 4/20/2018 LIPID PANEL Q1 5/22/2018 BREAST CANCER SCRN MAMMOGRAM 4/18/2019 GLAUCOMA SCREENING Q2Y 4/20/2019 DTaP/Tdap/Td series (2 - Td) 5/8/2025 Allergies as of 9/27/2017  Review Complete On: 9/27/2017 By: Adelso Mejía MD  
 No Known Allergies Current Immunizations  Reviewed on 7/13/2017 Name Date Influenza High Dose Vaccine PF 2/1/2017 Influenza Vaccine 10/26/2015 Influenza Vaccine (Quad) PF 10/26/2015 Pneumococcal Conjugate (PCV-13) 6/2/2017 Pneumococcal Polysaccharide (PPSV-23) 2/4/2016, 10/26/2015 Tdap 5/8/2015 Zoster Vaccine, Live 2/1/2016 Not reviewed this visit Vitals  BP Temp Height(growth percentile) Weight(growth percentile) SpO2 BMI  
 130/63 (BP 1 Location: Right arm, BP Patient Position: Sitting) (!) 69 °F (20.6 °C) 5' 3\" (1.6 m) 121 lb (54.9 kg) 99% 21.43 kg/m2 OB Status Smoking Status Postmenopausal Current Some Day Smoker Vitals History BMI and BSA Data Body Mass Index Body Surface Area  
 21.43 kg/m 2 1.56 m 2 Preferred Pharmacy Pharmacy Name Phone [facility] [address] Candice [place], [address] [phone] Your Updated Medication List  
  
   
This list is accurate as of: 9/27/17  2:43 PM.  Always use your most recent med list.  
  
  
  
  
 allopurinol 300 mg tablet Commonly known as:  ZYLOPRIM  
take 1 tablet by mouth once daily  
  
 atorvastatin 80 mg tablet Commonly known as:  LIPITOR Take 1 Tab by mouth daily. Blood-Glucose Meter monitoring kit  
accu chek gabino smartview meter  
  
 brimonidine 0.15 % ophthalmic solution Commonly known as:  Melvi Eulalio Administer 1 Drop to both eyes two (2) times a day. cilostazol 100 mg tablet Commonly known as:  PLETAL  
TAKE 1 TABLET BEFORE BREAKFAST AND DINNER  
  
 clopidogrel 75 mg Tab Commonly known as:  PLAVIX Take 1 Tab by mouth daily. dorzolamide-timolol 22.3-6.8 mg/mL ophthalmic solution Commonly known as:  COSOPT Administer 1 Drop to both eyes two (2) times a day.  
  
 gabapentin 100 mg capsule Commonly known as:  NEURONTIN Take 1 Cap by mouth daily. glucose blood VI test strips strip Commonly known as:  [address] Check blood sugar twice a day  
  
 hydroCHLOROthiazide 25 mg tablet Commonly known as:  HYDRODIURIL Take 1 Tab by mouth daily. HYDROcodone-acetaminophen 5-325 mg per tablet Commonly known as:  Hurman Grist Take 1-2 Tabs by mouth every six (6) hours as needed for Pain. Max Daily Amount: 8 Tabs. ibuprofen 600 mg tablet Commonly known as:  MOTRIN Take 1 Tab by mouth every eight (8) hours as needed for Pain. Lancets Misc Commonly known as:  ACCU-CHEK FASTCLIX Check blood sugar twice a day  
  
 latanoprost 0.005 % ophthalmic solution Commonly known as:  Marlyse Billow Administer 1 Drop to both eyes nightly. * metFORMIN 1,000 mg tablet Commonly known as:  GLUCOPHAGE Take 1 Tab by mouth daily. * metFORMIN 500 mg tablet Commonly known as:  GLUCOPHAGE Take 0.5 Tabs by mouth daily (with breakfast). metoprolol tartrate 25 mg tablet Commonly known as:  LOPRESSOR Take 0.5 Tabs by mouth two (2) times a day. traMADol-acetaminophen 37.5-325 mg per tablet Commonly known as:  ULTRACET Take 1 Tab by mouth every eight (8) hours as needed for Pain. Max Daily Amount: 3 Tabs. * Notice: This list has 2 medication(s) that are the same as other medications prescribed for you. Read the directions carefully, and ask your doctor or other care provider to review them with you. Introducing [facility]! [address] [facility] introduces [employer] patient portal. Now you can access parts of your medical record, email your doctor's office, and request medication refills online. 1. In your internet browser, go to [email or web address] 2. Click on the First Time User? Click Here link in the Sign In box. You will see the New Member Sign Up page. 3. Enter your [employer] Access Code exactly as it appears below. You will not need to use this code after youve completed the sign-up process. If you do not sign up before the expiration date, you must request a new code. · [employer] Access Code: [email or web address] Expires: 10/28/2017 12:32 PM 
 
4. Enter the last four digits of your Social Security Number (xxxx) and Date of Birth (mm/dd/yyyy) as indicated and click Submit. You will be taken to the next sign-up page. 5. Create a [employer] ID. This will be your [employer] login ID and cannot be changed, so think of one that is secure and easy to remember. 6. Create a [employer] password. You can change your password at any time. 7. Enter your Password Reset Question and Answer. This can be used at a later time if you forget your password. 8. Enter your e-mail address. You will receive e-mail notification when new information is available in [address] 9. Click Sign Up. You can now view and download portions of your medical record. 10. Click the Download Summary menu link to download a portable copy of your medical information. If you have questions, please visit the Frequently Asked Questions section of the [employer] website. Remember, [employer] is NOT to be used for urgent needs. For medical emergencies, dial 911. Now available from your iPhone and Android! Please provide this summary of care documentation to your next provider. Your primary care clinician is listed as Ryann Baires. If you have any questions after today's visit, please call [phone].

## 2017-09-27 NOTE — PROGRESS NOTES
HISTORY OF PRESENT ILLNESS  Deena Severe is a 79 y.o. female who comes in for follow up for her left breast cancer  Breast Cancer   Pertinent negatives include no chest pain, no abdominal pain, no headaches and no shortness of breath. Other   Pertinent negatives include no chest pain, no abdominal pain, no headaches and no shortness of breath. She went for screening mammogram 3/27/2017 demonstrating a density in the medial aspect of the left breast.   She underwent dx mammogram and US demonstrating a 1.0 x 0.9 x 0.8 cm at 9:00, 2 cm medial to the nipple in the left breast.  US core bx demonstrated a G1 IDC ER pos RI pos Her2/jud unamplified tumor. She denies prior breast issues and had not had a mammogram in two years. She denies skin changes, feeling any masses, nipple changes or drainage, breast pain, unexplained weight loss or bone pain. She had menarche at 15, first of 5 pregnancies at 12, menopause at 48 and did not take HRT. She denies family hx of breast or ovarian cancer. Breast MRI 5/13/2017 demonstrated 1. Left BI-RADS 6, known malignancy. Right BI-RADS 2, benign. 2. LEFT BREAST: Known invasive ductal carcinoma in the left inner breast at 9:00, 1.2 x 1.1 x 0.8 cm. No MRI evidence for multicentric disease. No lymphadenopathy. 3. RIGHT BREAST: No MRI sign of malignancy. She underwent a left lumpectomy and SLNB 6/22/2017 for a [id number] IDC. She then underwent  robotic partial lobectomy of her left lung for a second primary adenocarcinoma by Dr Linette Caceres at [facility]. She has seen DR Julián Alfred with plans for WBRT but has not started yet due to the lung surgery. She has seen DR Levi Rich who plans adjuvant endocrine therapy after the radiation.     Past Medical History:   Diagnosis Date    Arthritis     Cancer [facility])     breast cancer- left    Cerebrovascular accident (stroke) ([facility]     no deficiets    Diabetes mellitus type 2, controlled ([facility]     Gallstones     asymptomatic, pt denies having  GERD (gastroesophageal reflux disease)     dx with resolution of symptoms on ppi- pt denies having    Hypercholesterolemia     Hypertension     Ill-defined condition     gout    Non-nicotine vapor product user     used to use    PAD (peripheral artery disease) ([facility] 2/20/2013    right SFA angioplasty and athrectomy    Primary open angle glaucoma     Solitary lung nodule 5/9/2017    Type II diabetes mellitus, uncontrolled ([facility] 2/1/2017     Past Surgical History:   Procedure Laterality Date    HX BREAST LUMPECTOMY Left 6/22/2017    LEFT LUMPECTOMY WITH INTRAOPERATIVE ULTRASOUND GUIDED, LEFT AXILLARY SENTINEL LYMPH NODE BIOPSY, POSSIBLE AXILLARY DISECTION performed by Pacheco Ernst MD at [facility] MAIN OR    HX HEENT Bilateral     eye lids surgery    VASCULAR SURGERY PROCEDURE UNLIST  April 2016    right leg stent     Family History   Problem Relation Age of Onset    Hypertension Mother     Diabetes Mother     Diabetes Son      Social History   Substance Use Topics    Smoking status: Current Some Day Smoker     Types: Cigarettes     Last attempt to quit: 3/13/2013    Smokeless tobacco: Never Used      Comment: estimate is one pack per week    Alcohol use 12.6 oz/week     21 Cans of beer per week      Comment: 2 to 3 beers per day per patient     Current Outpatient Prescriptions   Medication Sig    gabapentin (NEURONTIN) 100 mg capsule Take 1 Cap by mouth daily.  clopidogrel (PLAVIX) 75 mg tab Take 1 Tab by mouth daily.  hydroCHLOROthiazide (HYDRODIURIL) 25 mg tablet Take 1 Tab by mouth daily.  traMADol-acetaminophen (ULTRACET) 37.5-325 mg per tablet Take 1 Tab by mouth every eight (8) hours as needed for Pain. Max Daily Amount: 3 Tabs.  latanoprost (XALATAN) 0.005 % ophthalmic solution Administer 1 Drop to both eyes nightly.  dorzolamide-timolol (COSOPT) 22.3-6.8 mg/mL ophthalmic solution Administer 1 Drop to both eyes two (2) times a day.     brimonidine (ALPHAGAN) 0.15 % ophthalmic solution Administer 1 Drop to both eyes two (2) times a day.  allopurinol (ZYLOPRIM) 300 mg tablet take 1 tablet by mouth once daily    HYDROcodone-acetaminophen (NORCO) 5-325 mg per tablet Take 1-2 Tabs by mouth every six (6) hours as needed for Pain. Max Daily Amount: 8 Tabs.  metFORMIN (GLUCOPHAGE) 500 mg tablet Take 0.5 Tabs by mouth daily (with breakfast).  ibuprofen (MOTRIN) 600 mg tablet Take 1 Tab by mouth every eight (8) hours as needed for Pain.  cilostazol (PLETAL) 100 mg tablet TAKE 1 TABLET BEFORE BREAKFAST AND DINNER    glucose blood VI test strips (ACCU-CHEK SMARTVIEW TEST STRIP) strip Check blood sugar twice a day    Lancets (ACCU-CHEK FASTCLIX) misc Check blood sugar twice a day    atorvastatin (LIPITOR) 80 mg tablet Take 1 Tab by mouth daily.  metoprolol (LOPRESSOR) 25 mg tablet Take 0.5 Tabs by mouth two (2) times a day.  metFORMIN (GLUCOPHAGE) 1,000 mg tablet Take 1 Tab by mouth daily.  Blood-Glucose Meter monitoring kit accu chek gabino smartview meter     No current facility-administered medications for this visit. No Known Allergies    Review of Systems   Constitutional: Negative for chills, diaphoresis, fever, malaise/fatigue and weight loss. HENT: Negative for congestion, ear pain and sore throat. Eyes: Negative for blurred vision and pain. Respiratory: Negative for cough, hemoptysis, sputum production, shortness of breath, wheezing and stridor. Cardiovascular: Negative for chest pain, palpitations, orthopnea, claudication, leg swelling and PND. Gastrointestinal: Negative for abdominal pain, blood in stool, constipation, diarrhea, heartburn, melena, nausea and vomiting. Genitourinary: Negative for dysuria, flank pain, frequency, hematuria and urgency. Musculoskeletal: Positive for joint pain. Negative for back pain, myalgias and neck pain. Skin: Negative for itching and rash.    Neurological: Negative for dizziness, tremors, focal weakness, seizures, weakness and headaches. Hx stroke without residual effect   Endo/Heme/Allergies: Negative for polydipsia. Psychiatric/Behavioral: Negative for depression and memory loss. The patient is not nervous/anxious. Visit Vitals    /63 (BP 1 Location: Right arm, BP Patient Position: Sitting)    Temp (!) 69 °F (20.6 °C)    Ht 5' 3\" (1.6 m)    Wt 54.9 kg (121 lb)    SpO2 99%    BMI 21.43 kg/m2       Physical Exam   Constitutional: She is oriented to person, place, and time. She appears well-developed and well-nourished. No distress. HENT:   Head: Normocephalic and atraumatic. Mouth/Throat: Oropharynx is clear and moist. No oropharyngeal exudate. Eyes: Conjunctivae and EOM are normal. Pupils are equal, round, and reactive to light. No scleral icterus. Neck: Normal range of motion. Neck supple. No JVD present. No tracheal deviation present. No thyromegaly present. Cardiovascular: Normal rate and regular rhythm. Exam reveals no gallop and no friction rub. No murmur heard. Pulmonary/Chest: Effort normal and breath sounds normal. No respiratory distress. She has no wheezes. She has no rales. Right breast exhibits no inverted nipple, no mass, no nipple discharge, no skin change and no tenderness. Left breast exhibits no inverted nipple, no mass, no nipple discharge, no skin change and no tenderness. Breasts are symmetrical (small/medium sized). Abdominal: Soft. Bowel sounds are normal. She exhibits no distension and no mass. There is no tenderness. There is no rebound and no guarding. Musculoskeletal: Normal range of motion. She exhibits no edema. Lymphadenopathy:     She has no cervical adenopathy. She has no axillary adenopathy. Right: No supraclavicular adenopathy present. Left: No supraclavicular adenopathy present. Neurological: She is alert and oriented to person, place, and time. No cranial nerve deficit. Skin: Skin is warm and dry. No rash noted. She is not diaphoretic. No erythema. No pallor. Psychiatric: She has a normal mood and affect. Her behavior is normal. Judgment and thought content normal.                ASSESSMENT and PLAN  1. Left breast cancer IDC stage 1 ([id number]) ER pos Her2/jud unamplified. Dx 4/2017. Needs to get back to DR Sayda Frost to start the radiation  Needs to see Dr Renée Jernigan to start an AI after radiation  Plan for bilateral screening mammogram in April/May 2018 (6 months after RT)  2. Left lung ca Stage 1.   S/p resection    RTC 4 months      Rochelle Dougherty MD FACS

## 2017-10-04 ENCOUNTER — OFFICE VISIT (OUTPATIENT)
Dept: ONCOLOGY | Age: 67
End: 2017-10-04

## 2017-10-04 VITALS
WEIGHT: 122 LBS | BODY MASS INDEX: 21.62 KG/M2 | SYSTOLIC BLOOD PRESSURE: 129 MMHG | OXYGEN SATURATION: 100 % | DIASTOLIC BLOOD PRESSURE: 82 MMHG | HEART RATE: 68 BPM | HEIGHT: 63 IN | TEMPERATURE: 97.5 F

## 2017-10-04 DIAGNOSIS — C50.212 MALIGNANT NEOPLASM OF UPPER-INNER QUADRANT OF LEFT BREAST IN FEMALE, ESTROGEN RECEPTOR POSITIVE (HCC): Primary | ICD-10-CM

## 2017-10-04 DIAGNOSIS — E55.9 VITAMIN D DEFICIENCY: ICD-10-CM

## 2017-10-04 DIAGNOSIS — R53.83 FATIGUE, UNSPECIFIED TYPE: ICD-10-CM

## 2017-10-04 DIAGNOSIS — Z17.0 MALIGNANT NEOPLASM OF UPPER-INNER QUADRANT OF LEFT BREAST IN FEMALE, ESTROGEN RECEPTOR POSITIVE (HCC): Primary | ICD-10-CM

## 2017-10-04 RX ORDER — LETROZOLE 2.5 MG/1
2.5 TABLET, FILM COATED ORAL DAILY
Qty: 30 TAB | Refills: 6 | Status: SHIPPED | OUTPATIENT
Start: 2017-10-04 | End: 2018-08-05 | Stop reason: SDUPTHER

## 2017-10-04 NOTE — MR AVS SNAPSHOT
Visit Information Date & Time Provider Department Dept. Phone Encounter #  
 10/4/2017  9:00 AM Maddy Samuels MD [address] [facility] Oncology at [facility] [phone] [id number] Your Appointments 1/31/2018  2:00 PM  
ESTABLISHED PATIENT with Billie Long MD  
[facility] Dr. Randy Valentin [facility] ([facility] Oseguera) Appt Note: f/u lumpectomy surgery 6.22.17  
 [address] [place] [address], [address], [address] [phone]  
[address], [address], [address] [facility] [address] [address]  
  
    
  
 10/16/2017 11:30 AM  
TRANSITIONAL CARE MANAGEMENT with Andrei Mckeon MD  
[address] [facility] OFFICE-[facility] ([facility] Oseguera) Appt Note: lobectomy 9/20 [address] [id number] [phone] [place] [id number] Upcoming Health Maintenance Date Due COLONOSCOPY 6/1/2015 MEDICARE YEARLY EXAM 5/25/2017 INFLUENZA AGE 9 TO ADULT 8/1/2017 HEMOGLOBIN A1C Q6M 11/22/2017 MICROALBUMIN Q1 2/1/2018 FOOT EXAM Q1 2/20/2018 EYE EXAM RETINAL OR DILATED Q1 4/20/2018 LIPID PANEL Q1 5/22/2018 BREAST CANCER SCRN MAMMOGRAM 4/18/2019 GLAUCOMA SCREENING Q2Y 4/20/2019 DTaP/Tdap/Td series (2 - Td) 5/8/2025 Allergies as of 10/4/2017  Review Complete On: 10/4/2017 By: Padmaja Leroy RN No Known Allergies Current Immunizations  Reviewed on 7/13/2017 Name Date Influenza High Dose Vaccine PF 2/1/2017 Influenza Vaccine 10/26/2015 Influenza Vaccine (Quad) PF 10/26/2015 Pneumococcal Conjugate (PCV-13) 6/2/2017 Pneumococcal Polysaccharide (PPSV-23) 2/4/2016, 10/26/2015 Tdap 5/8/2015 Zoster Vaccine, Live 2/1/2016 Not reviewed this visit You Were Diagnosed With   
  
 Codes Comments  Malignant neoplasm of upper-inner quadrant of left breast in female, estrogen receptor positive ([facility] [address] [address]    -  Primary ICD-10-CM: C50.212, Z17.0 ICD-9-CM: 174.2, V86.0 Fatigue, unspecified type     ICD-10-CM: R53.83 ICD-9-CM: 780.79 Vitamin D deficiency     ICD-10-CM: E55.9 ICD-9-CM: 268.9 Vitals BP Pulse Temp Height(growth percentile) Weight(growth percentile) SpO2  
 129/82 68 97.5 °F (36.4 °C) 5' 3\" (1.6 m) 122 lb (55.3 kg) 100% BMI OB Status Smoking Status 21.61 kg/m2 Postmenopausal Current Some Day Smoker BMI and BSA Data Body Mass Index Body Surface Area  
 21.61 kg/m 2 1.57 m 2 Preferred Pharmacy Pharmacy Name Phone [id number] [place], [address] [facility] [phone] Your Updated Medication List  
  
   
This list is accurate as of: 10/4/17  9:47 AM.  Always use your most recent med list.  
  
  
  
  
 allopurinol 300 mg tablet Commonly known as:  ZYLOPRIM  
take 1 tablet by mouth once daily  
  
 atorvastatin 80 mg tablet Commonly known as:  LIPITOR Take 1 Tab by mouth daily. Blood-Glucose Meter monitoring kit  
accu chek gabino smartview meter  
  
 brimonidine 0.15 % ophthalmic solution Commonly known as:  Gisela Alturas Administer 1 Drop to both eyes two (2) times a day. cilostazol 100 mg tablet Commonly known as:  PLETAL  
TAKE 1 TABLET BEFORE BREAKFAST AND DINNER  
  
 clopidogrel 75 mg Tab Commonly known as:  PLAVIX Take 1 Tab by mouth daily. dorzolamide-timolol 22.3-6.8 mg/mL ophthalmic solution Commonly known as:  COSOPT Administer 1 Drop to both eyes two (2) times a day.  
  
 gabapentin 100 mg capsule Commonly known as:  NEURONTIN Take 1 Cap by mouth daily. glucose blood VI test strips strip Commonly known as:  [address] Check blood sugar twice a day  
  
 hydroCHLOROthiazide 25 mg tablet Commonly known as:  HYDRODIURIL Take 1 Tab by mouth daily. HYDROcodone-acetaminophen 5-325 mg per tablet Commonly known as:  Iven Snyder Take 1-2 Tabs by mouth every six (6) hours as needed for Pain. Max Daily Amount: 8 Tabs. ibuprofen 600 mg tablet Commonly known as:  MOTRIN Take 1 Tab by mouth every eight (8) hours as needed for Pain. Lancets Misc Commonly known as:  ACCU-CHEK FASTCLIX Check blood sugar twice a day  
  
 latanoprost 0.005 % ophthalmic solution Commonly known as:  Richardine Solum Administer 1 Drop to both eyes nightly. letrozole 2.5 mg tablet Commonly known as:  [facility] Take 1 Tab by mouth daily. * metFORMIN 1,000 mg tablet Commonly known as:  GLUCOPHAGE Take 1 Tab by mouth daily. * metFORMIN 500 mg tablet Commonly known as:  GLUCOPHAGE Take 0.5 Tabs by mouth daily (with breakfast). metoprolol tartrate 25 mg tablet Commonly known as:  LOPRESSOR Take 0.5 Tabs by mouth two (2) times a day. traMADol-acetaminophen 37.5-325 mg per tablet Commonly known as:  ULTRACET Take 1 Tab by mouth every eight (8) hours as needed for Pain. Max Daily Amount: 3 Tabs. * Notice: This list has 2 medication(s) that are the same as other medications prescribed for you. Read the directions carefully, and ask your doctor or other care provider to review them with you. Prescriptions Sent to Pharmacy Refills  
 letrozole (FEMARA) 2.5 mg tablet 6 Sig: Take 1 Tab by mouth daily. Class: Normal  
 Pharmacy: [address] Ph #: [phone] Route: Oral  
  
We Performed the Following VITAMIN D, 25 HYDROXY [id number] CPT(R)] Introducing [facility]! [employer] introduces [employer] patient portal. Now you can access parts of your medical record, email your doctor's office, and request medication refills online. 1. In your internet browser, go to [email or web address] 2. Click on the First Time User? Click Here link in the Sign In box.  You will see the New Member Sign Up page. 3. Enter your [employer] Access Code exactly as it appears below. You will not need to use this code after youve completed the sign-up process. If you do not sign up before the expiration date, you must request a new code. · [employer] Access Code: [email or web address] Expires: 10/28/2017 12:32 PM 
 
4. Enter the last four digits of your Social Security Number (xxxx) and Date of Birth (mm/dd/yyyy) as indicated and click Submit. You will be taken to the next sign-up page. 5. Create a [employer] ID. This will be your [employer] login ID and cannot be changed, so think of one that is secure and easy to remember. 6. Create a [employer] password. You can change your password at any time. 7. Enter your Password Reset Question and Answer. This can be used at a later time if you forget your password. 8. Enter your e-mail address. You will receive e-mail notification when new information is available in [address] 9. Click Sign Up. You can now view and download portions of your medical record. 10. Click the Download Summary menu link to download a portable copy of your medical information. If you have questions, please visit the Frequently Asked Questions section of the [employer] website. Remember, [employer] is NOT to be used for urgent needs. For medical emergencies, dial 911. Now available from your iPhone and Android! Please provide this summary of care documentation to your next provider. Your primary care clinician is listed as Margarita Nava. If you have any questions after today's visit, please call [phone].

## 2017-10-04 NOTE — PROGRESS NOTES
Oncology Progress note        Patient: Justa Sanabria MRN: [id number]  SSN: [id number]    YOB: 1950  Age: 79 y.o. Sex: female      Subjective:      Justa Sanabria is a 79 y.o. female who I am seeing for a new diagnosis of left sided invasive ductal carcinoma of the breast. She has been referred by Dr. Tolu Claros. She underwent a screening mammogram on 3/27/2017 which demonstrated a density in the medial aspect of the left breast.   She underwent dx mammogram and US demonstrating a 1.0 x 0.9 x 0.8 cm at 9:00, 2 cm medial to the nipple in the left breast.  US core bx demonstrated a G1 IDC ER pos MA pos Her2/jud unamplified tumor. She underwent a lumpectomy on 06/22/2017. She was also noted to have a JUDSON FDG avid lung mass. She is s/p left upper lobectomy by Dr. Noemy Johnson at [facility]. She is doing well and does not have any new complaints other than decreased appetite.       Review of Systems:    Constitutional: negative  Eyes: negative  Ears, Nose, Mouth, Throat, and Face: negative  Respiratory: negative  Cardiovascular: negative  Gastrointestinal: negative  Genitourinary:negative  Integument/Breast: negative  Hematologic/Lymphatic: negative  Musculoskeletal:negative  Neurological: negative      Past Medical History:   Diagnosis Date    Arthritis     Cancer ([address]     breast cancer- left    Cerebrovascular accident (stroke) ([address]     no deficiets    Diabetes mellitus type 2, controlled ([address]     Gallstones     asymptomatic, pt denies having    GERD (gastroesophageal reflux disease)     dx with resolution of symptoms on ppi- pt denies having    Hypercholesterolemia     Hypertension     Ill-defined condition     gout    Non-nicotine vapor product user     used to use    PAD (peripheral artery disease) ([address] 2/20/2013    right SFA angioplasty and athrectomy    Primary open angle glaucoma     Solitary lung nodule 5/9/2017    Type II diabetes mellitus, uncontrolled ([address] 2/1/2017     Past Surgical History:   Procedure Laterality Date    HX BREAST LUMPECTOMY Left 6/22/2017    LEFT LUMPECTOMY WITH INTRAOPERATIVE ULTRASOUND GUIDED, LEFT AXILLARY SENTINEL LYMPH NODE BIOPSY, POSSIBLE AXILLARY DISECTION performed by Richard Flores MD at [facility] MAIN OR    HX HEENT Bilateral     eye lids surgery    VASCULAR SURGERY PROCEDURE UNLIST  April 2016    right leg stent      Family History   Problem Relation Age of Onset    Hypertension Mother     Diabetes Mother     Diabetes Son      Social History   Substance Use Topics    Smoking status: Current Some Day Smoker     Types: Cigarettes     Last attempt to quit: 3/13/2013    Smokeless tobacco: Never Used      Comment: estimate is one pack per week    Alcohol use 12.6 oz/week     21 Cans of beer per week      Comment: 2 to 3 beers per day per patient      Prior to Admission medications    Medication Sig Start Date End Date Taking? Authorizing Provider   gabapentin (NEURONTIN) 100 mg capsule Take 1 Cap by mouth daily. 5/22/17  Yes Benita Madison MD   clopidogrel (PLAVIX) 75 mg tab Take 1 Tab by mouth daily. 5/9/17  Yes Benita Madison MD   hydroCHLOROthiazide (HYDRODIURIL) 25 mg tablet Take 1 Tab by mouth daily. 3/24/17  Yes Benita Madison MD   traMADol-acetaminophen (ULTRACET) 37.5-325 mg per tablet Take 1 Tab by mouth every eight (8) hours as needed for Pain. Max Daily Amount: 3 Tabs. 2/20/17  Yes Benita Madison MD   glucose blood VI test strips (ACCU-CHEK SMARTVIEW TEST STRIP) strip Check blood sugar twice a day 9/12/16  Yes Sophia Gleason MD   [facility] Check blood sugar twice a day 9/12/16  Yes Sophia Gleason MD   latanoprost (XALATAN) 0.005 % ophthalmic solution Administer 1 Drop to both eyes nightly. 5/17/16  Yes Link Agee MD   dorzolamide-timolol (COSOPT) 22.3-6.8 mg/mL ophthalmic solution Administer 1 Drop to both eyes two (2) times a day.  2/25/16  Yes Historical Provider   brimonidine (ALPHAGAN) 0.15 % ophthalmic solution Administer 1 Drop to both eyes two (2) times a day. 10/26/15  Yes Jan Bustillo MD   allopurinol (ZYLOPRIM) 300 mg tablet take 1 tablet by mouth once daily 7/31/17   Andrei Mckeon MD   HYDROcodone-acetaminophen [facility]) 5-325 mg per tablet Take 1-2 Tabs by mouth every six (6) hours as needed for Pain. Max Daily Amount: 8 Tabs. 6/22/17   Billie Long MD   metFORMIN (GLUCOPHAGE) 500 mg tablet Take 0.5 Tabs by mouth daily (with breakfast). 2/20/17   Andrei Mckeon MD   ibuprofen (MOTRIN) 600 mg tablet Take 1 Tab by mouth every eight (8) hours as needed for Pain. 2/20/17   Andrei Mckeon MD   cilostazol (PLETAL) 100 mg tablet TAKE 1 TABLET BEFORE BREAKFAST AND DINNER 9/27/16   Roxi Serra MD   atorvastatin (LIPITOR) 80 mg tablet Take 1 Tab by mouth daily. 4/30/15   Jan Bustillo MD   metoprolol (LOPRESSOR) 25 mg tablet Take 0.5 Tabs by mouth two (2) times a day. 4/30/15   Jan Bustillo MD   metFORMIN (GLUCOPHAGE) 1,000 mg tablet Take 1 Tab by mouth daily. 4/30/15   Jan Bustillo MD   Blood-Glucose Meter monitoring kit accu chek gabino smartview meter 4/30/15   Jan Bustillo MD              No Known Allergies        Objective:     Vitals:    10/04/17 0920   BP: 129/82   Pulse: 68   Temp: 97.5 °F (36.4 °C)   SpO2: 100%   Weight: 122 lb (55.3 kg)   Height: 5' 3\" (1.6 m)        Physical Exam:    GENERAL: alert, cooperative, appears stated age  EYE: negative  LYMPHATIC: Cervical, supraclavicular, and axillary nodes normal.   THROAT & NECK: normal and no erythema or exudates noted. LUNG: clear to auscultation bilaterally  HEART: regular rate and rhythm  ABDOMEN: soft, non-tender  EXTREMITIES:  no edema  SKIN: Normal.  NEUROLOGIC: negative      Assessment:     1.  Left breast carcinoma:  T1b N0 M0 (Stage IA) infiltrating ductal carcinoma, Tumor size 1.0 cm, LN -ve, grade 1, %, [place] 70%, Her 2 -ve    ECOG PS 0  Intent of treatment - curative    S/P left sided lumpectomy and sentinel LN excision. She will receive adjuvant radiation to the left breast.   After the radiation is complete, she will take an aromatase inhibitor. 2. Lung cancer    S/p left upper lobectomy from Dr. Elmer Shanks at [facility]  Need to obtain pathology results from [facility]        Plan:       > Breast radiation per Dr. Jagdish Reddy  > Aromatase inhibitor after completion of radiation  > recommend Reclast after starting AI - will write order at the next appointment  > check vitamin D  > obtain pathology reports from [facility]   > Follow up in 3 months      I saw the patient in conjunction with TROY Lopez. Signed by: Curt Lopez MD                     October 4, 2017        CC. Deena Bennett MD  CC.  MD Hitesh Palomino MD Irby Rave, MD

## 2017-10-04 NOTE — PROGRESS NOTES
Pt is a 79 yr old Pt here to discuss left breast cancer, had recent left lobectomy, feeling pretty well, pain controlled with tramadol, pulse ox 100% on room air. Pt has already ready meet with Dr Sergo Reid and will set up treatment after healed from surgery.

## 2017-10-05 ENCOUNTER — TELEPHONE (OUTPATIENT)
Dept: FAMILY MEDICINE CLINIC | Age: 67
End: 2017-10-05

## 2017-10-05 LAB — 25(OH)D3+25(OH)D2 SERPL-MCNC: 14.7 NG/ML (ref 30–100)

## 2017-10-05 NOTE — TELEPHONE ENCOUNTER
----- Message from Sean Zarate sent at 10/5/2017  2:46 PM EDT -----  Regarding: Dr. Inés Juarez from [facility] is requesting to have referral re faxed for [facility] sent to [phone].

## 2017-10-06 DIAGNOSIS — Z17.0 MALIGNANT NEOPLASM OF UPPER-INNER QUADRANT OF LEFT BREAST IN FEMALE, ESTROGEN RECEPTOR POSITIVE (HCC): Primary | ICD-10-CM

## 2017-10-06 DIAGNOSIS — C50.212 MALIGNANT NEOPLASM OF UPPER-INNER QUADRANT OF LEFT BREAST IN FEMALE, ESTROGEN RECEPTOR POSITIVE (HCC): Primary | ICD-10-CM

## 2017-10-06 RX ORDER — ERGOCALCIFEROL 1.25 MG/1
50000 CAPSULE ORAL
Qty: 4 CAP | Refills: 6 | Status: SHIPPED | OUTPATIENT
Start: 2017-10-06 | End: 2018-04-05 | Stop reason: SDUPTHER

## 2017-10-06 NOTE — PROGRESS NOTES
Pt made aware, new script electronically sent to pharmacy. PER VOR[facility] from Dr. Jeaneth Osuna ERGOCALCIFEROL 50,000IU ONE CAP BY MOUTH WEEKLY, QUANTITY 4 REFILL 6.

## 2017-10-16 ENCOUNTER — OFFICE VISIT (OUTPATIENT)
Dept: FAMILY MEDICINE CLINIC | Age: 67
End: 2017-10-16

## 2017-10-16 VITALS
OXYGEN SATURATION: 100 % | HEIGHT: 63 IN | BODY MASS INDEX: 21.83 KG/M2 | WEIGHT: 123.2 LBS | HEART RATE: 72 BPM | RESPIRATION RATE: 14 BRPM | TEMPERATURE: 96.9 F | DIASTOLIC BLOOD PRESSURE: 63 MMHG | SYSTOLIC BLOOD PRESSURE: 112 MMHG

## 2017-10-16 DIAGNOSIS — Z13.31 SCREENING FOR DEPRESSION: ICD-10-CM

## 2017-10-16 DIAGNOSIS — Z12.11 SCREEN FOR COLON CANCER: Primary | ICD-10-CM

## 2017-10-16 DIAGNOSIS — Z13.39 SCREENING FOR ALCOHOLISM: ICD-10-CM

## 2017-10-16 DIAGNOSIS — Z23 ENCOUNTER FOR IMMUNIZATION: ICD-10-CM

## 2017-10-16 LAB — HBA1C MFR BLD HPLC: 5.9 %

## 2017-10-16 RX ORDER — TRAMADOL HYDROCHLORIDE 50 MG/1
TABLET ORAL
Refills: 0 | COMMUNITY
Start: 2017-09-20 | End: 2017-10-16 | Stop reason: SDUPTHER

## 2017-10-16 RX ORDER — TRAMADOL HYDROCHLORIDE 50 MG/1
TABLET ORAL
Qty: 90 TAB | Refills: 0 | Status: SHIPPED | OUTPATIENT
Start: 2017-10-16 | End: 2017-11-05 | Stop reason: SDUPTHER

## 2017-10-16 RX ORDER — DORZOLAMIDE HCL 20 MG/ML
SOLUTION/ DROPS OPHTHALMIC
Refills: 0 | COMMUNITY
Start: 2017-10-09

## 2017-10-16 RX ORDER — GABAPENTIN 300 MG/1
CAPSULE ORAL
Refills: 0 | COMMUNITY
Start: 2017-08-29 | End: 2018-04-05 | Stop reason: SDUPTHER

## 2017-10-16 RX ORDER — BRIMONIDINE TARTRATE 1 MG/ML
SOLUTION/ DROPS OPHTHALMIC
Refills: 0 | COMMUNITY
Start: 2017-10-09 | End: 2020-02-03

## 2017-10-16 NOTE — PROGRESS NOTES
Monica Becerra            Name and  verified          Chief Complaint   Patient presents with   [facility] Wellness Visit         Advance Directives Care Planning Information given, patient acknowledged understanding.

## 2017-10-16 NOTE — PATIENT INSTRUCTIONS
Vaccine Information Statement    Influenza (Flu) Vaccine (Inactivated or Recombinant): What you need to know    Many Vaccine Information Statements are available in Bengali and other languages. See www.immunize.org/vis  Hojas de Información Sobre Vacunas están disponibles en Español y en muchos otros idiomas. Visite www.immunize.org/vis    1. Why get vaccinated? Influenza (flu) is a contagious disease that spreads around the [place] every year, usually between October and May. Flu is caused by influenza viruses, and is spread mainly by coughing, sneezing, and close contact. Anyone can get flu. Flu strikes suddenly and can last several days. Symptoms vary by age, but can include:   fever/chills   sore throat   muscle aches   fatigue   cough   headache    runny or stuffy nose    Flu can also lead to pneumonia and blood infections, and cause diarrhea and seizures in children. If you have a medical condition, such as heart or lung disease, flu can make it worse. Flu is more dangerous for some people. Infants and young children, people 72years of age and older, pregnant women, and people with certain health conditions or a weakened immune system are at greatest risk. Each year thousands of people in the [facility] die from flu, and many more are hospitalized. Flu vaccine can:   keep you from getting flu,   make flu less severe if you do get it, and   keep you from spreading flu to your family and other people. 2. Inactivated and recombinant flu vaccines    A dose of flu vaccine is recommended every flu season. Children 6 months through 6years of age may need two doses during the same flu season. Everyone else needs only one dose each flu season.        Some inactivated flu vaccines contain a very small amount of a mercury-based preservative called thimerosal. Studies have not shown thimerosal in vaccines to be harmful, but flu vaccines that do not contain thimerosal are available. There is no live flu virus in flu shots. They cannot cause the flu. There are many flu viruses, and they are always changing. Each year a new flu vaccine is made to protect against three or four viruses that are likely to cause disease in the upcoming flu season. But even when the vaccine doesnt exactly match these viruses, it may still provide some protection    Flu vaccine cannot prevent:   flu that is caused by a virus not covered by the vaccine, or   illnesses that look like flu but are not. It takes about 2 weeks for protection to develop after vaccination, and protection lasts through the flu season. 3. Some people should not get this vaccine    Tell the person who is giving you the vaccine:     If you have any severe, life-threatening allergies. If you ever had a life-threatening allergic reaction after a dose of flu vaccine, or have a severe allergy to any part of this vaccine, you may be advised not to get vaccinated. Most, but not all, types of flu vaccine contain a small amount of egg protein.  If you ever had Guillain-Barré Syndrome (also called GBS). Some people with a history of GBS should not get this vaccine. This should be discussed with your doctor.  If you are not feeling well. It is usually okay to get flu vaccine when you have a mild illness, but you might be asked to come back when you feel better. 4. Risks of a vaccine reaction    With any medicine, including vaccines, there is a chance of reactions. These are usually mild and go away on their own, but serious reactions are also possible. Most people who get a flu shot do not have any problems with it.      Minor problems following a flu shot include:    soreness, redness, or swelling where the shot was given     hoarseness   sore, red or itchy eyes   cough   fever   aches   headache   itching   fatigue  If these problems occur, they usually begin soon after the shot and last 1 or 2 days. More serious problems following a flu shot can include the following:     There may be a small increased risk of Guillain-Barré Syndrome (GBS) after inactivated flu vaccine. This risk has been estimated at 1 or 2 additional cases per million people vaccinated. This is much lower than the risk of severe complications from flu, which can be prevented by flu vaccine.  Young children who get the flu shot along with pneumococcal vaccine (PCV13) and/or DTaP vaccine at the same time might be slightly more likely to have a seizure caused by fever. Ask your doctor for more information. Tell your doctor if a child who is getting flu vaccine has ever had a seizure. Problems that could happen after any injected vaccine:      People sometimes faint after a medical procedure, including vaccination. Sitting or lying down for about 15 minutes can help prevent fainting, and injuries caused by a fall. Tell your doctor if you feel dizzy, or have vision changes or ringing in the ears.  Some people get severe pain in the shoulder and have difficulty moving the arm where a shot was given. This happens very rarely.  Any medication can cause a severe allergic reaction. Such reactions from a vaccine are very rare, estimated at about 1 in a million doses, and would happen within a few minutes to a few hours after the vaccination. As with any medicine, there is a very remote chance of a vaccine causing a serious injury or death. The safety of vaccines is always being monitored. For more information, visit: www.cdc.gov/vaccinesafety/    5. What if there is a serious reaction? What should I look for?  Look for anything that concerns you, such as signs of a severe allergic reaction, very high fever, or unusual behavior.     Signs of a severe allergic reaction can include hives, swelling of the face and throat, difficulty breathing, a fast heartbeat, dizziness, and weakness  usually within a few minutes to a few hours after the vaccination. What should I do?  If you think it is a severe allergic reaction or other emergency that cant wait, call 9-1-1 and get the person to the nearest hospital. Otherwise, call your doctor.  Reactions should be reported to the Vaccine Adverse Event Reporting System (VAERS). Your doctor should file this report, or you can do it yourself through  the VAERS web site at www.vaers. [facility].gov, or by calling 4-278.383.8058. VAERS does not give medical advice. 6. The National Vaccine Injury Compensation Program    The [facility] Vaccine Injury Compensation Program (VICP) is a federal program that was created to compensate people who may have been injured by certain vaccines. Persons who believe they may have been injured by a vaccine can learn about the program and about filing a claim by calling 9-965.499.1511 or visiting the [employer] website at www.[facility].gov/vaccinecompensation. There is a time limit to file a claim for compensation. 7. How can I learn more?  Ask your healthcare provider. He or she can give you the vaccine package insert or suggest other sources of information.  Call your local or state health department.  Contact the Centers for Disease Control and Prevention (CDC):  - Call 4-492.192.9186 (1-800-CDC-INFO) or  - Visit CDCs website at www.cdc.gov/flu    Vaccine Information Statement   Inactivated Influenza Vaccine   8/7/2015  42 ANNEL Laguerre [id number]    Department of Health and Human Services  Centers for Disease Control and Prevention    Office Use Only    Medicare Wellness Visit, Female    The best way to live healthy is to have a healthy lifestyle by eating a well-balanced diet, exercising regularly, limiting alcohol and stopping smoking. Regular physical exams and screening tests are another way to keep healthy. Preventive exams provided by your health care provider can find health problems before they become diseases or illnesses. Preventive services including immunizations, screening tests, monitoring and exams can help you take care of your own health. All people over age 72 should have a pneumovax  and and a prevnar shot to prevent pneumonia. These are once in a lifetime unless you and your provider decide differently. All people over 65 should have a yearly flu shot and a tetanus vaccine every 10 years. A bone mass density to screen for osteoporosis or thinning of the bones should be done every 2 years after 65. Screening for diabetes mellitus with a blood sugar test should be done every year. Glaucoma is a disease of the eye due to increased ocular pressure that can lead to blindness and it should be done every year by an eye professional.    Cardiovascular screening tests that check for elevated lipids (fatty part of blood) which can lead to heart disease and strokes should be done every 5 years. Colorectal screening that evaluates for blood or polyps in your colon should be done yearly as a stool test or every five years as a flexible sigmoidoscope or every 10 years as a colonoscopy up to age 76. Breast cancer screening with a mammogram is recommended biennially  for women age 54-69. Screening for cervical cancer with a pap smear and pelvic exam is recommended for women after age 72 years every 2 years up to age 79 or when the provider and patient decide to stop. If there is a history of cervical abnormalities or other increased risk for cancer then the test is recommended yearly. Hepatitis C screening is also recommended for anyone born between [birth year] through [address]. A shingles vaccine is also recommended once in a lifetime after age 61. Your Medicare Wellness Exam is recommended annually.     Here is a list of your current Health Maintenance items with a due date:  Health Maintenance Due   Topic Date Due    Colonoscopy  06/01/2015    Annual Well Visit  05/25/2017    Flu Vaccine  08/01/2017

## 2017-10-16 NOTE — PROGRESS NOTES
This is a Subsequent Medicare Annual Wellness Exam (AWV) (Performed 12 months after IPPE or effective date of Medicare Part B enrollment, Once in a lifetime)    I have reviewed the patient's medical history in detail and updated the computerized patient record. History     Present for CPE, last Complete Physical exam was last yr ,  Up todate w/ all vaccination, last tetanus vaccine was in <5ys ago  . With hx of lung cancer resection in 2017, requesting to have pain meds since the surgical , used to smoke, has not smoked since then    last mammog was abnl and In 2017, with lombectomy,   last pap smear normal and was in 2 yrs ago . last colonoscopy was normal and was 2007Ago,   No past surgical hx,  last bone dexa scan was normal and was in 2016,      No family hx of breast cancer   no family hx of prostate cancer   no family hx of colon cancer, not sexaully active and uses Safe sex, not physically active,  compliant w/ meds,++ Rf needed for today for her meds.      No hx of fall not depressed , unfortunately patient had significant history of tobacco abuse for which recently had a CT scan low-dose which was positive for malignancy was sent to pulmonologist and oncologist for further care stating that they had to remove part of her long and she is awaiting the decision regarding possible chemotherapy or radiation therapy currently is in pain secondary to excision requesting medication refill otherwise doing well and very optimistic at this time      Past Medical History:   Diagnosis Date    Arthritis     Cancer [facility])     breast cancer- left    C[facility]brovascular [facility] (stroke) [facility] [address]     no deficiets    Diabetes mellitus type 2, controlled ([facility] [address]     Gallstones     asymptomatic, pt denies having    GERD (gastroesophageal reflux disease)     dx with resolution of symptoms on ppi- pt denies having    Hypercholesterolemia     Hypertension     Ill-defined condition     gout    Non-nicotine vapor product user     used to use    PAD (peripheral artery disease) ([facility] [address] 2/20/2013    right SFA angioplasty and athrectomy    Primary open angle glaucoma     Solitary lung nodule 5/9/2017    Type II diabetes mellitus, uncontrolled ([facility] [address] 2/1/2017      Past Surgical History:   Procedure Laterality Date    HX BREAST LUMPECTOMY Left 6/22/2017    LEFT LUMPECTOMY WITH INTRAOPERATIVE ULTRASOUND GUIDED, LEFT AXILLARY SENTINEL LYMPH NODE BIOPSY, POSSIBLE AXILLARY DISECTION performed by Orlin Valera MD at [facility] MAIN OR    HX HEENT Bilateral     eye lids surgery    VASCULAR SURGERY PROCEDURE UNLIST  April 2016    right leg stent     Current Outpatient Prescriptions   Medication Sig Dispense Refill    ALPHAGAN P 0.1 % ophthalmic solution instill 1 drop into both eyes twice a day  0    dorzolamide (TRUSOPT) 2 % ophthalmic solution instill 1 drop into both eyes twice a day  0    gabapentin (NEURONTIN) 300 mg capsule take 1 capsule by mouth twice a day THE DAY PRIOR TO SURGERY 9/13  0    traMADol (ULTRAM) 50 mg tablet take 1-2 tablets by mouth every 6 hours if needed for PAIN SCALE 4-6  0    ergocalciferol (ERGOCALCIFEROL) 50,000 unit capsule Take 1 Cap by mouth every seven (7) days. 4 Cap 6    letrozole (FEMARA) 2.5 mg tablet Take 1 Tab by mouth daily. 30 Tab 6    allopurinol (ZYLOPRIM) 300 mg tablet take 1 tablet by mouth once daily 30 Tab 0    HYDROcodone-acetaminophen (NORCO) 5-325 mg per tablet Take 1-2 Tabs by mouth every six (6) hours as needed for Pain. Max Daily Amount: 8 Tabs. 30 Tab 0    gabapentin (NEURONTIN) 100 mg capsule Take 1 Cap by mouth daily. 90 Cap 4    clopidogrel (PLAVIX) 75 mg tab Take 1 Tab by mouth daily. 30 Tab 6    hydroCHLOROthiazide (HYDRODIURIL) 25 mg tablet Take 1 Tab by mouth daily. 90 Tab 3    metFORMIN (GLUCOPHAGE) 500 mg tablet Take 0.5 Tabs by mouth daily (with breakfast).  90 Tab 3    ibuprofen (MOTRIN) 600 mg tablet Take 1 Tab by mouth every eight (8) hours as needed for Pain. 60 Tab 1    traMADol-acetaminophen (ULTRACET) 37.5-325 mg per tablet Take 1 Tab by mouth every eight (8) hours as needed for Pain. Max Daily Amount: 3 Tabs. 60 Tab 0    cilostazol (PLETAL) 100 mg tablet TAKE 1 TABLET BEFORE BREAKFAST AND DINNER 180 Tab 0    glucose blood VI test strips (ACCU-CHEK SMARTVIEW TEST STRIP) strip Check blood sugar twice a day 200 Strip 3    Lancets (ACCU-CHEK FASTCLIX) misc Check blood sugar twice a day 200 Each 3    latanoprost (XALATAN) 0.005 % ophthalmic solution Administer 1 Drop to both eyes nightly. 2.5 mL 1    dorzolamide-timolol (COSOPT) 22.3-6.8 mg/mL ophthalmic solution Administer 1 Drop to both eyes two (2) times a day.  0    brimonidine (ALPHAGAN) 0.15 % ophthalmic solution Administer 1 Drop to both eyes two (2) times a day. 15 mL 6    atorvastatin (LIPITOR) 80 mg tablet Take 1 Tab by mouth daily. 90 Tab 3    metoprolol (LOPRESSOR) 25 mg tablet Take 0.5 Tabs by mouth two (2) times a day. 90 Tab 3    metFORMIN (GLUCOPHAGE) 1,000 mg tablet Take 1 Tab by mouth daily.  180 Tab 3    Blood-Glucose Meter monitoring kit accu chek gbaino smartview meter 1 Kit 0     No Known Allergies  Family History   Problem Relation Age of Onset    Hypertension Mother     Diabetes Mother     Diabetes Son      Social History   Substance Use Topics    Smoking status: Current Some Day Smoker     Types: Cigarettes     Last attempt to quit: 3/13/2013    Smokeless tobacco: Never Used      Comment: estimate is one pack per week    Alcohol use 12.6 oz/week     21 Cans of beer per week      Comment: 2 to 3 beers per day per patient     Patient Active Problem List   Diagnosis Code    Hypertension I10    PAD [facility]peripheral artery disease) ([facility]) I73.9    Glaucoma H40.9    Gout M10.9    Leg pain, bilateral M79.604, M79.605    Nonallopathic lesion of lumbar region, not elsewhere classified M99.9    History of stroke Z86.73    Type II diabetes mellitus, uncontrolled ([facility] [address] E11.65    Encounter for immunization Z23    Tobacco abuse disorder Z72.0    Solitary lung nodule R91.1    Breast cancer of upper-inner quadrant of left female breast ([facility] [address] C50.212       Depression Risk Factor Screening:     PHQ over the last two weeks 7/13/2017   Little interest or pleasure in doing things Several days   Feeling down, depressed or hopeless Several days   Total Score PHQ 2 2     Alcohol Risk Factor Screening: You do not drink alcohol or very rarely. Functional Ability and Level of Safety:   Hearing Loss  Hearing is good. Activities of Daily Living  The home contains: no safety equipment. Patient does total self care    Fall RiskFall Risk Assessment, last 12 mths 2/20/2017   Able to walk? Yes   Fall in past 12 months? No   Fall with injury? -   Number of falls in past 12 months -   Fall Risk Score -       Abuse Screen  Patient is not abused    Cognitive Screening   Evaluation of Cognitive Function:  Has your family/caregiver stated any concerns about your memory: no  Normal    Patient Care Team   Patient Care Team:  Mateo Heart MD as PCP - General (Family Practice)  Tan Wheat LPN as Nurse Navigator  Manju Espinoza RN as Nurse Navigator  Sumaya Santos RN as Ambulatory Care Navigator ([facility])  Job López DPM as Physician (Podiatry)  NOAM Wood as Physician (Vascular Surgery)  Zee Stoner MD as Physician (Ophthalmology)  Mami Agrawal MD as Surgeon (General Surgery)    Assessment/Plan   Education and counseling provided:  Are appropriate based on today's review and evaluation  End-of-Life planning (with patient's consent)  Pneumococcal Vaccine  Influenza Vaccine  Colorectal cancer screening tests  Bone mass measurement (DEXA)  Screening for glaucoma    Diagnoses and all orders for this visit:    1.  Screen for colon cancer  -     OCCULT BLOOD, IMMUNOASSAY (FIT)  -     CBC W/O DIFF  -     METABOLIC PANEL, COMPREHENSIVE  -     TSH 3RD GENERATION  - LIPID PANEL  -     AMB POC HEMOGLOBIN A1C    2. Encounter for immunization  -     Influenza virus vaccine ([address]) 72 years and older  -     [place] IMMUNIZ ADMIN,1 SINGLE/COMB VAC/TOXOID  -     Influenza Admin ()  -     CBC W/O DIFF  -     METABOLIC PANEL, COMPREHENSIVE  -     TSH 3RD GENERATION  -     LIPID PANEL  -     AMB POC HEMOGLOBIN A1C    3. Screening for alcoholism  -     Annual  Alcohol Screen 15 min ()  -     CBC W/O DIFF  -     METABOLIC PANEL, COMPREHENSIVE  -     TSH 3RD GENERATION  -     LIPID PANEL  -     AMB POC HEMOGLOBIN A1C    4.  Screening for depression  -     Depression Screen Annual  -     CBC W/O DIFF  -     METABOLIC PANEL, COMPREHENSIVE  -     TSH 3RD GENERATION  -     LIPID PANEL  -     AMB POC HEMOGLOBIN A1C    Other orders  -     traMADol (ULTRAM) 50 mg tablet; take 1-2 tablets by mouth every 6 hours if needed for PAIN SCALE 4-6        Health Maintenance Due   Topic Date Due    COLONOSCOPY  06/01/2015    MEDICARE YEARLY EXAM  05/25/2017       At this time patient was told to lose weight, so that her body mass index would get into a normal level between 20-25,  increase physical activity, limit alcohol consumption, stop secondhand tobacco exposure    In addition the patient was told to start an active life style modifications, for which includes creating a an interesting delightful to do list,  such as start of a light physical activity with a brisk daily walking 30 minutes most days of the week, most likely to total of 150 minutes per week, then the patient was told to try to avoid fatty fast foods, have a low-fat low-cholesterol diet, include seafood such as adding fatty fish such as [place], Mac[place]rel, [place] to the diet, increase vegetables and fruits, nuts 3-4 times per week and finally have a low-salt and K rich food intake for a good 4-6 months possibly for ever for the best outcome,   All mentioned recommendations, have to be done at least most days of the weeks for the best result,  Routine labs ordered, and the needed abnormal labs will be discussed soon and they can be repeated in 3-6 months. In addition relevant handouts were given to the patient for a better understanding,    patient was told to call if any problems.   Patient acknowledged understanding and     Patient agreed with today's recommendation

## 2017-10-16 NOTE — MR AVS SNAPSHOT
Visit Information Date & Time Provider Department Dept. Phone Encounter #  
 10/16/2017 11:30 AM Ulysess Angelucci, MD [facility] [phone] [id number] Follow-up Instructions Return in about 3 months (around 1/16/2018), or if symptoms worsen or fail to improve. Your Appointments 1/5/2018  1:00 PM  
Any with Hal Hines MD  
[address]) Appt Note: onc 3 mth f/u  
 [address] [facility] [address] [address]  
[phone]  
  
   
 [address] [address]  
  
    
 1/31/2018  2:00 PM  
ESTABLISHED PATIENT with Brittani Young MD  
[facility] Dr. Randy Valentin [facility] ([address] [facility]) Appt Note: f/u lumpectomy surgery 6.22.17  
 [address] [facility], [address], [address] [address]  
[address], [address], [address] [facility] [address] [address] Upcoming Health Maintenance Date Due COLONOSCOPY 6/1/2015 MEDICARE YEARLY EXAM 5/25/2017 INFLUENZA AGE 9 TO ADULT 8/1/2017 HEMOGLOBIN A1C Q6M 11/22/2017 MICROALBUMIN Q1 2/1/2018 FOOT EXAM Q1 2/20/2018 EYE EXAM RETINAL OR DILATED Q1 4/20/2018 LIPID PANEL Q1 5/22/2018 BREAST CANCER SCRN MAMMOGRAM 4/18/2019 GLAUCOMA SCREENING Q2Y 4/20/2019 DTaP/Tdap/Td series (2 - Td) 5/8/2025 Allergies as of 10/16/2017  Review Complete On: 10/16/2017 By: Geena Sow LPN No Known Allergies Current Immunizations  Reviewed on 7/13/2017 Name Date Influenza High Dose Vaccine PF  Incomplete, 2/1/2017 Influenza Vaccine 10/26/2015 Influenza Vaccine (Quad) PF 10/26/2015 Pneumococcal Conjugate (PCV-13) 6/2/2017 Pneumococcal Polysaccharide (PPSV-23) 2/4/2016, 10/26/2015 Tdap 5/8/2015 Zoster Vaccine, Live 2/1/2016 Not reviewed this visit You Were Diagnosed With   
  
 Codes Comments Screen for colon cancer    -  Primary ICD-10-CM: Z12.11 ICD-9-CM: V76.51 Encounter for immunization     ICD-10-CM: H90 ICD-9-CM: V03.89 Screening for alcoholism     ICD-10-CM: Z13.89 ICD-9-CM: V79.1 Screening for depression     ICD-10-CM: Z13.89 ICD-9-CM: V79.0 Vitals BP Pulse Temp Resp Height(growth percentile) Weight(growth percentile) 112/63 (BP 1 Location: Left arm, BP Patient Position: At rest) 72 96.9 °F (36.1 °C) (Oral) 14 5' 3\" (1.6 m) 123 lb 3.2 oz (55.9 kg) SpO2 BMI OB Status Smoking Status 100% 21.82 kg/m2 Postmenopausal Current Some Day Smoker BMI and BSA Data Body Mass Index Body Surface Area  
 21.82 kg/m 2 1.58 m 2 Preferred Pharmacy Pharmacy Name Phone [facility] [id number], [address] [facility] [phone] Your Updated Medication List  
  
   
This list is accurate as of: 10/16/17 12:51 PM.  Always use your most recent med list.  
  
  
  
  
 allopurinol 300 mg tablet Commonly known as:  ZYLOPRIM  
take 1 tablet by mouth once daily  
  
 atorvastatin 80 mg tablet Commonly known as:  LIPITOR Take 1 Tab by mouth daily. Blood-Glucose Meter monitoring kit  
accu chek gabino smartview meter * brimonidine 0.15 % ophthalmic solution Commonly known as:  Yetta Antu Administer 1 Drop to both eyes two (2) times a day. * ALPHAGAN P 0.1 % ophthalmic solution Generic drug:  brimonidine  
instill 1 drop into both eyes twice a day  
  
 cilostazol 100 mg tablet Commonly known as:  PLETAL  
TAKE 1 TABLET BEFORE BREAKFAST AND DINNER  
  
 clopidogrel 75 mg Tab Commonly known as:  PLAVIX Take 1 Tab by mouth daily. dorzolamide 2 % ophthalmic solution Commonly known as:  TRUSOPT  
instill 1 drop into both eyes twice a day  
  
 dorzolamide-timolol 22.3-6.8 mg/mL ophthalmic solution Commonly known as:  COSOPT Administer 1 Drop to both eyes two (2) times a day. ergocalciferol 50,000 unit capsule Commonly known as:  ERGOCALCIFEROL Take 1 Cap by mouth every seven (7) days. * gabapentin 100 mg capsule Commonly known as:  NEURONTIN Take 1 Cap by mouth daily. * gabapentin 300 mg capsule Commonly known as:  NEURONTIN  
take 1 capsule by mouth twice a day THE DAY PRIOR TO SURGERY 9/13  
  
 glucose blood VI test strips strip Commonly known as:  [address] Check blood sugar twice a day  
  
 hydroCHLOROthiazide 25 mg tablet Commonly known as:  HYDRODIURIL Take 1 Tab by mouth daily. ibuprofen 600 mg tablet Commonly known as:  MOTRIN Take 1 Tab by mouth every eight (8) hours as needed for Pain. Lancets Misc Commonly known as:  ACCU-CHEK FASTCLIX Check blood sugar twice a day  
  
 latanoprost 0.005 % ophthalmic solution Commonly known as:  Wm Cower Administer 1 Drop to both eyes nightly. letrozole 2.5 mg tablet Commonly known as:  [facility] Take 1 Tab by mouth daily. * metFORMIN 1,000 mg tablet Commonly known as:  GLUCOPHAGE Take 1 Tab by mouth daily. * metFORMIN 500 mg tablet Commonly known as:  GLUCOPHAGE Take 0.5 Tabs by mouth daily (with breakfast). metoprolol tartrate 25 mg tablet Commonly known as:  LOPRESSOR Take 0.5 Tabs by mouth two (2) times a day. traMADol 50 mg tablet Commonly known as:  ULTRAM  
take 1-2 tablets by mouth every 6 hours if needed for PAIN SCALE 4-6 * Notice: This list has 6 medication(s) that are the same as other medications prescribed for you. Read the directions carefully, and ask your doctor or other care provider to review them with you. Prescriptions Printed Refills  
 traMADol (ULTRAM) 50 mg tablet 0 Sig: take 1-2 tablets by mouth every 6 hours if needed for PAIN SCALE 4-6 Class: Print We Performed the Following ADMIN INFLUENZA VIRUS VAC [ HCP] AMB POC HEMOGLOBIN A1C [29721 CPT(R)] CBC W/O DIFF [52369 CPT(R)] [place] [address] [[id number] [facility] [place]] INFLUENZA VIRUS VACCINE, HIGH DOSE SEASONAL, PRESERVATIVE FREE [52173 CPT(R)] LIPID PANEL [40011 CPT(R)] METABOLIC PANEL, COMPREHENSIVE [02914 CPT(R)] OCCULT BLOOD, IMMUNOASSAY (FIT) [id number] CPT(R)] [place] ANNUAL ALCOHOL SCREEN 15 MIN [id number] [facility] the [place]] [place] IMMUNIZ ADMIN,1 SINGLE/COMB VAC/TOXOID [id number] CPT(R)] TSH 3RD GENERATION [98848 CPT(R)] Follow-up Instructions Return in about 3 months (around 1/16/2018), or if symptoms worsen or fail to improve. Patient Instructions Vaccine Information Statement Influenza (Flu) Vaccine (Inactivated or Recombinant): What you need to know Many Vaccine Information Statements are available in Yoruba and other languages. See www.immunize.org/vis Hojas de Información Sobre Vacunas están disponibles en Español y en muchos otros idiomas. Visite www.immunize.org/vis 1. Why get vaccinated? Influenza (flu) is a contagious disease that spreads around the [place] every year, usually between October and May. Flu is caused by influenza viruses, and is spread mainly by coughing, sneezing, and close contact. Anyone can get flu. Flu strikes suddenly and can last several days. Symptoms vary by age, but can include: 
 fever/chills  sore throat  muscle aches  fatigue  cough  headache  runny or stuffy nose Flu can also lead to pneumonia and blood infections, and cause diarrhea and seizures in children. If you have a medical condition, such as heart or lung disease, flu can make it worse. Flu is more dangerous for some people. Infants and young children, people 72years of age and older, pregnant women, and people with certain health conditions or a weakened immune system are at greatest risk. Each year thousands of people in the [facility] die from flu, and many more are hospitalized. Flu vaccine can: 
 keep you from getting flu, 
 make flu less severe if you do get it, and 
 keep you from spreading flu to your family and other people. 2. Inactivated and recombinant flu vaccines A dose of flu vaccine is recommended every flu season. Children 6 months through 6years of age may need two doses during the same flu season. Everyone else needs only one dose each flu season. Some inactivated flu vaccines contain a very small amount of a mercury-based preservative called thimerosal. Studies have not shown thimerosal in vaccines to be harmful, but flu vaccines that do not contain thimerosal are available. There is no live flu virus in flu shots. They cannot cause the flu. There are many flu viruses, and they are always changing. Each year a new flu vaccine is made to protect against three or four viruses that are likely to cause disease in the upcoming flu season. But even when the vaccine doesnt exactly match these viruses, it may still provide some protection Flu vaccine cannot prevent: 
 flu that is caused by a virus not covered by the vaccine, or 
 illnesses that look like flu but are not. It takes about 2 weeks for protection to develop after vaccination, and protection lasts through the flu season. 3. Some people should not get this vaccine Tell the person who is giving you the vaccine:  If you have any severe, life-threatening allergies. If you ever had a life-threatening allergic reaction after a dose of flu vaccine, or have a severe allergy to any part of this vaccine, you may be advised not to get vaccinated. Most, but not all, types of flu vaccine contain a small amount of egg protein.  If you ever had Guillain-Barré Syndrome (also called GBS). Some people with a history of GBS should not get this vaccine. This should be discussed with your doctor.  If you are not feeling well. It is usually okay to get flu vaccine when you have a mild illness, but you might be asked to come back when you feel better. 4. Risks of a vaccine reaction With any medicine, including vaccines, there is a chance of reactions. These are usually mild and go away on their own, but serious reactions are also possible. Most people who get a flu shot do not have any problems with it. Minor problems following a flu shot include:  
 soreness, redness, or swelling where the shot was given  hoarseness  sore, red or itchy eyes  cough  fever  aches  headache  itching  fatigue If these problems occur, they usually begin soon after the shot and last 1 or 2 days. More serious problems following a flu shot can include the following:  There may be a small increased risk of Guillain-Barré Syndrome (GBS) after inactivated flu vaccine. This risk has been estimated at 1 or 2 additional cases per million people vaccinated. This is much lower than the risk of severe complications from flu, which can be prevented by flu vaccine.  Young children who get the flu shot along with pneumococcal vaccine (PCV13) and/or DTaP vaccine at the same time might be slightly more likely to have a seizure caused by fever. Ask your doctor for more information. Tell your doctor if a child who is getting flu vaccine has ever had a seizure. Problems that could happen after any injected vaccine:  People sometimes faint after a medical procedure, including vaccination. Sitting or lying down for about 15 minutes can help prevent fainting, and injuries caused by a fall. Tell your doctor if you feel dizzy, or have vision changes or ringing in the ears.  Some people get severe pain in the shoulder and have difficulty moving the arm where a shot was given. This happens very rarely.  Any medication can cause a severe allergic reaction.  Such reactions from a vaccine are very rare, estimated at about 1 in a million doses, and would happen within a few minutes to a few hours after the vaccination. As with any medicine, there is a very remote chance of a vaccine causing a serious injury or death. The safety of vaccines is always being monitored. For more information, visit: www.cdc.gov/vaccinesafety/ 
 
5. What if there is a serious reaction? What should I look for?  Look for anything that concerns you, such as signs of a severe allergic reaction, very high fever, or unusual behavior. Signs of a severe allergic reaction can include hives, swelling of the face and throat, difficulty breathing, a fast heartbeat, dizziness, and weakness  usually within a few minutes to a few hours after the vaccination. What should I do?  If you think it is a severe allergic reaction or other emergency that cant wait, call 9-1-1 and get the person to the nearest hospital. Otherwise, call your doctor.  Reactions should be reported to the Vaccine Adverse Event Reporting System (VAERS). Your doctor should file this report, or you can do it yourself through  the VAERS web site at www.vaers. [facility].gov, or by calling 3-463.481.9425. VAERS does not give medical advice. 6. The National Vaccine Injury Compensation Program 
 
The [facility] Vaccine Injury Compensation Program (VICP) is a federal program that was created to compensate people who may have been injured by certain vaccines. Persons who believe they may have been injured by a vaccine can learn about the program and about filing a claim by calling 0-811.417.8810 or visiting the [employer] website at www.[facility].gov/vaccinecompensation. There is a time limit to file a claim for compensation. 7. How can I learn more?  Ask your healthcare provider. He or she can give you the vaccine package insert or suggest other sources of information.  Call your local or state health department.  Contact the Centers for Disease Control and Prevention (CDC): 
- Call 5-653.915.6215 (1-800-CDC-INFO) or 
- Visit CDCs website at www.cdc.gov/flu Vaccine Information Statement Inactivated Influenza Vaccine 8/7/2015 
42 ANNEL Kidd [id number] Department of [facility] Centers for Disease Control and Prevention Office Use Only Medicare Wellness Visit, Female The best way to live healthy is to have a healthy lifestyle by eating a well-balanced diet, exercising regularly, limiting alcohol and stopping smoking. Regular physical exams and screening tests are another way to keep healthy. Preventive exams provided by your health care provider can find health problems before they become diseases or illnesses. Preventive services including immunizations, screening tests, monitoring and exams can help you take care of your own health. All people over age 72 should have a pneumovax  and and a prevnar shot to prevent pneumonia. These are once in a lifetime unless you and your provider decide differently. All people over 65 should have a yearly flu shot and a tetanus vaccine every 10 years. A bone mass density to screen for osteoporosis or thinning of the bones should be done every 2 years after 65. Screening for diabetes mellitus with a blood sugar test should be done every year. Glaucoma is a disease of the eye due to increased ocular pressure that can lead to blindness and it should be done every year by an eye professional. 
 
Cardiovascular screening tests that check for elevated lipids (fatty part of blood) which can lead to heart disease and strokes should be done every 5 years. Colorectal screening that evaluates for blood or polyps in your colon should be done yearly as a stool test or every five years as a flexible sigmoidoscope or every 10 years as a colonoscopy up to age 76. Breast cancer screening with a mammogram is recommended biennially  for women age 54-69. Screening for cervical cancer with a pap smear and pelvic exam is recommended for women after age 72 years every 2 years up to age 79 or when the provider and patient decide to stop. If there is a history of cervical abnormalities or other increased risk for cancer then the test is recommended yearly. Hepatitis C screening is also recommended for anyone born between 80 through [address]. A shingles vaccine is also recommended once in a lifetime after age 61. Your Medicare Wellness Exam is recommended annually. Here is a list of your current Health Maintenance items with a due date: 
Health Maintenance Due Topic Date Due  
 Colonoscopy  06/01/2015 Yaquelin Barnett Annual Well Visit  05/25/2017  Flu Vaccine  08/01/2017 Introducing [facility]! Romayne Duster introduces [employer] patient portal. Now you can access parts of your medical record, email your doctor's office, and request medication refills online. 1. In your internet browser, go to [email or web address] 2. Click on the First Time User? Click Here link in the Sign In box. You will see the New Member Sign Up page. 3. Enter your [employer] Access Code exactly as it appears below. You will not need to use this code after youve completed the sign-up process. If you do not sign up before the expiration date, you must request a new code. · [employer] Access Code: [email or web address] Expires: 10/28/2017 12:32 PM 
 
4. Enter the last four digits of your Social Security Number (xxxx) and Date of Birth (mm/dd/yyyy) as indicated and click Submit. You will be taken to the next sign-up page. 5. Create a CaseTrek ID. This will be your [employer] login ID and cannot be changed, so think of one that is secure and easy to remember. 6. Create a [employer] password. You can change your password at any time. 7. Enter your Password Reset Question and Answer. This can be used at a later time if you forget your password. 8. Enter your e-mail address. You will receive e-mail notification when new information is available in [address] 9. Click Sign Up. You can now view and download portions of your medical record. 10. Click the Download Summary menu link to download a portable copy of your medical information. If you have questions, please visit the Frequently Asked Questions section of the Likeeds website. Remember, Likeeds is NOT to be used for urgent needs. For medical emergencies, dial 911. Now available from your iPhone and Android! Please provide this summary of care documentation to your next provider. Your primary care clinician is listed as Ulysess Angelucci. If you have any questions after today's visit, please call [phone].

## 2017-10-17 LAB
ALBUMIN SERPL-MCNC: 4.1 G/DL (ref 3.6–4.8)
ALBUMIN/GLOB SERPL: 1.5 {RATIO} (ref 1.2–2.2)
ALP SERPL-CCNC: 67 IU/L (ref 39–117)
ALT SERPL-CCNC: 10 IU/L (ref 0–32)
AST SERPL-CCNC: 17 IU/L (ref 0–40)
BILIRUB SERPL-MCNC: 0.2 MG/DL (ref 0–1.2)
BUN SERPL-MCNC: 13 MG/DL (ref 8–27)
BUN/CREAT SERPL: 14 (ref 12–28)
CALCIUM SERPL-MCNC: 9.8 MG/DL (ref 8.7–10.3)
CHLORIDE SERPL-SCNC: 103 MMOL/L (ref 96–106)
CHOLEST SERPL-MCNC: 269 MG/DL (ref 100–199)
CO2 SERPL-SCNC: 21 MMOL/L (ref 18–29)
CREAT SERPL-MCNC: 0.93 MG/DL (ref 0.57–1)
ERYTHROCYTE [DISTWIDTH] IN BLOOD BY AUTOMATED COUNT: 13 % (ref 12.3–15.4)
GLOBULIN SER CALC-MCNC: 2.7 G/DL (ref 1.5–4.5)
GLUCOSE SERPL-MCNC: 100 MG/DL (ref 65–99)
HCT VFR BLD AUTO: 29.8 % (ref 34–46.6)
HDLC SERPL-MCNC: 63 MG/DL
HGB BLD-MCNC: 10.4 G/DL (ref 11.1–15.9)
LDLC SERPL CALC-MCNC: 187 MG/DL (ref 0–99)
MCH RBC QN AUTO: 31 PG (ref 26.6–33)
MCHC RBC AUTO-ENTMCNC: 34.9 G/DL (ref 31.5–35.7)
MCV RBC AUTO: 89 FL (ref 79–97)
PLATELET # BLD AUTO: 277 X10E3/UL (ref 150–379)
POTASSIUM SERPL-SCNC: 3.7 MMOL/L (ref 3.5–5.2)
PROT SERPL-MCNC: 6.8 G/DL (ref 6–8.5)
RBC # BLD AUTO: 3.36 X10E6/UL (ref 3.77–5.28)
SODIUM SERPL-SCNC: 140 MMOL/L (ref 134–144)
TRIGL SERPL-MCNC: 95 MG/DL (ref 0–149)
TSH SERPL DL<=0.005 MIU/L-ACNC: 1.11 UIU/ML (ref 0.45–4.5)
VLDLC SERPL CALC-MCNC: 19 MG/DL (ref 5–40)
WBC # BLD AUTO: 6 X10E3/UL (ref 3.4–10.8)

## 2017-10-31 LAB — HEMOCCULT STL QL IA: NEGATIVE

## 2017-11-06 RX ORDER — TRAMADOL HYDROCHLORIDE 50 MG/1
TABLET ORAL
Qty: 90 TAB | Refills: 0 | Status: SHIPPED | OUTPATIENT
Start: 2017-11-06 | End: 2018-04-05 | Stop reason: ALTCHOICE

## 2017-11-29 NOTE — TELEPHONE ENCOUNTER
Returned call to [facility] [employer] [facility], spoke with Cindy Cheema,  Requesting refill on pts diabetic supplies.   meds pended at this time

## 2017-11-29 NOTE — TELEPHONE ENCOUNTER
----- Message from Mary Caldwell sent at 11/29/2017  4:17 PM EST -----  Regarding: Dr. Waldo Mcallister, from [facility] mail order pharmacy, submitted order for the Rx items \" Acue check monitor, lancets and alcohol swabs' which was submitted on 11/22/17. Requested an confirmation of receiving fax.     P: [phone]  P:[phone]

## 2017-12-04 ENCOUNTER — HOSPITAL ENCOUNTER (OUTPATIENT)
Dept: RADIATION THERAPY | Age: 67
Discharge: HOME | End: 2017-12-04
Payer: MEDICARE

## 2017-12-04 PROCEDURE — 77334 RADIATION TREATMENT AID(S): CPT

## 2017-12-04 PROCEDURE — 77300 RADIATION THERAPY DOSE PLAN: CPT

## 2017-12-04 PROCEDURE — 77295 3-D RADIOTHERAPY PLAN: CPT

## 2017-12-05 RX ORDER — LANCETS
EACH MISCELLANEOUS
Qty: 1 EACH | Refills: 11 | Status: SHIPPED | OUTPATIENT
Start: 2017-12-05 | End: 2018-04-05 | Stop reason: ALTCHOICE

## 2017-12-05 RX ORDER — BLOOD-GLUCOSE METER
EACH MISCELLANEOUS
Qty: 1 EACH | Refills: 0 | Status: SHIPPED | OUTPATIENT
Start: 2017-12-05 | End: 2020-02-03

## 2017-12-05 RX ORDER — ISOPROPYL ALCOHOL 70 ML/100ML
SWAB TOPICAL
Qty: 50 PAD | Refills: 11 | Status: SHIPPED | OUTPATIENT
Start: 2017-12-05 | End: 2020-02-03

## 2017-12-11 ENCOUNTER — HOSPITAL ENCOUNTER (OUTPATIENT)
Dept: RADIATION THERAPY | Age: 67
Discharge: HOME | End: 2017-12-11
Payer: MEDICARE

## 2017-12-11 PROCEDURE — 77280 THER RAD SIMULAJ FIELD SMPL: CPT

## 2017-12-12 ENCOUNTER — HOSPITAL ENCOUNTER (OUTPATIENT)
Dept: RADIATION THERAPY | Age: 67
Discharge: HOME | End: 2017-12-12
Payer: MEDICARE

## 2017-12-12 PROCEDURE — 77412 RADIATION TX DELIVERY LVL 3: CPT

## 2017-12-13 ENCOUNTER — HOSPITAL ENCOUNTER (OUTPATIENT)
Dept: RADIATION THERAPY | Age: 67
Discharge: HOME | End: 2017-12-13
Payer: MEDICARE

## 2017-12-13 PROCEDURE — 77412 RADIATION TX DELIVERY LVL 3: CPT

## 2017-12-14 ENCOUNTER — HOSPITAL ENCOUNTER (OUTPATIENT)
Dept: RADIATION THERAPY | Age: 67
Discharge: HOME | End: 2017-12-14
Payer: MEDICARE

## 2017-12-14 PROCEDURE — 77412 RADIATION TX DELIVERY LVL 3: CPT

## 2017-12-14 RX ORDER — IBUPROFEN 600 MG/1
600 TABLET ORAL
Qty: 60 TAB | Refills: 1 | Status: SHIPPED | OUTPATIENT
Start: 2017-12-14 | End: 2018-04-05 | Stop reason: SDUPTHER

## 2017-12-15 ENCOUNTER — HOSPITAL ENCOUNTER (OUTPATIENT)
Dept: RADIATION THERAPY | Age: 67
Discharge: HOME | End: 2017-12-15
Payer: MEDICARE

## 2017-12-15 PROCEDURE — 77336 RADIATION PHYSICS CONSULT: CPT

## 2017-12-15 PROCEDURE — 77412 RADIATION TX DELIVERY LVL 3: CPT

## 2017-12-18 ENCOUNTER — HOSPITAL ENCOUNTER (OUTPATIENT)
Dept: RADIATION THERAPY | Age: 67
Discharge: HOME | End: 2017-12-18
Payer: MEDICARE

## 2017-12-18 PROCEDURE — 77412 RADIATION TX DELIVERY LVL 3: CPT

## 2017-12-18 PROCEDURE — 77417 THER RADIOLOGY PORT IMAGE(S): CPT

## 2017-12-19 ENCOUNTER — HOSPITAL ENCOUNTER (OUTPATIENT)
Dept: RADIATION THERAPY | Age: 67
Discharge: HOME | End: 2017-12-19
Payer: MEDICARE

## 2017-12-19 PROCEDURE — 77412 RADIATION TX DELIVERY LVL 3: CPT

## 2017-12-20 ENCOUNTER — HOSPITAL ENCOUNTER (OUTPATIENT)
Dept: RADIATION THERAPY | Age: 67
Discharge: HOME | End: 2017-12-20
Payer: MEDICARE

## 2017-12-20 PROCEDURE — 77412 RADIATION TX DELIVERY LVL 3: CPT

## 2017-12-21 ENCOUNTER — HOSPITAL ENCOUNTER (OUTPATIENT)
Dept: RADIATION THERAPY | Age: 67
Discharge: HOME | End: 2017-12-21
Payer: MEDICARE

## 2017-12-21 PROCEDURE — 77412 RADIATION TX DELIVERY LVL 3: CPT

## 2017-12-22 ENCOUNTER — HOSPITAL ENCOUNTER (OUTPATIENT)
Dept: RADIATION THERAPY | Age: 67
Discharge: HOME | End: 2017-12-22
Payer: MEDICARE

## 2017-12-22 PROCEDURE — 77412 RADIATION TX DELIVERY LVL 3: CPT

## 2017-12-22 PROCEDURE — 77336 RADIATION PHYSICS CONSULT: CPT

## 2017-12-26 ENCOUNTER — HOSPITAL ENCOUNTER (OUTPATIENT)
Dept: RADIATION THERAPY | Age: 67
Discharge: HOME | End: 2017-12-26
Payer: MEDICARE

## 2017-12-26 PROCEDURE — 77412 RADIATION TX DELIVERY LVL 3: CPT

## 2017-12-27 ENCOUNTER — HOSPITAL ENCOUNTER (OUTPATIENT)
Dept: RADIATION THERAPY | Age: 67
Discharge: HOME | End: 2017-12-27
Payer: MEDICARE

## 2017-12-27 PROCEDURE — 77412 RADIATION TX DELIVERY LVL 3: CPT

## 2017-12-27 PROCEDURE — 77417 THER RADIOLOGY PORT IMAGE(S): CPT

## 2017-12-28 ENCOUNTER — HOSPITAL ENCOUNTER (OUTPATIENT)
Dept: RADIATION THERAPY | Age: 67
Discharge: HOME | End: 2017-12-28
Payer: MEDICARE

## 2017-12-28 PROCEDURE — 77412 RADIATION TX DELIVERY LVL 3: CPT

## 2017-12-29 ENCOUNTER — HOSPITAL ENCOUNTER (OUTPATIENT)
Dept: RADIATION THERAPY | Age: 67
Discharge: HOME | End: 2017-12-29
Payer: MEDICARE

## 2017-12-29 PROCEDURE — 77412 RADIATION TX DELIVERY LVL 3: CPT

## 2018-01-02 ENCOUNTER — HOSPITAL ENCOUNTER (OUTPATIENT)
Dept: RADIATION THERAPY | Age: 68
Discharge: HOME | End: 2018-01-02
Payer: MEDICARE

## 2018-01-02 PROCEDURE — 77307 TELETHX ISODOSE PLAN CPLX: CPT

## 2018-01-02 PROCEDURE — 77412 RADIATION TX DELIVERY LVL 3: CPT

## 2018-01-02 PROCEDURE — 77336 RADIATION PHYSICS CONSULT: CPT

## 2018-01-02 PROCEDURE — 77334 RADIATION TREATMENT AID(S): CPT

## 2018-01-03 ENCOUNTER — HOSPITAL ENCOUNTER (OUTPATIENT)
Dept: RADIATION THERAPY | Age: 68
Discharge: HOME | End: 2018-01-03
Payer: MEDICARE

## 2018-01-03 PROCEDURE — 77412 RADIATION TX DELIVERY LVL 3: CPT

## 2018-01-04 ENCOUNTER — HOSPITAL ENCOUNTER (OUTPATIENT)
Dept: RADIATION THERAPY | Age: 68
Discharge: HOME | End: 2018-01-04
Payer: MEDICARE

## 2018-01-05 ENCOUNTER — HOSPITAL ENCOUNTER (OUTPATIENT)
Dept: RADIATION THERAPY | Age: 68
Discharge: HOME | End: 2018-01-05
Payer: MEDICARE

## 2018-01-05 PROCEDURE — 77412 RADIATION TX DELIVERY LVL 3: CPT

## 2018-01-07 DIAGNOSIS — Z86.73 HISTORY OF STROKE: ICD-10-CM

## 2018-01-08 ENCOUNTER — HOSPITAL ENCOUNTER (OUTPATIENT)
Dept: RADIATION THERAPY | Age: 68
Discharge: HOME | End: 2018-01-08
Payer: MEDICARE

## 2018-01-08 PROCEDURE — 77280 THER RAD SIMULAJ FIELD SMPL: CPT

## 2018-01-08 PROCEDURE — 77412 RADIATION TX DELIVERY LVL 3: CPT

## 2018-01-08 RX ORDER — CLOPIDOGREL BISULFATE 75 MG/1
TABLET ORAL
Qty: 30 TAB | Refills: 6 | Status: SHIPPED | OUTPATIENT
Start: 2018-01-08 | End: 2018-04-05 | Stop reason: SDUPTHER

## 2018-01-09 ENCOUNTER — HOSPITAL ENCOUNTER (OUTPATIENT)
Dept: RADIATION THERAPY | Age: 68
Discharge: HOME | End: 2018-01-09
Payer: MEDICARE

## 2018-01-11 ENCOUNTER — HOSPITAL ENCOUNTER (OUTPATIENT)
Dept: RADIATION THERAPY | Age: 68
Discharge: HOME | End: 2018-01-11
Payer: MEDICARE

## 2018-01-11 PROCEDURE — 77412 RADIATION TX DELIVERY LVL 3: CPT

## 2018-01-12 ENCOUNTER — OFFICE VISIT (OUTPATIENT)
Dept: ONCOLOGY | Age: 68
End: 2018-01-12

## 2018-01-12 VITALS
TEMPERATURE: 98.7 F | HEART RATE: 68 BPM | OXYGEN SATURATION: 100 % | DIASTOLIC BLOOD PRESSURE: 69 MMHG | HEIGHT: 63 IN | SYSTOLIC BLOOD PRESSURE: 144 MMHG | WEIGHT: 124.2 LBS | BODY MASS INDEX: 22.01 KG/M2 | RESPIRATION RATE: 16 BRPM

## 2018-01-12 DIAGNOSIS — E55.9 VITAMIN D DEFICIENCY: ICD-10-CM

## 2018-01-12 DIAGNOSIS — Z17.0 MALIGNANT NEOPLASM OF UPPER-INNER QUADRANT OF LEFT BREAST IN FEMALE, ESTROGEN RECEPTOR POSITIVE (HCC): Primary | ICD-10-CM

## 2018-01-12 DIAGNOSIS — C50.212 MALIGNANT NEOPLASM OF UPPER-INNER QUADRANT OF LEFT BREAST IN FEMALE, ESTROGEN RECEPTOR POSITIVE (HCC): Primary | ICD-10-CM

## 2018-01-12 PROBLEM — E11.40 TYPE 2 DIABETES MELLITUS WITH DIABETIC NEUROPATHY (HCC): Status: ACTIVE | Noted: 2018-01-12

## 2018-01-12 NOTE — PROGRESS NOTES
Karoline Winston is a 79 y.o. female is here for Left Breast CA, also Left lobectomy for mass at [facility]    Chief Complaint   Patient presents with    Follow-up     Breast CA     1. Have you been to the ER, urgent care clinic since your last visit? Hospitalized since your last visit? No    2. Have you seen or consulted any other health care providers outside of the [address] [place] [address] since your last visit? Include any pap smears or colon screening. No     Visit Vitals    /69 (BP 1 Location: Right arm, BP Patient Position: Sitting)    Pulse 68    Temp 98.7 °F (37.1 °C) (Oral)    Resp 16    Ht 5' 3\" (1.6 m)    Wt 124 lb 3.2 oz (56.3 kg)    SpO2 100%    BMI 22 kg/m2   reported bp to np. Pt states she didn't take bp meds today     Fall Risk Assessment, last 12 mths 1/12/2018   Able to walk? Yes   Fall in past 12 months? Yes   Fall with injury?  No   Number of falls in past 12 months 2   Fall Risk Score 2     Patient denies V,N,D  Py denies any tingling     Pain Scale: 0 - No pain/10  Health Maintenance Due   Topic Date Due    COLONOSCOPY  06/01/2015    MICROALBUMIN Q1  02/01/2018

## 2018-01-12 NOTE — PROGRESS NOTES
Oncology Progress note        Patient: Ryan Kern MRN: [id number]  SSN: [id number]    YOB: 1950  Age: 79 y.o. Sex: female        Diagnosis:     1. Left breast carcinoma:  T1b N0 M0 (Stage IA) infiltrating ductal carcinoma, Tumor size 1.0 cm, LN -ve, grade 1, %, AL 70%, Her 2 -ve  Dx: 6/22/2017      Treatment:     1. S/p lumpectomy on 6/22/2017  2. Adjuvant radiation completed on 1/5/2018  3. Letrozole - started 1/12/2018    Subjective:      Ryan Kern is a 79 y.o. female who I am seeing for a new diagnosis of left sided invasive ductal carcinoma of the breast. She has been referred by Dr. Melani Austin. She underwent a screening mammogram on 3/27/2017 which demonstrated a density in the medial aspect of the left breast. She underwent dx mammogram and US demonstrating a 1.0 x 0.9 x 0.8 cm at 9:00, 2 cm medial to the nipple in the left breast.  US core bx demonstrated a G1 IDC ER pos AL pos Her2/jud unamplified tumor. She underwent a lumpectomy on 06/22/2017. She was also noted to have a JUDSON FDG avid lung mass. She is s/p left upper lob[facility] by [facility] Trey [facility]un[facility] at [facility]. She completed adjuvant breast radiation last week and is here today to discuss hormonal therapy. She is doing well.        Review of Systems:    Constitutional: negative  Eyes: negative  Ears, Nose, Mouth, Throat, and Face: negative  Respiratory: negative  Cardiovascular: negative  Gastrointestinal: negative  Genitourinary:negative  Integument/Breast: negative  Hematologic/Lymphatic: negative  Musculoskeletal:negative  Neurological: negative      Past Medical History:   Diagnosis Date    Arthritis     Cancer ([address]     breast cancer- left    Cerebrovascular accident (stroke) ([address]     no deficiets    Diabetes mellitus type 2, controlled ([address]     Gallstones     asymptomatic, pt denies having    GERD (gastroesophageal reflux disease)     dx with resolution of symptoms on ppi- pt denies having  Hypercholesterolemia     Hypertension     Ill-defined condition     gout    Non-nicotine vapor product user     used to use    PAD (peripheral artery disease) ([facility] 2/20/2013    right SFA angioplasty and athrectomy    Primary open angle glaucoma     Solitary lung nodule 5/9/2017    Type II diabetes mellitus, uncontrolled ([facility] 2/1/2017     Past Surgical History:   Procedure Laterality Date    HX BREAST LUMPECTOMY Left 6/22/2017    LEFT LUMPECTOMY WITH INTRAOPERATIVE ULTRASOUND GUIDED, LEFT AXILLARY SENTINEL LYMPH NODE BIOPSY, POSSIBLE AXILLARY DISECTION performed by Cecilia Delgado MD at [facility] MAIN OR    HX HEENT Bilateral     eye lids surgery    VASCULAR SURGERY PROCEDURE UNLIST  April 2016    right leg stent      Family History   Problem Relation Age of Onset    Hypertension Mother     Diabetes Mother     Diabetes Son      Social History   Substance Use Topics    Smoking status: Current Some Day Smoker     Types: Cigarettes     Last attempt to quit: 3/13/2013    Smokeless tobacco: Never Used      Comment: estimate is one pack per week    Alcohol use 12.6 oz/week     21 Cans of beer per week      Comment: 2 to 3 beers per day per patient      Prior to Admission medications    Medication Sig Start Date End Date Taking? Authorizing Provider   clopidogrel (PLAVIX) 75 mg tab take 1 tablet by mouth once daily 1/8/18  Yes Nimesh Stout MD   Lancets (ACCU-CHEK SOFTCLIX LANCETS) misc Test blood sugar daily 12/5/17  Yes Nimesh Stout MD   gabapentin (NEURONTIN) 100 mg capsule Take 1 Cap by mouth daily. 5/22/17  Yes Nimesh Stout MD   hydroCHLOROthiazide (HYDRODIURIL) 25 mg tablet Take 1 Tab by mouth daily. 3/24/17  Yes Nimesh Stout MD   ibuprofen (MOTRIN) 600 mg tablet Take 1 Tab by mouth every eight (8) hours as needed for Pain.  12/14/17   Nimesh Stout MD   Blood-Glucose Meter (ACCU-CHEK OLIVIA PLUS METER) misc Test blood sugar daily 12/5/17   Nimesh Stout MD   glucose blood VI test strips (ACCU-CHEK OLIVIA PLUS TEST STRP) strip Test blood sugar once daily 12/5/17   Ciro Becker MD   alcohol swabs (BD SINGLE USE SWABS REGULAR) padm Test blood sugar once daily 12/5/17   Ciro Becker MD   traMADol [place]) 50 mg tablet take 1-2 tablets by mouth every 6 hours if needed for pain SCALE 4-6 11/6/17   Ciro Becker MD   [facility] P 0.1 % ophthalmic solution instill 1 drop into both eyes twice a day 10/9/17   Historical Provider   dorzolamide (TRUSOPT) 2 % ophthalmic solution instill 1 drop into both eyes twice a day 10/9/17   Historical Provider   gabapentin (NEURONTIN) 300 mg capsule take 1 capsule by mouth twice a day THE DAY PRIOR TO SURGERY 9/13 8/29/17   Historical Provider   ergocalciferol (ERGOCALCIFEROL) 50,000 unit capsule Take 1 Cap by mouth every seven (7) days. 10/6/17   Carolyn Roman MD   letrozole [facility]) 2.5 mg tablet Take 1 Tab by mouth daily. 10/4/17   Teressa Gar NP   allopurinol (ZYLOPRIM) 300 mg tablet take 1 tablet by mouth once daily 7/31/17   Ciro Becker MD   metFORMIN (GLUCOPHAGE) 500 mg tablet Take 0.5 Tabs by mouth daily (with breakfast). 2/20/17   Ciro Becker MD   cilostazol (PLETAL) 100 mg tablet TAKE 1 TABLET BEFORE BREAKFAST AND DINNER 9/27/16   Daniel Oseguera MD   glucose blood VI test strips (ACCU-CHEK SMARTVIEW TEST STRIP) strip Check blood sugar twice a day 9/12/16   Daniel Oseguera MD   [facility] Check blood sugar twice a day 9/12/16   Daniel Oseguera MD   latanoprost (XALATAN) 0.005 % ophthalmic solution Administer 1 Drop to both eyes nightly. 5/17/16   Chloé Abarca MD   dorzolamide-timolol (COSOPT) 22.3-6.8 mg/mL ophthalmic solution Administer 1 Drop to both eyes two (2) times a day. 2/25/16   Historical Provider   brimonidine (ALPHAGAN) 0.15 % ophthalmic solution Administer 1 Drop to both eyes two (2) times a day. 10/26/15   Chloé Abarca MD   atorvastatin (LIPITOR) 80 mg tablet Take 1 Tab by mouth daily.  4/30/15   Ayanna Schultz Mary Morales MD   metoprolol (LOPRESSOR) 25 mg tablet Take 0.5 Tabs by mouth two (2) times a day. 4/30/15   Ike Fortune MD   metFORMIN (GLUCOPHAGE) 1,000 mg tablet Take 1 Tab by mouth daily. 4/30/15   Ike Fortune MD   Blood-Glucose Meter monitoring kit accu chek gabino smartview meter 4/30/15   Ike Fortune MD          No Known Allergies        Objective:     Vitals:    01/12/18 1059   BP: 144/69   Pulse: 68   Resp: 16   Temp: 98.7 °F (37.1 °C)   TempSrc: Oral   SpO2: 100%   Weight: 124 lb 3.2 oz (56.3 kg)   Height: 5' 3\" (1.6 m)        Physical Exam:    GENERAL: alert, cooperative, appears stated age  EYE: negative  LYMPHATIC: Cervical, supraclavicular, and axillary nodes normal.   THROAT & NECK: normal and no erythema or exudates noted. LUNG: clear to auscultation bilaterally  HEART: regular rate and rhythm  ABDOMEN: soft, non-tender  EXTREMITIES:  no edema  SKIN: Normal.  NEUROLOGIC: negative      Assessment:     1. Left breast carcinoma:  T1b N0 M0 (Stage IA) infiltrating ductal carcinoma, Tumor size 1.0 cm, LN -ve, grade 1, %, IL 70%, Her 2 -ve  Dx: 6/22/2017    ECOG PS 0  Intent of treatment - curative    S/P left sided lumpectomy and sentinel LN excision. Completed adjuvant radiation to the left breast on 1/5/2018     Start Letrozole 2.5 mg daily - started 1/12/2018  Discussed the side effects of hormonal therapy and ways to manage. Based on the latest ASCO guideline for bisphosphonate use in adjuvant treatment of breast cancer, I recommend she receive Zoledronic acid every 6 months. I educated her about the rare side effect of osteonecrosis of the jaw. She agrees to receiving the medicine after she has seen a dentist.    Symptom management form reviewed with patient.       2. Lung cancer    S/p left upper lobectomy from Dr. Rachid Mireles at [facility]      3. Vitamin D deficiency    > Patient stopped weekly Vitamin D 50,000 after 1 month  > Recheck Vitamin D      Plan:       > Start Letrozole  > Start Zometa after she has seen a dentist - patient will call  > Recheck Vitamin D  > Follow up in 6 months        Signed by: Valente Watson MD                     January 12, 2018        CC. Birgit To MD  CC.  MD Kevon Piper MD Fenton Mayhew, MD

## 2018-01-13 LAB — 25(OH)D3+25(OH)D2 SERPL-MCNC: 19.8 NG/ML (ref 30–100)

## 2018-01-31 ENCOUNTER — OFFICE VISIT (OUTPATIENT)
Dept: SURGERY | Age: 68
End: 2018-01-31

## 2018-01-31 VITALS
BODY MASS INDEX: 21.79 KG/M2 | HEART RATE: 67 BPM | DIASTOLIC BLOOD PRESSURE: 82 MMHG | SYSTOLIC BLOOD PRESSURE: 147 MMHG | RESPIRATION RATE: 14 BRPM | WEIGHT: 123 LBS | OXYGEN SATURATION: 100 % | HEIGHT: 63 IN | TEMPERATURE: 98.2 F

## 2018-01-31 DIAGNOSIS — Z17.0 MALIGNANT NEOPLASM OF UPPER-INNER QUADRANT OF LEFT BREAST IN FEMALE, ESTROGEN RECEPTOR POSITIVE (HCC): Primary | ICD-10-CM

## 2018-01-31 DIAGNOSIS — C50.212 MALIGNANT NEOPLASM OF UPPER-INNER QUADRANT OF LEFT BREAST IN FEMALE, ESTROGEN RECEPTOR POSITIVE (HCC): Primary | ICD-10-CM

## 2018-01-31 NOTE — PROGRESS NOTES
Douglas Ruth is a 79 y.o. female      Chief Complaint   Patient presents with    Breast Cancer     Follow-up Lumpectomy Surgery 6/22/17       1. Have you been to the ER, urgent care clinic since your last visit? Hospitalized since your last visit? No    2. Have you seen or consulted any other health care providers outside of the [address] [place] [address] since your last visit? Include any pap smears or colon screening.  No

## 2018-01-31 NOTE — PROGRESS NOTES
HISTORY OF PRESENT ILLNESS  Danelle Ralph is a 79 y.o. female who comes in for follow up for her left breast cancer  Breast Cancer   Pertinent negatives include no chest pain, no abdominal pain, no headaches and no shortness of breath. Other   Pertinent negatives include no chest pain, no abdominal pain, no headaches and no shortness of breath. She went for screening mammogram 3/27/2017 demonstrating a density in the medial aspect of the left breast.   She underwent dx mammogram and US demonstrating a 1.0 x 0.9 x 0.8 cm at 9:00, 2 cm medial to the nipple in the left breast.  US core bx demonstrated a G1 IDC ER pos AL pos Her2/jud unamplified tumor. She denies prior breast issues and had not had a mammogram in two years. She denies skin changes, feeling any masses, nipple changes or drainage, breast pain, unexplained weight loss or bone pain. She had menarche at 15, first of 5 pregnancies at 12, menopause at 48 and did not take HRT. She denies family hx of breast or ovarian cancer. Breast MRI 5/13/2017 demonstrated 1. Left BI-RADS 6, known malignancy. Right BI-RADS 2, benign. 2. LEFT BREAST: Known invasive ductal carcinoma in the left inner breast at 9:00, 1.2 x 1.1 x 0.8 cm. No MRI evidence for multicentric disease. No lymphadenopathy. 3. RIGHT BREAST: No MRI sign of malignancy. She underwent a left lumpectomy and SLNB 6/22/2017 for a G4yP7T5 IDC. She then underwent  robotic partial lobectomy of her left lung for a second primary adenocarcinoma by Dr Katie Pittman at [facility]. Dr Jose C Santillan completed WBRT in mid Jan 2018. Dr Letty Mcdowell had her start letrozole in Jan 2018 and she is tolerating well so far.     Past Medical History:   Diagnosis Date    Arthritis     Cancer [facility])     breast cancer- left    Cerebrovascular accident (stroke) ([address]     no deficiets    Diabetes mellitus type 2, controlled ([address]     Gallstones     asymptomatic, pt denies having    GERD (gastroesophageal reflux disease)     dx with resolution of symptoms on ppi- pt denies having    Hypercholesterolemia     Hypertension     Ill-defined condition     gout    Long term current use of anticoagulant therapy     Non-nicotine vapor product user     used to use    PAD (peripheral artery disease) ([address] 2/20/2013    right SFA angioplasty and athrectomy    Primary open angle glaucoma     Solitary lung nodule 5/9/2017    Type II diabetes mellitus, uncontrolled ([address] 2/1/2017     Past Surgical History:   Procedure Laterality Date    HX BREAST LUMPECTOMY Left 6/22/2017    LEFT LUMPECTOMY WITH INTRAOPERATIVE ULTRASOUND GUIDED, LEFT AXILLARY SENTINEL LYMPH NODE BIOPSY, POSSIBLE AXILLARY DISECTION performed by Fabian Lobo MD at [facility] MAIN OR    HX HEENT Bilateral     eye lids surgery    VASCULAR SURGERY PROCEDURE UNLIST  April 2016    right leg stent     Family History   Problem Relation Age of Onset    Hypertension Mother     Diabetes Mother     Diabetes Son      Social History   Substance Use Topics    Smoking status: Current Some Day Smoker     Types: Cigarettes     Last attempt to quit: 3/13/2013    Smokeless tobacco: Never Used      Comment: estimate is one pack per week    Alcohol use 12.6 oz/week     21 Cans of beer per week      Comment: 2 to 3 beers per day per patient     Current Outpatient Prescriptions   Medication Sig    clopidogrel (PLAVIX) 75 mg tab take 1 tablet by mouth once daily    ibuprofen (MOTRIN) 600 mg tablet Take 1 Tab by mouth every eight (8) hours as needed for Pain.  traMADol (ULTRAM) 50 mg tablet take 1-2 tablets by mouth every 6 hours if needed for pain SCALE 4-6    ergocalciferol (ERGOCALCIFEROL) 50,000 unit capsule Take 1 Cap by mouth every seven (7) days.  gabapentin (NEURONTIN) 100 mg capsule Take 1 Cap by mouth daily.  hydroCHLOROthiazide (HYDRODIURIL) 25 mg tablet Take 1 Tab by mouth daily.  (Patient taking differently: Take 25 mg by mouth as needed.)    latanoprost (XALATAN) 0.005 % ophthalmic solution Administer 1 Drop to both eyes nightly.  brimonidine (ALPHAGAN) 0.15 % ophthalmic solution Administer 1 Drop to both eyes two (2) times a day.  Blood-Glucose Meter (ACCU-CHEK OLIVIA PLUS METER) misc Test blood sugar daily    glucose blood VI test strips (ACCU-CHEK OLIVIA PLUS TEST STRP) strip Test blood sugar once daily    Lancets (ACCU-CHEK SOFTCLIX LANCETS) misc Test blood sugar daily    alcohol swabs (BD SINGLE USE SWABS REGULAR) padm Test blood sugar once daily    ALPHAGAN P 0.1 % ophthalmic solution instill 1 drop into both eyes twice a day    dorzolamide (TRUSOPT) 2 % ophthalmic solution instill 1 drop into both eyes twice a day    gabapentin (NEURONTIN) 300 mg capsule take 1 capsule by mouth twice a day THE DAY PRIOR TO SURGERY 9/13    letrozole (FEMARA) 2.5 mg tablet Take 1 Tab by mouth daily.  allopurinol (ZYLOPRIM) 300 mg tablet take 1 tablet by mouth once daily    metFORMIN (GLUCOPHAGE) 500 mg tablet Take 0.5 Tabs by mouth daily (with breakfast).  cilostazol (PLETAL) 100 mg tablet TAKE 1 TABLET BEFORE BREAKFAST AND DINNER    glucose blood VI test strips (ACCU-CHEK SMARTVIEW TEST STRIP) strip Check blood sugar twice a day    Lancets (ACCU-CHEK FASTCLIX) misc Check blood sugar twice a day    dorzolamide-timolol (COSOPT) 22.3-6.8 mg/mL ophthalmic solution Administer 1 Drop to both eyes two (2) times a day.  atorvastatin (LIPITOR) 80 mg tablet Take 1 Tab by mouth daily.  metoprolol (LOPRESSOR) 25 mg tablet Take 0.5 Tabs by mouth two (2) times a day.  metFORMIN (GLUCOPHAGE) 1,000 mg tablet Take 1 Tab by mouth daily.  Blood-Glucose Meter monitoring kit accu chek gabino smartview meter     No current facility-administered medications for this visit. No Known Allergies    Review of Systems   Constitutional: Negative for chills, diaphoresis, fever, malaise/fatigue and weight loss. HENT: Negative for congestion, ear pain and sore throat.     Eyes: Negative for blurred vision and pain. Respiratory: Negative for cough, hemoptysis, sputum production, shortness of breath, wheezing and stridor. Cardiovascular: Negative for chest pain, palpitations, orthopnea, claudication, leg swelling and PND. Gastrointestinal: Negative for abdominal pain, blood in stool, constipation, diarrhea, heartburn, melena, nausea and vomiting. Genitourinary: Negative for dysuria, flank pain, frequency, hematuria and urgency. Musculoskeletal: Positive for joint pain. Negative for back pain, myalgias and neck pain. Skin: Negative for itching and rash. Neurological: Negative for dizziness, tremors, focal weakness, seizures, weakness and headaches. Hx stroke without residual effect   Endo/Heme/Allergies: Negative for polydipsia. Psychiatric/Behavioral: Negative for depression and memory loss. The patient is not nervous/anxious. Visit Vitals    /82 (BP 1 Location: Right arm, BP Patient Position: Sitting)    Pulse 67    Temp 98.2 °F (36.8 °C) (Oral)    Resp 14    Ht 5' 3\" (1.6 m)    Wt 55.8 kg (123 lb)    SpO2 100%    BMI 21.79 kg/m2       Physical Exam   Constitutional: She is oriented to person, place, and time. She appears well-developed and well-nourished. No distress. HENT:   Head: Normocephalic and atraumatic. Mouth/Throat: Oropharynx is clear and moist. No oropharyngeal exudate. Eyes: Conjunctivae and EOM are normal. Pupils are equal, round, and reactive to light. No scleral icterus. Neck: Normal range of motion. Neck supple. No JVD present. No tracheal deviation present. No thyromegaly present. Cardiovascular: Normal rate and regular rhythm. Exam reveals no gallop and no friction rub. No murmur heard. Pulmonary/Chest: Effort normal and breath sounds normal. No respiratory distress. She has no wheezes. She has no rales. Right breast exhibits no inverted nipple, no mass, no nipple discharge, no skin change and no tenderness.  Left breast exhibits skin change (extensive peeling skin from radiation) and tenderness. Left breast exhibits no inverted nipple, no mass and no nipple discharge. Breasts are symmetrical (small/medium sized). Abdominal: Soft. Bowel sounds are normal. She exhibits no distension and no mass. There is no tenderness. There is no rebound and no guarding. Musculoskeletal: Normal range of motion. She exhibits no edema. Lymphadenopathy:     She has no cervical adenopathy. She has no axillary adenopathy. Right: No supraclavicular adenopathy present. Left: No supraclavicular adenopathy present. Neurological: She is alert and oriented to person, place, and time. No cranial nerve deficit. Skin: Skin is warm and dry. No rash noted. She is not diaphoretic. No erythema. No pallor. Psychiatric: She has a normal mood and affect. Her behavior is normal. Judgment and thought content normal.                ASSESSMENT and PLAN  1. Left breast cancer IDC stage 1 ([id number]) ER pos Her2/jud unamplified. Dx 4/2017. Dr Abigail Alcazar completed WBRT in early Jan 2018  Dr Dionne Serra has started letrozole  Plan for right 3D Dx mammogram 3/2018 and left 3D Dx mammogram in July 2018  Will ask Beth Kessler to contact her  2. Left lung ca Stage 1.   S/p resection  CT chest in March per Dr Sharon Diallo 6 months      Vivek Wooten MD FACS

## 2018-01-31 NOTE — MR AVS SNAPSHOT
[address], [address], Suite [place] [address] [facility] 
[phone] Patient: Janelle Kumar MRN: [id number] [id number] Visit Information Date & Time Provider Department Dept. Phone Encounter #  
 1/31/2018  2:00 PM Phill Brooks MD [facility] [id number] Your Appointments 7/12/2018  1:00 PM  
ESTABLISHED PATIENT with Ping Penaloza MD  
[address] Oncology at [facility]) Appt Note: onc 6 mth f/u  
 [address] [facility] [address] [id number]  
[address] [place] [address] [address] Jorge L Fletcher Upcoming Health Maintenance Date Due COLONOSCOPY 6/1/2015 MICROALBUMIN Q1 2/1/2018 FOOT EXAM Q1 2/20/2018 HEMOGLOBIN A1C Q6M 4/16/2018 EYE EXAM RETINAL OR DILATED Q1 4/20/2018 LIPID PANEL Q1 10/16/2018 MEDICARE YEARLY EXAM 10/17/2018 BREAST CANCER SCRN MAMMOGRAM 4/18/2019 GLAUCOMA SCREENING Q2Y 4/20/2019 DTaP/Tdap/Td series (2 - Td) 5/8/2025 Allergies as of 1/31/2018  Review Complete On: 1/31/2018 By: Phill Brooks MD  
 No Known Allergies Current Immunizations  Reviewed on 7/13/2017 Name Date Influenza High Dose Vaccine PF 10/16/2017, 2/1/2017 Influenza Vaccine 10/26/2015 Influenza Vaccine (Quad) PF 10/26/2015 Pneumococcal Conjugate (PCV-13) 6/2/2017 Pneumococcal Polysaccharide (PPSV-23) 2/4/2016, 10/26/2015 Tdap 5/8/2015 Zoster Vaccine, Live 2/1/2016 Not reviewed this visit You Were Diagnosed With   
  
 Codes Comments Malignant neoplasm of upper-inner quadrant of left breast in female, estrogen receptor positive ([facility] [address]    -  Primary ICD-10-CM: C50.212, Z17.0 ICD-9-CM: 174.2, V86.0 Vitals BP Pulse Temp Resp Height(growth percentile) Weight(growth percentile) 147/82 (BP 1 Location: Right arm, BP Patient Position: Sitting) 67 98.2 °F (36.8 °C) (Oral) 14 5' 3\" (1.6 m) 123 lb (55.8 kg) SpO2 BMI OB Status Smoking Status 100% 21.79 kg/m2 Postmenopausal Current Some Day Smoker Vitals History BMI and BSA Data Body Mass Index Body Surface Area 21.79 kg/m 2 1.57 m 2 [facility]erred [facility] [address] [phone] Your Updated Medication List  
  
   
This list is accurate as of: 1/31/18  3:15 PM.  Always use your most recent med list.  
  
  
  
  
 alcohol swabs Padm Commonly known as:  BD Single Use Swabs Regular Test blood sugar once daily  
  
 allopurinol 300 mg tablet Commonly known as:  ZYLOPRIM  
take 1 tablet by mouth once daily  
  
 atorvastatin 80 mg tablet Commonly known as:  LIPITOR Take 1 Tab by mouth daily. * Blood-Glucose Meter monitoring kit  
accu chek gabino smartview meter * Blood-Glucose Meter Misc Commonly known as:  ACCU-CHEK OLIVIA PLUS METER Test blood sugar daily * brimonidine 0.15 % ophthalmic solution Commonly known as:  Harlo Bohr Administer 1 Drop to both eyes two (2) times a day. * ALPHAGAN P 0.1 % ophthalmic solution Generic drug:  brimonidine  
instill 1 drop into both eyes twice a day  
  
 cilostazol 100 mg tablet Commonly known as:  PLETAL  
TAKE 1 TABLET BEFORE BREAKFAST AND DINNER  
  
 clopidogrel 75 mg Tab Commonly known as:  PLAVIX  
take 1 tablet by mouth once daily  
  
 dorzolamide 2 % ophthalmic solution Commonly known as:  TRUSOPT  
instill 1 drop into both eyes twice a day  
  
 dorzolamide-timolol 22.3-6.8 mg/mL ophthalmic solution Commonly known as:  COSOPT Administer 1 Drop to both eyes two (2) times a day. ergocalciferol 50,000 unit capsule Commonly known as:  ERGOCALCIFEROL Take 1 Cap by mouth every seven (7) days. * gabapentin 100 mg capsule Commonly known as:  NEURONTIN Take 1 Cap by mouth daily. * gabapentin 300 mg capsule Commonly known as:  NEURONTIN  
take 1 capsule by mouth twice a day THE DAY PRIOR TO SURGERY 9/13 * glucose blood VI test strips strip Commonly known as:  [address] Check blood sugar twice a day * glucose blood VI test strips strip Commonly known as:  ACCU-CHEK OLIVIA PLUS TEST STRP Test blood sugar once daily  
  
 hydroCHLOROthiazide 25 mg tablet Commonly known as:  HYDRODIURIL Take 1 Tab by mouth daily. ibuprofen 600 mg tablet Commonly known as:  MOTRIN Take 1 Tab by mouth every eight (8) hours as needed for Pain. * Lancets Misc Commonly known as:  ACCU-CHEK FASTCLIX Check blood sugar twice a day * Lancets Misc Commonly known as:  ACCU-CHEK SOFTCLIX LANCETS Test blood sugar daily  
  
 latanoprost 0.005 % ophthalmic solution Commonly known as:  Virlinda Seeds Administer 1 Drop to both eyes nightly. letrozole 2.5 mg tablet Commonly known as:  [facility] Take 1 Tab by mouth daily. * metFORMIN 1,000 mg tablet Commonly known as:  GLUCOPHAGE Take 1 Tab by mouth daily. * metFORMIN 500 mg tablet Commonly known as:  GLUCOPHAGE Take 0.5 Tabs by mouth daily (with breakfast). metoprolol tartrate 25 mg tablet Commonly known as:  LOPRESSOR Take 0.5 Tabs by mouth two (2) times a day. traMADol 50 mg tablet Commonly known as:  ULTRAM  
take 1-2 tablets by mouth every 6 hours if needed for pain SCALE 4-6 * Notice: This list has 12 medication(s) that are the same as other medications prescribed for you. Read the directions carefully, and ask your doctor or other care provider to review them with you. To-Do List   
 03/13/2018 10:30 AM  
  Appointment with [address] at [facility] ([phone]) NON-CONTRAST STUDY: 1. Bring any non [facility] facility films/images pertaining to the area of interest with you on the day of appointment. 2. Check in at registration at least 30 minutes before appt time unless you were instructed to do otherwise. 3. If you have to drink oral contrast please pick it up any weekday prior to your appointment, if you cannot please check in 2 hrs  before appt time. 03/28/2018 Imaging:  [facility] 3D KIRIT W MAMMO RT DX INCL CAD   
  
 03/29/2018 9:30 AM  
  Appointment with [facility] BILL 1 at [address] [place] [address] ([phone]) Shower or bathe using soap and water. Do not use deodorant, powder, perfumes, or lotion the day of your exam.  If your prior mammograms were not performed at [facility] 6 please bring films with you or forward prior images 2 days before your procedure. Check in at registration 15min before your appointment time unless you were instructed to do otherwise. A script is not necessary for screening. If you have one, please bring it on the day of the mammogram or have it faxed to the department. [facility]  [phone] [facility] [phone] [facility] [phone] [facility] [phone]  
  
 07/09/2018 Imaging:  [facility] 3D KIRIT W MAMMO LT DX INCL CAD   
  
 07/13/2018 9:30 AM  
  Appointment with [facility] BILL 1 at [address] [place] [address] ([phone]) Shower or bathe using soap and water. Do not use deodorant, powder, perfumes, or lotion the day of your exam.  If your prior mammograms were not performed at [facility] 6 please bring films with you or forward prior images 2 days before your procedure. Check in at registration 15min before your appointment time unless you were instructed to do otherwise. A script is not necessary for screening. If you have one, please bring it on the day of the mammogram or have it faxed to the department. [facility]  [phone] [facility] [phone] [facility] [phone] [facility] [phone] Introducing [facility] [place] SERVICES!    
 [facility] introduces [employer] patient portal. Now you can access parts of your medical record, email your doctor's office, and request medication refills online. 1. In your internet browser, go to [email or web address] 2. Click on the First Time User? Click Here link in the Sign In box. You will see the New Member Sign Up page. 3. Enter your [employer] Access Code exactly as it appears below. You will not need to use this code after youve completed the sign-up process. If you do not sign up before the expiration date, you must request a new code. · [employer] Access Code: [email or web address] Expires: 4/12/2018  1:55 PM 
 
4. Enter the last four digits of your Social Security Number (xxxx) and Date of Birth (mm/dd/yyyy) as indicated and click Submit. You will be taken to the next sign-up page. 5. Create a In[employer] ID. This will be your [employer] login ID and cannot be changed, so think of one that is secure and easy to remember. 6. Create a [employer] password. You can change your password at any time. 7. Enter your Password Reset Question and Answer. This can be used at a later time if you forget your password. 8. Enter your e-mail address. You will receive e-mail notification when new information is available in [address] 9. Click Sign Up. You can now view and download portions of your medical record. 10. Click the Download Summary menu link to download a portable copy of your medical information. If you have questions, please visit the Frequently Asked Questions section of the [employer] website. Remember, [employer] is NOT to be used for urgent needs. For medical emergencies, dial 911. Now available from your iPhone and Android! Please provide this summary of care documentation to your next provider. Your primary care clinician is listed as Dodie Hamm. If you have any questions after today's visit, please call [phone].

## 2018-02-21 ENCOUNTER — TELEPHONE (OUTPATIENT)
Dept: ONCOLOGY | Age: 68
End: 2018-02-21

## 2018-02-21 NOTE — TELEPHONE ENCOUNTER
I will also send a copy to her PCP, Dr. Jordan Briceño. Described what is included in the survivorship care plan and encouraged her to call me with any questions she has once she has reviewed the information or to set up a more formal opportunity to go over this and receive additional resources--either in a clinic visit or by phone. She is agreeable to this plan.

## 2018-02-23 ENCOUNTER — DOCUMENTATION ONLY (OUTPATIENT)
Dept: ONCOLOGY | Age: 68
End: 2018-02-23

## 2018-02-23 NOTE — PROGRESS NOTES
[address]   [address] [place], [address], [address] [facility]    Breast Cancer Survivorship Care Plan    GENERAL INFORMATION    NAME/AGE/GENDER: Mirza Ruiz is a 76 y.o. female who was born on 1950. PHONE: [phone] (A)      [phone] (M)    Date: 2/23/2018    Referring Provider: Loreto Gonzalez RN, Oncology Nurse Navigator     Patient Care Team:  Yohana Mason MD as PCP - [facility] [phone]  Delfino Gibson MD as Surgeon (General Surgery) [facility] [phone]  Toshia Parks MD as Physician (Radiation Oncology) 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 Sweeney Crest scan (bone density study) Consider every two years, after baseline study, while on Letrozole Dr. Stephen Lab     Please continue to see your primary care provider for all general health care recommended for a woman your age, including cancer screening tests.     Possible late and long-term effects that someone with this type of cancer and treatment may experience:  bone effects, elevated cholesterol, lymphedema, arthralgias / myalgias (joint / muscle aches and pains), hot flashes and vaginal dryness    Schedule of Clinical Visits        Coordinating Provider  When/How often Contact information   Primary Care Provider As needed for non-cancer health care [phone]     Medical Oncologist Every 3 to 6 months for the first 3 years, every 6 to 12 months for years 4 and 5, and annually thereafter [phone]     Radiation Oncologist Rotate visits with medical oncologist [phone]     Surgeon Rotate visits with medical oncologist [phone]       CANCER TREATMENT SUMMARY     Surgery: Date and procedure/location/findings: 6/22/17 Left lumpectomy with Left axillary sentinel lymph node biopsy, margins clear, no lymphovascular invasion identified, 2 lymph nodes removed--bioth were negative for cancer    Radiation: Yes End date (year): 12/12/17 to 1/12/18   Body area treated/dosage: 4256 cGy to Left breast, 5256 cGy to Left breast tumor bed--includes boost of 1000 cGy    Systemic Therapy (chemotherapy, biologics, other): No    Persistent symptoms or side effects at completion of treatment: No     Clinical Trial: No     Date of other cancer and/or recurrence of primary cancer and subsequent treatment: lung cancer--you have had a Left upper lobectomy with Dr. Tricia Allen at [facility] 9/2017. At the completion of your treatments for lung cancer, please ask your cancer team at [facility] if they will provide you with a lung cancer survivorship care plan, too.       FAMILIAL CANCER RISK ASSESSMENT    Family history of cancer: none, according to documentation in [address] [place] [address], your [facility] medical record    Genetic/hereditary risk factor(s) or predisposing conditions: none apparent  Genetic testing: No      CONTINUING TREATMENT    Need for Ongoing (Adjuvant) Treatment for Cancer: Yes, Letrozole 2.5 mg daily; began 1/12/18                Additional treatment name Possible side effects Planned duration   Anastrozole (Arimidex) or Letrozole (Femara) or Exemestane (Aromasin)   Joint aches and pains, vaginal dryness, loss of interest in sex, bone loss or thinning, bone fractures, elevated cholesterol, hot flashes Currently recommended for at least 5 years     DOING YOUR PART AS A CANCER SURVIVOR    Many survivors feel worried or anxious that the cancer will come back after treatment. While it often does not, its important to talk with your doctor about the possibility of the cancer returning. Most breast cancer recurrences are found by patients between visits. Tell your doctor if you notice any of the following symptoms, as they may be signs of a cancer recurrence:     · New lumps in the breast  · Bone pain  · Chest pain  · Abdominal pain  · Shortness of breath or difficulty breathing  · Persistent headaches  · Persistent coughing  · Rash on breast  · Nipple discharge (liquid coming from the nipple)    Or any other symptoms should be brought to the attention of your provider:   1. Anything that represents a brand new symptom   2. Anything that represents a persistent symptom   3. Anything you are worried about that might be related to the cancer coming back    Cancer survivors may experience issues with the areas listed below. If you have any concerns in these or other areas, please speak with your survivorship nurse practitioner or a member of your physician team to find out how to get help with them.     Emotional and mental health, fatigue, weight changes, stopping smoking, physical functioning, insurance, financial advice/assistance, school/work issues, parenting, memory or concentration loss, fertility, sexual functioning, or any other survivorship concerns            General Guidelines for Health and Cancer Prevention/Risk Reduction      · Work to achieve and maintain a healthy weight  · Engage in regular physical activity including:  Aerobic exercise at least 150 minutes per week                            Strength training exercise at least 2 days per week    · Eat a diet that is high in vegetables, fruits, whole grains, legumes (beans) and low in saturated fats (animal fats)    · Quit/do not start using tobacco  · Limit alcohol consumption to no more than 1 drink per day for women/no more than 2 drinks per day for men    If you have any questions or need additional information/support, please discuss these recommendations with your doctor or nurse practitioner.         RESOURCES FOR THE JOURNEY    Breast Cancer Specific:  Living Beyond Breast Cancer www.lbbc.org  Breast Cancer. org NotSimilar.no. [address] [facility] http://ww5.curt. [address] www.vb. [facility]. [facility] www.cancer. [address] www.cancer. org  American Society of Clinical Oncology http://Starvine.CFBank/. net/research-and-advocacy/asco-care-and-treatment-  recommendations-patients/follow-care-breast-cancer  [facility] [employer] www.[facility]. [facility] www.canceradvocaArcamed. [facility] Soni Ernst www.nccn. org  [employer] www. patientadvocate. org  Cancer and Careers www.cancerandcareers. [address] [place] [address]  Cancer Survivorship www.Compass-EOS/cancersurvivorship    Prepared By: Ale ALAS  [address]  [phone] or [phone]  [email or web address]    Delivered on: 2/23/18    This [facility] is a cancer treatment summary and follow-up plan provided to you to keep with your healthcare records and to share with your healthcare providers. This summary is a brief record of major aspects of your cancer treatment, not a detailed or comprehensive record of your care.

## 2018-03-13 ENCOUNTER — HOSPITAL ENCOUNTER (OUTPATIENT)
Dept: CT IMAGING | Age: 68
Discharge: HOME | End: 2018-03-13
Attending: NURSE PRACTITIONER
Payer: MEDICARE

## 2018-03-13 DIAGNOSIS — R91.1 SOLITARY LUNG NODULE: ICD-10-CM

## 2018-03-13 PROCEDURE — 71250 CT THORAX DX C-: CPT

## 2018-03-30 ENCOUNTER — HOSPITAL ENCOUNTER (OUTPATIENT)
Dept: MAMMOGRAPHY | Age: 68
Discharge: HOME | End: 2018-03-30
Attending: SURGERY
Payer: MEDICARE

## 2018-03-30 DIAGNOSIS — C50.212 MALIGNANT NEOPLASM OF UPPER-INNER QUADRANT OF LEFT BREAST IN FEMALE, ESTROGEN RECEPTOR POSITIVE (HCC): ICD-10-CM

## 2018-03-30 DIAGNOSIS — Z17.0 MALIGNANT NEOPLASM OF UPPER-INNER QUADRANT OF LEFT BREAST IN FEMALE, ESTROGEN RECEPTOR POSITIVE (HCC): ICD-10-CM

## 2018-03-30 PROCEDURE — 77062 BREAST TOMOSYNTHESIS BI: CPT

## 2018-03-30 PROCEDURE — 77065 DX MAMMO INCL CAD UNI: CPT

## 2018-04-05 ENCOUNTER — OFFICE VISIT (OUTPATIENT)
Dept: FAMILY MEDICINE CLINIC | Age: 68
End: 2018-04-05

## 2018-04-05 VITALS
BODY MASS INDEX: 22.15 KG/M2 | WEIGHT: 125 LBS | HEART RATE: 61 BPM | DIASTOLIC BLOOD PRESSURE: 72 MMHG | OXYGEN SATURATION: 97 % | HEIGHT: 63 IN | TEMPERATURE: 96.4 F | SYSTOLIC BLOOD PRESSURE: 152 MMHG | RESPIRATION RATE: 18 BRPM

## 2018-04-05 DIAGNOSIS — Z86.73 HISTORY OF STROKE: ICD-10-CM

## 2018-04-05 DIAGNOSIS — I10 ESSENTIAL HYPERTENSION: ICD-10-CM

## 2018-04-05 LAB — HBA1C MFR BLD HPLC: 5.6 %

## 2018-04-05 RX ORDER — HYDROCHLOROTHIAZIDE 25 MG/1
25 TABLET ORAL AS NEEDED
Qty: 90 TAB | Refills: 3 | Status: SHIPPED | OUTPATIENT
Start: 2018-04-05 | End: 2019-05-04 | Stop reason: SDUPTHER

## 2018-04-05 RX ORDER — CLOPIDOGREL BISULFATE 75 MG/1
TABLET ORAL
Qty: 90 TAB | Refills: 3 | Status: SHIPPED | OUTPATIENT
Start: 2018-04-05 | End: 2019-04-19 | Stop reason: SDUPTHER

## 2018-04-05 RX ORDER — GABAPENTIN 300 MG/1
CAPSULE ORAL
Qty: 180 CAP | Refills: 1 | Status: SHIPPED | OUTPATIENT
Start: 2018-04-05 | End: 2018-10-15 | Stop reason: SDUPTHER

## 2018-04-05 RX ORDER — ERGOCALCIFEROL 1.25 MG/1
50000 CAPSULE ORAL
Qty: 4 CAP | Refills: 6 | Status: SHIPPED | OUTPATIENT
Start: 2018-04-05 | End: 2018-11-15

## 2018-04-05 RX ORDER — IBUPROFEN 600 MG/1
600 TABLET ORAL
Qty: 60 TAB | Refills: 2 | Status: SHIPPED | OUTPATIENT
Start: 2018-04-05 | End: 2018-11-26 | Stop reason: ALTCHOICE

## 2018-04-05 NOTE — MR AVS SNAPSHOT
[facility] [facility] [id number] [phone] Patient: Lizet Baig MRN: [id number] WKI:[id number] Visit Information Date & Time Provider Department Dept. Phone Encounter #  
 4/5/2018 11:45 AM Blanche Krueger MD [address] OFF[facility]EX [phone] [id number] Follow-up Instructions Return in about 6 months (around 10/5/2018), or if symptoms worsen or fail to improve. Your Appointments 7/12/2018  1:00 PM  
ESTABLISHED PATIENT with Courtney Jackson MD  
[address] [facility]) Appt Note: onc 6 mth f/u  
 [address] [facility] [address] [facility]  
[phone]  
  
   
 [address] [place] [address] [address] [address]  
  
    
 7/18/2018  2:00 PM  
ESTABLISHED PATIENT with Celine Guillermo MD  
[facility] ([address] [facility]) Appt Note: 6 month breast check [address] [facility] [address] [address]  
[address] [facility], [address], [address] [facility] [address] [phone] Upcoming Health Maintenance Date Due COLONOSCOPY 6/1/2015 MICROALBUMIN Q1 2/1/2018 FOOT EXAM Q1 2/20/2018 HEMOGLOBIN A1C Q6M 4/16/2018 EYE EXAM RETINAL OR DILATED Q1 4/20/2018 LIPID PANEL Q1 10/16/2018 MEDICARE YEARLY EXAM 10/17/2018 GLAUCOMA SCREENING Q2Y 4/20/2019 BREAST CANCER SCRN MAMMOGRAM 3/30/2020 DTaP/Tdap/Td series (2 - Td) 5/8/2025 Allergies as of 4/5/2018  Review Complete On: 4/5/2018 By: Pamela Davidson LPN No Known Allergies Current Immunizations  Reviewed on 7/13/2017 Name Date Influenza High Dose Vaccine PF 10/16/2017, 2/1/2017 Influenza Vaccine 10/26/2015 Influenza Vaccine (Quad) PF 10/26/2015 Pneumococcal Conjugate (PCV-13) 6/2/2017 Pneumococcal Polysaccharide (PPSV-23) 2/4/2016, 10/26/2015 Tdap 5/8/2015 Zoster Vaccine, Live 2/1/2016 Not reviewed this visit You Were Diagnosed With   
  
 Codes Comments Uncontrolled type 2 diabetes mellitus with complication, without long-term current use of insulin ([facility]    -  Primary ICD-10-CM: E11.8, E11.65 ICD-9-CM: 250.82 History of stroke     ICD-10-CM: Z86.73 
ICD-9-CM: V12.54 Essential hypertension     ICD-10-CM: I10 
ICD-9-CM: 401.9 Vitals BP Pulse Temp Resp Height(growth percentile) Weight(growth percentile) 152/72 (BP 1 Location: Right arm, BP Patient Position: Sitting) 61 96.4 °F (35.8 °C) (Oral) 18 5' 3\" (1.6 m) 125 lb (56.7 kg) SpO2 BMI OB Status Smoking Status 97% 22.14 kg/m2 Postmenopausal Former Smoker BMI and BSA Data Body Mass Index Body Surface Area  
 22.14 kg/m 2 1.59 m 2 Preferred Pharmacy Pharmacy Name Phone [facility] [address], [address] [phone] Your Updated Medication List  
  
   
This list is accurate as of 4/5/18 12:44 PM.  Always use your most recent med list.  
  
  
  
  
 alcohol swabs Padm Commonly known as:  BD Single Use Swabs Regular Test blood sugar once daily * Blood-Glucose Meter monitoring kit  
accu chek gabino smartview meter * Blood-Glucose Meter Misc Commonly known as:  ACCU-CHEK OLIVIA PLUS METER Test blood sugar daily * brimonidine 0.15 % ophthalmic solution Commonly known as:  Chelsy Frankel Administer 1 Drop to both eyes two (2) times a day. * ALPHAGAN P 0.1 % ophthalmic solution Generic drug:  brimonidine  
instill 1 drop into both eyes twice a day  
  
 clopidogrel 75 mg Tab Commonly known as:  PLAVIX  
take 1 tablet by mouth once daily  
  
 dorzolamide 2 % ophthalmic solution Commonly known as:  TRUSOPT  
instill 1 drop into both eyes twice a day dorzolamide-timolol 22.3-6.8 mg/mL ophthalmic solution Commonly known as:  COSOPT Administer 1 Drop to both eyes two (2) times a day. ergocalciferol 50,000 unit capsule Commonly known as:  ERGOCALCIFEROL Take 1 Cap by mouth every seven (7) days. gabapentin 300 mg capsule Commonly known as:  NEURONTIN  
take 1 capsule by mouth twice a day THE DAY PRIOR TO SURGERY 9/13  
  
 glucose blood VI test strips strip Commonly known as:  ACCU-CHEK OLIVIA PLUS TEST STRP Test blood sugar once daily  
  
 hydroCHLOROthiazide 25 mg tablet Commonly known as:  HYDRODIURIL Take 1 Tab by mouth as needed. ibuprofen 600 mg tablet Commonly known as:  MOTRIN Take 1 Tab by mouth every eight (8) hours as needed for Pain.  
  
 latanoprost 0.005 % ophthalmic solution Commonly known as:  Nagi Layton Administer 1 Drop to both eyes nightly. letrozole 2.5 mg tablet Commonly known as:  [facility] Take 1 Tab by mouth daily. * Notice: This list has 4 medication(s) that are the same as other medications prescribed for you. Read the directions carefully, and ask your doctor or other care provider to review them with you. Prescriptions Sent to Pharmacy Refills  
 gabapentin (NEURONTIN) 300 mg capsule 1 Sig: take 1 capsule by mouth twice a day THE DAY PRIOR TO SURGERY 9/13 Class: Normal  
 [facility]acy: [facility] [address] Ph #: [phone]  
 ibuprofen (MOTRIN) 600 mg tablet 2 Sig: Take 1 Tab by mouth every eight (8) hours as needed for Pain. Class: Normal  
 Pharmacy: [facility] [address] Ph #: [phone] Route: Oral  
 clopidogrel (PLAVIX) 75 mg tab 3 Sig: take 1 tablet by mouth once daily  Class: Normal  
 Pharmacy: [facility] [address] Ph #: [phone]  
 hydroCHLOROthiazide (HYDRODIURIL) 25 mg tablet 3  
 Sig: Take 1 Tab by mouth as needed. Class: Normal  
 Pharmacy: [facility] [address] [facility] [address] Ph #: [phone] Route: Oral  
 ergocalciferol (ERGOCALCIFEROL) 50,000 unit capsule 6 Sig: Take 1 Cap by mouth every seven (7) days. Class: Normal  
 Pharmacy: [facility] [address] Ph #: [phone] Route: Oral  
  
We Performed the Following AMB POC HEMOGLOBIN A1C [35472 CPT(R)] MICROALBUMIN, UR, RAND W/ MICROALB/CREAT RATIO [id number] CPT(R)] Follow-up Instructions Return in about 6 months (around 10/5/2018), or if symptoms worsen or fail to improve. To-Do List   
 07/13/2018 9:30 AM  
  [facility]ointment with [facility] BILL 1 at [address] [place] [address] ([phone]) Shower or bathe using soap and water. Do not use deodorant, powder, perfumes, or lotion the day of your exam.  If your prior mammograms were not performed at [facility] 6 please bring films with you or forward prior images 2 days before your procedure. Check in at registration 15min before your appointment time unless you were instructed to do otherwise. A script is not necessary for screening. If you have one, please bring it on the day of the mammogram or have it faxed to the department. [facility]  [phone] [facility] [phone] [facility] [phone] [facility] [phone]  
  
 09/04/2018 9:30 AM  
  Appointment with [facility] 2 at [facility] RAD CT ([phone]) NON-CONTRAST STUDY: 1. Bring any non [facility] facility films/images pertaining to the area of interest with you on the day of appointment. 2. Check in at registration at least 30 minutes before appt time unless you were instructed to do otherwise. 3. If you have to drink oral contrast please pick it up any weekday prior to your appointment, if you cannot please check in 2 hrs  before appt time. Introducing [facility] SERVICES! Gloria Art introduces [employer] patient portal. Now you can access parts of your medical record, email your doctor's office, and request medication refills online. 1. In your internet browser, go to [email or web address] 2. Click on the First Time User? Click Here link in the Sign In box. You will see the New Member Sign Up page. 3. Enter your [employer] Access Code exactly as it appears below. You will not need to use this code after youve completed the sign-up process. If you do not sign up before the expiration date, you must request a new code. · [employer] Access Code: [email or web address] Expires: 4/12/2018  2:55 PM 
 
4. Enter the last four digits of your Social Security Number (xxxx) and Date of Birth (mm/dd/yyyy) as indicated and click Submit. You will be taken to the next sign-up page. 5. Create a [employer] ID. This will be your [employer] login ID and cannot be changed, so think of one that is secure and easy to remember. 6. Create a [employer] password. You can change your password at any time. 7. Enter your Password Reset Question and Answer. This can be used at a later time if you forget your password. 8. Enter your e-mail address. You will receive e-mail notification when new information is available in [address] 9. Click Sign Up. You can now view and download portions of your medical record. 10. Click the Download Summary menu link to download a portable copy of your medical information. If you have questions, please visit the Frequently Asked Questions section of the [employer] website. Remember, [employer] is NOT to be used for urgent needs. For medical emergencies, dial 911. Now available from your iPhone and Android! Please provide this summary of care documentation to your next provider. Your primary care clinician is listed as [place]. If you have any questions after today's visit, please call [phone].

## 2018-04-05 NOTE — PROGRESS NOTES
Fred aCrlin is a 76 y.o. female    Chief Complaint   Patient presents with    Hypertension     follow up    Diabetes     follow up     1. Have you been to the ER, urgent care clinic since your last visit? Hospitalized since your last visit? No    2. Have you seen or consulted any other health care providers outside of the [address] [place] [address] since your last visit? Include any pap smears or colon screening.  No      Health Maintenance Due   Topic Date Due    COLONOSCOPY  06/01/2015    MICROALBUMIN Q1  02/01/2018    FOOT EXAM Q1  02/20/2018    HEMOGLOBIN A1C Q6M  04/16/2018    EYE EXAM RETINAL OR DILATED Q1  04/20/2018       States eye exam was done by  about couple weeks sgo  colonoscopy is not don't since last 10 years

## 2018-04-06 LAB
ALBUMIN/CREAT UR: 6.3 MG/G CREAT (ref 0–30)
CREAT UR-MCNC: 49.5 MG/DL
MICROALBUMIN UR-MCNC: 3.1 UG/ML

## 2018-04-26 NOTE — PROGRESS NOTES
HISTORY OF PRESENT ILLNESS  Saundra High is a 76 y.o. female. HPI   DMtype II     having diabetic diet,  last a1c was  at target 5.9, 5.6%%,    HTN  Today pt present for Bp check and the patient stating that so far there has been a Compliancy w/ the bp meds, the patient has been active   and does do the daily walking,   today the pt denies Chest Pain, has no legs swelling no lightheadedness,    the pat has not been feeling anxious, and  Has not been feeling stressed out, otherwise feeling better since the last visit  Feeling better since the last visit. Current Outpatient Prescriptions   Medication Sig Dispense Refill    gabapentin (NEURONTIN) 300 mg capsule take 1 capsule by mouth twice a day THE DAY PRIOR TO SURGERY 9/13 180 Cap 1    ibuprofen (MOTRIN) 600 mg tablet Take 1 Tab by mouth every eight (8) hours as needed for Pain. 60 Tab 2    clopidogrel (PLAVIX) 75 mg tab take 1 tablet by mouth once daily 90 Tab 3    hydroCHLOROthiazide (HYDRODIURIL) 25 mg tablet Take 1 Tab by mouth as needed. 90 Tab 3    ergocalciferol (ERGOCALCIFEROL) 50,000 unit capsule Take 1 Cap by mouth every seven (7) days. 4 Cap 6    ALPHAGAN P 0.1 % ophthalmic solution instill 1 drop into both eyes twice a day  0    dorzolamide (TRUSOPT) 2 % ophthalmic solution instill 1 drop into both eyes twice a day  0    letrozole (FEMARA) 2.5 mg tablet Take 1 Tab by mouth daily. 30 Tab 6    latanoprost (XALATAN) 0.005 % ophthalmic solution Administer 1 Drop to both eyes nightly. 2.5 mL 1    brimonidine (ALPHAGAN) 0.15 % ophthalmic solution Administer 1 Drop to both eyes two (2) times a day.  15 mL 6    Blood-Glucose Meter (ACCU-CHEK OLIVIA PLUS METER) misc Test blood sugar daily 1 Each 0    glucose blood VI test strips (ACCU-CHEK OLIVIA PLUS TEST STRP) strip Test blood sugar once daily 50 Strip 11    alcohol swabs (BD SINGLE USE SWABS REGULAR) padm Test blood sugar once daily 50 Pad 11    dorzolamide-timolol (COSOPT) 22.3-6.8 mg/mL ophthalmic solution Administer 1 Drop to both eyes two (2) times a day.  0    Blood-Glucose Meter monitoring kit accu chek gabino smartview meter 1 Kit 0     No Known Allergies  Past Medical History:   Diagnosis Date    Arthritis     [facility] ([facility] 04/2017    lumpectomy left breast, radiation    Cancer ([facility]     breast cancer- left    Cerebrovascular accident (stroke) ([facility]     no deficiets    Diabetes mellitus type 2, controlled ([facility]     Gallstones     asymptomatic, pt denies having    GERD (gastroesophageal reflux disease)     dx with resolution of symptoms on ppi- pt denies having    Hypercholesterolemia     Hypertension     Ill-defined condition     gout    Long term current use of anticoagulant therapy     Non-nicotine vapor product user     used to use    PAD (peripheral artery disease) ([facility] 2/20/2013    right SFA angioplasty and athrectomy    Primary open angle glaucoma     Solitary lung nodule 5/9/2017    Type II diabetes mellitus, uncontrolled ([facility] 2/1/2017     Past Surgical History:   Procedure Laterality Date    HX BREAST LUMPECTOMY Left 6/22/2017    LEFT LUMPECTOMY WITH INTRAOPERATIVE ULTRASOUND GUIDED, LEFT AXILLARY SENTINEL LYMPH NODE BIOPSY, POSSIBLE AXILLARY DISECTION performed [facility] Katja [facility] MA[facility] OR    HX HEENT Bilateral     eye lids surgery    VASCULAR SURGERY PROCEDURE UNLIST  April 2016    right leg stent     Family History   Problem Relation Age of Onset    Hypertension Mother     Diabetes Mother     Diabetes Son      Social History   Substance Use Topics    Smoking status: Former Smoker     Types: Cigarettes     Quit date: 3/13/2013    Smokeless tobacco: Never Used      Comment: estimate is one pack per week    Alcohol use 12.6 oz/week     21 Cans of beer per week      Comment: 2 to 3 beers per day per patient      Lab Results  Component Value Date/Time   WBC 6.0 10/16/2017 01:01 PM   HGB 10.4 (L) 10/16/2017 01:01 PM   Hemoglobin (POC) 10.5 (L) 06/22/2017 07:57 AM   HCT 29.8 (L) 10/16/2017 01:01 PM   Hematocrit (POC) 31 (L) 06/22/2017 07:57 AM   PLATELET 016 67/83/4553 01:01 PM   MCV 89 10/16/2017 01:01 PM     Lab Results  Component Value Date/Time   Hemoglobin A1c 5.2 02/01/2017 03:05 PM   Hemoglobin A1c 4.9 05/24/2016 11:52 AM   Hemoglobin A1c 5.7 (H) 10/26/2015 10:42 AM   Glucose 100 (H) 10/16/2017 01:01 PM   Glucose (POC) 121 (H) 06/22/2017 12:08 PM   Microalbumin/Creat ratio (mg/g creat) 8 07/03/2014 10:16 AM   Microalb/Creat ratio (ug/mg creat.) 6.3 04/05/2018 12:51 PM   Microalbumin,urine random 0.68 07/03/2014 10:16 AM   LDL, calculated 187 (H) 10/16/2017 01:01 PM   Creatinine (POC) 0.9 06/22/2017 07:57 AM   Creatinine 0.93 10/16/2017 01:01 PM         Review of Systems   Constitutional: Negative for chills, fever and malaise/fatigue. HENT: Negative for congestion and nosebleeds. Eyes: Negative for blurred vision and pain. Respiratory: Negative for shortness of breath and wheezing. Cardiovascular: Negative for chest pain, palpitations and leg swelling. Gastrointestinal: Negative for constipation, diarrhea, nausea and vomiting. Genitourinary: Negative for dysuria and frequency. Musculoskeletal: Negative for joint pain and myalgias. Skin: Negative for itching and rash. Neurological: Negative for dizziness, loss of consciousness and headaches. Endo/Heme/Allergies: Does not bruise/bleed easily. Psychiatric/Behavioral: Negative for depression. The patient is not nervous/anxious and does not have insomnia. All other systems reviewed and are negative. Physical Exam   Constitutional: She is oriented to person, place, and time. She appears well-developed and well-nourished. HENT:   Head: Normocephalic and atraumatic. Eyes: Conjunctivae and EOM are normal.   Neck: Normal range of motion. Neck supple. No JVD present. Cardiovascular: Normal rate, regular rhythm and normal heart sounds.     No murmur heard.  Pulmonary/Chest: Effort normal and breath sounds normal. No stridor. Abdominal: Soft. Bowel sounds are normal. She exhibits no distension and no mass. There is no tenderness. Musculoskeletal: Normal range of motion. She exhibits no edema. Nl pulses, nl visual inspection nl monoF, +dystrophy and elongated Nails   Lymphadenopathy:     She has no cervical adenopathy. Neurological: She is alert and oriented to person, place, and time. Skin: No erythema. Psychiatric: Her behavior is normal.   Nursing note and vitals reviewed. ASSESSMENT and PLAN  Diagnoses and all orders for this visit:    1. Uncontrolled type 2 diabetes mellitus with complication, without long-term current use of insulin (HCC)  -     AMB POC HEMOGLOBIN A1C  -     MICROALBUMIN, UR, RAND W/ MICROALB/CREAT RATIO    2. History of stroke  -     clopidogrel (PLAVIX) 75 mg tab; take 1 tablet by mouth once daily    3. Essential hypertension  -     hydroCHLOROthiazide (HYDRODIURIL) 25 mg tablet; Take 1 Tab by mouth as needed. Other orders  -     gabapentin (NEURONTIN) 300 mg capsule; take 1 capsule by mouth twice a day THE DAY PRIOR TO SURGERY 9/13  -     ibuprofen (MOTRIN) 600 mg tablet; Take 1 Tab by mouth every eight (8) hours as needed for Pain. -     ergocalciferol (ERGOCALCIFEROL) 50,000 unit capsule; Take 1 Cap by mouth every seven (7) days.

## 2018-07-13 ENCOUNTER — HOSPITAL ENCOUNTER (OUTPATIENT)
Dept: MAMMOGRAPHY | Age: 68
Discharge: HOME | End: 2018-07-13
Attending: SURGERY
Payer: MEDICARE

## 2018-07-13 DIAGNOSIS — C50.212 MALIGNANT NEOPLASM OF UPPER-INNER QUADRANT OF LEFT BREAST IN FEMALE, ESTROGEN RECEPTOR POSITIVE (HCC): ICD-10-CM

## 2018-07-13 DIAGNOSIS — Z17.0 MALIGNANT NEOPLASM OF UPPER-INNER QUADRANT OF LEFT BREAST IN FEMALE, ESTROGEN RECEPTOR POSITIVE (HCC): ICD-10-CM

## 2018-07-13 PROCEDURE — 77065 DX MAMMO INCL CAD UNI: CPT

## 2018-07-31 ENCOUNTER — OFFICE VISIT (OUTPATIENT)
Dept: SURGERY | Age: 68
End: 2018-07-31

## 2018-07-31 VITALS
TEMPERATURE: 95.7 F | DIASTOLIC BLOOD PRESSURE: 66 MMHG | OXYGEN SATURATION: 100 % | SYSTOLIC BLOOD PRESSURE: 143 MMHG | HEART RATE: 57 BPM | HEIGHT: 63 IN | BODY MASS INDEX: 23.39 KG/M2 | RESPIRATION RATE: 16 BRPM | WEIGHT: 132 LBS

## 2018-07-31 DIAGNOSIS — Z17.0 MALIGNANT NEOPLASM OF UPPER-INNER QUADRANT OF LEFT BREAST IN FEMALE, ESTROGEN RECEPTOR POSITIVE (HCC): Primary | ICD-10-CM

## 2018-07-31 DIAGNOSIS — C50.212 MALIGNANT NEOPLASM OF UPPER-INNER QUADRANT OF LEFT BREAST IN FEMALE, ESTROGEN RECEPTOR POSITIVE (HCC): Primary | ICD-10-CM

## 2018-07-31 NOTE — PROGRESS NOTES
1. Have you been to the ER, urgent care clinic since your last visit? Hospitalized since your last visit? No 
 
2. Have you seen or consulted any other health care providers outside of the [address] [place] [address] since your last visit? Include any pap smears or colon screening.  No

## 2018-07-31 NOTE — PROGRESS NOTES
HISTORY OF PRESENT ILLNESS Valentino Mcnair is a 76 y.o. female who comes in for follow up for her left breast cancer Breast Cancer Pertinent negatives include no chest pain, no abdominal pain, no headaches and no shortness of breath. Other Pertinent negatives include no chest pain, no abdominal pain, no headaches and no shortness of breath. She went for screening mammogram 3/27/2017 demonstrating a density in the medial aspect of the left breast.   She underwent dx mammogram and US demonstrating a 1.0 x 0.9 x 0.8 cm at 9:00, 2 cm medial to the nipple in the left breast.  US core bx demonstrated a G1 IDC ER pos VT pos Her2/jud unamplified tumor. She denies prior breast issues and had not had a mammogram in two years. She denies skin changes, feeling any masses, nipple changes or drainage, breast pain, unexplained weight loss or bone pain. She had menarche at 15, first of 5 pregnancies at 12, menopause at 48 and did not take HRT. She denies family hx of breast or ovarian cancer. Breast MRI 5/13/2017 demonstrated 1. Left BI-RADS 6, known malignancy. Right BI-RADS 2, benign. 2. LEFT BREAST: Known invasive ductal carcinoma in the left inner breast at 9:00, 1.2 x 1.1 x 0.8 cm. No MRI evidence for multicentric disease. No lymphadenopathy. 3. RIGHT BREAST: No MRI sign of malignancy. She underwent a left lumpectomy and SLNB 6/22/2017 for a [id number] IDC. She then underwent  robotic partial lobectomy of her left lung for a second primary adenocarcinoma by Dr Elgin Johnson at [facility]. Dr Isma Floyd completed WBRT in mid Jan 2018. Dr Sami Adkins had her start letrozole in Jan 2018 and she is tolerating well so far. She had a bilateral 3D dx mammogram 3/30/2018 which was BIRADS 2 and a left dx mammogram 7/13/2018 which was BIRADS 2. Past Medical History:  
Diagnosis Date  Arthritis  Breast cancer ([facility] 04/2017  
 lumpectomy left breast, radiation  Cancer ([facility] breast cancer- left  Cerebrovascular accident (stroke) ([facility]   
 no deficiets  Diabetes mellitus type 2, controlled ([facility]  Gallstones   
 asymptomatic, pt denies having  GERD (gastroesophageal reflux disease) dx with resolution of symptoms on ppi- pt denies having  Hypercholesterolemia  Hypertension  Ill-defined condition   
 gout  Long term current use of anticoagulant therapy  Non-nicotine vapor product user   
 used to use  PAD (peripheral artery disease) ([facility] 2/20/2013  
 right SFA angioplasty and athrectomy  Primary open angle glaucoma  Radiation therapy complication  Solitary lung nodule 5/9/2017  Type II diabetes mellitus, uncontrolled ([facility] 2/1/2017 Past Surgical History:  
Procedure Laterality Date  HX BREAST LUMPECTOMY Left 6/22/2017 LEFT LUMPECTOMY WITH INTRAOPERATIVE ULTRASOUND GUIDED, LEFT AXILLARY SENTINEL LYMPH NODE BIOPSY, POSSIBLE AXILLARY DISECTION performed by Guillermo Mays MD at [facility] MAIN OR  
 HX HEENT Bilateral   
 eye lids surgery  VASCULAR SURGERY PROCEDURE UNLIST  April 2016  
 right leg stent Family History Problem Relation Age of Onset  Hypertension Mother  Diabetes Mother  Diabetes Son   
 
Social History Substance Use Topics  Smoking status: Former Smoker Types: Cigarettes Quit date: 3/13/2013  Smokeless tobacco: Never Used Comment: estimate is one pack per week  Alcohol use 12.6 oz/week 21 Cans of beer per week Comment: 2 to 3 beers per day per patient Current Outpatient Prescriptions Medication Sig  
 gabapentin (NEURONTIN) 300 mg capsule take 1 capsule by mouth twice a day THE DAY PRIOR TO SURGERY 9/13  clopidogrel (PLAVIX) 75 mg tab take 1 tablet by mouth once daily  hydroCHLOROthiazide (HYDRODIURIL) 25 mg tablet Take 1 Tab by mouth as needed.  ergocalciferol (ERGOCALCIFEROL) 50,000 unit capsule Take 1 Cap by mouth every seven (7) days.   
 alcohol swabs (BD SINGLE USE SWABS REGULAR) padm Test blood sugar once daily  ALPHAGAN P 0.1 % ophthalmic solution instill 1 drop into both eyes twice a day  dorzolamide (TRUSOPT) 2 % ophthalmic solution instill 1 drop into both eyes twice a day  letrozole (FEMARA) 2.5 mg tablet Take 1 Tab by mouth daily.  latanoprost (XALATAN) 0.005 % ophthalmic solution Administer 1 Drop to both eyes nightly.  dorzolamide-timolol (COSOPT) 22.3-6.8 mg/mL ophthalmic solution Administer 1 Drop to both eyes two (2) times a day.  ibuprofen (MOTRIN) 600 mg tablet Take 1 Tab by mouth every eight (8) hours as needed for Pain.  Blood-Glucose Meter (ACCU-CHEK OLIVIA PLUS METER) misc Test blood sugar daily  glucose blood VI test strips (ACCU-CHEK OLIVIA PLUS TEST STRP) strip Test blood sugar once daily  brimonidine (ALPHAGAN) 0.15 % ophthalmic solution Administer 1 Drop to both eyes two (2) times a day.  Blood-Glucose Meter monitoring kit accu chek gabino smartview meter No current facility-administered medications for this visit. No Known Allergies Review of Systems Constitutional: Negative for chills, diaphoresis, fever, malaise/fatigue and weight loss. HENT: Negative for congestion, ear pain and sore throat. Eyes: Negative for blurred vision and pain. Respiratory: Negative for cough, hemoptysis, sputum production, shortness of breath, wheezing and stridor. Cardiovascular: Negative for chest pain, palpitations, orthopnea, claudication, leg swelling and PND. Gastrointestinal: Negative for abdominal pain, blood in stool, constipation, diarrhea, heartburn, melena, nausea and vomiting. Genitourinary: Negative for dysuria, flank pain, frequency, hematuria and urgency. Musculoskeletal: Positive for joint pain. Negative for back pain, myalgias and neck pain. Skin: Negative for itching and rash. Neurological: Negative for dizziness, tremors, focal weakness, seizures, weakness and headaches.   
     Hx stroke without residual effect Endo/Heme/Allergies: Negative for polydipsia. Psychiatric/Behavioral: Negative for depression and memory loss. The patient is not nervous/anxious. Visit Vitals  /66 (BP 1 Location: Right arm, BP Patient Position: Sitting)  Pulse (!) 57  Temp 95.7 °F (35.4 °C) (Oral)  Resp 16  
 Ht 5' 3\" (1.6 m)  Wt 59.9 kg (132 lb)  SpO2 100%  BMI 23.38 kg/m2 Physical Exam  
Constitutional: She is oriented to person, place, and time. She appears well-developed and well-nourished. No distress. HENT:  
Head: Normocephalic and atraumatic. Mouth/Throat: Oropharynx is clear and moist. No oropharyngeal exudate. Eyes: Conjunctivae and EOM are normal. Pupils are equal, round, and reactive to light. No scleral icterus. Neck: Normal range of motion. Neck supple. No JVD present. No tracheal deviation present. No thyromegaly present. Cardiovascular: Normal rate and regular rhythm. Exam reveals no gallop and no friction rub. No murmur heard. Pulmonary/Chest: Effort normal and breath sounds normal. No respiratory distress. She has no wheezes. She has no rales. Right breast exhibits no inverted nipple, no mass, no nipple discharge, no skin change and no tenderness. Left breast exhibits skin change (extensive peeling skin from radiation) and tenderness. Left breast exhibits no inverted nipple, no mass and no nipple discharge. Breasts are symmetrical (small/medium sized). Abdominal: Soft. Bowel sounds are normal. She exhibits no distension and no mass. There is no tenderness. There is no rebound and no guarding. Musculoskeletal: Normal range of motion. She exhibits no edema. Lymphadenopathy:  
  She has no cervical adenopathy. She has no axillary adenopathy. Right: No supraclavicular adenopathy present. Left: No supraclavicular adenopathy present. Neurological: She is alert and oriented to person, place, and time. No cranial nerve deficit.   
Skin: Skin is warm and dry. No rash noted. She is not diaphoretic. No erythema. No pallor. Multiple cysts on back Psychiatric: She has a normal mood and affect. Her behavior is normal. Judgment and thought content normal.  
          
 
ASSESSMENT and PLAN 1. Left breast cancer IDC stage 1 ([id number]) ER pos Her2/jud unamplified. Dx 4/2017. Dr Sunita Vyas completed WBRT in early Jan 2018 Dr Wayne Reyes has started letrozole Bilateral 3D dx mammogram after 3/30/2019 2. Left lung ca Stage 1. S/p resection Followed by Dr Gabbi Mccray 3. Epidermal cysts on back. Occasionally bothersome but not today. Not infected RTC 6 months Ava Bullock MD FACS

## 2018-07-31 NOTE — MR AVS SNAPSHOT
[address] [facility], [address], [address] [facility] [address] [facility] 
[phone] Patient: Robert Sung MRN: [id number] LQR:[id number]/[id number] Visit Information Date & Time Provider Department Dept. Phone Encounter #  
 7/31/2018  9:40 AM Tunde Adamson MD [facility] Dr. Randy Valentin [facility] [phone] [id number] Your Appointments 1/8/2019  9:40 AM  
ESTABLISHED PATIENT with Tunde Adamson MD  
[facility] Dr. Randy Feldman ([address]) Appt Note: 6 month breast check [facility] [address], [address] [phone]  
[address], [address] [facility] [address] [phone] Upcoming Health Maintenance Date Due COLONOSCOPY 6/1/2015 FOOT EXAM Q1 2/20/2018 EYE EXAM RETINAL OR DILATED Q1 4/20/2018 Influenza Age 5 to Adult 8/1/2018 HEMOGLOBIN A1C Q6M 10/5/2018 LIPID PANEL Q1 10/16/2018 MEDICARE YEARLY EXAM 10/17/2018 MICROALBUMIN Q1 4/5/2019 GLAUCOMA SCREENING Q2Y 4/20/2019 BREAST CANCER SCRN MAMMOGRAM 7/13/2020 DTaP/Tdap/Td series (2 - Td) 5/8/2025 Allergies as of 7/31/2018  Review Complete On: 7/31/2018 By: Tunde Adamson MD  
 No Known Allergies Current Immunizations  Reviewed on 7/13/2017 Name Date Influenza High Dose Vaccine PF 10/16/2017, 2/1/2017 Influenza Vaccine 10/26/2015 Influenza Vaccine (Quad) PF 10/26/2015 Pneumococcal Conjugate (PCV-13) 6/2/2017 Pneumococcal Polysaccharide (PPSV-23) 2/4/2016, 10/26/2015 Tdap 5/8/2015 Zoster Vaccine, Live 2/1/2016 Not reviewed this visit You Were Diagnosed With   
  
 Codes Comments Malignant neoplasm of upper-inner quadrant of left breast in female, estrogen receptor positive ([facility]    -  Primary ICD-10-CM: C50.212, Z17.0 ICD-9-CM: 174.2, V86.0 Vitals BP Pulse Temp Resp Height(growth percentile) Weight(growth percentile) 143/66 (BP 1 Location: Right arm, BP Patient Position: Sitting) (!) 57 95.7 °F (35.4 °C) (Oral) 16 5' 3\" (1.6 m) 132 lb (59.9 kg) SpO2 BMI OB Status Smoking Status 100% 23.38 kg/m2 Postmenopausal Former Smoker BMI and BSA Data Body Mass Index Body Surface Area  
 23.38 kg/m 2 1.63 m 2 Preferred Pharmacy Pharmacy Name Phone [facility] [id number], [email or web address] [phone] Your Updated Medication List  
  
   
This list is accurate as of 7/31/18 10:48 AM.  Always use your most recent med list.  
  
  
  
  
 alcohol swabs Padm Commonly known as:  BD Single Use Swabs Regular Test blood sugar once daily * Blood-Glucose Meter monitoring kit  
accu chek gabino smartview meter * Blood-Glucose Meter Misc Commonly known as:  ACCU-CHEK OLIVIA PLUS METER Test blood sugar daily * brimonidine 0.15 % ophthalmic solution Commonly known as:  Ava Ganja Administer 1 Drop to both eyes two (2) times a day. * ALPHAGAN P 0.1 % ophthalmic solution Generic drug:  brimonidine  
instill 1 drop into both eyes twice a day  
  
 clopidogrel 75 mg Tab Commonly known as:  PLAVIX  
take 1 tablet by mouth once daily  
  
 dorzolamide 2 % ophthalmic solution Commonly known as:  TRUSOPT  
instill 1 drop into both eyes twice a day  
  
 dorzolamide-timolol 22.3-6.8 mg/mL ophthalmic solution Commonly known as:  COSOPT Administer 1 Drop to both eyes two (2) times a day. ergocalciferol 50,000 unit capsule Commonly known as:  ERGOCALCIFEROL Take 1 Cap by mouth every seven (7) days. gabapentin 300 mg capsule Commonly known as:  NEURONTIN  
take 1 capsule by mouth twice a day THE DAY PRIOR TO SURGERY 9/13  
  
 glucose blood VI test strips strip Commonly known as:  ACCU-CHEK OLIVIA PLUS TEST STRP Test blood sugar once daily hydroCHLOROthiazide 25 mg tablet Commonly known as:  HYDRODIURIL Take 1 Tab by mouth as needed. ibuprofen 600 mg tablet Commonly known as:  MOTRIN Take 1 Tab by mouth every eight (8) hours as needed for Pain.  
  
 latanoprost 0.005 % ophthalmic solution Commonly known as:  Odalis Furbish Administer 1 Drop to both eyes nightly. letrozole 2.5 mg tablet Commonly known as:  [facility] Take 1 Tab by mouth daily. * Notice: This list has 4 medication(s) that are the same as other medications prescribed for you. Read the directions carefully, and ask your doctor or other care provider to review them with you. To-Do List   
 09/04/2018 9:30 AM  
  Appointment with [facility] [address] at [facility] ([phone]) NON-CONTRAST STUDY: 1. Bring any non [facility] facility films/images pertaining to the area of interest with you on the day of appointment. 2. Check in at registration at least 30 minutes before appt time unless you were instructed to do otherwise. 3. If you have to drink oral contrast please pick it up any weekday prior to your appointment, if you cannot please check in 2 hrs  before appt time. Introducing [facility] HE[facility]! Wanda Wheat introduces [employer] patient portal. Now you can access parts of your medical record, email your doctor's office, and request medication refills online. 1. In your internet browser, go to [email or web address] 2. Click on the First Time User? Click Here link in the Sign In box. You will see the New Member Sign Up page. 3. Enter your [employer] Access Code exactly as it appears below. You will not need to use this code after youve completed the sign-up process. If you do not sign up before the expiration date, you must request a new code. · [employer] Access Code: [email or web address] Expires: 10/29/2018 10:18 AM 
 
4.  Enter the last four digits of your Social Security Number (xxxx) and Date of Birth (mm/dd/yyyy) as indicated and click Submit. You will be taken to the next sign-up page. 5. Create a [email or web address] ID. This will be your [email or web address] login ID and cannot be changed, so think of one that is secure and easy to remember. 6. Create a [email or web address] password. You can change your password at any time. 7. Enter your Password Reset Question and Answer. This can be used at a later time if you forget your password. 8. Enter your e-mail address. You will receive e-mail notification when new information is available in [address] 9. Click Sign Up. You can now view and download portions of your medical record. 10. Click the Download Summary menu link to download a portable copy of your medical information. If you have questions, please visit the Frequently Asked Questions section of the [email or web address] website. Remember, [email or web address] is NOT to be used for urgent needs. For medical emergencies, dial 911. Now available from your iPhone and Android! Please provide this summary of care documentation to your next provider. Your primary care clinician is listed as Jacinta Osborne. If you have any questions after today's visit, please call [phone].

## 2018-08-05 DIAGNOSIS — Z17.0 MALIGNANT NEOPLASM OF UPPER-INNER QUADRANT OF LEFT BREAST IN FEMALE, ESTROGEN RECEPTOR POSITIVE (HCC): ICD-10-CM

## 2018-08-05 DIAGNOSIS — C50.212 MALIGNANT NEOPLASM OF UPPER-INNER QUADRANT OF LEFT BREAST IN FEMALE, ESTROGEN RECEPTOR POSITIVE (HCC): ICD-10-CM

## 2018-08-07 RX ORDER — LETROZOLE 2.5 MG/1
TABLET, FILM COATED ORAL
Qty: 30 TAB | Refills: 6 | Status: SHIPPED | OUTPATIENT
Start: 2018-08-07 | End: 2019-03-11 | Stop reason: SDUPTHER

## 2018-09-04 ENCOUNTER — HOSPITAL ENCOUNTER (OUTPATIENT)
Dept: CT IMAGING | Age: 68
Discharge: HOME | End: 2018-09-04
Attending: NURSE PRACTITIONER
Payer: MEDICARE

## 2018-09-04 DIAGNOSIS — R91.1 LUNG NODULE, SOLITARY: ICD-10-CM

## 2018-09-04 PROCEDURE — 71250 CT THORAX DX C-: CPT

## 2018-10-04 ENCOUNTER — OFFICE VISIT (OUTPATIENT)
Dept: ONCOLOGY | Age: 68
End: 2018-10-04

## 2018-10-04 VITALS
BODY MASS INDEX: 24.13 KG/M2 | TEMPERATURE: 98.3 F | DIASTOLIC BLOOD PRESSURE: 73 MMHG | RESPIRATION RATE: 16 BRPM | HEART RATE: 64 BPM | SYSTOLIC BLOOD PRESSURE: 127 MMHG | OXYGEN SATURATION: 99 % | HEIGHT: 63 IN | WEIGHT: 136.2 LBS

## 2018-10-04 DIAGNOSIS — Z17.0 MALIGNANT NEOPLASM OF UPPER-INNER QUADRANT OF LEFT BREAST IN FEMALE, ESTROGEN RECEPTOR POSITIVE (HCC): Primary | ICD-10-CM

## 2018-10-04 DIAGNOSIS — C50.212 MALIGNANT NEOPLASM OF UPPER-INNER QUADRANT OF LEFT BREAST IN FEMALE, ESTROGEN RECEPTOR POSITIVE (HCC): Primary | ICD-10-CM

## 2018-10-04 RX ORDER — TIMOLOL MALEATE 5 MG/ML
SOLUTION/ DROPS OPHTHALMIC 2 TIMES DAILY
Refills: 0 | COMMUNITY
Start: 2018-09-04

## 2018-10-04 NOTE — PROGRESS NOTES
Patrick Huynh is a 76 y.o. female here today for left sided breast cancer f/u. S/P lumpectomy + rads. Patient started Letrozole 1/12/18. VS stable. Patient reports right shoulder pain. Decreased appetite; pt reports she eats 2 meals a day. Patient denies N/V/D and constipation. Patient denies numbness and tingling. Patient denies mouth ulcers. Patient denies cough. Patient reports SOB on excertion. Patient reports she feels dizzy and tired every other day; pt feeling dizzy and tired today; B/P WNL.     Visit Vitals    /73 (BP 1 Location: Right arm, BP Patient Position: Sitting)    Pulse 64    Temp 98.3 °F (36.8 °C) (Oral)    Resp 16    Ht 5' 3\" (1.6 m)    Wt 136 lb 3.2 oz (61.8 kg)    SpO2 99%    BMI 24.13 kg/m2 Problem: Falls - Risk of  Goal: *Absence of Falls  Document Leda Fall Risk and appropriate interventions in the flowsheet.    Outcome: Progressing Towards Goal  Fall Risk Interventions:  Mobility Interventions: Bed/chair exit alarm         Medication Interventions: Bed/chair exit alarm    Elimination Interventions: Bed/chair exit alarm    History of Falls Interventions: Bed/chair exit alarm

## 2018-10-05 NOTE — PROGRESS NOTES
Oncology Progress note        Patient: Luis Dahl MRN: [id number]  SSN: [id number]    YOB: 1950  Age: 76 y.o. Sex: female        Diagnosis:     1. Left breast carcinoma:  T1b N0 M0 (Stage IA) infiltrating ductal carcinoma, Tumor size 1.0 cm, LN -ve, grade 1, %, OK 70%, Her 2 -ve  Dx: 6/22/2017    Treatment:     1. S/p lumpectomy on 6/22/2017  2. Adjuvant radiation completed on 1/5/2018  3. Letrozole - started 1/12/2018    Subjective:      Luis Dahl is a 76 y.o. female who I am seeing for a new diagnosis of left sided invasive ductal carcinoma of the breast. She has been referred by Dr. Sonu Aviles. She underwent a screening mammogram on 3/27/2017 which demonstrated a density in the medial aspect of the left breast. She underwent dx mammogram and US demonstrating a 1.0 x 0.9 x 0.8 cm at 9:00, 2 cm medial to the nipple in the left breast.  US core bx demonstrated a G1 IDC ER pos OK pos Her2/jud unamplified tumor. She underwent a lumpectomy on 06/22/2017. She was also noted to have a JUDSON FDG avid lung mass. She is s/p left upper lob[facility] by Dr. Forest [facility]. She completed adjuvant breast radiation. She has been taking Letrozole without any difficulty. She is doing well. She has been dizzy on occasions.        Review of Systems:    Constitutional: negative  Eyes: negative  Ears, Nose, Mouth, Throat, and Face: negative  Respiratory: negative  Cardiovascular: negative  Gastrointestinal: negative  Genitourinary:negative  Integument/Breast: negative  Hematologic/Lymphatic: negative  Musculoskeletal:negative  Neurological: negative      Past Medical History:   Diagnosis Date    Arthritis     Breast cancer ([address] 04/2017    lumpectomy left breast, radiation    Cancer ([address]     breast cancer- left    Cerebrovascular accident (stroke) ([address]     no deficiets    Diabetes mellitus type 2, controlled ([address]     Gallstones     asymptomatic, pt denies having    GERD (gastroesophageal reflux disease)     dx with resolution of symptoms on ppi- pt denies having    Hypercholesterolemia     Hypertension     Ill-defined condition     gout    Long term current use of anticoagulant therapy     Non-nicotine vapor product user     used to use    PAD (peripheral artery disease) ([facility] 2/20/2013    right SFA angioplasty and athrectomy    Primary open angle glaucoma     Radiation therapy complication     Solitary lung nodule 5/9/2017    Type II diabetes mellitus, uncontrolled ([facility] 2/1/2017     Past Surgical History:   Procedure Laterality Date    HX BREAST LUMPECTOMY Left 6/22/2017    LEFT LUMPECTOMY WITH INTRAOPERATIVE ULTRASOUND GUIDED, LEFT AXILLARY SENTINEL LYMPH NODE BIOPSY, POSSIBLE AXILLARY DISECTION performed by Haydee Chatman MD at [facility] MAIN OR    HX HEENT Bilateral     eye lids surgery    VASCULAR SURGERY PROCEDURE UNLIST  April 2016    right leg stent      Family History   Problem Relation Age of Onset    Hypertension Mother     Diabetes Mother     Diabetes Son      Social History   Substance Use Topics    Smoking status: Former Smoker     Types: Cigarettes     Quit date: 3/13/2013    Smokeless tobacco: Never Used      Comment: estimate is one pack per week    Alcohol use 12.6 oz/week     21 Cans of beer per week      Comment: 2 to 3 beers per day per patient      Prior to Admission medications    Medication Sig Start Date End Date Taking? Authorizing Provider   letrozole [facility]) 2.5 mg tablet take 1 tablet by mouth once daily 8/7/18  Yes Radha San NP   gabapentin (NEURONTIN) 300 mg capsule take 1 capsule by mouth twice a day THE DAY PRIOR TO SURGERY 9/13 4/5/18  Yes Salvador Roper MD   ibuprofen (MOTRIN) 600 mg tablet Take 1 Tab by mouth every eight (8) hours as needed for Pain.  4/5/18  Yes Salvador Roper MD   clopidogrel (PLAVIX) 75 mg tab take 1 tablet by mouth once daily 4/5/18  Yes Salvador Roper MD   hydroCHLOROthiazide (HYDRODIURIL) 25 mg tablet Take 1 Tab by mouth as needed. 4/5/18  Yes Jonelle Kirkland MD   ergocalciferol (ERGOCALCIFEROL) 50,000 unit capsule Take 1 Cap by mouth every seven (7) days. 4/5/18  Yes Jonelle Kirkland MD   Blood-Glucose Meter (ACCU-CHEK OLIVIA PLUS METER) misc Test blood sugar daily 12/5/17  Yes Jonelle Kirkland MD   glucose blood VI test strips (ACCU-CHEK OLIVIA PLUS TEST STRP) strip Test blood sugar once daily 12/5/17  Yes Jonelle Kirkland MD   alcohol swabs (BD SINGLE USE SWABS REGULAR) padm Test blood sugar once daily 12/5/17  Yes Jonelle Kirkland MD   ALPHAGAN P 0.1 % ophthalmic solution instill 1 drop into both eyes twice a day 10/9/17  Yes Historical Provider   dorzolamide (TRUSOPT) 2 % ophthalmic solution instill 1 drop into both eyes twice a day 10/9/17  Yes Historical Provider   latanoprost (XALATAN) 0.005 % ophthalmic solution Administer 1 Drop to both eyes nightly. 5/17/16  Yes Yoav Hoover MD   dorzolamide-timolol (COSOPT) 22.3-6.8 mg/mL ophthalmic solution Administer 1 Drop to both eyes two (2) times a day. 2/25/16  Yes Historical Provider   brimonidine (ALPHAGAN) 0.15 % ophthalmic solution Administer 1 Drop to both eyes two (2) times a day. 10/26/15  Yes Yoav Hoover MD   Blood-Glucose Meter monitoring kit accu chek gabino smartview meter 4/30/15  Yes Yoav Hoover MD   timolol (TIMOPTIC) 0.5 % ophthalmic solution  9/4/18   Historical Provider          No Known Allergies        Objective:     Vitals:    10/04/18 1458   BP: 127/73   Pulse: 64   Resp: 16   Temp: 98.3 °F (36.8 °C)   TempSrc: Oral   SpO2: 99%   Weight: 136 lb 3.2 oz (61.8 kg)   Height: 5' 3\" (1.6 m)        Physical Exam:    GENERAL: alert, cooperative, appears stated age  EYE: negative  LYMPHATIC: Cervical, supraclavicular, and axillary nodes normal.   THROAT & NECK: normal and no erythema or exudates noted.    LUNG: clear to auscultation bilaterally  HEART: regular rate and rhythm  ABDOMEN: soft, non-tender  EXTREMITIES: no edema  SKIN: Normal.  NEUROLOGIC: negative          Assessment:     1. Left breast carcinoma:  T1b N0 M0 (Stage IA) infiltrating ductal carcinoma, Tumor size 1.0 cm, LN -ve, grade 1, %, OK 70%, Her 2 -ve  Dx: 6/22/2017    ECOG PS 0  Intent of treatment - curative    S/P left sided lumpectomy and sentinel LN excision. Completed adjuvant radiation to the left breast on 1/5/2018     Letrozole 2.5 mg daily - started 1/12/2018  Tolerating well  No side effects attributed to AI  In remission      2. Lung cancer    S/p left upper lobectomy from Dr. Shilo Palmer at [address] [address] [facility] [address]      3. Vitamin D deficiency    > Patient stopped weekly Vitamin D 50,000 after 1 month  > Recheck Vitamin D      Plan:       > Continue Letrozole  > Recheck Vitamin D  > Follow up in 6 months        Signed by: Donis Jimenez MD                     October 4, 2018        CC. Parish Brar MD  CC.  MD Fede Vargas MD Alvena Gin, MD

## 2018-10-15 ENCOUNTER — OFFICE VISIT (OUTPATIENT)
Dept: FAMILY MEDICINE CLINIC | Age: 68
End: 2018-10-15

## 2018-10-15 VITALS
HEIGHT: 63 IN | RESPIRATION RATE: 18 BRPM | OXYGEN SATURATION: 100 % | SYSTOLIC BLOOD PRESSURE: 119 MMHG | WEIGHT: 139.6 LBS | TEMPERATURE: 97.3 F | HEART RATE: 68 BPM | BODY MASS INDEX: 24.73 KG/M2 | DIASTOLIC BLOOD PRESSURE: 58 MMHG

## 2018-10-15 DIAGNOSIS — I73.9 PAD (PERIPHERAL ARTERY DISEASE) (HCC): ICD-10-CM

## 2018-10-15 DIAGNOSIS — Z23 ENCOUNTER FOR IMMUNIZATION: ICD-10-CM

## 2018-10-15 DIAGNOSIS — I10 ESSENTIAL HYPERTENSION: Primary | ICD-10-CM

## 2018-10-15 RX ORDER — PRAVASTATIN SODIUM 40 MG/1
40 TABLET ORAL
Qty: 30 TAB | Refills: 4 | Status: SHIPPED | OUTPATIENT
Start: 2018-10-15 | End: 2018-11-15

## 2018-10-15 RX ORDER — ACETAMINOPHEN 500 MG
1000 TABLET ORAL
Qty: 40 TAB | Refills: 1
Start: 2018-10-15

## 2018-10-15 RX ORDER — GABAPENTIN 300 MG/1
CAPSULE ORAL
Qty: 180 CAP | Refills: 1 | Status: SHIPPED | OUTPATIENT
Start: 2018-10-15 | End: 2020-07-24

## 2018-10-15 NOTE — PROGRESS NOTES
HISTORY OF PRESENT ILLNESS Cj Davidson is a 76 y.o. female. HPI With hx of lung nodules stop cigs>1yr ago, was sent to oncologist was told to f/u with pcp, has been on Letrozole 2.5 mg one tab once daily, pt was discussed its side effect such More common: Back pain,  bone pain, hot flashes (sudden sweating and feeling of warmth), joint pain, muscle pain for which pt has been experiencing, also takes 60mg of Motrin which gets rid of the pain nicley, just takes it once daily some days she wont take her pain meds, Hx of high ldl with closed to nl a1c for her DMtype II, pt declining to take any statin tx, not in a fasting, pt was given Crestor and Lipitor developed palpitation, Crestor side effects does not remember made her feel alfonzo as she states,  
 
Currently on no diabetic meds >12months, having ++diabetic diet, and no daily exercise but active with her grand kids, no home glucose monitoring, Denies any tingling sensation, polyurea and polydipsia, last a1c was at target 5.6%%  Last urine microalbumin 2018 and was normal  . Feeling better since the last visit. Current Outpatient Prescriptions Medication Sig Dispense Refill  timolol (TIMOPTIC) 0.5 % ophthalmic solution Administer  to both eyes two (2) times a day.  0  
 letrozole (FEMARA) 2.5 mg tablet take 1 tablet by mouth once daily 30 Tab 6  
 gabapentin (NEURONTIN) 300 mg capsule take 1 capsule by mouth twice a day THE DAY PRIOR TO SURGERY 9/13 180 Cap 1  ibuprofen (MOTRIN) 600 mg tablet Take 1 Tab by mouth every eight (8) hours as needed for Pain. 60 Tab 2  clopidogrel (PLAVIX) 75 mg tab take 1 tablet by mouth once daily 90 Tab 3  
 hydroCHLOROthiazide (HYDRODIURIL) 25 mg tablet Take 1 Tab by mouth as needed. 90 Tab 3  
 ergocalciferol (ERGOCALCIFEROL) 50,000 unit capsule Take 1 Cap by mouth every seven (7) days. 4 Cap 6  dorzolamide (TRUSOPT) 2 % ophthalmic solution instill 1 drop into both eyes twice a day  0  
  latanoprost (XALATAN) 0.005 % ophthalmic solution Administer 1 Drop to both eyes nightly. 2.5 mL 1  
 dorzolamide-timolol (COSOPT) 22.3-6.8 mg/mL ophthalmic solution Administer 1 Drop to both eyes two (2) times a day.  0  
 brimonidine (ALPHAGAN) 0.15 % ophthalmic solution Administer 1 Drop to both eyes two (2) times a day. 15 mL 6  Blood-Glucose Meter (ACCU-CHEK OLIVIA PLUS METER) misc Test blood sugar daily (Patient not taking: Reported on 10/15/2018) 1 Each 0  
 glucose blood VI test strips (ACCU-CHEK OLIVIA PLUS TEST STRP) strip Test blood sugar once daily (Patient not taking: Reported on 10/15/2018) 50 Strip 11  
 alcohol swabs (BD SINGLE USE SWABS REGULAR) padm Test blood sugar once daily (Patient not taking: Reported on 10/15/2018) 50 Pad 11  
 ALPHAGAN P 0.1 % ophthalmic solution instill 1 drop into both eyes twice a day  0  Blood-Glucose Meter monitoring kit accu chek gabino smartview meter (Patient not taking: Reported on 10/15/2018) 1 Kit 0 No Known Allergies Past Medical History:  
Diagnosis Date  Arthritis  Breast cancer ([address] 04/2017  
 lumpectomy left breast, radiation  Cancer ([address] breast cancer- left  Cerebrovascular accident (stroke) ([address]   
 no deficiets  Diabetes mellitus type 2, controlled ([address]  Gallstones   
 asymptomatic, pt denies having  GERD (gastroesophageal reflux disease) dx with resolution of symptoms on ppi- pt denies having  Hypercholesterolemia  Hypertension  Ill-defined condition   
 gout  Long term current use of anticoagulant therapy  Non-nicotine vapor product user   
 used to use  PAD (peripheral artery disease) ([address] 2/20/2013  
 right SFA angioplasty and athrectomy  Primary open angle glaucoma  Radiation therapy complication  Solitary lung nodule 5/9/2017  Type II diabetes mellitus, uncontrolled ([address] 2/1/2017 Past Surgical History:  
Procedure Laterality Date  HX BREAST LUMPECTOMY Left 6/22/2017 LEFT LUMPECTOMY WITH INTRAOPERATIVE ULTRASOUND GUIDED, LEFT AXILLARY SENTINEL LYMPH NODE BIOPSY, POSSIBLE AXILLARY DISECTION performed by Juan Carlos Banerjee MD at [facility] MAIN OR  
 HX HEENT Bilateral   
 eye lids surgery  VASCULAR SURGERY PROCEDURE UNLIST  April 2016  
 right leg stent Family History Problem Relation Age of Onset  Hypertension Mother  Diabetes Mother  Diabetes Son   
 
Social History Substance Use Topics  Smoking status: Former Smoker Types: Cigarettes Quit date: 3/13/2013  Smokeless tobacco: Never Used Comment: estimate is one pack per week  Alcohol use 12.6 oz/week 21 Cans of beer per week Comment: 2 to 3 beers per day per patient Lab Results Component Value Date/Time WBC 6.0 10/16/2017 01:01 PM  
HGB 10.4 (L) 10/16/2017 01:01 PM  
Hemoglobin (POC) 10.5 (L) 06/22/2017 07:57 AM  
HCT 29.8 (L) 10/16/2017 01:01 PM  
Hematocrit (POC) 31 (L) 06/22/2017 07:57 AM  
PLATELET 032 45/75/4415 01:01 PM  
MCV 89 10/16/2017 01:01 PM  
 
Lab Results Component Value Date/Time Hemoglobin A1c 5.2 02/01/2017 03:05 PM  
Hemoglobin A1c 4.9 05/24/2016 11:52 AM  
Hemoglobin A1c 5.7 (H) 10/26/2015 10:42 AM  
Glucose 100 (H) 10/16/2017 01:01 PM  
Glucose (POC) 121 (H) 06/22/2017 12:08 PM  
Microalbumin/Creat ratio (mg/g creat) 8 07/03/2014 10:16 AM  
Microalb/Creat ratio (ug/mg creat.) 6.3 04/05/2018 12:51 PM  
Microalbumin,urine random 0.68 07/03/2014 10:16 AM  
LDL, calculated 187 (H) 10/16/2017 01:01 PM  
Creatinine (POC) 0.9 06/22/2017 07:57 AM  
Creatinine 0.93 10/16/2017 01:01 PM  
   
Review of Systems Constitutional: Negative for chills and fever. HENT: Negative for congestion and nosebleeds. Eyes: Negative for blurred vision and pain. Respiratory: Negative for cough, shortness of breath and wheezing. Cardiovascular: Negative for chest pain and leg swelling. Gastrointestinal: Negative for constipation, diarrhea, nausea and vomiting. Genitourinary: Negative for dysuria and frequency. Musculoskeletal: Negative for joint pain and myalgias. Skin: Negative for itching and rash. Neurological: Negative for dizziness, loss of consciousness and headaches. Psychiatric/Behavioral: Negative for depression. The patient is not nervous/anxious and does not have insomnia. Physical Exam  
Constitutional: She is oriented to person, place, and time. She appears well-developed and well-nourished. HENT:  
Head: Normocephalic and atraumatic. Eyes: Conjunctivae and EOM are normal. Pupils are equal, round, and reactive to light. Neck: No JVD present. No thyromegaly present. Cardiovascular: Normal rate, regular rhythm, normal heart sounds and intact distal pulses. Exam reveals no gallop and no friction rub. No murmur heard. Pulmonary/Chest: Effort normal and breath sounds normal. No stridor. No respiratory distress. She has no wheezes. She has no rales. Abdominal: Soft. Bowel sounds are normal. She exhibits no distension and no mass. There is no tenderness. Musculoskeletal: Normal range of motion. She exhibits no edema or tenderness. Lymphadenopathy:  
  She has no cervical adenopathy. Neurological: She is alert and oriented to person, place, and time. She has normal reflexes. No cranial nerve deficit. Skin: No rash noted. No erythema. Psychiatric: She has a normal mood and affect. Her behavior is normal.  
Nursing note and vitals reviewed. ASSESSMENT and PLAN Diagnoses and all orders for this visit: 1. Essential hypertension 
-     REFERRAL TO PODIATRY 2. Encounter for immunization 
-     REFERRAL TO PODIATRY 
-     INFLUENZA VACCINE INACTIVATED (IIV), SUBUNIT, ADJUVANTED, IM 
-     TX IMMUNIZ ADMIN,1 SINGLE/COMB VAC/TOXOID 3. PAD (peripheral artery disease) (HCC) 
-     REFERRAL TO PODIATRY Other orders -     varicella-zoster recombinant, PF, (SHINGRIX) 50 mcg/0.5 mL susr injection; 0.5 mL by IntraMUSCular route once for 1 dose. -     acetaminophen (TYLENOL) 500 mg tablet; Take 2 Tabs by mouth every six (6) hours as needed for Pain. Indications: Pain 
-     gabapentin (NEURONTIN) 300 mg capsule; take 1 capsule by mouth twice a day THE DAY PRIOR TO SURGERY 9/13 
-     pravastatin (PRAVACHOL) 40 mg tablet; Take 1 Tab by mouth nightly.

## 2018-10-15 NOTE — PROGRESS NOTES
Name and  verified Chief Complaint Patient presents with  Hypertension Health Maintenance reviewed-discussed with patient. Patient stated FOBT screening completed 2018. 1. Have you been to the ER, urgent care clinic since your last visit? Hospitalized since your last visit? Yes, Oncology office visit on 10/4/2018 and on 2018 Dr. Adán Cohen. 2. Have you seen or consulted any other health care providers outside of the [address] [place] [address] since your last visit? Include any pap smears or colon screening.  No

## 2018-10-15 NOTE — PROGRESS NOTES
After obtaining consent, and per orders of , flu vaccine  given to  Right deltoid IM  . Patient instructed to remain in clinic for 15 minutes afterwards, and to report any adverse reaction to me immediately. Patient did not have any adverse reactions during this office visit

## 2018-10-15 NOTE — PATIENT INSTRUCTIONS
Vaccine Information Statement Influenza (Flu) Vaccine (Inactivated or Recombinant): What you need to know Many Vaccine Information Statements are available in [place] and other languages. See www.immunize.org/vis Hojas de Información Sobre Vacunas están disponibles en Español y en muchos otros idiomas. Visite www.immunize.org/vis 1. Why get vaccinated? Influenza (flu) is a contagious disease that spreads around the [place] every year, usually between October and May. Flu is caused by influenza viruses, and is spread mainly by coughing, sneezing, and close contact. Anyone can get flu. Flu strikes suddenly and can last several days. Symptoms vary by age, but can include: 
 fever/chills  sore throat  muscle aches  fatigue  cough  headache  runny or stuffy nose Flu can also lead to pneumonia and blood infections, and cause diarrhea and seizures in children. If you have a medical condition, such as heart or lung disease, flu can make it worse. Flu is more dangerous for some people. Infants and young children, people 72years of age and older, pregnant women, and people with certain health conditions or a weakened immune system are at greatest risk. Each year thousands of people in the [facility] die from flu, and many more are hospitalized. Flu vaccine can: 
 keep you from getting flu, 
 make flu less severe if you do get it, and 
 keep you from spreading flu to your family and other people. 2. Inactivated and recombinant flu vaccines A dose of flu vaccine is recommended every flu season. Children 6 months through 6years of age may need two doses during the same flu season. Everyone else needs only one dose each flu season.   
 
 
Some inactivated flu vaccines contain a very small amount of a mercury-based preservative called thimerosal. Studies have not shown thimerosal in vaccines to be harmful, but flu vaccines that do not contain thimerosal are available. There is no live flu virus in flu shots. They cannot cause the flu. There are many flu viruses, and they are always changing. Each year a new flu vaccine is made to protect against three or four viruses that are likely to cause disease in the upcoming flu season. But even when the vaccine doesnt exactly match these viruses, it may still provide some protection Flu vaccine cannot prevent: 
 flu that is caused by a virus not covered by the vaccine, or 
 illnesses that look like flu but are not. It takes about 2 weeks for protection to develop after vaccination, and protection lasts through the flu season. 3. Some people should not get this vaccine Tell the person who is giving you the vaccine:  If you have any severe, life-threatening allergies. If you ever had a life-threatening allergic reaction after a dose of flu vaccine, or have a severe allergy to any part of this vaccine, you may be advised not to get vaccinated. Most, but not all, types of flu vaccine contain a small amount of egg protein.  If you ever had Guillain-Barré Syndrome (also called GBS). Some people with a history of GBS should not get this vaccine. This should be discussed with your doctor.  If you are not feeling well. It is usually okay to get flu vaccine when you have a mild illness, but you might be asked to come back when you feel better. 4. Risks of a vaccine reaction With any medicine, including vaccines, there is a chance of reactions. These are usually mild and go away on their own, but serious reactions are also possible. Most people who get a flu shot do not have any problems with it. Minor problems following a flu shot include:  
 soreness, redness, or swelling where the shot was given  hoarseness  sore, red or itchy eyes  cough  fever  aches  headache  itching  fatigue If these problems occur, they usually begin soon after the shot and last 1 or 2 days. More serious problems following a flu shot can include the following:  There may be a small increased risk of Guillain-Barré Syndrome (GBS) after inactivated flu vaccine. This risk has been estimated at 1 or 2 additional cases per million people vaccinated. This is much lower than the risk of severe complications from flu, which can be prevented by flu vaccine.  Young children who get the flu shot along with pneumococcal vaccine (PCV13) and/or DTaP vaccine at the same time might be slightly more likely to have a seizure caused by fever. Ask your doctor for more information. Tell your doctor if a child who is getting flu vaccine has ever had a seizure. Problems that could happen after any injected vaccine:  People sometimes faint after a medical procedure, including vaccination. Sitting or lying down for about 15 minutes can help prevent fainting, and injuries caused by a fall. Tell your doctor if you feel dizzy, or have vision changes or ringing in the ears.  Some people get severe pain in the shoulder and have difficulty moving the arm where a shot was given. This happens very rarely.  Any medication can cause a severe allergic reaction. Such reactions from a vaccine are very rare, estimated at about 1 in a million doses, and would happen within a few minutes to a few hours after the vaccination. As with any medicine, there is a very remote chance of a vaccine causing a serious injury or death. The safety of vaccines is always being monitored. For more information, visit: www.cdc.gov/vaccinesafety/ 
 
5. What if there is a serious reaction? What should I look for?  Look for anything that concerns you, such as signs of a severe allergic reaction, very high fever, or unusual behavior.  
 
Signs of a severe allergic reaction can include hives, swelling of the face and throat, difficulty breathing, a fast heartbeat, dizziness, and weakness  usually within a few minutes to a few hours after the vaccination. What should I do?  If you think it is a severe allergic reaction or other emergency that cant wait, call 9-1-1 and get the person to the nearest hospital. Otherwise, call your doctor.  Reactions should be reported to the Vaccine Adverse Event Reporting System (VAERS). Your doctor should file this report, or you can do it yourself through  the VAERS web site at www.vaers. [facility].gov, or by calling 5-121.140.7364. VAERS does not give medical advice. 6. The National Vaccine Injury Compensation Program 
 
The [facility] Vaccine Injury Compensation Program (VICP) is a federal program that was created to compensate people who may have been injured by certain vaccines. Persons who believe they may have been injured by a vaccine can learn about the program and about filing a claim by calling 8-846.745.9995 or visiting the [employer] website at www.[facility].gov/vaccinecompensation. There is a time limit to file a claim for compensation. 7. How can I learn more?  Ask your healthcare provider. He or she can give you the vaccine package insert or suggest other sources of information.  Call your local or state health department.  Contact the Centers for Disease Control and Prevention (CDC): 
- Call 4-591.185.5227 (1-800-CDC-INFO) or 
- Visit CDCs website at www.cdc.gov/flu Vaccine Information Statement Inactivated Influenza Vaccine 8/7/2015 
[address] ANN[address] Johnson Generous [id number] Department of [facility] and [employer] Centers for Disease Control and Prevention Office Use Only

## 2018-10-15 NOTE — MR AVS SNAPSHOT
[address] [facility] 7 [address] 
[phone] Patient: Consuelo Oneill MRN: [id number] DNT:0/10/7890 Visit Information Date & Time Provider Department Dept. Phone Encounter #  
 10/15/2018 10:30 AM Jesus Sanabria MD [address] [place] [address] [place] [facility] [phone] [id number] Follow-up Instructions Return in about 3 months (around 1/15/2019), or if symptoms worsen or fail to improve. Your Appointments 1/8/2019  9:40 AM  
ESTABLISHED PATIENT with Alden Francois MD  
[facility] Dr. Randy Valentin [facility] ([facility]) Appt Note: 6 month breast check [address] [facility] [address] [address]  
[address], [address], [address] [facility] [address] [address] Upcoming Health Maintenance Date Due Shingrix Vaccine Age 50> (1 of 2) 2/10/2000 COLONOSCOPY 6/1/2015 FOOT EXAM Q1 2/20/2018 EYE EXAM RETINAL OR DILATED Q1 4/20/2018 Influenza Age 5 to Adult 8/1/2018 HEMOGLOBIN A1C Q6M 10/5/2018 LIPID PANEL Q1 10/16/2018 MEDICARE YEARLY EXAM 10/17/2018 MICROALBUMIN Q1 4/5/2019 GLAUCOMA SCREENING Q2Y 4/20/2019 BREAST CANCER SCRN MAMMOGRAM 7/13/2020 DTaP/Tdap/Td series (2 - Td) 5/8/2025 Allergies as of 10/15/2018  Review Complete On: 10/15/2018 By: Chloe Funk LPN No Known Allergies Current Immunizations  Reviewed on 7/13/2017 Name Date Influenza High Dose Vaccine PF 10/16/2017, 2/1/2017 Influenza Vaccine 10/26/2015 Influenza Vaccine (Quad) PF 10/26/2015 Influenza Vaccine (Tri) Adjuvanted 10/15/2018 Pneumococcal Conjugate (PCV-13) 6/2/2017 Pneumococcal Polysaccharide (PPSV-23) 2/4/2016, 10/26/2015 Tdap 5/8/2015 Zoster Vaccine, Live 2/1/2016 Not reviewed this visit You Were Diagnosed With   
  
 Codes Comments Essential hypertension    -  Primary ICD-10-CM: I10 
ICD-9-CM: 401.9 Encounter for immunization     ICD-10-CM: U46 ICD-9-CM: V03.89 PAD (peripheral artery disease) ([facility])     ICD-10-CM: I73.9 ICD-9-CM: 443. 9 Vitals BP Pulse Temp Resp Height(growth percentile) Weight(growth percentile) 119/58 (BP 1 Location: Right arm, BP Patient Position: At rest) 68 97.3 °F (36.3 °C) (Oral) 18 5' 3\" (1.6 m) 139 lb 9.6 oz (63.3 kg) SpO2 BMI OB Status Smoking Status 100% 24.73 kg/m2 Postmenopausal Former Smoker Vitals History BMI and BSA Data Body Mass Index Body Surface Area 24.73 kg/m 2 1.68 m 2 Preferred Pharmacy Pharmacy Name Phone [facility] [id number], [address] [facility] [phone] Your Updated Medication List  
  
   
This list is accurate as of 10/15/18 11:24 AM.  Always use your most recent med list.  
  
  
  
  
 acetaminophen 500 mg tablet Commonly known as:  TYLENOL Take 2 Tabs by mouth every six (6) hours as needed for Pain. Indications: Pain  
  
 alcohol swabs Pad Commonly known as:  BD Single Use Swabs Regular Test blood sugar once daily * Blood-Glucose Meter monitoring kit  
accu chek gabino smartview meter * Blood-Glucose Meter Misc Commonly known as:  ACCU-CHEK OLIVIA PLUS METER Test blood sugar daily * brimonidine 0.15 % ophthalmic solution Commonly known as:  Cherylann Patch Administer 1 Drop to both eyes two (2) times a day. * ALPHAGAN P 0.1 % ophthalmic solution Generic drug:  brimonidine  
instill 1 drop into both eyes twice a day  
  
 clopidogrel 75 mg Tab Commonly known as:  PLAVIX  
take 1 tablet by mouth once daily  
  
 dorzolamide 2 % ophthalmic solution Commonly known as:  TRUSOPT  
instill 1 drop into both eyes twice a day  
  
 dorzolamide-timolol 22.3-6.8 mg/mL ophthalmic solution Commonly known as:  COSOPT  
 Administer 1 Drop to both eyes two (2) times a day. ergocalciferol 50,000 unit capsule Commonly known as:  ERGOCALCIFEROL Take 1 Cap by mouth every seven (7) days. gabapentin 300 mg capsule Commonly known as:  NEURONTIN  
take 1 capsule by mouth twice a day THE DAY PRIOR TO SURGERY   
  
 glucose blood VI test strips strip Commonly known as:  ACCU-CHEK OLIVIA PLUS TEST STRP Test blood sugar once daily  
  
 hydroCHLOROthiazide 25 mg tablet Commonly known as:  HYDRODIURIL Take 1 Tab by mouth as needed. ibuprofen 600 mg tablet Commonly known as:  MOTRIN Take 1 Tab by mouth every eight (8) hours as needed for Pain.  
  
 latanoprost 0.005 % ophthalmic solution Commonly known as:  Heloise Greulich Administer 1 Drop to both eyes nightly. letrozole 2.5 mg tablet Commonly known as:  FEMARA  
take 1 tablet by mouth once daily  
  
 pravastatin 40 mg tablet Commonly known as:  PRAVACHOL Take 1 Tab by mouth nightly. timolol 0.5 % ophthalmic solution Commonly known as:  TIMOPTIC Administer  to both eyes two (2) times a day. varicella-zoster recombinant (PF) 50 mcg/0.5 mL Susr injection Commonly known as:  SHINGRIX  
0.5 mL by IntraMUSCular route once for 1 dose. * Notice: This list has 4 medication(s) that are the same as other medications prescribed for you. Read the directions carefully, and ask your doctor or other care provider to review them with you. Prescriptions Printed Refills  
 varicella-zoster recombinant, PF, (SHINGRIX) 50 mcg/0.5 mL susr injection 0 Si.5 mL by IntraMUSCular route once for 1 dose. Class: Print Route: IntraMUSCular Prescriptions Sent to Pharmacy Refills  
 gabapentin (NEURONTIN) 300 mg capsule 1 Sig: take 1 capsule by mouth twice a day THE DAY PRIOR TO SURGERY  Class: Normal  
 Pharmacy: [address] Jonelle Garcias[address] Ph #: [phone] pravastatin (PRAVACHOL) 40 mg tablet 4 Sig: Take 1 Tab by mouth nightly. Class: Normal  
 [facility] [address] [facility] [address] Ph #: [phone] Route: Oral  
  
We Performed the Following AMB POC HEMOGLOBIN A1C [90935 CPT(R)] INFLUENZA VACCINE INACTIVATED (IIV), SUBUNIT, ADJUVANTED, IM [id number] CPT(R)] HI IMMUNIZ ADMIN,1 SINGLE/COMB VAC/TOXOID [id number] CPT(R)] REFERRAL TO PODIATRY [REF90 Custom] Comments:  
 Please evaluate patient for DM. Follow-up Instructions Return in about 3 months (around 1/15/2019), or if symptoms worsen or fail to improve. Referral Information Referral ID Referred By Referred To  
  
 [id number] Javier LAN [facility], [address] [place] [address], [address] [place] [address] Street Phone: [phone] Visits Status Start Date End Date 1 New Request 10/15/18 10/15/19 If your referral has a status of pending review or denied, additional information will be sent to support the outcome of this decision. Patient Instructions Vaccine Information Statement Influenza (Flu) Vaccine (Inactivated or Recombinant): What you need to know Many Vaccine Information Statements are available in Polish and other languages. See www.immunize.org/vis Hojas de Información Sobre Vacunas están disponibles en Español y en muchos otros idiomas. Visite www.immunize.org/vis 1. Why get vaccinated? Influenza (flu) is a contagious disease that spreads around the [place] every year, usually between October and May. Flu is caused by influenza viruses, and is spread mainly by coughing, sneezing, and close contact. Anyone can get flu. Flu strikes suddenly and can last several days. Symptoms vary by age, but can include: 
 fever/chills  sore throat  muscle aches  fatigue  cough  headache  runny or stuffy nose Flu can also lead to pneumonia and blood infections, and cause diarrhea and seizures in children. If you have a medical condition, such as heart or lung disease, flu can make it worse. Flu is more dangerous for some people. Infants and young children, people 72years of age and older, pregnant women, and people with certain health conditions or a weakened immune system are at greatest risk. Each year thousands of people in the [facility] die from flu, and many more are hospitalized. Flu vaccine can: 
 keep you from getting flu, 
 make flu less severe if you do get it, and 
 keep you from spreading flu to your family and other people. 2. Inactivated and recombinant flu vaccines A dose of flu vaccine is recommended every flu season. Children 6 months through 6years of age may need two doses during the same flu season. Everyone else needs only one dose each flu season. Some inactivated flu vaccines contain a very small amount of a mercury-based preservative called thimerosal. Studies have not shown thimerosal in vaccines to be harmful, but flu vaccines that do not contain thimerosal are available. There is no live flu virus in flu shots. They cannot cause the flu. There are many flu viruses, and they are always changing. Each year a new flu vaccine is made to protect against three or four viruses that are likely to cause disease in the upcoming flu season. But even when the vaccine doesnt exactly match these viruses, it may still provide some protection Flu vaccine cannot prevent: 
 flu that is caused by a virus not covered by the vaccine, or 
 illnesses that look like flu but are not. It takes about 2 weeks for protection to develop after vaccination, and protection lasts through the flu season. 3. Some people should not get this vaccine Tell the person who is giving you the vaccine:  If you have any severe, life-threatening allergies. If you ever had a life-threatening allergic reaction after a dose of flu vaccine, or have a severe allergy to any part of this vaccine, you may be advised not to get vaccinated. Most, but not all, types of flu vaccine contain a small amount of egg protein.  If you ever had Guillain-Barré Syndrome (also called GBS). Some people with a history of GBS should not get this vaccine. This should be discussed with your doctor.  If you are not feeling well. It is usually okay to get flu vaccine when you have a mild illness, but you might be asked to come back when you feel better. 4. Risks of a vaccine reaction With any medicine, including vaccines, there is a chance of reactions. These are usually mild and go away on their own, but serious reactions are also possible. Most people who get a flu shot do not have any problems with it. Minor problems following a flu shot include:  
 soreness, redness, or swelling where the shot was given  hoarseness  sore, red or itchy eyes  cough  fever  aches  headache  itching  fatigue If these problems occur, they usually begin soon after the shot and last 1 or 2 days. More serious problems following a flu shot can include the following:  There may be a small increased risk of Guillain-Barré Syndrome (GBS) after inactivated flu vaccine. This risk has been estimated at 1 or 2 additional cases per million people vaccinated. This is much lower than the risk of severe complications from flu, which can be prevented by flu vaccine.  Young children who get the flu shot along with pneumococcal vaccine (PCV13) and/or DTaP vaccine at the same time might be slightly more likely to have a seizure caused by fever. Ask your doctor for more information. Tell your doctor if a child who is getting flu vaccine has ever had a seizure. Problems that could happen after any injected vaccine:  People sometimes faint after a medical procedure, including vaccination. Sitting or lying down for about 15 minutes can help prevent fainting, and injuries caused by a fall. Tell your doctor if you feel dizzy, or have vision changes or ringing in the ears.  Some people get severe pain in the shoulder and have difficulty moving the arm where a shot was given. This happens very rarely.  Any medication can cause a severe allergic reaction. Such reactions from a vaccine are very rare, estimated at about 1 in a million doses, and would happen within a few minutes to a few hours after the vaccination. As with any medicine, there is a very remote chance of a vaccine causing a serious injury or death. The safety of vaccines is always being monitored. For more information, visit: www.cdc.gov/vaccinesafety/ 
 
 
The [facility] Vaccine Injury Compensation Program (VICP) is a federal program that was created to compensate people who may have been injured by certain vaccines. Persons who believe they may have been injured by a vaccine can learn about the program and about filing a claim by calling 0-287.213.8058 or visiting the [employer] website at www.[facility].gov/vaccinecompensation. There is a time limit to file a claim for compensation. 7. How can I learn more?  Ask your healthcare provider. He or she can give you the vaccine package insert or suggest other sources of information.  Call your local or state health department.  Contact the Centers for Disease Control and Prevention (CDC): 
- Call 0-146.399.5709 (0-410-ICC-INFO) or 
- Visit CDCs website at www.cdc.gov/flu Vaccine Information Statement Inactivated Influenza Vaccine 8/7/2015 
42 ANNEL Fernandez [id number] [facility] and [employer] Centers for Disease Control and Prevention Office Use Only Introducing [facility]! [employer] introduces [employer] patient portal. Now you can access parts of your medical record, email your doctor's office, and request medication refills online. 1. In your internet browser, go to [email or web address] 2. Click on the First Time User? Click Here link in the Sign In box. You will see the New Member Sign Up page. 3. Enter your [employer] Access Code exactly as it appears below. You will not need to use this code after youve completed the sign-up process. If you do not sign up before the expiration date, you must request a new code. · [employer] Access Code: [email or web address] Expires: 10/29/2018 10:18 AM 
 
4. Enter the last four digits of your Social Security Number (xxxx) and Date of Birth (mm/dd/yyyy) as indicated and click Submit. You will be taken to the next sign-up page. 5. Create a [employer] ID. This will be your [employer] login ID and cannot be changed, so think of one that is secure and easy to remember. 6. Create a [employer] password. You can change your password at any time. 7. Enter your Password Reset Question and Answer. This can be used at a later time if you forget your password. 8. Enter your e-mail address. You will receive e-mail notification when new information is available in [address] 9. Click Sign Up. You can now view and download portions of your medical record. 10. Click the Download Summary menu link to download a portable copy of your medical information. If you have questions, please visit the Frequently Asked Questions section of the [employer] website. Remember, [employer] is NOT to be used for urgent needs. For medical emergencies, dial 911. Now available from your iPhone and Android! Please provide this summary of care documentation to your next provider. Your primary care clinician is listed as Nancy Murphy. If you have any questions after today's visit, please call [phone].

## 2018-10-16 ENCOUNTER — LAB ONLY (OUTPATIENT)
Dept: FAMILY MEDICINE CLINIC | Age: 68
End: 2018-10-16

## 2018-10-16 DIAGNOSIS — E11.40 TYPE 2 DIABETES MELLITUS WITH DIABETIC NEUROPATHY, WITHOUT LONG-TERM CURRENT USE OF INSULIN (HCC): Primary | ICD-10-CM

## 2018-10-17 LAB
ALBUMIN SERPL-MCNC: 4.2 G/DL (ref 3.6–4.8)
ALBUMIN/GLOB SERPL: 1.8 {RATIO} (ref 1.2–2.2)
ALP SERPL-CCNC: 62 IU/L (ref 39–117)
ALT SERPL-CCNC: 7 IU/L (ref 0–32)
AST SERPL-CCNC: 16 IU/L (ref 0–40)
BILIRUB SERPL-MCNC: 0.2 MG/DL (ref 0–1.2)
BUN SERPL-MCNC: 22 MG/DL (ref 8–27)
BUN/CREAT SERPL: 18 (ref 12–28)
CALCIUM SERPL-MCNC: 9.7 MG/DL (ref 8.7–10.3)
CHLORIDE SERPL-SCNC: 102 MMOL/L (ref 96–106)
CHOLEST SERPL-MCNC: 239 MG/DL (ref 100–199)
CO2 SERPL-SCNC: 22 MMOL/L (ref 20–29)
CREAT SERPL-MCNC: 1.2 MG/DL (ref 0.57–1)
ERYTHROCYTE [DISTWIDTH] IN BLOOD BY AUTOMATED COUNT: 14 % (ref 12.3–15.4)
GLOBULIN SER CALC-MCNC: 2.3 G/DL (ref 1.5–4.5)
GLUCOSE SERPL-MCNC: 101 MG/DL (ref 65–99)
HCT VFR BLD AUTO: 30.2 % (ref 34–46.6)
HDLC SERPL-MCNC: 62 MG/DL
HGB BLD-MCNC: 10.1 G/DL (ref 11.1–15.9)
INTERPRETATION: NORMAL
LDLC SERPL CALC-MCNC: 138 MG/DL (ref 0–99)
Lab: NORMAL
MCH RBC QN AUTO: 30.1 PG (ref 26.6–33)
MCHC RBC AUTO-ENTMCNC: 33.4 G/DL (ref 31.5–35.7)
MCV RBC AUTO: 90 FL (ref 79–97)
PLATELET # BLD AUTO: 218 X10E3/UL (ref 150–379)
POTASSIUM SERPL-SCNC: 3.8 MMOL/L (ref 3.5–5.2)
PROT SERPL-MCNC: 6.5 G/DL (ref 6–8.5)
RBC # BLD AUTO: 3.36 X10E6/UL (ref 3.77–5.28)
SODIUM SERPL-SCNC: 141 MMOL/L (ref 134–144)
TRIGL SERPL-MCNC: 193 MG/DL (ref 0–149)
TSH SERPL DL<=0.005 MIU/L-ACNC: 1.77 UIU/ML (ref 0.45–4.5)
VLDLC SERPL CALC-MCNC: 39 MG/DL (ref 5–40)
WBC # BLD AUTO: 3.9 X10E3/UL (ref 3.4–10.8)

## 2018-10-29 PROBLEM — E11.21 TYPE 2 DIABETES WITH NEPHROPATHY (HCC): Status: ACTIVE | Noted: 2018-10-29

## 2018-11-15 ENCOUNTER — HOSPITAL ENCOUNTER (INPATIENT)
Age: 68
LOS: 1 days | Discharge: HOME | DRG: 123 | End: 2018-11-16
Attending: EMERGENCY MEDICINE | Admitting: INTERNAL MEDICINE
Payer: MEDICARE

## 2018-11-15 ENCOUNTER — APPOINTMENT (OUTPATIENT)
Dept: CT IMAGING | Age: 68
DRG: 123 | End: 2018-11-15
Attending: EMERGENCY MEDICINE
Payer: MEDICARE

## 2018-11-15 DIAGNOSIS — I10 ESSENTIAL HYPERTENSION: ICD-10-CM

## 2018-11-15 DIAGNOSIS — H54.62 VISION LOSS, LEFT EYE: Primary | ICD-10-CM

## 2018-11-15 DIAGNOSIS — Z86.73 HISTORY OF STROKE: ICD-10-CM

## 2018-11-15 DIAGNOSIS — I63.9 CEREBROVASCULAR ACCIDENT (CVA), UNSPECIFIED MECHANISM (HCC): ICD-10-CM

## 2018-11-15 DIAGNOSIS — E11.65 UNCONTROLLED TYPE 2 DIABETES MELLITUS WITH HYPERGLYCEMIA (HCC): ICD-10-CM

## 2018-11-15 DIAGNOSIS — I65.23 BILATERAL CAROTID ARTERY STENOSIS: ICD-10-CM

## 2018-11-15 LAB
ALBUMIN SERPL-MCNC: 4 G/DL (ref 3.5–5)
ALBUMIN/GLOB SERPL: 1 {RATIO} (ref 1.1–2.2)
ALP SERPL-CCNC: 73 U/L (ref 45–117)
ALT SERPL-CCNC: 17 U/L (ref 12–78)
ANION GAP SERPL CALC-SCNC: 8 MMOL/L (ref 5–15)
AST SERPL-CCNC: 21 U/L (ref 15–37)
BASOPHILS # BLD: 0 K/UL (ref 0–0.1)
BASOPHILS NFR BLD: 1 % (ref 0–1)
BILIRUB SERPL-MCNC: 0.2 MG/DL (ref 0.2–1)
BUN SERPL-MCNC: 32 MG/DL (ref 6–20)
BUN/CREAT SERPL: 26 (ref 12–20)
CALCIUM SERPL-MCNC: 9.8 MG/DL (ref 8.5–10.1)
CHLORIDE SERPL-SCNC: 107 MMOL/L (ref 97–108)
CO2 SERPL-SCNC: 24 MMOL/L (ref 21–32)
CREAT SERPL-MCNC: 1.23 MG/DL (ref 0.55–1.02)
DIFFERENTIAL METHOD BLD: ABNORMAL
EOSINOPHIL # BLD: 0.2 K/UL (ref 0–0.4)
EOSINOPHIL NFR BLD: 5 % (ref 0–7)
ERYTHROCYTE [DISTWIDTH] IN BLOOD BY AUTOMATED COUNT: 13.1 % (ref 11.5–14.5)
ERYTHROCYTE [SEDIMENTATION RATE] IN BLOOD: 35 MM/HR (ref 0–30)
GLOBULIN SER CALC-MCNC: 4.1 G/DL (ref 2–4)
GLUCOSE BLD STRIP.AUTO-MCNC: 157 MG/DL (ref 65–100)
GLUCOSE SERPL-MCNC: 102 MG/DL (ref 65–100)
HCT VFR BLD AUTO: 32.3 % (ref 35–47)
HGB BLD-MCNC: 10.4 G/DL (ref 11.5–16)
IMM GRANULOCYTES # BLD: 0 K/UL (ref 0–0.04)
IMM GRANULOCYTES NFR BLD AUTO: 0 % (ref 0–0.5)
INR PPP: 1 (ref 0.9–1.1)
LYMPHOCYTES # BLD: 1.2 K/UL (ref 0.8–3.5)
LYMPHOCYTES NFR BLD: 35 % (ref 12–49)
MCH RBC QN AUTO: 30.6 PG (ref 26–34)
MCHC RBC AUTO-ENTMCNC: 32.2 G/DL (ref 30–36.5)
MCV RBC AUTO: 95 FL (ref 80–99)
MONOCYTES # BLD: 0.5 K/UL (ref 0–1)
MONOCYTES NFR BLD: 14 % (ref 5–13)
NEUTS SEG # BLD: 1.6 K/UL (ref 1.8–8)
NEUTS SEG NFR BLD: 46 % (ref 32–75)
NRBC # BLD: 0 K/UL (ref 0–0.01)
NRBC BLD-RTO: 0 PER 100 WBC
PLATELET # BLD AUTO: 218 K/UL (ref 150–400)
PMV BLD AUTO: 9.2 FL (ref 8.9–12.9)
POTASSIUM SERPL-SCNC: 3.8 MMOL/L (ref 3.5–5.1)
PROT SERPL-MCNC: 8.1 G/DL (ref 6.4–8.2)
PROTHROMBIN TIME: 9.8 SEC (ref 9–11.1)
RBC # BLD AUTO: 3.4 M/UL (ref 3.8–5.2)
SERVICE CMNT-IMP: ABNORMAL
SODIUM SERPL-SCNC: 139 MMOL/L (ref 136–145)
TSH SERPL DL<=0.05 MIU/L-ACNC: 0.55 UIU/ML (ref 0.36–3.74)
WBC # BLD AUTO: 3.6 K/UL (ref 3.6–11)

## 2018-11-15 PROCEDURE — 74011250637 HC RX REV CODE- 250/637: Performed by: INTERNAL MEDICINE

## 2018-11-15 PROCEDURE — 84443 ASSAY THYROID STIM HORMONE: CPT

## 2018-11-15 PROCEDURE — 36415 COLL VENOUS BLD VENIPUNCTURE: CPT

## 2018-11-15 PROCEDURE — 85652 RBC SED RATE AUTOMATED: CPT

## 2018-11-15 PROCEDURE — 82962 GLUCOSE BLOOD TEST: CPT

## 2018-11-15 PROCEDURE — 65660000000 HC RM CCU STEPDOWN

## 2018-11-15 PROCEDURE — 84439 ASSAY OF FREE THYROXINE: CPT

## 2018-11-15 PROCEDURE — 85610 PROTHROMBIN TIME: CPT

## 2018-11-15 PROCEDURE — 80053 COMPREHEN METABOLIC PANEL: CPT

## 2018-11-15 PROCEDURE — 85025 COMPLETE CBC W/AUTO DIFF WBC: CPT

## 2018-11-15 PROCEDURE — 99284 EMERGENCY DEPT VISIT MOD MDM: CPT

## 2018-11-15 PROCEDURE — 74011000250 HC RX REV CODE- 250: Performed by: INTERNAL MEDICINE

## 2018-11-15 PROCEDURE — 70450 CT HEAD/BRAIN W/O DYE: CPT

## 2018-11-15 RX ORDER — MORPHINE SULFATE 4 MG/ML
2 INJECTION INTRAVENOUS
Status: DISCONTINUED | OUTPATIENT
Start: 2018-11-15 | End: 2018-11-17 | Stop reason: HOSPADM

## 2018-11-15 RX ORDER — DEXTROSE 50 % IN WATER (D50W) INTRAVENOUS SYRINGE
12.5-25 AS NEEDED
Status: DISCONTINUED | OUTPATIENT
Start: 2018-11-15 | End: 2018-11-17 | Stop reason: HOSPADM

## 2018-11-15 RX ORDER — SODIUM CHLORIDE 0.9 % (FLUSH) 0.9 %
5-10 SYRINGE (ML) INJECTION AS NEEDED
Status: DISCONTINUED | OUTPATIENT
Start: 2018-11-15 | End: 2018-11-17 | Stop reason: HOSPADM

## 2018-11-15 RX ORDER — LATANOPROST 50 UG/ML
1 SOLUTION/ DROPS OPHTHALMIC
Status: DISCONTINUED | OUTPATIENT
Start: 2018-11-15 | End: 2018-11-17 | Stop reason: HOSPADM

## 2018-11-15 RX ORDER — CLOPIDOGREL BISULFATE 75 MG/1
75 TABLET ORAL DAILY
Status: DISCONTINUED | OUTPATIENT
Start: 2018-11-16 | End: 2018-11-17 | Stop reason: HOSPADM

## 2018-11-15 RX ORDER — LETROZOLE 2.5 MG/1
2.5 TABLET, FILM COATED ORAL DAILY
Status: DISCONTINUED | OUTPATIENT
Start: 2018-11-16 | End: 2018-11-17 | Stop reason: HOSPADM

## 2018-11-15 RX ORDER — ENOXAPARIN SODIUM 100 MG/ML
40 INJECTION SUBCUTANEOUS EVERY 24 HOURS
Status: DISCONTINUED | OUTPATIENT
Start: 2018-11-15 | End: 2018-11-17 | Stop reason: HOSPADM

## 2018-11-15 RX ORDER — ACETAMINOPHEN 325 MG/1
650 TABLET ORAL
Status: DISCONTINUED | OUTPATIENT
Start: 2018-11-15 | End: 2018-11-17 | Stop reason: HOSPADM

## 2018-11-15 RX ORDER — GUAIFENESIN 100 MG/5ML
81 LIQUID (ML) ORAL DAILY
Status: DISCONTINUED | OUTPATIENT
Start: 2018-11-15 | End: 2018-11-17 | Stop reason: HOSPADM

## 2018-11-15 RX ORDER — GABAPENTIN 300 MG/1
300 CAPSULE ORAL 2 TIMES DAILY
Status: DISCONTINUED | OUTPATIENT
Start: 2018-11-15 | End: 2018-11-17 | Stop reason: HOSPADM

## 2018-11-15 RX ORDER — HYDROCHLOROTHIAZIDE 25 MG/1
25 TABLET ORAL DAILY
Status: DISCONTINUED | OUTPATIENT
Start: 2018-11-16 | End: 2018-11-17 | Stop reason: HOSPADM

## 2018-11-15 RX ORDER — MORPHINE SULFATE 2 MG/ML
2 INJECTION, SOLUTION INTRAMUSCULAR; INTRAVENOUS
Status: DISCONTINUED | OUTPATIENT
Start: 2018-11-15 | End: 2018-11-15

## 2018-11-15 RX ORDER — LABETALOL HCL 20 MG/4 ML
10 SYRINGE (ML) INTRAVENOUS
Status: DISCONTINUED | OUTPATIENT
Start: 2018-11-15 | End: 2018-11-17 | Stop reason: HOSPADM

## 2018-11-15 RX ORDER — TIMOLOL MALEATE 5 MG/ML
1 SOLUTION/ DROPS OPHTHALMIC 2 TIMES DAILY
Status: DISCONTINUED | OUTPATIENT
Start: 2018-11-15 | End: 2018-11-17 | Stop reason: HOSPADM

## 2018-11-15 RX ORDER — ONDANSETRON 2 MG/ML
4 INJECTION INTRAMUSCULAR; INTRAVENOUS
Status: DISCONTINUED | OUTPATIENT
Start: 2018-11-15 | End: 2018-11-17 | Stop reason: HOSPADM

## 2018-11-15 RX ORDER — BRIMONIDINE TARTRATE 2 MG/ML
1 SOLUTION/ DROPS OPHTHALMIC 2 TIMES DAILY
Status: DISCONTINUED | OUTPATIENT
Start: 2018-11-15 | End: 2018-11-17 | Stop reason: HOSPADM

## 2018-11-15 RX ORDER — MAGNESIUM SULFATE 100 %
4 CRYSTALS MISCELLANEOUS AS NEEDED
Status: DISCONTINUED | OUTPATIENT
Start: 2018-11-15 | End: 2018-11-17 | Stop reason: HOSPADM

## 2018-11-15 RX ORDER — ENOXAPARIN SODIUM 100 MG/ML
40 INJECTION SUBCUTANEOUS EVERY 24 HOURS
Status: DISCONTINUED | OUTPATIENT
Start: 2018-11-15 | End: 2018-11-15 | Stop reason: SDUPTHER

## 2018-11-15 RX ORDER — INSULIN LISPRO 100 [IU]/ML
INJECTION, SOLUTION INTRAVENOUS; SUBCUTANEOUS
Status: DISCONTINUED | OUTPATIENT
Start: 2018-11-15 | End: 2018-11-17 | Stop reason: HOSPADM

## 2018-11-15 RX ORDER — DORZOLAMIDE HCL 20 MG/ML
1 SOLUTION/ DROPS OPHTHALMIC 3 TIMES DAILY
Status: DISCONTINUED | OUTPATIENT
Start: 2018-11-15 | End: 2018-11-17 | Stop reason: HOSPADM

## 2018-11-15 RX ORDER — SODIUM CHLORIDE 0.9 % (FLUSH) 0.9 %
5-10 SYRINGE (ML) INJECTION EVERY 8 HOURS
Status: DISCONTINUED | OUTPATIENT
Start: 2018-11-15 | End: 2018-11-17 | Stop reason: HOSPADM

## 2018-11-15 RX ADMIN — ACETAMINOPHEN 650 MG: 325 TABLET ORAL at 22:56

## 2018-11-15 RX ADMIN — DORZOLAMIDE HYDROCHLORIDE 1 DROP: 20 SOLUTION/ DROPS OPHTHALMIC at 22:32

## 2018-11-15 RX ADMIN — LATANOPROST 1 DROP: 50 SOLUTION OPHTHALMIC at 22:33

## 2018-11-15 RX ADMIN — GABAPENTIN 300 MG: 300 CAPSULE ORAL at 22:31

## 2018-11-15 NOTE — ED PROVIDER NOTES
EMERGENCY DEPARTMENT HISTORY AND PHYSICAL EXAM 
 
 
Date: 11/15/2018 Patient Name: Gallito Joy History of Presenting Illness Chief Complaint Patient presents with  Vision Change  
  pt reports she woke up Monday with loss of sight in left eye, went to eye doctor today and was sent over for \"stroke in eye\" History Provided By: Patient HPI: Gallito Joy, 76 y.o. female with PMHx significant for PAD, DM, hypercholesterolemia, CVA, HTN, presents via wheelchair to the ED by ophthalmology referral for further evaluation of an acute onset of left eye vision loss upon waking up 3 days ago. Pt reports no associated sx. She expresses upon waking up Monday she had minimal to no vision in the left eye which has persisted leading her to her ophthalmology's office. Pt discloses she was evaluated and referred to a retinal specialist who referred the pt to the ED for stroke work up. Of note pt's pupils were dilated. She expresses a h/o stroke in 2007 endorsing she is on Plavix but denies any ASA use. Pt denies the use of contacts or glasses. She denies any fevers, chills, weakness, numbness, facial droop, speech complications, eye pain, chest pain, SOB, abdominal pain, nausea, vomiting, diarrhea, or dysuria. There are no other complaints, changes, or physical findings at this time. PCP: Nevaeh Espitia MD 
SHx: (-)Tobacco; (+) ETOH; (-) Illicit drug use Current Outpatient Medications Medication Sig Dispense Refill  acetaminophen (TYLENOL) 500 mg tablet Take 2 Tabs by mouth every six (6) hours as needed for Pain. Indications: Pain 40 Tab 1  
 gabapentin (NEURONTIN) 300 mg capsule take 1 capsule by mouth twice a day THE DAY PRIOR TO SURGERY 9/13 180 Cap 1  
 pravastatin (PRAVACHOL) 40 mg tablet Take 1 Tab by mouth nightly. 30 Tab 4  
 timolol (TIMOPTIC) 0.5 % ophthalmic solution Administer  to both eyes two (2) times a day.   0  
  letrozole (FEMARA) 2.5 mg tablet take 1 tablet by mouth once daily 30 Tab 6  ibuprofen (MOTRIN) 600 mg tablet Take 1 Tab by mouth every eight (8) hours as needed for Pain. 60 Tab 2  clopidogrel (PLAVIX) 75 mg tab take 1 tablet by mouth once daily 90 Tab 3  
 hydroCHLOROthiazide (HYDRODIURIL) 25 mg tablet Take 1 Tab by mouth as needed. 90 Tab 3  
 ergocalciferol (ERGOCALCIFEROL) 50,000 unit capsule Take 1 Cap by mouth every seven (7) days. 4 Cap 6  Blood-Glucose Meter (ACCU-CHEK OLIVIA PLUS METER) misc Test blood sugar daily (Patient not taking: Reported on 10/15/2018) 1 Each 0  
 glucose blood VI test strips (ACCU-CHEK OLIVIA PLUS TEST STRP) strip Test blood sugar once daily (Patient not taking: Reported on 10/15/2018) 50 Strip 11  
 alcohol swabs (BD SINGLE USE SWABS REGULAR) padm Test blood sugar once daily (Patient not taking: Reported on 10/15/2018) 50 Pad 11  
 ALPHAGAN P 0.1 % ophthalmic solution instill 1 drop into both eyes twice a day  0  
 dorzolamide (TRUSOPT) 2 % ophthalmic solution instill 1 drop into both eyes twice a day  0  
 latanoprost (XALATAN) 0.005 % ophthalmic solution Administer 1 Drop to both eyes nightly. 2.5 mL 1  
 dorzolamide-timolol (COSOPT) 22.3-6.8 mg/mL ophthalmic solution Administer 1 Drop to both eyes two (2) times a day.  0  
 brimonidine (ALPHAGAN) 0.15 % ophthalmic solution Administer 1 Drop to both eyes two (2) times a day. 15 mL 6  Blood-Glucose Meter monitoring kit accu chek gabino smartview meter (Patient not taking: Reported on 10/15/2018) 1 Kit 0 Past History Past Medical History: 
Past Medical History:  
Diagnosis Date  Arthritis  Breast cancer ([address] 04/2017  
 lumpectomy left breast, radiation  Cancer ([address] breast cancer- left  Cerebrovascular accident (stroke) ([address]   
 no deficiets  Diabetes mellitus type 2, controlled ([address]  Gallstones   
 asymptomatic, pt denies having  GERD (gastroesophageal reflux disease) dx with resolution of symptoms on ppi- pt denies having  Hypercholesterolemia  Hypertension  Ill-defined condition   
 gout  Long term current use of anticoagulant therapy  Non-nicotine vapor product user   
 used to use  PAD (peripheral artery disease) ([facility] [address] 2013  
 right SFA angioplasty and athrectomy  Primary open angle glaucoma  Radiation therapy complication  Solitary lung nodule 2017  Type II diabetes mellitus, uncontrolled ([facility] [address] 2017 Past Surgical History: 
Past Surgical History:  
Procedure Laterality Date  HX HEENT Bilateral   
 eye lids surgery  VASCULAR SURGERY PROCEDURE UNLIST  2016  
 right leg stent Family History: 
Family History Problem Relation Age of Onset  Hypertension Mother  Diabetes Mother  Diabetes Son   
 
 
Social History: 
Social History Tobacco Use  Smoking status: Former Smoker Types: Cigarettes Last attempt to quit: 3/13/2013 Years since quittin.6  Smokeless tobacco: Never Used  Tobacco comment: estimate is one pack per week Substance Use Topics  Alcohol use: Yes Alcohol/week: 12.6 oz Types: 21 Cans of beer per week Comment: 2 to 3 beers per day per patient  Drug use: No  
 
 
Allergies: 
No Known Allergies Review of Systems Review of Systems Constitutional: Negative. Negative for chills, diaphoresis and fever. HENT: Negative. Negative for congestion, sore throat and trouble swallowing. Eyes: Positive for visual disturbance (left eye vision loss ). Negative for photophobia, pain and redness. Respiratory: Negative. Negative for cough, chest tightness, shortness of breath and wheezing. Cardiovascular: Negative. Negative for chest pain and palpitations. Gastrointestinal: Negative. Negative for abdominal pain, blood in stool, diarrhea, nausea and vomiting. Genitourinary: Negative for difficulty urinating, dysuria and frequency. Musculoskeletal: Negative. Negative for arthralgias, myalgias, neck pain and neck stiffness. Skin: Negative. Neurological: Negative. Negative for dizziness, tremors, seizures, syncope, facial asymmetry, speech difficulty, weakness, light-headedness, numbness and headaches. Psychiatric/Behavioral: Negative. Negative for confusion. The patient is not nervous/anxious. All other systems reviewed and are negative. Physical Exam  
Physical Exam  
Constitutional: She is oriented to person, place, and time. She appears well-developed and well-nourished. HENT:  
Head: Normocephalic and atraumatic. Eyes: Conjunctivae and EOM are normal.  
Pupils dilated and sluggish to react (due to dilating drops) Normal peripheral vision in the right eye Left eye unable to visualize anything but blurred shapes Neck: Normal range of motion. Neck supple. Cardiovascular: Normal rate and regular rhythm. Pulmonary/Chest: Effort normal and breath sounds normal. No respiratory distress. Abdominal: Soft. She exhibits no distension. There is no tenderness. Musculoskeletal: Normal range of motion. Neurological: She is alert and oriented to person, place, and time. CN II - XII intact 5/5 strength in all 4 extremities Normal gait Skin: Skin is warm and dry. Psychiatric: She has a normal mood and affect. Nursing note and vitals reviewed. Diagnostic Study Results Labs - Recent Results (from the past 12 hour(s)) CBC WITH AUTOMATED DIFF Collection Time: 11/15/18  4:50 PM  
Result Value Ref Range WBC 3.6 3.6 - 11.0 K/uL  
 RBC 3.40 (L) 3.80 - 5.20 M/uL  
 HGB 10.4 (L) 11.5 - 16.0 g/dL HCT 32.3 (L) 35.0 - 47.0 % MCV 95.0 80.0 - 99.0 FL  
 MCH 30.6 26.0 - 34.0 PG  
 MCHC 32.2 30.0 - 36.5 g/dL  
 RDW 13.1 11.5 - 14.5 % PLATELET 893 780 - 039 K/uL MPV 9.2 8.9 - 12.9 FL  
 NRBC 0.0 0  WBC ABSOLUTE NRBC 0.00 0.00 - 0.01 K/uL NEUTROPHILS 46 32 - 75 % LYMPHOCYTES 35 12 - 49 % MONOCYTES 14 (H) 5 - 13 % EOSINOPHILS 5 0 - 7 % BASOPHILS 1 0 - 1 % IMMATURE GRANULOCYTES 0 0.0 - 0.5 % ABS. NEUTROPHILS 1.6 (L) 1.8 - 8.0 K/UL  
 ABS. LYMPHOCYTES 1.2 0.8 - 3.5 K/UL  
 ABS. MONOCYTES 0.5 0.0 - 1.0 K/UL  
 ABS. EOSINOPHILS 0.2 0.0 - 0.4 K/UL  
 ABS. BASOPHILS 0.0 0.0 - 0.1 K/UL  
 ABS. IMM. GRANS. 0.0 0.00 - 0.04 K/UL  
 DF AUTOMATED PROTHROMBIN TIME + INR Collection Time: 11/15/18  4:50 PM  
Result Value Ref Range INR 1.0 0.9 - 1.1 Prothrombin time 9.8 9.0 - 11.1 sec METABOLIC PANEL, COMPREHENSIVE Collection Time: 11/15/18  4:50 PM  
Result Value Ref Range Sodium 139 136 - 145 mmol/L Potassium 3.8 3.5 - 5.1 mmol/L Chloride 107 97 - 108 mmol/L  
 CO2 24 21 - 32 mmol/L Anion gap 8 5 - 15 mmol/L Glucose 102 (H) 65 - 100 mg/dL BUN 32 (H) 6 - 20 MG/DL Creatinine 1.23 (H) 0.55 - 1.02 MG/DL  
 BUN/Creatinine ratio 26 (H) 12 - 20 GFR est AA 53 (L) >60 ml/min/1.73m2 GFR est non-AA 43 (L) >60 ml/min/1.73m2 Calcium 9.8 8.5 - 10.1 MG/DL Bilirubin, total 0.2 0.2 - 1.0 MG/DL  
 ALT (SGPT) 17 12 - 78 U/L  
 AST (SGOT) 21 15 - 37 U/L Alk. phosphatase 73 45 - 117 U/L Protein, total 8.1 6.4 - 8.2 g/dL Albumin 4.0 3.5 - 5.0 g/dL Globulin 4.1 (H) 2.0 - 4.0 g/dL A-G Ratio 1.0 (L) 1.1 - 2.2 Radiologic Studies -  
CT Results  (Last 48 hours)  
          
 11/15/18 1754  CT HEAD WO CONT Final result Impression:  IMPRESSION:   
   
Mild nonspecific white matter disease, no acute intracranial  
   
   
  
 Narrative:  EXAM:  CT HEAD WO CONT INDICATION:   left eye vision loss COMPARISON: None. CONTRAST:  None. TECHNIQUE: Unenhanced CT of the head was performed using 5 mm images. Brain and  
bone windows were generated. CT dose reduction was achieved through use of a  
standardized protocol tailored for this examination and automatic exposure  
control for dose modulation. FINDINGS:  
The ventricles and sulci are normal in size, shape and configuration and  
midline. Mild low density within the left periventricular white matter  
nonspecific but may be due to small vessel ischemic change. There is no  
intracranial hemorrhage, extra-axial collection, mass, mass effect or midline  
shift. The basilar cisterns are open. No acute infarct is identified. The bone  
windows demonstrate no abnormalities. The visualized portions of the paranasal  
sinuses and mastoid air cells are clear. Medical Decision Making I am the first provider for this patient. I reviewed the vital signs, available nursing notes, past medical history, past surgical history, family history and social history. Vital Signs-Reviewed the patient's vital signs. Patient Vitals for the past 12 hrs: 
 Temp Pulse Resp BP SpO2  
11/15/18 1848 98 °F (36.7 °C) 60 11 151/63 100 % 11/15/18 1843  63 20  100 % 11/15/18 1815  64 14  100 % 11/15/18 1732  60 13  100 % 11/15/18 1707  60 10  100 % 11/15/18 1559 98.2 °F (36.8 °C) 67 16 144/73 100 % Pulse Oximetry Analysis - 100% on room air Cardiac Monitor:  
Rate: 67 bpm 
Rhythm: Normal Sinus Rhythm Records Reviewed: Nursing Notes, Old Medical Records, Previous Radiology Studies and Previous Laboratory Studies Provider Notes (Medical Decision Making): Pt presenting with vision loss in the left eye. Pt has already been evaluated by ophthalmology and a retinal specialist to rule out retinal detachment, cataract, and glaucoma. Concern for ANDERSON vs. CVO vs. Amaurosis fugax. Will need to admit for stroke w/u 
 
ED Course:  
Initial assessment performed. The patients presenting problems have been discussed, and they are in agreement with the care plan formulated and outlined with them. I have encouraged them to ask questions as they arise throughout their visit.  
 
Progress Notes: 
 
CONSULT NOTE:  
5:51 PM 
 Benson Mcmillan M.D spoke with Dr. Dilia Grey, Specialty: Hospitalist 
Discussed pt's hx, disposition, and available diagnostic and imaging results. Reviewed care plans. Consultant will evaluate pt for admission. Written by Quoc Dooley, ED Scribe, as dictated by Benson Mcmillan M.D. Critical Care Time: 0 minutes Disposition: 
Admit Note: 
5:51 PM 
Patient is being admitted to the hospital by Dr. Dilia Grey. The results of their tests and reasons for their admission have been discussed with the patient and/or available family. They convey their agreement and understanding for the need to be admitted and for their admission diagnosis. Written by Juan Carlos Maxwell, RADHA Scribe, as dictated by Benson Mcmillan M.D. PLAN: 
1. Admission Diagnosis Clinical Impression: 1. Vision loss, left eye Attestations: 
 
Attestation: This note is prepared by Kasey Lombard. Barrett, acting as Scribe for Benson Mcmillan M.D. Benson Mcmillan M.D: The scribe's documentation has been prepared under my direction and personally reviewed by me in its entirety. I confirm that the note above accurately reflects all work, treatment, procedures, and medical decision making performed by me.

## 2018-11-16 ENCOUNTER — APPOINTMENT (OUTPATIENT)
Dept: MRI IMAGING | Age: 68
DRG: 123 | End: 2018-11-16
Attending: INTERNAL MEDICINE
Payer: MEDICARE

## 2018-11-16 VITALS
SYSTOLIC BLOOD PRESSURE: 126 MMHG | WEIGHT: 135 LBS | RESPIRATION RATE: 18 BRPM | HEIGHT: 63 IN | TEMPERATURE: 97.6 F | BODY MASS INDEX: 23.92 KG/M2 | HEART RATE: 76 BPM | DIASTOLIC BLOOD PRESSURE: 61 MMHG | OXYGEN SATURATION: 100 %

## 2018-11-16 PROBLEM — I65.23 BILATERAL CAROTID ARTERY STENOSIS: Status: ACTIVE | Noted: 2018-11-16

## 2018-11-16 LAB
ALBUMIN SERPL-MCNC: 3.5 G/DL (ref 3.5–5)
ALBUMIN/GLOB SERPL: 1 {RATIO} (ref 1.1–2.2)
ALP SERPL-CCNC: 61 U/L (ref 45–117)
ALT SERPL-CCNC: 13 U/L (ref 12–78)
ANION GAP SERPL CALC-SCNC: 9 MMOL/L (ref 5–15)
AST SERPL-CCNC: 15 U/L (ref 15–37)
BASOPHILS # BLD: 0 K/UL (ref 0–0.1)
BASOPHILS NFR BLD: 0 % (ref 0–1)
BILIRUB SERPL-MCNC: 0.4 MG/DL (ref 0.2–1)
BUN SERPL-MCNC: 29 MG/DL (ref 6–20)
BUN/CREAT SERPL: 29 (ref 12–20)
CALCIUM SERPL-MCNC: 9.3 MG/DL (ref 8.5–10.1)
CHLORIDE SERPL-SCNC: 108 MMOL/L (ref 97–108)
CHOLEST SERPL-MCNC: 272 MG/DL
CO2 SERPL-SCNC: 22 MMOL/L (ref 21–32)
CREAT SERPL-MCNC: 1 MG/DL (ref 0.55–1.02)
CRP SERPL HS-MCNC: 2 MG/L
DIFFERENTIAL METHOD BLD: ABNORMAL
EOSINOPHIL # BLD: 0.2 K/UL (ref 0–0.4)
EOSINOPHIL NFR BLD: 6 % (ref 0–7)
ERYTHROCYTE [DISTWIDTH] IN BLOOD BY AUTOMATED COUNT: 13.1 % (ref 11.5–14.5)
EST. AVERAGE GLUCOSE BLD GHB EST-MCNC: 120 MG/DL
GLOBULIN SER CALC-MCNC: 3.5 G/DL (ref 2–4)
GLUCOSE BLD STRIP.AUTO-MCNC: 145 MG/DL (ref 65–100)
GLUCOSE BLD STRIP.AUTO-MCNC: 89 MG/DL (ref 65–100)
GLUCOSE SERPL-MCNC: 99 MG/DL (ref 65–100)
HBA1C MFR BLD: 5.8 % (ref 4.2–6.3)
HCT VFR BLD AUTO: 28.8 % (ref 35–47)
HDLC SERPL-MCNC: 59 MG/DL
HDLC SERPL: 4.6 {RATIO} (ref 0–5)
HGB BLD-MCNC: 9.4 G/DL (ref 11.5–16)
IMM GRANULOCYTES # BLD: 0 K/UL (ref 0–0.04)
IMM GRANULOCYTES NFR BLD AUTO: 0 % (ref 0–0.5)
LDLC SERPL CALC-MCNC: 174.4 MG/DL (ref 0–100)
LIPID PROFILE,FLP: ABNORMAL
LYMPHOCYTES # BLD: 1.2 K/UL (ref 0.8–3.5)
LYMPHOCYTES NFR BLD: 38 % (ref 12–49)
MAGNESIUM SERPL-MCNC: 2 MG/DL (ref 1.6–2.4)
MCH RBC QN AUTO: 30.4 PG (ref 26–34)
MCHC RBC AUTO-ENTMCNC: 32.6 G/DL (ref 30–36.5)
MCV RBC AUTO: 93.2 FL (ref 80–99)
MONOCYTES # BLD: 0.4 K/UL (ref 0–1)
MONOCYTES NFR BLD: 13 % (ref 5–13)
NEUTS SEG # BLD: 1.4 K/UL (ref 1.8–8)
NEUTS SEG NFR BLD: 42 % (ref 32–75)
NRBC # BLD: 0 K/UL (ref 0–0.01)
NRBC BLD-RTO: 0 PER 100 WBC
PLATELET # BLD AUTO: 186 K/UL (ref 150–400)
PMV BLD AUTO: 8.7 FL (ref 8.9–12.9)
POTASSIUM SERPL-SCNC: 3.6 MMOL/L (ref 3.5–5.1)
PROT SERPL-MCNC: 7 G/DL (ref 6.4–8.2)
RBC # BLD AUTO: 3.09 M/UL (ref 3.8–5.2)
SERVICE CMNT-IMP: ABNORMAL
SERVICE CMNT-IMP: NORMAL
SODIUM SERPL-SCNC: 139 MMOL/L (ref 136–145)
T3FREE SERPL-MCNC: 3.3 PG/ML (ref 2.2–4)
T4 FREE SERPL-MCNC: 1.1 NG/DL (ref 0.8–1.5)
TRIGL SERPL-MCNC: 193 MG/DL (ref ?–150)
VLDLC SERPL CALC-MCNC: 38.6 MG/DL
WBC # BLD AUTO: 3.2 K/UL (ref 3.6–11)

## 2018-11-16 PROCEDURE — 97116 GAIT TRAINING THERAPY: CPT

## 2018-11-16 PROCEDURE — 36415 COLL VENOUS BLD VENIPUNCTURE: CPT

## 2018-11-16 PROCEDURE — 97161 PT EVAL LOW COMPLEX 20 MIN: CPT

## 2018-11-16 PROCEDURE — 85025 COMPLETE CBC W/AUTO DIFF WBC: CPT

## 2018-11-16 PROCEDURE — 93306 TTE W/DOPPLER COMPLETE: CPT

## 2018-11-16 PROCEDURE — 74011250636 HC RX REV CODE- 250/636: Performed by: INTERNAL MEDICINE

## 2018-11-16 PROCEDURE — 97165 OT EVAL LOW COMPLEX 30 MIN: CPT | Performed by: OCCUPATIONAL THERAPIST

## 2018-11-16 PROCEDURE — 74011000250 HC RX REV CODE- 250: Performed by: INTERNAL MEDICINE

## 2018-11-16 PROCEDURE — 70544 MR ANGIOGRAPHY HEAD W/O DYE: CPT

## 2018-11-16 PROCEDURE — 86141 C-REACTIVE PROTEIN HS: CPT

## 2018-11-16 PROCEDURE — 80061 LIPID PANEL: CPT

## 2018-11-16 PROCEDURE — 97535 SELF CARE MNGMENT TRAINING: CPT | Performed by: OCCUPATIONAL THERAPIST

## 2018-11-16 PROCEDURE — G8980 MOBILITY D/C STATUS: HCPCS

## 2018-11-16 PROCEDURE — 83036 HEMOGLOBIN GLYCOSYLATED A1C: CPT

## 2018-11-16 PROCEDURE — G8978 MOBILITY CURRENT STATUS: HCPCS

## 2018-11-16 PROCEDURE — 84481 FREE ASSAY (FT-3): CPT

## 2018-11-16 PROCEDURE — 70551 MRI BRAIN STEM W/O DYE: CPT

## 2018-11-16 PROCEDURE — 74011250637 HC RX REV CODE- 250/637: Performed by: INTERNAL MEDICINE

## 2018-11-16 PROCEDURE — 82962 GLUCOSE BLOOD TEST: CPT

## 2018-11-16 PROCEDURE — 80053 COMPREHEN METABOLIC PANEL: CPT

## 2018-11-16 PROCEDURE — 83735 ASSAY OF MAGNESIUM: CPT

## 2018-11-16 PROCEDURE — 93880 EXTRACRANIAL BILAT STUDY: CPT

## 2018-11-16 PROCEDURE — G8979 MOBILITY GOAL STATUS: HCPCS

## 2018-11-16 RX ORDER — GUAIFENESIN 100 MG/5ML
81 LIQUID (ML) ORAL DAILY
Qty: 30 TAB | Refills: 6 | Status: SHIPPED | OUTPATIENT
Start: 2018-11-17

## 2018-11-16 RX ADMIN — TIMOLOL MALEATE 1 DROP: 5 SOLUTION OPHTHALMIC at 17:11

## 2018-11-16 RX ADMIN — LETROZOLE 2.5 MG: 2.5 TABLET ORAL at 09:58

## 2018-11-16 RX ADMIN — Medication 10 ML: at 15:05

## 2018-11-16 RX ADMIN — HYDROCHLOROTHIAZIDE 25 MG: 25 TABLET ORAL at 09:57

## 2018-11-16 RX ADMIN — BRIMONIDINE TARTRATE 1 DROP: 2 SOLUTION OPHTHALMIC at 10:02

## 2018-11-16 RX ADMIN — TIMOLOL MALEATE 1 DROP: 5 SOLUTION OPHTHALMIC at 00:36

## 2018-11-16 RX ADMIN — DORZOLAMIDE HYDROCHLORIDE 1 DROP: 20 SOLUTION/ DROPS OPHTHALMIC at 09:59

## 2018-11-16 RX ADMIN — BRIMONIDINE TARTRATE 1 DROP: 2 SOLUTION OPHTHALMIC at 00:36

## 2018-11-16 RX ADMIN — DORZOLAMIDE HYDROCHLORIDE 1 DROP: 20 SOLUTION/ DROPS OPHTHALMIC at 17:09

## 2018-11-16 RX ADMIN — ASPIRIN 81 MG 81 MG: 81 TABLET ORAL at 09:52

## 2018-11-16 RX ADMIN — CLOPIDOGREL BISULFATE 75 MG: 75 TABLET ORAL at 09:51

## 2018-11-16 RX ADMIN — BRIMONIDINE TARTRATE 1 DROP: 2 SOLUTION OPHTHALMIC at 17:12

## 2018-11-16 RX ADMIN — Medication 10 ML: at 00:37

## 2018-11-16 RX ADMIN — TIMOLOL MALEATE 1 DROP: 5 SOLUTION OPHTHALMIC at 10:00

## 2018-11-16 RX ADMIN — ACETAMINOPHEN 650 MG: 325 TABLET ORAL at 11:36

## 2018-11-16 NOTE — PROGRESS NOTES
Occupational Therapy Orders received and medical record reviewed. Pt is off the floor at this time; will defer and continue to follow.

## 2018-11-16 NOTE — PROGRESS NOTES
physical Therapy EVALUATION/DISCHARGE Patient: Susy Joseph (42 y.o. female) Date: 11/16/2018 Primary Diagnosis: CVA (cerebral vascular accident) ([facility] [address] Precautions:     
ASSESSMENT : 
Based on the objective data described below, the patient presents with good strength, rom, balance and mobility skills following admission for CVA yesterday. She was lying in bed when PT arrived. Agreed to therapy eval and cleared by nursing. PTA she reports living with her SO in a 1 story home with 3-4 steps to enter. She was independent in all areas of mobility and ADLs. Denies any falls in the last 12 months. Has a walker and cane at home but does not use them. Currently demonstrates 4/5 strength in bilateral lower extremities, independent bed mobility, sit to stand and bed to chair transfers. Ambulation was independent for 125 feet with a normal gait pattern. Soria score of 51/56 indicates a low fall risk at this time. No needs for PT or DME. Will sign off at this time. Skilled physical therapy is not indicated at this time. PLAN : 
Discharge Recommendations: None Further Equipment Recommendations for Discharge: none SUBJECTIVE:  
Patient stated I'm still independent.  OBJECTIVE DATA SUMMARY:  
HISTORY:   
Past Medical History:  
Diagnosis Date  Arthritis  Breast cancer ([facility] [address] 04/2017  
 lumpectomy left breast, radiation  Cancer ([facility] [address] breast cancer- left  Cerebrovascular accident (stroke) ([facility] [address]   
 no deficiets  Diabetes mellitus type 2, controlled ([facility] [address]  Gallstones   
 asymptomatic, pt denies having  GERD (gastroesophageal reflux disease) dx with resolution of symptoms on ppi- pt denies having  Hypercholesterolemia  Hypertension  Ill-defined condition   
 gout  Long term current use of anticoagulant therapy  Non-nicotine vapor product user   
 used to use  PAD (peripheral artery disease) ([facility] [address] 2/20/2013  
 right SFA angioplasty and athrectomy  Primary open angle glaucoma  Radiation therapy complication  Solitary lung nodule 5/9/2017  Type II diabetes mellitus, uncontrolled ([facility] [address] 2/1/2017 Past Surgical History:  
Procedure Laterality Date  HX HEENT Bilateral   
 eye lids surgery  VASCULAR SURGERY PROCEDURE UNLIST  April 2016  
 right leg stent Prior Level of Function/Home Situation: she reports living with her SO in a 1 story home with 3-4 steps to enter. She was independent in all areas of mobility and ADLs. Denies any falls in the last 12 months. Has a walker and cane at home but does not use them. Personal factors and/or comorbidities impacting plan of care: None Home Situation Home Environment: Private residence # Steps to Enter: 3 One/Two Story Residence: One story Living Alone: No 
Support Systems: Family member(s) Patient Expects to be Discharged to[de-identified] Private residence Current DME Used/Available at Home: Cane, straight, Walker, rolling EXAMINATION/PRESENTATION/DECISION MAKING:  
Critical Behavior: 
Neurologic State: Alert Orientation Level: Oriented X4 Cognition: Appropriate for age attention/concentration, Follows commands Hearing: Auditory Auditory Impairment: None Skin:   
Edema:  
Range Of Motion: 
AROM: Within functional limits PROM: Within functional limits Strength:   
Strength: Within functional limits Tone & Sensation:  
Tone: Normal 
  
  
  
  
Sensation: Intact Coordination: 
Coordination: Within functional limits Vision:  
  
Functional Mobility: 
Bed Mobility: 
Rolling: Independent Supine to Sit: Independent Scooting: Independent Transfers: 
Sit to Stand: Independent Stand to Sit: Independent Bed to Chair: Independent Balance:  
Sitting: Intact Standing: Intact Ambulation/Gait Training: 
Distance (ft): 125 Feet (ft) Assistive Device: Gait belt Ambulation - Level of Assistance: Independent Gait Description (WDL): Exceptions to WDL(Normal gait pattern) Gait Abnormalities: (Normal fluid gait) Functional Measure: 
Denisn Malik Balance Test: 
 
Sitting to Standing: 3 Standing Unsupported: 4 Sitting with Back Unsupported: 4 Standing to Sittin Transfers: 4 Standing Unsupported with Eyes Closed: 4 Standing Unsupported with Feet Together: 4 Reach Forward with Outstretched Arm: 4  Object: 4 Turn to Look Over Shoulders: 4 Turn 360 Degrees: 4 Alternate Foot on Step/Stool: 4 Standing Unsupported One Foot in Front: 2 Stand on One Le Total: 51 
 
 
 
56=Maximum possible score;  
0-20=High fall risk 21-40=Moderate fall risk 41-56=Low fall risk Soria Balance Test and G-code impairment scale: 
Percentage of Impairment CH 
 
0% 
 CI 
 
1-19% CJ 
 
20-39% CK 
 
40-59% CL 
 
60-79% CM 
 
80-99% CN  
 
100% Pedro Luis Zapataire Score 0-56 56 45-55 34-44 23-33 12-22 1-11 0  
 
 
 
G codes: In compliance with CMSs Claims Based Outcome Reporting, the following G-code set was chosen for this patient based on their primary functional limitation being treated: The outcome measure chosen to determine the severity of the functional limitation was the Terryann Guthrie with a score of 51/56 which was correlated with the impairment scale. ? Mobility - Walking and Moving Around:  
  - CURRENT STATUS: CI - 1%-19% impaired, limited or restricted  - GOAL STATUS: CI - 1%-19% impaired, limited or restricted  - D/C STATUS:  CI - 1%-19% impaired, limited or restricted Physical Therapy Evaluation Charge Determination History Examination Presentation Decision-Making HIGH Complexity :3+ comorbidities / personal factors will impact the outcome/ POC  HIGH Complexity : 4+ Standardized tests and measures addressing body structure, function, activity limitation and / or participation in recreation  LOW Complexity : Stable, uncomplicated  Other outcome measures Soria  LOW Based on the above components, the patient evaluation is determined to be of the following complexity level: LOW Pain: 
Pain Scale 1: Numeric (0 - 10) Pain Intensity 1: 5 Pain Location 1: Flank Activity Tolerance:  
Good Please refer to the flowsheet for vital signs taken during this treatment. After treatment:  
[x]   Patient left in no apparent distress sitting up in chair 
[]   Patient left in no apparent distress in bed 
[x]   Call bell left within reach [x]   Nursing notified 
[]   Caregiver present 
[]   Bed alarm activated COMMUNICATION/EDUCATION:  
Communication/Collaboration: 
[x]   Fall prevention education was provided and the patient/caregiver indicated understanding. [x]   Patient/family have participated as able and agree with findings and recommendations. []   Patient is unable to participate in plan of care at this time. Findings and recommendations were discussed with: Occupational Therapist, Registered Nurse and  Thank you for this referral. 
Fei Encarnacion, PT Time Calculation: 18 mins

## 2018-11-16 NOTE — H&P
Hospitalist Admission NoteNAME: Gallito Joy :  1950 MRN:  [id number] Date/Time:  11/15/2018 7:04 PM 
 
Patient PCP: Nevaeh Espitia MD 
______________________________________________________________________ Given the patient's current clinical presentation, I have a high level of concern for decompensation if discharged from the emergency department. Complex decision making was performed, which includes reviewing the patient's available past medical records, laboratory results, and x-ray films. My assessment of this patient's clinical condition and my plan of care is as follows. Assessment / Plan: CVA: will check MRI/MRA of brain, Carotid duplex, ECHO and complete stroke protocol, c/w Plavix, Add ASA, get Neurology evaluation. DM: controlled with diet, place SSI, check HbA1C. Glaucoma: c/w eye drops. HTN: c/w HCTZ, use labetalol prn. H/O Breast Cancer: c/w Femara. Code Status: Full Code Surrogate Decision Maker: sister in law INSKIP [id number] DVT Prophylaxis: Lovenox GI Prophylaxis: not indicated Baseline: independent Subjective: CHIEF COMPLAINT: \"I can not see with my left eye\" HISTORY OF PRESENT ILLNESS:    
Gallito Joy is a 76 y.o.  female with PMH of [place], DM, HTN, Hyperlipidemia, H/O CVA, H/O Breast Cancer, presents to the ED with chief complaint of left eye vision loss x Monday. Pt reports seeing her eye Doctor and retinologist today and was sent here from them for further evaluation. Pt reports history of stroke in . Pt reports her eyes were dilated at the appointment today. Pt. Is A/O x 4. Pt. Denies any other symptoms at this time. At this time patient is seated in bed states that she is blind in her left eye, her pupils are still dilated from prior eye fundus at ophthalmologist, she denies chest pain,no SOB, no fever, no N/V no diarrhea, no urinary symptoms, no other associated symptoms. We were asked to admit for work up and evaluation of the above problems. Past Medical History:  
Diagnosis Date  Arthritis  Breast cancer ([facility] 2017  
 lumpectomy left breast, radiation  Cancer ([facility] breast cancer- left  Cerebrovascular accident (stroke) ([facility]   
 no deficiets  Diabetes mellitus type 2, controlled ([facility]  Gallstones   
 asymptomatic, pt denies having  GERD (gastroesophageal reflux disease) dx with resolution of symptoms on ppi- pt denies having  Hypercholesterolemia  Hypertension  Ill-defined condition   
 gout  Long term current use of anticoagulant therapy  Non-nicotine vapor product user   
 used to use  PAD (peripheral artery disease) ([facility] 2013  
 right SFA angioplasty and athrectomy  Primary open angle glaucoma  Radiation therapy complication  Solitary lung nodule 2017  Type II diabetes mellitus, uncontrolled ([facility] 2017 Past Surgical History:  
Procedure Laterality Date  HX HEENT Bilateral   
 eye lids surgery  VASCULAR SURGERY PROCEDURE UNLIST  2016  
 right leg stent Social History Tobacco Use  Smoking status: Former Smoker Types: Cigarettes Last attempt to quit: 3/13/2013 Years since quittin.6  Smokeless tobacco: Never Used  Tobacco comment: estimate is one pack per week Substance Use Topics  Alcohol use: Yes Alcohol/week: 12.6 oz Types: 21 Cans of beer per week Comment: 2 to 3 beers per day per patient Family History Problem Relation Age of Onset  Hypertension Mother  Diabetes Mother  Diabetes Son No Known Allergies Prior to Admission medications Medication Sig Start Date End Date Taking? Authorizing Provider  
acetaminophen (TYLENOL) 500 mg tablet Take 2 Tabs by mouth every six (6) hours as needed for Pain.  Indications: Pain 10/15/18   Serenity Castillo MD  
 gabapentin (NEURONTIN) 300 mg capsule take 1 capsule by mouth twice a day THE DAY PRIOR TO SURGERY 9/13 Patient taking differently: daily. take 1 capsule by mouth twice a day THE DAY PRIOR TO SURGERY 9/13 10/15/18   Mari Art MD  
timolol (TIMOPTIC) 0.5 % ophthalmic solution Administer  to both eyes two (2) times a day. 9/4/18   Provider, Historical  
letrozole [facility]) 2.5 mg tablet take 1 tablet by mouth once daily 8/7/18   Peter SEGOVIA NP  
ibuprofen (MOTRIN) 600 mg tablet Take 1 Tab by mouth every eight (8) hours as needed for Pain. 4/5/18   Mari Art MD  
clopidogrel (PLAVIX) 75 mg tab take 1 tablet by mouth once daily 4/5/18   Mari Art MD  
hydroCHLOROthiazide (HYDRODIURIL) 25 mg tablet Take 1 Tab by mouth as needed. 4/5/18   Mari Art MD  
Blood-Glucose Meter (ACCU-CHEK OLIVIA PLUS METER) misc Test blood sugar daily Patient not taking: Reported on 10/15/2018 12/5/17   Mari Art MD  
glucose blood VI test strips (ACCU-CHEK OLIVIA PLUS TEST STRP) strip Test blood sugar once daily Patient not taking: Reported on 10/15/2018 12/5/17   Mari Art MD  
alcohol swabs (BD SINGLE USE SWABS REGULAR) padm Test blood sugar once daily Patient not taking: Reported on 10/15/2018 12/5/17   Mari Art MD  
[facility] P 0.1 % ophthalmic solution instill 1 drop into both eyes twice a day 10/9/17   Provider, Historical  
dorzolamide (TRUSOPT) 2 % ophthalmic solution instill 1 drop into both eyes twice a day 10/9/17   Provider, Historical  
latanoprost (XALATAN) 0.005 % ophthalmic solution Administer 1 Drop to both eyes nightly. 5/17/16   Chon Comer MD  
brimonidine (ALPHAGAN) 0.15 % ophthalmic solution Administer 1 Drop to both eyes two (2) times a day. 10/26/15   Chon Comer MD  
Blood-Glucose Meter monitoring kit accu chek gabino smartview meter Patient not taking: Reported on 10/15/2018 4/30/15   Chon Comer MD  
 
 
REVIEW OF SYSTEMS:    
 I am not able to complete the review of systems because: The patient is intubated and sedated The patient has altered mental status due to his acute medical problems The patient has baseline aphasia from prior stroke(s) The patient has baseline dementia and is not reliable historian The patient is in acute medical distress and unable to provide information Total of 12 systems reviewed as follows:   
   POSITIVE= underlined text  Negative = text not underlined General:  fever, chills, sweats, generalized weakness, weight loss/gain,  
   loss of appetite Eyes:    blurred vision, eye pain, loss of vision, double vision, vision loss ENT:    rhinorrhea, pharyngitis Respiratory:   cough, sputum production, SOB, SWANSON, wheezing, pleuritic pain  
Cardiology:   chest pain, palpitations, orthopnea, PND, edema, syncope Gastrointestinal:  abdominal pain , N/V, diarrhea, dysphagia, constipation, bleeding Genitourinary:  frequency, urgency, dysuria, hematuria, incontinence Muskuloskeletal :  arthralgia, myalgia, back pain Hematology:  easy bruising, nose or gum bleeding, lymphadenopathy Dermatological: rash, ulceration, pruritis, color change / jaundice Endocrine:   hot flashes or polydipsia Neurological:  headache, dizziness, confusion, focal weakness, paresthesia, Speech difficulties, memory loss, gait difficulty Psychological: Feelings of anxiety, depression, agitation Objective: VITALS:   
Visit Vitals /63 (BP 1 Location: Right arm, BP Patient Position: At rest) Pulse 60 Temp 98 °F (36.7 °C) Resp 11 Ht 5' 3\" (1.6 m) Wt 61.2 kg (135 lb) SpO2 100% BMI 23.91 kg/m² PHYSICAL EXAM: 
 
General:    Alert, cooperative, no distress, appears stated age. HEENT: Atraumatic, anicteric sclerae, pink conjunctivae, left eye blind No oral ulcers, mucosa moist, throat clear, dentition poor Neck:  Supple, symmetrical,  thyroid: non tender Lungs:   Clear to auscultation bilaterally. No Wheezing or Rhonchi. No rales. Chest wall:  No tenderness  No Accessory muscle use. Heart:   Regular  rhythm,  No  murmur   No edema Abdomen:   Soft, non-tender. Not distended. Bowel sounds normal 
Extremities: No cyanosis. No clubbing,   
  Skin turgor normal, Capillary refill normal, Radial   pulse 2+ Skin:     Not pale. Not Jaundiced  No rashes Psych:  Good insight. Not depressed. Not anxious or agitated. Neurologic: EOMs intact. No facial asymmetry. No aphasia or slurred speech. Symmetrical strength, Sensation grossly intact. Alert and oriented X 4.  
 
_______________________________________________________________________ Care Plan discussed with: 
  Comments Patient y Family RN y   
Care Manager Consultant:     
_______________________________________________________________________ Expected  Disposition:  
Home with Family y HH/PT/OT/RN y  
SNF/LTC   
LUDWIG   
________________________________________________________________________ TOTAL TIME:  55 Minutes Critical Care Provided     Minutes non procedure based Comments  
 y Reviewed previous records  
>50% of visit spent in counseling and coordination of care y Discussion with patient and/or family and questions answered 
  
 
________________________________________________________________________ Signed: Riky Perez MD 
 
Procedures: see electronic medical records for all procedures/Xrays and details which were not copied into this note but were reviewed prior to creation of Plan. LAB DATA REVIEWED:   
Recent Results (from the past 24 hour(s)) CBC WITH AUTOMATED DIFF Collection Time: 11/15/18  4:50 PM  
Result Value Ref Range WBC 3.6 3.6 - 11.0 K/uL  
 RBC 3.40 (L) 3.80 - 5.20 M/uL  
 HGB 10.4 (L) 11.5 - 16.0 g/dL HCT 32.3 (L) 35.0 - 47.0 % MCV 95.0 80.0 - 99.0 FL  
 MCH 30.6 26.0 - 34.0 PG  
 MCHC 32.2 30.0 - 36.5 g/dL RDW 13.1 11.5 - 14.5 % PLATELET 042 863 - 083 K/uL MPV 9.2 8.9 - 12.9 FL  
 NRBC 0.0 0  WBC ABSOLUTE NRBC 0.00 0.00 - 0.01 K/uL NEUTROPHILS 46 32 - 75 % LYMPHOCYTES 35 12 - 49 % MONOCYTES 14 (H) 5 - 13 % EOSINOPHILS 5 0 - 7 % BASOPHILS 1 0 - 1 % IMMATURE GRANULOCYTES 0 0.0 - 0.5 % ABS. NEUTROPHILS 1.6 (L) 1.8 - 8.0 K/UL  
 ABS. LYMPHOCYTES 1.2 0.8 - 3.5 K/UL  
 ABS. MONOCYTES 0.5 0.0 - 1.0 K/UL  
 ABS. EOSINOPHILS 0.2 0.0 - 0.4 K/UL  
 ABS. BASOPHILS 0.0 0.0 - 0.1 K/UL  
 ABS. IMM. GRANS. 0.0 0.00 - 0.04 K/UL  
 DF AUTOMATED PROTHROMBIN TIME + INR Collection Time: 11/15/18  4:50 PM  
Result Value Ref Range INR 1.0 0.9 - 1.1 Prothrombin time 9.8 9.0 - 11.1 sec METABOLIC PANEL, COMPREHENSIVE Collection Time: 11/15/18  4:50 PM  
Result Value Ref Range Sodium 139 136 - 145 mmol/L Potassium 3.8 3.5 - 5.1 mmol/L Chloride 107 97 - 108 mmol/L  
 CO2 24 21 - 32 mmol/L Anion gap 8 5 - 15 mmol/L Glucose 102 (H) 65 - 100 mg/dL BUN 32 (H) 6 - 20 MG/DL Creatinine 1.23 (H) 0.55 - 1.02 MG/DL  
 BUN/Creatinine ratio 26 (H) 12 - 20 GFR est AA 53 (L) >60 ml/min/1.73m2 GFR est non-AA 43 (L) >60 ml/min/1.73m2 Calcium 9.8 8.5 - 10.1 MG/DL Bilirubin, total 0.2 0.2 - 1.0 MG/DL  
 ALT (SGPT) 17 12 - 78 U/L  
 AST (SGOT) 21 15 - 37 U/L Alk. phosphatase 73 45 - 117 U/L Protein, total 8.1 6.4 - 8.2 g/dL Albumin 4.0 3.5 - 5.0 g/dL Globulin 4.1 (H) 2.0 - 4.0 g/dL A-G Ratio 1.0 (L) 1.1 - 2.2

## 2018-11-16 NOTE — PROGRESS NOTES
* No surgery found * 
* No surgery found * Bedside and Verbal shift change report given to  Phani Gardner R.N (oncoming nurse) by  Perez Haddad R.N (offgoing nurse). Report included the following information Kardex, MAR, Recent Results and Cardiac Rhythm nsr. [facility] Phone:   [phone] Significant changes during shift:  none Patient Information Scott Pack 
76 y.o. 
11/15/2018  3:59 PM by Bethany Laboy MD. Scott Pack was admitted from Home 
 
Problem List 
 
Patient Active Problem List  
 Diagnosis Date Noted  CVA (cerebral vascular accident) ([address] 11/15/2018  Type 2 diabetes with nephropathy ([address] 10/29/2018  Type 2 diabetes mellitus with diabetic neuropathy ([address] 01/12/2018  Breast cancer of upper-inner quadrant of left female breast ([address] 05/31/2017  Solitary lung nodule 05/09/2017  Tobacco abuse disorder 02/20/2017  Type II diabetes mellitus, uncontrolled ([address] 02/01/2017  Encounter for immunization 02/01/2017  History of stroke 09/28/2015  Nonallopathic lesion of lumbar region, not elsewhere classified 02/19/2015  Leg pain, bilateral 10/16/2014  Hypertension 04/02/2014  PAD (peripheral artery disease) ([address] 04/02/2014  Glaucoma 04/02/2014  Gout 04/02/2014 Past Medical History:  
Diagnosis Date  Arthritis  Breast cancer ([address] 04/2017  
 lumpectomy left breast, radiation  Cancer ([address] breast cancer- left  Cerebrovascular accident (stroke) ([address]   
 no deficiets  Diabetes mellitus type 2, controlled ([address]  Gallstones   
 asymptomatic, pt denies having  GERD (gastroesophageal reflux disease) dx with resolution of symptoms on ppi- pt denies having  Hypercholesterolemia  Hypertension  Ill-defined condition   
 gout  Long term current use of anticoagulant therapy  Non-nicotine vapor product user   
 used to use  PAD (peripheral artery disease) ([address] 2/20/2013  
 right SFA angioplasty and athrectomy  Primary open angle glaucoma  Radiation therapy complication  Solitary lung nodule 5/9/2017  Type II diabetes mellitus, uncontrolled ([facility] 2/1/2017 Core Measures: CVA: Yes Yes CHF:No No 
PNA:No No 
 
Post Op Surgical (If Applicable):  
 
Number times ambulated in hallway past shift:  x1 
Number of times OOB to chair past shift:   x1 
NG Tube: No 
Incentive Spirometer: No 
Drains: No   Volume Dressing Present:  Not applicable Flatus:  Not applicable Activity Status: OOB to Chair Yes Ambulated this shift yes Bed Rest No 
 
Supplemental O2: (If Applicable) NC No 
NRB No 
Venti-mask No 
On  Liters/min LINES AND DRAINS: 
Central Line? No  
 
PICC LINE? No  
 
Urinary Catheter? No  
 
DVT prophylaxis: DVT prophylaxis Med- No 
DVT prophylaxis SCD or AGUILAR- No  
 
Wounds: (If Applicable) Wounds- No 
 
Location Patient Safety: 
 
Falls Score Total Score: 1 Safety Level_______ Bed Alarm On? No 
Sitter? No 
 
Plan for upcoming shift: neuro check every 4 hrs Discharge Plan: No  
 
Active Consults: 
IP CONSULT TO NEUROLOGY

## 2018-11-16 NOTE — PROCEDURES
[facility]  *** FINAL REPORT ***    Name: Ciara Mcbride  MRN: [id number]    Inpatient  : 10 Feb 1950  HIS Order #: [id number]  [id number] [facility] Visit #: [id number]  Date: 2018    TYPE OF TEST: Cerebrovascular Duplex    REASON FOR TEST  CVA    Right Carotid:-             Proximal               Mid                 Distal  cm/s  Systolic  Diastolic  Systolic  Diastolic  Systolic  Diastolic  CCA:     05.4      16.0                            65.0      14.0  Bulb:  ECA:     53.0      13.0  ICA:     56.0      18.0       83.0      27.0       78.0      27.0  ICA/CCA:  0.9       1.3    ICA Stenosis: <50%    Right Vertebral:-  Finding: Antegrade  Sys:       40.0  Karyna:        9.0    Right Subclavian: Normal    Left Carotid:-            Proximal                Mid                 Distal  cm/s  Systolic  Diastolic  Systolic  Diastolic  Systolic  Diastolic  CCA:     92.7      15.0                            59.0      13.0  Bulb:  ECA:     50.0       0.0  ICA:     50.0      19.0       83.0      22.0       88.0      28.0  ICA/CCA:  0.8       1.5    ICA Stenosis: <50%    Left Vertebral:-  Finding: Antegrade  Sys:       77.0  Karyna:       19.0    Left Subclavian: Normal    INTERPRETATION/FINDINGS  PROCEDURE:  Carotid Duplex Examination using B-mode, color and  spectral Doppler of the extracranial cerebrovascular arteries. 1. Bilateral <50% stenosis of the internal carotid arteries. 2. No significant stenosis in the external carotid arteries  bilaterally. 3. Antegrade flow in both vertebral arteries. 4. Normal flow in both subclavian arteries. Plaque Morphology:  1. Calcific plaque in the bulb and right ICA. 2. Calcific plaque in the bulb and left ICA. ADDITIONAL COMMENTS    I have personally reviewed the data relevant to the interpretation of  this  study. TECHNOLOGIST: Arturo Damian RVT  Signed: 2018 10:08 AM    PHYSICIAN: Vivien Blackburn MD  Signed: 2018 05:32 PM

## 2018-11-16 NOTE — CONSULTS
NEUROLOGY NOTE     Chief Complaint   Patient presents with    Vision Change     pt reports she woke up Monday with loss of sight in left eye, went to eye doctor today and was sent over for \"stroke in eye\"       Reason for Consult  I have been asked to see the patient in neurological consultation by Cheyanne Stevens MD to render advice and opinion regarding possible stroke    HPI  Nathan Elizondo is a 76 y.o. female who presents to the hospital because of sudden onset left eye vision loss. When she woke up on 11/12/18, she was not able to see with her left eye. Symptoms are getting better and now she can perceive shapes and shadows. No pain in the eye. She did see ophthalmology and was hx with \"stroke in the eye\". Carotid US is nl. MRI brain is pending.      ROS  A ten system review of constitutional, cardiovascular, respiratory, musculoskeletal, endocrine, skin, SHEENT, genitourinary, psychiatric and neurologic systems was obtained and is unremarkable except as stated in HPI     PMH  Past Medical History:   Diagnosis Date    Arthritis     Breast cancer ([address] 04/2017    lumpectomy left breast, radiation    Cancer ([address]     breast cancer- left    Cerebrovascular accident (stroke) ([address]     no deficiets    Diabetes mellitus type 2, controlled ([address]     Gallstones     asymptomatic, pt denies having    GERD (gastroesophageal reflux disease)     dx with resolution of symptoms on ppi- pt denies having    Hypercholesterolemia     Hypertension     Ill-defined condition     gout    Long term current use of anticoagulant therapy     Non-nicotine vapor product user     used to use    PAD (peripheral artery disease) ([address] 2/20/2013    right SFA angioplasty and athrectomy    Primary open angle glaucoma     Radiation therapy complication     Solitary lung nodule 5/9/2017    Type II diabetes mellitus, uncontrolled ([address] 2/1/2017       FH  Family History   Problem Relation Age of Onset    Hypertension Mother     Diabetes Mother     Diabetes Son        SH  Social History     Socioeconomic History    Marital status:      Spouse name: Not on file    Number of children: Not on file    Years of education: Not on file    Highest education level: Not on file   Social Needs    Financial resource strain: Not on file    Food insecurity - worry: Not on file    Food insecurity - inability: Not on file   [employer] needs - medical: Not on file   [employer] needs - non-medical: Not on file   Occupational History    Not on file   Tobacco Use    Smoking status: Former Smoker     Types: Cigarettes     Last attempt to quit: 3/13/2013     Years since quittin.6    Smokeless tobacco: Never Used    Tobacco comment: estimate is one pack per week   Substance and Sexual Activity    Alcohol use: Yes     Alcohol/week: 12.6 oz     Types: 21 Cans of beer per week     Comment: 2 to 3 beers per day per patient    Drug use: No    Sexual activity: Yes     Partners: Male   Other Topics Concern    Not on file   Social History Narrative    Not on file       ALLERGIES  No Known Allergies    PHYSICAL EXAMINATION:   Patient Vitals for the past 24 hrs:   Temp Pulse Resp BP SpO2   18 1100  (P) 94  (!) (P) 83/59    18 1059 (P) 98 °F (36.7 °C) (P) 80  (P) 110/59 (P) 99 %   18 0804 97.7 °F (36.5 °C) (!) 55 14 132/63 100 %   18 0346 97.7 °F (36.5 °C) 61 12 120/57 99 %   11/15/18 2345 97.9 °F (36.6 °C) 61 14 132/56 100 %   11/15/18 2003 97.5 °F (36.4 °C) 65 14 158/60 100 %   11/15/18 1848 98 °F (36.7 °C) 60 11 151/63 100 %   11/15/18 1843  63 20  100 %   11/15/18 1815  64 14  100 %   11/15/18 1732  60 13  100 %   11/15/18 1707  60 10  100 %   11/15/18 1559 98.2 °F (36.8 °C) 67 16 144/73 100 %        General:   General appearance: Pt is in no acute distress   Distal pulses are preserved    Neurological Examination:   Mental Status:  AAO x3. Speech is fluent.  Follows commands, has normal fund of knowledge, attention, short term recall, comprehension and insight. Cranial Nerves: Visual fields - loss of vn in left eye. . PE and not reactive to light on the left, Extraocular movements are full. Facial sensation intact. Facial movement intact. Hearing intact to conversation. Palate elevates symmetrically. Shoulder shrug symmetric. Tongue midline. Motor: Strength is 5/5 in all 4 ext. Normal tone. No atrophy. Sensation: Normal to light touch    Reflexes: DTRs absent in LE and trace in LE. Plantar responses downgoing. Coordination/Cerebellar: Intact to finger-nose-finger     Gait: deferred    Skin: No significant bruising or lacerations. LAB DATA REVIEWED:    Recent Results (from the past 24 hour(s))   CBC WITH AUTOMATED DIFF    Collection Time: 11/15/18  4:50 PM   Result Value Ref Range    WBC 3.6 3.6 - 11.0 K/uL    RBC 3.40 (L) 3.80 - 5.20 M/uL    HGB 10.4 (L) 11.5 - 16.0 g/dL    HCT 32.3 (L) 35.0 - 47.0 %    MCV 95.0 80.0 - 99.0 FL    MCH 30.6 26.0 - 34.0 PG    MCHC 32.2 30.0 - 36.5 g/dL    RDW 13.1 11.5 - 14.5 %    PLATELET 456 721 - 151 K/uL    MPV 9.2 8.9 - 12.9 FL    NRBC 0.0 0  WBC    ABSOLUTE NRBC 0.00 0.00 - 0.01 K/uL    NEUTROPHILS 46 32 - 75 %    LYMPHOCYTES 35 12 - 49 %    MONOCYTES 14 (H) 5 - 13 %    EOSINOPHILS 5 0 - 7 %    BASOPHILS 1 0 - 1 %    IMMATURE GRANULOCYTES 0 0.0 - 0.5 %    ABS. NEUTROPHILS 1.6 (L) 1.8 - 8.0 K/UL    ABS. LYMPHOCYTES 1.2 0.8 - 3.5 K/UL    ABS. MONOCYTES 0.5 0.0 - 1.0 K/UL    ABS. EOSINOPHILS 0.2 0.0 - 0.4 K/UL    ABS. BASOPHILS 0.0 0.0 - 0.1 K/UL    ABS. IMM.  GRANS. 0.0 0.00 - 0.04 K/UL    DF AUTOMATED     PROTHROMBIN TIME + INR    Collection Time: 11/15/18  4:50 PM   Result Value Ref Range    INR 1.0 0.9 - 1.1      Prothrombin time 9.8 9.0 - 95.0 sec   METABOLIC PANEL, COMPREHENSIVE    Collection Time: 11/15/18  4:50 PM   Result Value Ref Range    Sodium 139 136 - 145 mmol/L    Potassium 3.8 3.5 - 5.1 mmol/L    Chloride 107 97 - 108 mmol/L    CO2 24 21 - 32 mmol/L    Anion gap 8 5 - 15 mmol/L    Glucose 102 (H) 65 - 100 mg/dL    BUN 32 (H) 6 - 20 MG/DL    Creatinine 1.23 (H) 0.55 - 1.02 MG/DL    BUN/Creatinine ratio 26 (H) 12 - 20      GFR est AA 53 (L) >60 ml/min/1.73m2    GFR est non-AA 43 (L) >60 ml/min/1.73m2    Calcium 9.8 8.5 - 10.1 MG/DL    Bilirubin, total 0.2 0.2 - 1.0 MG/DL    ALT (SGPT) 17 12 - 78 U/L    AST (SGOT) 21 15 - 37 U/L    Alk. phosphatase 73 45 - 117 U/L    Protein, total 8.1 6.4 - 8.2 g/dL    Albumin 4.0 3.5 - 5.0 g/dL    Globulin 4.1 (H) 2.0 - 4.0 g/dL    A-G Ratio 1.0 (L) 1.1 - 2.2     SED RATE (ESR)    Collection Time: 11/15/18  4:50 PM   Result Value Ref Range    Sed rate, automated 35 (H) 0 - 30 mm/hr   TSH 3RD GENERATION    Collection Time: 11/15/18  4:50 PM   Result Value Ref Range    TSH 0.55 0.36 - 3.74 uIU/mL   T4, FREE    Collection Time: 11/15/18  4:50 PM   Result Value Ref Range    T4, Free 1.1 0.8 - 1.5 NG/DL   GLUCOSE, POC    Collection Time: 11/15/18  9:30 PM   Result Value Ref Range    Glucose (POC) 157 (H) 65 - 100 mg/dL    Performed by Shiloh Ridley (PCT)    GLUCOSE, POC    Collection Time: 11/16/18  6:27 AM   Result Value Ref Range    Glucose (POC) 89 65 - 100 mg/dL    Performed by Shiloh Ridley (PCT)    CBC WITH AUTOMATED DIFF    Collection Time: 11/16/18  7:45 AM   Result Value Ref Range    WBC 3.2 (L) 3.6 - 11.0 K/uL    RBC 3.09 (L) 3.80 - 5.20 M/uL    HGB 9.4 (L) 11.5 - 16.0 g/dL    HCT 28.8 (L) 35.0 - 47.0 %    MCV 93.2 80.0 - 99.0 FL    MCH 30.4 26.0 - 34.0 PG    MCHC 32.6 30.0 - 36.5 g/dL    RDW 13.1 11.5 - 14.5 %    PLATELET 592 264 - 171 K/uL    MPV 8.7 (L) 8.9 - 12.9 FL    NRBC 0.0 0  WBC    ABSOLUTE NRBC 0.00 0.00 - 0.01 K/uL    NEUTROPHILS 42 32 - 75 %    LYMPHOCYTES 38 12 - 49 %    MONOCYTES 13 5 - 13 %    EOSINOPHILS 6 0 - 7 %    BASOPHILS 0 0 - 1 %    IMMATURE GRANULOCYTES 0 0.0 - 0.5 %    ABS. NEUTROPHILS 1.4 (L) 1.8 - 8.0 K/UL    ABS. LYMPHOCYTES 1.2 0.8 - 3.5 K/UL    ABS. MONOCYTES 0.4 0.0 - 1.0 K/UL    ABS. EOSINOPHILS 0.2 0.0 - 0.4 K/UL    ABS. BASOPHILS 0.0 0.0 - 0.1 K/UL    ABS. IMM. GRANS. 0.0 0.00 - 0.04 K/UL    DF AUTOMATED     MAGNESIUM    Collection Time: 11/16/18  7:45 AM   Result Value Ref Range    Magnesium 2.0 1.6 - 2.4 mg/dL   METABOLIC PANEL, COMPREHENSIVE    Collection Time: 11/16/18  7:45 AM   Result Value Ref Range    Sodium 139 136 - 145 mmol/L    Potassium 3.6 3.5 - 5.1 mmol/L    Chloride 108 97 - 108 mmol/L    CO2 22 21 - 32 mmol/L    Anion gap 9 5 - 15 mmol/L    Glucose 99 65 - 100 mg/dL    BUN 29 (H) 6 - 20 MG/DL    Creatinine 1.00 0.55 - 1.02 MG/DL    BUN/Creatinine ratio 29 (H) 12 - 20      GFR est AA >60 >60 ml/min/1.73m2    GFR est non-AA 55 (L) >60 ml/min/1.73m2    Calcium 9.3 8.5 - 10.1 MG/DL    Bilirubin, total 0.4 0.2 - 1.0 MG/DL    ALT (SGPT) 13 12 - 78 U/L    AST (SGOT) 15 15 - 37 U/L    Alk. phosphatase 61 45 - 117 U/L    Protein, total 7.0 6.4 - 8.2 g/dL    Albumin 3.5 3.5 - 5.0 g/dL    Globulin 3.5 2.0 - 4.0 g/dL    A-G Ratio 1.0 (L) 1.1 - 2.2          HOME MEDS  Prior to Admission Medications   Prescriptions Last Dose Informant Patient Reported? Taking? ALPHAGAN P 0.1 % ophthalmic solution Unknown at Unknown time  Yes No   Sig: instill 1 drop into both eyes twice a day   Blood-Glucose Meter (ACCU-CHEK OLIVIA PLUS METER) misc Unknown at Unknown time  No No   Sig: Test blood sugar daily   Patient not taking: Reported on 10/15/2018   Blood-Glucose Meter monitoring kit Unknown at Unknown time  No No   Sig: accu chek gabino smartview meter   Patient not taking: Reported on 10/15/2018   acetaminophen (TYLENOL) 500 mg tablet Unknown at Unknown time  No No   Sig: Take 2 Tabs by mouth every six (6) hours as needed for Pain.  Indications: Pain   alcohol swabs (BD SINGLE USE SWABS REGULAR) padm Unknown at Unknown time  No No   Sig: Test blood sugar once daily   Patient not taking: Reported on 10/15/2018   brimonidine (ALPHAGAN) 0.15 % ophthalmic solution 11/15/2018 at Unknown time  No Yes   Sig: Administer 1 Drop to both eyes two (2) times a day. clopidogrel (PLAVIX) 75 mg tab 11/15/2018 at Unknown time  No Yes   Sig: take 1 tablet by mouth once daily   dorzolamide (TRUSOPT) 2 % ophthalmic solution 11/15/2018 at Unknown time  Yes Yes   Sig: instill 1 drop into both eyes twice a day   gabapentin (NEURONTIN) 300 mg capsule 11/14/2018 at Unknown time  No Yes   Sig: take 1 capsule by mouth twice a day THE DAY PRIOR TO SURGERY 9/13   Patient taking differently: daily. take 1 capsule by mouth twice a day THE DAY PRIOR TO SURGERY 9/13   glucose blood VI test strips (ACCU-CHEK OLIVIA PLUS TEST STRP) strip Unknown at Unknown time  No No   Sig: Test blood sugar once daily   Patient not taking: Reported on 10/15/2018   hydroCHLOROthiazide (HYDRODIURIL) 25 mg tablet 11/15/2018 at Unknown time  No Yes   Sig: Take 1 Tab by mouth as needed. ibuprofen (MOTRIN) 600 mg tablet Unknown at Unknown time  No No   Sig: Take 1 Tab by mouth every eight (8) hours as needed for Pain.   latanoprost (XALATAN) 0.005 % ophthalmic solution 11/14/2018 at Unknown time  No Yes   Sig: Administer 1 Drop to both eyes nightly. letrozole [facility]) 2.5 mg tablet 11/15/2018 at Unknown time  No Yes   Sig: take 1 tablet by mouth once daily   timolol (TIMOPTIC) 0.5 % ophthalmic solution Unknown at Unknown time  Yes No   Sig: Administer  to both eyes two (2) times a day.       Facility-Administered Medications: None       CURRENT MEDS  Current Facility-Administered Medications   Medication Dose Route Frequency    brimonidine (ALPHAGAN) 0.2 % ophthalmic solution 1 Drop  1 Drop Both Eyes BID    clopidogrel (PLAVIX) tablet 75 mg  75 mg Oral DAILY    dorzolamide (TRUSOPT) 2 % ophthalmic solution 1 Drop  1 Drop Both Eyes TID    gabapentin (NEURONTIN) capsule 300 mg  300 mg Oral BID    hydroCHLOROthiazide (HYDRODIURIL) tablet 25 mg  25 mg Oral DAILY    latanoprost (XALATAN) 0.005 % ophthalmic solution 1 Drop 1 Drop Both Eyes QHS    letrozole (FEMARA) tablet 2.5 mg  2.5 mg Oral DAILY    timolol (TIMOPTIC) 0.5 % ophthalmic solution 1 Drop  1 Drop Both Eyes BID    insulin lispro (HUMALOG) injection   SubCUTAneous AC&HS    sodium chloride (NS) flush 5-10 mL  5-10 mL IntraVENous Q8H    aspirin chewable tablet 81 mg  81 mg Oral DAILY    enoxaparin (LOVENOX) injection 40 mg  40 mg SubCUTAneous Q24H       Stroke workup    MRI Brain  Pending    MRA head  Pending    Carotids:   1. Bilateral <50% stenosis of the internal carotid arteries. 2. No significant stenosis in the external carotid arteries  bilaterally. TTE:   Left ventricle: Systolic function was normal. Ejection fraction was  estimated in the range of 55 % to 60 %. No obvious wall motion  abnormalities identified in the views obtained. Stroke labs:  HgBA1c    Lab Results   Component Value Date/Time    Hemoglobin A1c 5.2 02/01/2017 03:05 PM     LDL   Lab Results   Component Value Date/Time    LDL, calculated 138 (H) 10/16/2018 08:57 AM       IMPRESSION:  Nathan Elizondo is a 76 y.o. female who presents with sudden onset left eye vision loss. Suspect CRAO. Pt was on plavix 75 mg daily at home. Add ASA 81 mg a day. MRI scan pending. Pt cannot tolerate statins. RECOMMENDATIONS:  - MRI brain w/o C - Pending  - MRA head - Pending  - Telemetry  - BP goal is less than 140/90  - Stroke labs (HbA1c, lipid panel) - pending  - Continue ASA 81 mg daily and plavix 75 mg daily  - No statins. Pt has tried 3 statins in the past with side effects. Does not want to try another statin.   - ST/OT/PT eval    MRI brain pending. Will not . ASA 81 mg already added. Call with questions.      Glen Fonseca MD  Neurologist

## 2018-11-16 NOTE — PROGRESS NOTES
Reason for Admission:  CVA RRAT Score:     26 - high risk Resources/supports as identified by patient/family:   Pt's boyfriend and family Top Challenges facing patient (as identified by patient/family and CM): Finances/Medication cost?     No issues - has insurance Transportation? Pt drives and her boyfriend can transport Support system or lack thereof? Good support system Living arrangements? Lives with boyfriend for 27 yrs Self-care/ADLs/Cognition? Independent with her ADL's and IADL's Current Advanced Directive/Advance Care Plan:  Full Code Plan for utilizing home health:    No  
                   
Likelihood of readmission: low Transition of Care Plan:   Home with f/u appts Pt is a 76 y.o  [place] female admitted with a CVA. Pt was alert, oriented resting in bed. Demographic information verified and all is correct. Pt lives with her boyfriend of 32 yrs in a 1 story home with 3 steps to the entrance. Prior to admission, pt was independent with her ADL's and IADL's and driving. Pt states she only was driving during the day. Pt uses a cane and walker. Denies having rehab and home health in the past. Preferred pharmacy is [facility] on [address]. Pt's boyfriend can transport pt home by car. Therapy not making any recommendations. Care Management Interventions PCP Verified by CM: Yes(Dr. Renee Reed) Mode of Transport at Discharge: Other (see comment) Transition of Care Consult (CM Consult): Discharge Planning Discharge Durable Medical Equipment: No(cane, walker ) Physical Therapy Consult: Yes Occupational Therapy Consult: Yes Speech Therapy Consult: Yes Current Support Network: Other, Own Home(pt lives with her boyfriend of 32 yrs in a 1 story home with 3 steps to the entrance) Confirm Follow Up Transport: Family Discharge Location Discharge Placement: Home [place], [address]

## 2018-11-16 NOTE — ED NOTES
Assumed care of pt. from triage. Pt. Presents to the ED with chief complaint of left eye vision loss x Monday. Pt reports seeing her eye Doctor and retinologist today and was sent here from them for further evaluation. Pt reports history of stroke in 2007. Pt reports her eyes were dilated at the appointment today. Pt. Is A/O x 4. Pt. Denies any other symptoms at this time. Pt. Resting comfortably on the stretcher in a position of comfort. Pt. In no acute distress at this time. Call bell within reach. Side rails x 2. Cardiac monitor x 2. Stretcher locked in the lowest position. Pt. Aware of plan to await for MD/PA-C/NP assessment, and patient/family verbalizes understanding. Will continue to monitor.
Dr. Lonny Phalen at bedside.
Patient ambulatory to the restroom.
Pt to CT.
TRANSFER - OUT REPORT: 
 
Verbal report given to Shannon Kern RN on Marquis Kothari  being transferred to [facility] [id number], [facility] for routine progression of care Report consisted of patients Situation, Background, Assessment and  
Recommendations(SBAR). Information from the following report(s) SBAR, Kardex, ED Summary, [facility] and Recent Results was reviewed with the receiving nurse. Lines:  
Peripheral IV 11/15/18 Right Antecubital (Active) Site Assessment Clean, dry, & intact 11/15/2018  4:51 PM  
Phlebitis Assessment 0 11/15/2018  4:51 PM  
Infiltration Assessment 0 11/15/2018  4:51 PM  
Dressing Status Clean, dry, & intact 11/15/2018  4:51 PM  
Hub Color/Line Status Pink 11/15/2018  4:51 PM  
  
 
Opportunity for questions and clarification was provided. Patient transported with: 
 [facility]
Unable to complete pt's visual acuity check due to pt's eyes being dilated from the doctor's office.
room air

## 2018-11-16 NOTE — PROGRESS NOTES
Order acknowledged and chart reviewed. Patient was off floor for testing when PT arrived to room for assessment.  Will check back later for eval. 
 
Aurora Matthews, PT

## 2018-11-16 NOTE — ROUTINE PROCESS
Occupational Therapy Orders received and pt evaluated. Pt participated in OT Evaluation has no OT needs at this time; pt concurs. Pt was orthostatic this tx session and nursing was informed. Pt has L eye vision impairment-medical testing is in progress. Pt is able to see shadows and movement in her left eye. Her R eye is at baseline and pt reports good vision. Pt wears reading glasses. Educated pt on turning head to compensate for L eye vision loss for safety. Pt states awareness of compensatory strategies and reports that she's been practicing for several days now. Pt did not report any issues with reading, nor understanding what she read this date. Pt was provided information/resources re: availability of vision services i.e. [facility] low vision program and driving assessment program (pt does not drive at night at baseline) and the [place] Dept of blind and vision impaired website in handout form. Full OT note to follow.

## 2018-11-16 NOTE — PROGRESS NOTES
Speech pathology note Reviewed chart and note patient admitted with L vision loss. MRI pending. Note patient passed the STAND and a regular diet was ordered. Patient denied decline in motor speech, language, or cognitive function, and patient A&Ox4. Patient also denied any difficulty swallowing. Formal SLP evaluation not clinically indicated at this time. Will sign off. Please re-consult if further needs arise. Thank you. Erich Fabry, Virgel Leys., CCC-SLP

## 2018-11-16 NOTE — ROUTINE PROCESS
-Please complete MRI History and Safety Screening Form for this patient using KARDEX only under Orders Requiring a Screening Form: 
 

## 2018-11-16 NOTE — PROGRESS NOTES
Bedside and Verbal shift change report given to bebeto molina (oncoming nurse) by Kenyetta Knight (offgoing nurse). Report included the following information SBAR, Kardex, Intake/Output, MAR and Recent Results.

## 2018-11-16 NOTE — PROGRESS NOTES
Hospitalist Progress NoteNAME: Dom Richardson :  1950 MRN:  [id number] Admit date: 11/15/2018 Today's date: 18 PCP: MD Wellington Mancia M.D. Cell [phone] Assessment / Plan: CVA POA with several days left eye visual changes Saw ophthalmologist yesterday, felt to have retinal artery event Sent to ED Tele no atrial fib MRI/MRA of brain No evidence of acute infarction. Evidence of chronic small vessel ischemic change Asymmetric in the left hemisphere. No major territorial infarct Carotid duplex < 50% ICA stenosis, antegrade vertebral flow ECHO LVEF 55 to 60%, no wall motion changes, normal RV function Mild to Mod MR, no AS 
C/w Plavix and ASA Plan D/C to home No driving x 6 months till improved DM type 2 POA Controlled with diet, SSI HgBa1c 5.8 Hyperlipidemia POA .4 Glaucoma POA c/w eye drops. Essential HTN POA c/w HCTZ, use labetalol prn. H/O Breast Cancer: c/w Femara. Code Status: Full Code Surrogate Decision Maker: sister in law INSKIP [phone] DVT Prophylaxis: Lovenox GI Prophylaxis: not indicated Baseline: independent Body mass index is 23.91 kg/m². Subjective: Chief Complaint / Reason for Physician Visit \"still cannot see out of my left eye\". Discussed with RN events overnight. Still cannot see other than light in the left eye No HA or focal motor or sensory changes No CP or SOB Review of Systems: 
Symptom Y/N Comments  Symptom Y/N Comments Fever/Chills n   Chest Pain n   
Poor Appetite    Edema Cough n   Abdominal Pain n   
Sputum    Joint Pain SOB/SWANSON n   Headache Nausea/vomit n   Tolerating PT/OT n Diarrhea n   Tolerating Diet y Constipation    Other Could NOT obtain due to:   
 
Objective: VITALS:  
Last 24hrs VS reviewed since prior progress note. Most recent are: Patient Vitals for the past 24 hrs: 
 Temp Pulse Resp BP SpO2  
11/16/18 1646 97.6 °F (36.4 °C) 76 18 126/61 100 % 11/16/18 1513 97.9 °F (36.6 °C) 65 18 122/57 100 % 11/16/18 1100  94  (!) 83/59   
11/16/18 1059 98 °F (36.7 °C) 80 18 110/59 99 % 11/16/18 0804 97.7 °F (36.5 °C) (!) 55 14 132/63 100 % 11/16/18 0346 97.7 °F (36.5 °C) 61 12 120/57 99 % 11/15/18 2345 97.9 °F (36.6 °C) 61 14 132/56 100 % 11/15/18 2003 97.5 °F (36.4 °C) 65 14 158/60 100 % 11/15/18 1848 98 °F (36.7 °C) 60 11 151/63 100 % 11/15/18 1843  63 20  100 % Intake/Output Summary (Last 24 hours) at 11/16/2018 1831 Last data filed at 11/16/2018 1400 Gross per 24 hour Intake 480 ml Output  Net 480 ml Wt Readings from Last 12 Encounters:  
11/15/18 61.2 kg (135 lb)  
11/16/18 61.2 kg (135 lb)  
11/16/18 61.2 kg (135 lb) 10/15/18 63.3 kg (139 lb 9.6 oz) 10/04/18 61.8 kg (136 lb 3.2 oz)  
07/31/18 59.9 kg (132 lb) 04/05/18 56.7 kg (125 lb) 01/31/18 55.8 kg (123 lb) 01/12/18 56.3 kg (124 lb 3.2 oz) 10/16/17 55.9 kg (123 lb 3.2 oz) 10/04/17 55.3 kg (122 lb)  
09/27/17 54.9 kg (121 lb) PHYSICAL EXAM: 
General: WD, WN. Alert, cooperative, no acute distress   
EENT:  Not able to count fingers left eye, can see light,  Anicteric sclerae. MMM Resp:  CTA bilaterally, no wheezing or rales. No accessory muscle use CV:  Regular  rhythm,  No edema GI:  Soft, Non distended, Non tender.  +Bowel sounds, no rebound Neurologic:  Alert and oriented X 3, normal speech, non focal motor exam 
Psych:   Not anxious nor agitated Skin:  No rashes. No jaundice Reviewed most current lab test results and cultures  YES Reviewed most current radiology test results   YES Review and summation of old records today    NO Reviewed patient's current orders and MAR    YES 
PMH/SH reviewed - no change compared to H&P 
________________________________________________________________________ Care Plan discussed with: 
 Comments Patient x Family RN x Care Manager Consultant Multidiciplinary team rounds were held today with , nursing, pharmacist and clinical coordinator. Patient's plan of care was discussed; medications were reviewed and discharge planning was addressed. ________________________________________________________________________ Total NON critical care TIME: 35   Minutes Total CRITICAL CARE TIME Spent:   Minutes non procedure based Comments >50% of visit spent in counseling and coordination of care    
________________________________________________________________________ Ed Mckeon MD  
 
Procedures: see electronic medical records for all procedures/Xrays and details which were not copied into this note but were reviewed prior to creation of Plan. LABS: 
I reviewed today's most current labs and imaging studies. Pertinent labs include: 
Recent Labs 11/16/18 
0745 11/15/18 
1650 WBC 3.2* 3.6 HGB 9.4* 10.4* HCT 28.8* 32.3*  
 218 Recent Labs 11/16/18 
0745 11/15/18 
1650  139  
K 3.6 3.8  107 CO2 22 24 GLU 99 102* BUN 29* 32* CREA 1.00 1.23* CA 9.3 9.8 MG 2.0  --   
ALB 3.5 4.0 TBILI 0.4 0.2 SGOT 15 21 ALT 13 17 INR  --  1.0

## 2018-11-16 NOTE — PROGRESS NOTES
Occupational Therapy EVALUATION/discharge Patient: Susy Joseph (33 y.o. female) Date: 11/16/2018 Primary Diagnosis: CVA (cerebral vascular accident) ([facility] [address] Precautions: standard ASSESSMENT:  
Based on the objective data described below, the patient presents with near baseline level of functioning for adls and mobility. Pt reports pain in her R side and impaired vision in her L eye. She has no discernable weakness nor sensory impairment. Pt reports that her CA medication makes her dizzy at times and feeling fatigued. Today, pt was orthostatic in standing, but asymptomatic and nursing was informed. Pt reports no therapy needs at this time. Provided possible  Resources for vision loss/impairment and educated on strategies for safety during adls . Pt verbalizes understanding reporting that she's already been living with the vision loss for the past several days. (saw her ophthamologist first who sent her to ED) Further skilled acute occupational therapy is not indicated at this time. Discharge Recommendations: None, but may want to follow up with low vision resources provided Further Equipment Recommendations for Discharge: none SUBJECTIVE:  
Patient stated I've already been doing that.   (thorough scanning of environment and heightened awareness of environment to compensate for L vision loss) OBJECTIVE DATA SUMMARY:  
HISTORY:  
Past Medical History:  
Diagnosis Date  Arthritis  Breast cancer ([facility] [address] 04/2017  
 lumpectomy left breast, radiation  Cancer ([facility] [address] breast cancer- left  Cerebrovascular accident (stroke) ([facility] [address]   
 no deficiets  Diabetes mellitus type 2, controlled ([facility] [address]  Gallstones   
 asymptomatic, pt denies having  GERD (gastroesophageal reflux disease) dx with resolution of symptoms on ppi- pt denies having  Hypercholesterolemia  Hypertension  Ill-defined condition   
 gout  Long term current use of anticoagulant therapy  Non-nicotine vapor product user   
 used to use  PAD (peripheral artery disease) ([facility] [address] 2/20/2013  
 right SFA angioplasty and athrectomy  Primary open angle glaucoma  Radiation therapy complication  Solitary lung nodule 5/9/2017  Type II diabetes mellitus, uncontrolled ([facility] [address] 2/1/2017 Past Surgical History:  
Procedure Laterality Date  HX HEENT Bilateral   
 eye lids surgery  VASCULAR SURGERY PROCEDURE UNLIST  April 2016  
 right leg stent Prior Level of Function/Environment/Context: Pt lives with her significant other who works daily. Pt cares for her 2 y.o family member. She is able to drive except at night. Pt reports independence in adls and IADLs. Occupations in which the patient is/was successful, what are the barriers preventing that success: medical condition-r/o cva and new L eyevision loss Performance Patterns (routines, roles, habits, and rituals):  
Personal Interests and/or values:  
Expanded or extensive additional review of patient history: is on CA medication which makes her sleepy and at times dizzy Medical testing continues Home Situation Home Environment: Private residence # Steps to Enter: 3 One/Two Story Residence: One story Living Alone: No 
Support Systems: Family member(s) Patient Expects to be Discharged to[de-identified] Private residence Current DME Used/Available at Home: Cane, straight, Walker, rolling Tub or Shower Type: Tub/Shower combination Hand dominance: Right EXAMINATION OF PERFORMANCE DEFICITS: 
Cognitive/Behavioral Status: 
Neurologic State: Alert Orientation Level: Appropriate for age Cognition: Appropriate decision making; Appropriate for age attention/concentration; Follows commands Perception: Appears intact Perseveration: No perseveration noted Safety/Judgement: Awareness of environment; Fall prevention;Home safety; Insight into deficits Skin: intact Edema: none observed Hearing: Auditory Auditory Impairment: None Vision/Perceptual:   
Tracking: Able to track stimulus in all quadrants w/o difficulty Visual Fields: (pt does not drive at night at baseline) Diplopia: No   
Acuity: Impaired near vision; Impaired far vision(newly impaired vision L eye, baseline glaucoma, R eye intact) Corrective Lenses: Reading glasses Range of Motion: BUEs:  
AROM: Within functional limits PROM: Within functional limits Strength: BUEs:   strength is equal bilaterally Strength: Within functional limits Coordination: 
Coordination: Within functional limits Fine Motor Skills-Upper: Left Intact; Right Intact Gross Motor Skills-Upper: Left Intact; Right Intact Tone & Sensation: 
 
Tone: Normal 
Sensation: Intact Balance: 
Sitting: Intact Standing: Intact Functional Mobility and Transfers for ADLs:Bed Mobility: 
Rolling: Independent Supine to Sit: Independent Sit to Supine: Independent Scooting: Independent Transfers: 
Sit to Stand: Independent Stand to Sit: Independent Bed to Chair: Independent Bathroom Mobility: Independent Toilet Transfer : Independent ADL Assessment: 
Feeding: Independent Bathing: Stand-by assistance Upper Body Dressing: Independent Lower Body Dressing: Independent Toileting: Independent Meal Preparation: (pt has been independent in adls/IADLs and cares for 2 y.o. ) ADL Intervention and task modifications: 
  
Pt educated on role of OT and OT plan of care. Pt perfromed bed mobility and bathroom mobility. She was orthostatic, but without symptoms. Pt is able to bend down to the floor to  a dropped object without difficulty and no LOB Occupational Therapy Pooja Amol Pt has L eye vision impairment-medical testing is in progress. Pt is able to see shadows and movement in her left eye.   Her R eye is at baseline and pt reports good vision. Pt wears reading glasses. Educated pt on turning head to compensate for L eye vision loss for safety. Pt states awareness of compensatory strategies and reports that she's been practicing for several days now. Pt did not report any issues with reading, nor problems understanding what she read this date. Pt was provided information/resources re: availability of vision services i.e. [facility] low vision program and driving assessment program (pt does not drive at night at baseline) and the [facility] website in handout form. Cognitive Retraining Safety/Judgement: Awareness of environment; Fall prevention;Home safety; Insight into deficits Therapeutic Exercise: 
Encouraged sitting up in the chair throughout the day to maintain strength and endurance for adls. Pt wishes to rest at this time. Functional Measure: 
Barthel Index: 
 
Bathin Bladder: 10 Bowels: 10 
Groomin Dressing: 10 Feeding: 10 Mobility: 0 Stairs: 5 Toilet Use: 10 Transfer (Bed to Chair and Back): 15 Total: 80 Barthel and G-code impairment scale: 
Percentage of impairment CH 
0% CI 
1-19% CJ 
20-39% CK 
40-59% CL 
60-79% CM 
80-99% CN 
100% Barthel Score 0-100 100 99-80 79-60 59-40 20-39 1-19 
 0 Barthel Score 0-20 20 17-19 13-16 9-12 5-8 1-4 0 The Barthel ADL Index: Guidelines 1. The index should be used as a record of what a patient does, not as a record of what a patient could do. 2. The main aim is to establish degree of independence from any help, physical or verbal, however minor and for whatever reason. 3. The need for supervision renders the patient not independent. 4. A patient's performance should be established using the best available evidence. Asking the patient, friends/relatives and nurses are the usual sources, but direct observation and common sense are also important. However direct testing is not needed. 5. Usually the patient's performance over the preceding 24-48 hours is important, but occasionally longer periods will be relevant. 6. Middle categories imply that the patient supplies over 50 per cent of the effort. 7. Use of aids to be independent is allowed. Minna Oropeza., Barthel, D.W. ([id number]). Functional evaluation: the Barthel Index. [address] [facility] (14)2. TAURUS Harris, Marito Sun., Sarah Rogers., [place], [address] (1999). Measuring the change indisability after inpatient rehabilitation; comparison of the responsiveness of the Barthel Index and Functional [place] Measure. Journal of Neurology, Neurosurgery, and Psychiatry, 66(4), 966-700. CHOCO Zacarias, WADE [place], & Judah Patten M.A. (2004.) Assessment of post-stroke quality of life in cost-effectiveness studies: The usefulness of the Barthel Index and the EuroQoL-5D. [facility], 13, 789-66 FM testing 66/66 G codes: In compliance with CMSs Claims Based Outcome Reporting, the following G-code set was chosen for this patient based on their primary functional limitation being treated: The outcome measure chosen to determine the severity of the functional limitation was the Barthel Index with a score of 80/100 which was correlated with the impairment scale. ? Self Care:  
  - CURRENT STATUS: CI - 1%-19% impaired, limited or restricted  - GOAL STATUS: CI - 1%-19% impaired, limited or restricted  - D/C STATUS:  CI - 1%-19% impaired, limited or restricted Occupational Therapy Evaluation Charge Determination History Examination Decision-Making MEDIUM Complexity : Expanded review of history including physical, cognitive and psychosocial  history  MEDIUM Complexity : 3-5 performance deficits relating to physical, cognitive , or psychosocial skils that result in activity limitations and / or participation restrictions MEDIUM Complexity : Patient may present with comorbidities that affect occupational performnce. Miniml to moderate modification of tasks or assistance (eg, physical or verbal ) with assesment(s) is necessary to enable patient to complete evaluation Based on the above components, the patient evaluation is determined to be of the following complexity level: MEDIUM Pain: 
Pain Scale 1: Numeric (0 - 10) Pain Intensity 1: 5 Pain Location 1: Flank Pain Orientation 1: Right Pain Description 1: Aching  Hurts with movement Pain Intervention(s) 1: Medication (see MAR) Activity Tolerance:  
Orthostatic hypotension, asymptomatic Please refer to the flowsheet for vital signs taken during this treatment. After treatment:  
[]  Patient left in no apparent distress sitting up in chair 
[x]  Patient left in no apparent distress in bed 
[x]  Call bell left within reach [x]  Nursing notified 
[]  Caregiver present 
[]  Bed alarm activated COMMUNICATION/EDUCATION:  
Communication/Collaboration: 
[x]      Home safety education was provided and the patient/caregiver indicated understanding. [x]      Patient/family have participated as able and agree with findings and recommendations. []      Patient is unable to participate in plan of care at this time. Findings and recommendations were discussed with: Physical Therapist and Registered Nurse Martir Keating OTR/KELLEY Time Calculation: 27 mins

## 2018-11-17 NOTE — DISCHARGE INSTRUCTIONS
Patient Discharge Instructions    Petrona Ontiveros / [id number] : 1950    Admitted 11/15/2018 Discharged: 2018         DISCHARGE DIAGNOSIS:       CVA (cerebral vascular accident) ([address] (11/15/2018) with left vision loss         What to do at Home    1. Recommended diet: Diabetic Diet    2. Recommended activity: Activity as tolerated    3. If you experience any of the following symptoms then please call your primary care physician or return to the emergency room if you cannot get hold of your doctor:   Fevers > 100.5, chills   Nausea or vomiting, persistent diarrhea > 24 hours   Blood in stool or black stools   Chest pain or SOB      Follow-up Information     Follow up With Specialties Details Why Contact Info    Brynn Gitelman, MD Family Practice Go on 2018 Please follow up on 2018 at 11:30 arriving at 11:15 to register with photo ID, insurance card and current list of medication with a copy of your hospital discharge paperwork [address]  [phone]      Curtis Anderson MD Neurology Go on 2018 Please follow up on 2018 at 9:40 arriving at 9:10 to register with photo ID, insurance card and current list of medication  [facility] [address]  [address] [phone]          Add aspirin to the plavix    No driving a car/truck/motorcycle for 6 months till vision returns to normal and cleared by your doctors      Information obtained by :  I understand that if any problems occur once I am at home I am to contact my physician. I understand and acknowledge receipt of the instructions indicated above.                                                                                                                                            Physician's or R.N.'s Signature                                                                  Date/Time Patient or Representative Signature                                                          Date/Time

## 2018-11-17 NOTE — DISCHARGE SUMMARY
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 Essential HTN POA c/w HCTZ, use labetalol prn. H/O Breast Cancer: c/w Femara. Code Status: Full Code    Discharge Medications:  Current Discharge Medication List      START taking these medications    Details   aspirin 81 mg chewable tablet Take 1 Tab by mouth daily. Qty: 30 Tab, Refills: 6         CONTINUE these medications which have NOT CHANGED    Details   gabapentin (NEURONTIN) 300 mg capsule take 1 capsule by mouth twice a day THE DAY PRIOR TO SURGERY   Qty: 180 Cap, Refills: 1      letrozole (FEMARA) 2.5 mg tablet take 1 tablet by mouth once daily  Qty: 30 Tab, Refills: 6    Associated Diagnoses: Malignant neoplasm of upper-inner quadrant of left breast in female, estrogen receptor positive (HCC)      clopidogrel (PLAVIX) 75 mg tab take 1 tablet by mouth once daily  Qty: 90 Tab, Refills: 3    Associated Diagnoses: History of stroke      hydroCHLOROthiazide (HYDRODIURIL) 25 mg tablet Take 1 Tab by mouth as needed. Qty: 90 Tab, Refills: 3    Associated Diagnoses: Essential hypertension      dorzolamide (TRUSOPT) 2 % ophthalmic solution instill 1 drop into both eyes twice a day  Refills: 0      latanoprost (XALATAN) 0.005 % ophthalmic solution Administer 1 Drop to both eyes nightly. Qty: 2.5 mL, Refills: 1      brimonidine (ALPHAGAN) 0.15 % ophthalmic solution Administer 1 Drop to both eyes two (2) times a day. Qty: 15 mL, Refills: 6    Associated Diagnoses: Glaucoma      acetaminophen (TYLENOL) 500 mg tablet Take 2 Tabs by mouth every six (6) hours as needed for Pain.  Indications: Pain  Qty: 40 Tab, Refills: 1      timolol (TIMOPTIC) 0.5 % ophthalmic solution Administer  to both eyes two (2) times a day. Refills: 0      ibuprofen (MOTRIN) 600 mg tablet Take 1 Tab by mouth every eight (8) hours as needed for Pain. Qty: 60 Tab, Refills: 2      Blood-Glucose Meter (ACCU-CHEK OLIVIA PLUS METER) misc Test blood sugar daily  Qty: 1 Each, Refills: 0      glucose blood VI test strips (ACCU-CHEK OLIVIA PLUS TEST STRP) strip Test blood sugar once daily  Qty: 50 Strip, Refills: 11      alcohol swabs (BD SINGLE USE SWABS REGULAR) padm Test blood sugar once daily  Qty: 50 Pad, Refills: 11      ALPHAGAN P 0.1 % ophthalmic solution instill 1 drop into both eyes twice a day  Refills: 0      Blood-Glucose Meter monitoring kit accu chek gabino smartview meter  Qty: 1 Kit, Refills: 0             Follow-up Information     Follow up With Specialties Details Why Contact Info    Hermes Mas MD Family Practice Go on 11/26/2018 Please follow up on November 26, 2018 at 11:30 arriving at 11:15 to register with photo ID, insurance card and current list of medication with a copy of your hospital discharge paperwork [address] [address]. [address].  [phone]      Rafy Briseno MD Neurology Go on 12/5/2018 Please follow up on December 5, 2018 at 9:40 arriving at 9:10 to register with photo ID, insurance card and current list of medication  [facility] [address] [place] [address] Rd  [phone]            Time spent on discharge:   I spent greater than 30 minutes on discharge, seeing and examining the patient, reconciling home meds and new meds, coordinating care with case management, doing the discharge papers and the D/C summary    Discharge disposition: home    Discharge Condition: Stable    Summary of admission H+P(copied from Dr Eliazar Curling Note):     CHIEF COMPLAINT: \"I can not see with my left eye\"     HISTORY OF PRESENT ILLNESS:     Kyara Escobar is a 76 y.o.    American female with PMH of [place], DM, HTN, Hyperlipidemia, H/O CVA, H/O Breast Cancer, presents to the ED with chief complaint of left eye vision loss x Monday. Pt reports seeing her eye Doctor and retinologist today and was sent here from them for further evaluation. Pt reports history of stroke in 2007. Pt reports her eyes were dilated at the appointment today. Pt. Is A/O x 4. Pt. Denies any other symptoms at this time. At this time patient is seated in bed states that she is blind in her left eye, her pupils are still dilated from prior eye fundus at ophthalmologist, she denies chest pain,no SOB, no fever, no N/V no diarrhea, no urinary symptoms, no other associated symptoms. We were asked to admit for work up and evaluation of the above problems.           Past Medical History:   Diagnosis Date    Arthritis      Breast cancer ([address] 04/2017     lumpectomy left breast, radiation    Cancer ([address]       breast cancer- left    Cerebrovascular accident (stroke) ([address]       no deficiets    Diabetes mellitus type 2, controlled ([address]      Gallstones       asymptomatic, pt denies having    GERD (gastroesophageal reflux disease)       dx with resolution of symptoms on ppi- pt denies having    Hypercholesterolemia      Hypertension      Ill-defined condition       gout    Long term current use of anticoagulant therapy      Non-nicotine vapor product user       used to use    PAD (peripheral artery disease) ([address] 2/20/2013     right SFA angioplasty and athrectomy    Primary open angle glaucoma      Radiation therapy complication      Solitary lung nodule 5/9/2017    Type II diabetes mellitus, uncontrolled ([address]      Admit head CT FINDINGS:  The ventricles and sulci are normal in size, shape and configuration and  midline. Mild low density within the left periventricular white matter  nonspecific but may be due to small vessel ischemic change.  There is no  intracranial hemorrhage, extra-axial collection, mass, mass effect or midline  shift. The basilar cisterns are open. No acute infarct is identified. The bone  windows demonstrate no abnormalities. The visualized portions of the paranasal  sinuses and mastoid air cells are clear. IMPRESSION:   Mild nonspecific white matter disease, no acute intracranial    MRI brain FINDINGS:  BRAIN PARENCHYMA:  No acute infarct. Mild left periventricular white matter  signal abnormality, chronic lacunar infarcts in the left thalamus and basal  ganglia, and patchy T2 hyperintensity in the ellie, consistent with chronic small  vessel ischemic change. INTRACRANIAL HEMORRHAGE: None. CSF SPACES:  Normal in size and morphology for the patient's age. BASAL CISTERNS:  Patent. MIDLINE SHIFT: None. VASCULAR SYSTEM:  See report of MRA of head. PARANASAL SINUSES AND MASTOID AIR CELLS:  Clear. VISUALIZED ORBITS:  No significant abnormalities. VISUALIZED UPPER CERVICAL SPINE:  No significant abnormalities. SELLA:  No enlargement or  focal abnormality. SKULL BASE:  No significant abnormalities. CALVARIUM:  Normal.  IMPRESSION:    1. No evidence of acute infarction. 2. Evidence of chronic small vessel ischemic change, asymmetric in the left  hemisphere. No major territorial infarct. MRA brain FINDINGS:  The distal vertebral arteries are not completely included on this study. . The  basilar artery and its branches are normal. There is occlusion of the left  middle cerebral artery at its origin. This is likely chronic, particularly in  view of the fact that there is no acute infarct on the concurrent MRI. The  bilateral internal carotid arteries, bilateral anterior cerebral arteries, and  right middle cerebral artery are widely patent. The proximal ophthalmic arteries  are bilaterally patent. No aneurysms are demonstrated. The bilateral posterior cerebral arteries have a  fetal origin. IMPRESSION:    1.  Left middle cerebral artery occlusion, probably chronic. 2. Otherwise negative MRA of the head. Echo TTE results  LEFT VENTRICLE: Size was normal. Systolic function was normal. Ejection  fraction was estimated in the range of 55 % to 60 %. No obvious wall  motion abnormalities identified in the views obtained. Wall thickness was normal.  RIGHT VENTRICLE: The size was normal. Systolic function was normal. Wall  thickness was normal.  LEFT ATRIUM: Size was normal.  RIGHT ATRIUM: Size was normal.  MITRAL VALVE: Normal valve structure. There was normal leaflet separation. DOPPLER: The transmitral velocity was within the normal range. There was  no evidence for stenosis. There was mild to moderate regurgitation. AORTIC VALVE: The valve was trileaflet. Leaflets exhibited normal  thickness and normal cuspal separation. DOPPLER: Transaortic velocity was  within the normal range. There was no stenosis. There was no regurgitation. TRICUSPID VALVE: Normal valve structure. There was normal leaflet  separation. DOPPLER: The transtricuspid velocity was within the normal  range. There was no evidence for tricuspid stenosis. There was trivial  regurgitation. PULMONIC VALVE: Not well visualized, but normal Doppler findings. AORTA: The root exhibited normal size. PERICARDIUM: Insignificant pericardial effusion and/or pericardial fat was  present. Carotid doppler INTERPRETATION/FINDINGS  PROCEDURE:  Carotid Duplex Examination using B-mode, color and  spectral Doppler of the extracranial cerebrovascular arteries. 1. Bilateral <50% stenosis of the internal carotid arteries. 2. No significant stenosis in the external carotid arteries bilaterally. 3. Antegrade flow in both vertebral arteries. 4. Normal flow in both subclavian arteries. Plaque Morphology:  1. Calcific plaque in the bulb and right ICA.   2. Calcific plaque in the bulb and left ICA.     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 Evidence of chronic small vessel ischemic change           Asymmetric in the left hemisphere. No major territorial infarct  Carotid duplex < 50% ICA stenosis, antegrade vertebral flow  ECHO LVEF 55 to 60%, no wall motion changes, normal RV function         Mild to Mod MR, no AS  Seen by neurology, recommended adding ASA to plavix  C/w Plavix and ASA  Plan D/C to home  No driving x 6 months and till cleared by MDs, d/w patient    DM type 2 POA  Controlled with diet, SSI  HgBa1c 5.8    Hyperlipidemia POA .4  Failed multiple statins, did not want to try  Encouraged her to consider, benefits explained, she will discuss with her PCP    Glaucoma POA c/w eye drops. Essential HTN POA c/w HCTZ, use labetalol prn. H/O Breast Cancer: c/w Femara. Code Status: Full Code    Surrogate Decision Maker: sister in law Yonathan Anand [phone]    DVT Prophylaxis: Lovenox    GI Prophylaxis: not indicated    Baseline: independent    Body mass index is 23.91 kg/m². Subjective:     Chief Complaint / Reason for Physician Visit f/u CVA  \"still cannot see out of my left eye\". Discussed with RN events overnight. Still cannot see other than light in the left eye  No HA or focal motor or sensory changes  No CP or SOB     Review of Systems:  Symptom Y/N Comments  Symptom Y/N Comments   Fever/Chills n   Chest Pain n    Poor Appetite    Edema     Cough n   Abdominal Pain n    Sputum    Joint Pain     SOB/SWANSON n   Headache     Nausea/vomit n   Tolerating PT/OT n    Diarrhea n   Tolerating Diet y    Constipation    Other       Could NOT obtain due to:      Objective:     VITALS:   Last 24hrs VS reviewed since prior progress note.  Most recent are:  Patient Vitals for the past 24 hrs:   Temp Pulse Resp BP SpO2   11/16/18 1646 97.6 °F (36.4 °C) 76 18 126/61 100 %   11/16/18 1513 97.9 °F (36.6 °C) 65 18 122/57 100 %   11/16/18 1100  94  (!) 83/59    11/16/18 1059 98 °F (36.7 °C) 80 18 110/59 99 %   11/16/18 0804 97.7 °F (36.5 °C) (!) 55 14 132/63 100 %   11/16/18 0346 97.7 °F (36.5 °C) 61 12 120/57 99 %   11/15/18 2345 97.9 °F (36.6 °C) 61 14 132/56 100 %   11/15/18 2003 97.5 °F (36.4 °C) 65 14 158/60 100 %       Intake/Output Summary (Last 24 hours) at 11/16/2018 1932  Last data filed at 11/16/2018 1400  Gross per 24 hour   Intake 480 ml   Output    Net 480 ml        Wt Readings from Last 12 Encounters:   11/15/18 61.2 kg (135 lb)   11/16/18 61.2 kg (135 lb)   11/16/18 61.2 kg (135 lb)   10/15/18 63.3 kg (139 lb 9.6 oz)   10/04/18 61.8 kg (136 lb 3.2 oz)   07/31/18 59.9 kg (132 lb)   04/05/18 56.7 kg (125 lb)   01/31/18 55.8 kg (123 lb)   01/12/18 56.3 kg (124 lb 3.2 oz)   10/16/17 55.9 kg (123 lb 3.2 oz)   10/04/17 55.3 kg (122 lb)   09/27/17 54.9 kg (121 lb)       PHYSICAL EXAM:  General: WD, WN. Alert, cooperative, no acute distress    EENT:  Not able to count fingers left eye, can see light,  Anicteric sclerae. MMM  Resp:  CTA bilaterally, no wheezing or rales. No accessory muscle use  CV:  Regular  rhythm,  No edema  GI:  Soft, Non distended, Non tender.  +Bowel sounds, no rebound  Neurologic:  Alert and oriented X 3, normal speech, non focal motor exam  Psych:   Not anxious nor agitated  Skin:  No rashes. No jaundice    Reviewed most current lab test results and cultures  YES  Reviewed most current radiology test results   YES  Review and summation of old records today    NO  Reviewed patient's current orders and MAR    YES  PMH/SH reviewed - no change compared to H&P  ________________________________________________________________________  Care Plan discussed with:    Comments   Patient x    Family      RN x    Care Manager     Consultant                        Multidiciplinary team rounds were held today with , nursing, pharmacist and clinical coordinator.   Patient's plan of care was discussed; medications were reviewed and discharge planning was addressed. ________________________________________________________________________      Comments   >50% of visit spent in counseling and coordination of care     ________________________________________________________________________  Cem Durbin MD     Procedures: see electronic medical records for all procedures/Xrays and details which were not copied into this note but were reviewed prior to creation of Plan. LABS:  I reviewed today's most current labs and imaging studies.   Pertinent labs include:  Recent Labs     11/16/18  0745 11/15/18  1650   WBC 3.2* 3.6   HGB 9.4* 10.4*   HCT 28.8* 32.3*    218     Recent Labs     11/16/18  0745 11/15/18  1650    139   K 3.6 3.8    107   CO2 22 24   GLU 99 102*   BUN 29* 32*   CREA 1.00 1.23*   CA 9.3 9.8   MG 2.0  --    ALB 3.5 4.0   TBILI 0.4 0.2   SGOT 15 21   ALT 13 17   INR  --  1.0

## 2018-11-20 ENCOUNTER — PATIENT OUTREACH (OUTPATIENT)
Dept: FAMILY MEDICINE CLINIC | Age: 68
End: 2018-11-20

## 2018-11-20 NOTE — PROGRESS NOTES
Hospital Discharge Follow-Up Date/Time:  2018 11:39 AM 
 
Patient was admitted to [facility] on 11-15-18 and discharged on 18 for: 
 
Discharge Diagnoses: 
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 Essential HTN POA c/w HCTZ, use labetalol prn. H/O Breast Cancer: c/w Femara. Code Status: Full Code The physician discharge summary was available at the time of outreach. Patient was contacted within two business days of discharge. Challenges reviewed with the provider:  
- Patient reports that she does not check her home blood glucose levels. - Patient states,\"I am still blind in my left eye, but the doctor says my sight might come back. \"  
- Patient reports taking Gabapentin 300mg every night, not taking 300mg twice a day as ordered. Method of communication with provider :chart routing Inpatient RRAT score: 26 on 18. Was this a readmission? no  
Patient stated reason for the readmission: not applicable Nurse Navigator (NN) contacted the patient by telephone to perform post hospital discharge assessment. Verified name and  with patient as identifiers. Provided introduction to self, and explanation of the Nurse Navigator role. Reviewed discharge instructions and red flags with patient who verbalized understanding. Patient given an opportunity to ask questions and does not have any further questions or concerns at this time. The patient agrees to contact the PCP office for questions related to their healthcare. NN provided contact information for future reference. Disease Specific:   N/A Summary of patient's top problems: 1. Patient reports that she does not check her home blood glucose levels.  Patient states her blood glucose levels are well controlled and she does not need to check her home blood glucose levels. 2. Patient states,\"I am still blind in my left eye, but the doctor says my sight might come back. \" Patient does not present as being concerned about her lack of left eye sight and stated, \"I really don't even notice it, I don't pay attention to it. \" 3. Patient reports taking Gabapentin 300mg every night, not taking 300mg twice a day as ordered. Notation made on medication reconciliation 11-20-18. Home Health orders at discharge: none Home Health company: not applicable Date of initial visit: not applicable Durable Medical Equipment ordered/company: none upon discharge Durable Medical Equipment received: none upon discharge Barriers to care? ineffective coping, lack of knowledge about disease, stages of grief Advance Care Planning:  
Does patient have an Advance Directive:  not on file Medications per After Visit Summary from [facility] dated 11-16-18:  
Discharge Medications: 
Current Discharge Medication List  
START taking these medications  
  Details  
aspirin 81 mg chewable tablet Take 1 Tab by mouth daily. Qty: 30 Tab, Refills: 6  
   
   
    
CONTINUE these medications which have NOT CHANGED  
  Details  
gabapentin (NEURONTIN) 300 mg capsule take 1 capsule by mouth twice a day THE DAY PRIOR TO SURGERY 9/13 Qty: 180 Cap, Refills: 1  
   
letrozole (FEMARA) 2.5 mg tablet take 1 tablet by mouth once daily 
Qty: 30 Tab, Refills: 6  
  Associated Diagnoses: Malignant neoplasm of upper-inner quadrant of left breast in female, estrogen receptor positive (HCC)  
   
clopidogrel (PLAVIX) 75 mg tab take 1 tablet by mouth once daily 
Qty: 90 Tab, Refills: 3  
  Associated Diagnoses: History of stroke  
   
hydroCHLOROthiazide (HYDRODIURIL) 25 mg tablet Take 1 Tab by mouth as needed. Qty: 90 Tab, Refills: 3  
  Associated Diagnoses: Essential hypertension  
   
dorzolamide (TRUSOPT) 2 % ophthalmic solution instill 1 drop into both eyes twice a day Refills: 0  
   
latanoprost (XALATAN) 0.005 % ophthalmic solution Administer 1 Drop to both eyes nightly. Qty: 2.5 mL, Refills: 1  
   
brimonidine (ALPHAGAN) 0.15 % ophthalmic solution Administer 1 Drop to both eyes two (2) times a day. Qty: 15 mL, Refills: 6  
  Associated Diagnoses: Glaucoma  
   
acetaminophen (TYLENOL) 500 mg tablet Take 2 Tabs by mouth every six (6) hours as needed for Pain. Indications: Pain 
Qty: 40 Tab, Refills: 1  
   
timolol (TIMOPTIC) 0.5 % ophthalmic solution Administer  to both eyes two (2) times a day. Refills: 0  
   
ibuprofen (MOTRIN) 600 mg tablet Take 1 Tab by mouth every eight (8) hours as needed for Pain. Qty: 60 Tab, Refills: 2  
  Blood-Glucose Meter (ACCU-CHEK OLIVIA PLUS METER) misc Test blood sugar daily 
Qty: 1 Each, Refills: 0  
   
glucose blood VI test strips (ACCU-CHEK OLIVIA PLUS TEST STRP) strip Test blood sugar once daily 
Qty: 50 Strip, Refills: 11  
   
alcohol swabs (BD SINGLE USE SWABS REGULAR) padm Test blood sugar once daily 
Qty: 50 Pad, Refills: 11  
   
ALPHAGAN P 0.1 % ophthalmic solution instill 1 drop into both eyes twice a day Refills: 0  
   
Blood-Glucose Meter monitoring kit accu chek gabino smartview meter Qty: 1 Kit, Refills: 0  
   
 
Medication reconciliation was performed with patient, who verbalizes understanding of administration of home medications. There were no barriers to obtaining medications identified at this time and patient states her medications are affordable. Patient is taking Gabapentin 300mg every night, she is not taking Gabapentin 300mg twice a day as ordered, notation made on medication reconciliation 11-20-18. Referral to Pharm D needed: no  
 
Current Outpatient Medications Medication Sig  
 aspirin 81 mg chewable tablet Take 1 Tab by mouth daily.  acetaminophen (TYLENOL) 500 mg tablet Take 2 Tabs by mouth every six (6) hours as needed for Pain. Indications: Pain  gabapentin (NEURONTIN) 300 mg capsule take 1 capsule by mouth twice a day THE DAY PRIOR TO SURGERY 9/13 (Patient taking differently: 300 mg daily. take 1 capsule by mouth twice a day THE DAY PRIOR TO SURGERY 9/13)  timolol (TIMOPTIC) 0.5 % ophthalmic solution Administer  to both eyes two (2) times a day.  letrozole (FEMARA) 2.5 mg tablet take 1 tablet by mouth once daily  ibuprofen (MOTRIN) 600 mg tablet Take 1 Tab by mouth every eight (8) hours as needed for Pain.  clopidogrel (PLAVIX) 75 mg tab take 1 tablet by mouth once daily  hydroCHLOROthiazide (HYDRODIURIL) 25 mg tablet Take 1 Tab by mouth as needed.  ALPHAGAN P 0.1 % ophthalmic solution instill 1 drop into both eyes twice a day  dorzolamide (TRUSOPT) 2 % ophthalmic solution instill 1 drop into both eyes twice a day  latanoprost (XALATAN) 0.005 % ophthalmic solution Administer 1 Drop to both eyes nightly.  brimonidine (ALPHAGAN) 0.15 % ophthalmic solution Administer 1 Drop to both eyes two (2) times a day.  Blood-Glucose Meter (ACCU-CHEK OLIVIA PLUS METER) misc Test blood sugar daily (Patient not taking: Reported on 10/15/2018)  glucose blood VI test strips (ACCU-CHEK OLIVIA PLUS TEST STRP) strip Test blood sugar once daily (Patient not taking: Reported on 10/15/2018)  alcohol swabs (BD SINGLE USE SWABS REGULAR) padm Test blood sugar once daily (Patient not taking: Reported on 10/15/2018)  Blood-Glucose Meter monitoring kit accu chek gabino smartview meter (Patient not taking: Reported on 10/15/2018) No current facility-administered medications for this visit. [facility] follow up appointment(s):  
Future Appointments Date Time Provider Sapna Jackie 11/26/2018 11:30 AM Nevaeh Espitia MD [id number]  
12/5/2018  9:40 AM Jud Lauren MD [address]  
1/8/2019  9:40 AM Scooter Gomez MD [id number] [facility] follow up appointment(s): none noted at this time. [facility]  information provided as a resource Goals  Attends follow-up appointments as directed. 11-20-18: Patient has MIYA scheduled with Dr. Rebeca Arevalo. Silvino/PCP on 11-26-18, neuro follow-up is scheduled with Dr. Cierra Miller on 12-5-18, and surgical follow-up is scheduled with Dr. Michael Dillon on 1-8-19. Patient states she plans to attend all appointments as scheduled and her sister-in-law, Ana Zheng, will provide transportation. THANH  
  
  Returns to baseline activity level. 11-20-18: Patient states she has not returned to her baseline activity level and states, \"I am still blind in my left eye, but the doctor says my sight might come back. \" [address] [place] [address] resources [address] to maintain patient in the community (ie. Home Health, DME equipment, refer to, medication assistant plan, etc.)   
  11-20-18: Patient resides with her significant other, Lucy Rowe, and reports that her sister-in-law, Ana Zheng, resides nearby. Patient states she has several neighbors that she can also call upon should she need to do so and patient reports a good support network.  Milagros Keepers

## 2018-11-26 ENCOUNTER — OFFICE VISIT (OUTPATIENT)
Dept: FAMILY MEDICINE CLINIC | Age: 68
End: 2018-11-26

## 2018-11-26 VITALS
WEIGHT: 135 LBS | HEART RATE: 58 BPM | TEMPERATURE: 96.8 F | SYSTOLIC BLOOD PRESSURE: 138 MMHG | DIASTOLIC BLOOD PRESSURE: 69 MMHG | BODY MASS INDEX: 23.92 KG/M2 | RESPIRATION RATE: 18 BRPM | HEIGHT: 63 IN | OXYGEN SATURATION: 100 %

## 2018-11-26 DIAGNOSIS — I73.9 PAD (PERIPHERAL ARTERY DISEASE) (HCC): ICD-10-CM

## 2018-11-26 DIAGNOSIS — I10 ESSENTIAL HYPERTENSION: Primary | ICD-10-CM

## 2018-11-26 DIAGNOSIS — R91.1 SOLITARY LUNG NODULE: ICD-10-CM

## 2018-11-26 DIAGNOSIS — Z86.73 HISTORY OF STROKE: ICD-10-CM

## 2018-11-26 DIAGNOSIS — C50.212 MALIGNANT NEOPLASM OF UPPER-INNER QUADRANT OF LEFT FEMALE BREAST, UNSPECIFIED ESTROGEN RECEPTOR STATUS (HCC): ICD-10-CM

## 2018-11-26 RX ORDER — ERGOCALCIFEROL 1.25 MG/1
CAPSULE ORAL
Refills: 0 | COMMUNITY
Start: 2018-10-05 | End: 2019-09-03 | Stop reason: SDUPTHER

## 2018-11-26 RX ORDER — PRAVASTATIN SODIUM 40 MG/1
TABLET ORAL
Refills: 0 | COMMUNITY
Start: 2018-10-15 | End: 2020-02-03

## 2018-11-26 NOTE — PROGRESS NOTES
HISTORY OF PRESENT ILLNESS Pilo Devlin is a 76 y.o. female. HPI Went to ER to get a CT and duplex US of the neck but was kept admitted for one day for possibility of stroke but the results was indicative of chronic problems, Today visit mostly is a felisa chronic CVA from the past to make sure that there is medication compliance, that there is no sign or sxs of DVT, nor sign and sxs for infection, wa on Plavix and the new low dose asa 81 mg was added to her new MAR, pt is also with breast and lung cancers and currently on Letrozole, awaiting to be seen by the neurologist for further care coming up soon, has been on Plavix for >6 yrs for PAD,  
post-opdate at 5, stating that she is feeling well, and doing well, denies fever chills no night sweat, no breathing problem on inspiration or expiration, no leg pain nor swelling, having no dyspnea no chest pain,  pt currently has no sign of gross bleeding,  Stopped her cigs for >1 yr,  patient is currently not constipated, active with walking, no pain,   no sign of bleeding, retired and not have to worry about working , having good family support at this time, will be seen the Neurologist on 12/05/2018, having no other concern, and complaint today,not nauseated bowl/ appetite is back, just seen the breast cancer DOC, none of her meds changed recently except for the addition of baby aspirin, Current Outpatient Medications Medication Sig Dispense Refill  VITAMIN D2 50,000 unit capsule Take  by mouth every seven (7) days. 0  
 aspirin 81 mg chewable tablet Take 1 Tab by mouth daily. 30 Tab 6  
 acetaminophen (TYLENOL) 500 mg tablet Take 2 Tabs by mouth every six (6) hours as needed for Pain. Indications: Pain 40 Tab 1  
 gabapentin (NEURONTIN) 300 mg capsule take 1 capsule by mouth twice a day THE DAY PRIOR TO SURGERY 9/13 (Patient taking differently: 300 mg daily.  take 1 capsule by mouth twice a day THE DAY PRIOR TO SURGERY 9/13) 180 Cap 1  
  timolol (TIMOPTIC) 0.5 % ophthalmic solution Administer  to both eyes two (2) times a day.  0  
 letrozole (FEMARA) 2.5 mg tablet take 1 tablet by mouth once daily 30 Tab 6  ibuprofen (MOTRIN) 600 mg tablet Take 1 Tab by mouth every eight (8) hours as needed for Pain. 60 Tab 2  clopidogrel (PLAVIX) 75 mg tab take 1 tablet by mouth once daily 90 Tab 3  
 hydroCHLOROthiazide (HYDRODIURIL) 25 mg tablet Take 1 Tab by mouth as needed. 90 Tab 3  Blood-Glucose Meter (ACCU-CHEK OLIVIA PLUS METER) misc Test blood sugar daily 1 Each 0  
 glucose blood VI test strips (ACCU-CHEK OLIVIA PLUS TEST STRP) strip Test blood sugar once daily 50 Strip 11  
 alcohol swabs (BD SINGLE USE SWABS REGULAR) padm Test blood sugar once daily 50 Pad 11  
 ALPHAGAN P 0.1 % ophthalmic solution instill 1 drop into both eyes twice a day  0  
 dorzolamide (TRUSOPT) 2 % ophthalmic solution instill 1 drop into both eyes twice a day  0  
 latanoprost (XALATAN) 0.005 % ophthalmic solution Administer 1 Drop to both eyes nightly. 2.5 mL 1  
 brimonidine (ALPHAGAN) 0.15 % ophthalmic solution Administer 1 Drop to both eyes two (2) times a day. 15 mL 6  Blood-Glucose Meter monitoring kit accu chek gabino smartview meter 1 Kit 0  
 pravastatin (PRAVACHOL) 40 mg tablet TAKE 1 TABLET BY MOUTH NIGHTLY  0 No Known Allergies Past Medical History:  
Diagnosis Date  Arthritis  Breast cancer ([address] 04/2017  
 lumpectomy left breast, radiation  Cancer ([facility] breast cancer- left  Cerebrovascular accident (stroke) ([facility]   
 no deficiets  CVA (cerebral vascular accident) ([facility]   
 11/12/2018  Diabetes mellitus type 2, controlled ([facility]  Gallstones   
 asymptomatic, pt denies having  GERD (gastroesophageal reflux disease) dx with resolution of symptoms on ppi- pt denies having  Hypercholesterolemia  Hypertension  Ill-defined condition   
 gout  Long term current use of anticoagulant therapy  Non-nicotine vapor product user   
 used to use  PAD (peripheral artery disease) ([facility] 2013  
 right SFA angioplasty and athrectomy  Primary open angle glaucoma  Radiation therapy complication  Solitary lung nodule 2017  Type II diabetes mellitus, uncontrolled ([facility] 2017 Past Surgical History:  
Procedure Laterality Date  HX HEENT Bilateral   
 eye lids surgery  VASCULAR SURGERY PROCEDURE UNLIST  2016  
 right leg stent Family History Problem Relation Age of Onset  Hypertension Mother  Diabetes Mother  Diabetes Son   
 
Social History Tobacco Use  Smoking status: Former Smoker Types: Cigarettes Last attempt to quit: 3/13/2013 Years since quittin.7  Smokeless tobacco: Never Used  Tobacco comment: estimate is one pack per week Substance Use Topics  Alcohol use: Yes Alcohol/week: 12.6 oz Types: 21 Cans of beer per week Comment: 2 to 3 beers per day per patient Lab Results Component Value Date/Time WBC 3.2 (L) 2018 07:45 AM  
 HGB 9.4 (L) 2018 07:45 AM  
 Hemoglobin (POC) 10.5 (L) 2017 07:57 AM  
 HCT 28.8 (L) 2018 07:45 AM  
 Hematocrit (POC) 31 (L) 2017 07:57 AM  
 PLATELET 026  07:45 AM  
 MCV 93.2 2018 07:45 AM  
 
Lab Results Component Value Date/Time  GFR est non-AA 55 (L) 2018 07:45 AM  
 GFRNA, POC >60 2017 07:57 AM  
 GFR est AA >60 2018 07:45 AM  
 GFRAA, POC >60 2017 07:57 AM  
 Creatinine 1.00 2018 07:45 AM  
 Creatinine (POC) 0.9 2017 07:57 AM  
 BUN 29 (H) 2018 07:45 AM  
 BUN (POC) 15 2017 07:57 AM  
 Sodium 139 2018 07:45 AM  
 Sodium (POC) 139 2017 07:57 AM  
 Potassium 3.6 2018 07:45 AM  
 Potassium (POC) 4.3 2017 07:57 AM  
 Chloride 108 2018 07:45 AM  
 Chloride (POC) 109 (H) 2017 07:57 AM  
 CO2 22 2018 07:45 AM  
 Magnesium 2.0 11/16/2018 07:45 AM  
  
Review of Systems Constitutional: Negative for chills and fever. HENT: Negative for ear pain and nosebleeds. Eyes: Negative for blurred vision, pain and discharge. Respiratory: Negative for shortness of breath. Cardiovascular: Negative for chest pain and leg swelling. Gastrointestinal: Negative for constipation, diarrhea, nausea and vomiting. Genitourinary: Negative for frequency. Musculoskeletal: Negative for joint pain. Skin: Negative for itching and rash. Neurological: Negative for headaches. Psychiatric/Behavioral: Negative for depression. The patient is not nervous/anxious. Physical Exam  
Constitutional: She is oriented to person, place, and time. She appears well-developed and well-nourished. HENT:  
Head: Normocephalic and atraumatic. Eyes: Conjunctivae and EOM are normal.  
Neck: Normal range of motion. Neck supple. Cardiovascular: Normal rate, regular rhythm and normal heart sounds. No murmur heard. Pulmonary/Chest: Effort normal and breath sounds normal.  
Abdominal: Soft. Bowel sounds are normal. She exhibits no distension. Musculoskeletal: Normal range of motion. She exhibits no edema. Neurological: She is alert and oriented to person, place, and time. Skin: No erythema. Psychiatric: Her behavior is normal.  
Nursing note and vitals reviewed. ASSESSMENT and PLAN Diagnoses and all orders for this visit: 1. Essential hypertension 2. Malignant neoplasm of upper-inner quadrant of left female breast, unspecified estrogen receptor status ([facility] [facility] 3. PAD (peripheral artery disease) ([facility] 4. History of stroke 
-     REFERRAL TO NEUROLOGY 5.  Solitary lung nodule 
 
 
 
 
stable, but need to start to do a healthy lifestyle: cont with Aspirin therapy, acei and cholesterol lowering agents,  use will monitor bp control, addressed smoking cessation, stress reduction,  exercise program were all counseled and advised. Do not smoke or allow others to smoke around you, If you need help quitting, encouraged to talk to me about stop-smoking programs and medicines, Smoking makes a stroke more likely, These can increase the pt's chances of quitting for good, The pt was told to Limit alcohol to 1-2 drinks a day , Lose weight to maintain ideal body wt,  A healthy weight will help the pt keep the heart and body healthy, The pt was told to be active, the pt was informed which type and level of activity is safe for the pt, The pt was also told to eat heart-healthy foods, like fruits, vegetables, and high-fiber foods.

## 2018-11-26 NOTE — PATIENT INSTRUCTIONS

## 2018-11-26 NOTE — PROGRESS NOTES
Name and  verified Chief Complaint Patient presents with  
[facility] Follow Up  
  CVA discharged on 2018 Health Maintenance reviewed-discussed with patient. 1. Have you been to the ER, urgent care clinic since your last visit? Hospitalized since your last visit? yes, see above note. 2. Have you seen or consulted any other health care providers outside of the [address] [place] [address] since your last visit? Include any pap smears or colon screening.  no

## 2019-03-11 DIAGNOSIS — Z17.0 MALIGNANT NEOPLASM OF UPPER-INNER QUADRANT OF LEFT BREAST IN FEMALE, ESTROGEN RECEPTOR POSITIVE (HCC): ICD-10-CM

## 2019-03-11 DIAGNOSIS — C50.212 MALIGNANT NEOPLASM OF UPPER-INNER QUADRANT OF LEFT BREAST IN FEMALE, ESTROGEN RECEPTOR POSITIVE (HCC): ICD-10-CM

## 2019-03-11 RX ORDER — LETROZOLE 2.5 MG/1
TABLET, FILM COATED ORAL
Qty: 30 TAB | Refills: 6 | Status: SHIPPED | OUTPATIENT
Start: 2019-03-11 | End: 2019-08-23 | Stop reason: SDUPTHER

## 2019-03-12 NOTE — TELEPHONE ENCOUNTER
Last visit:11/26/18  Next visit: not scheduled  Previous refill: 7/31/17 ( 30 tablets + 0 refills)      **Pharmacy sent over request for allopurinol refill. Appears medication was discontinued as of 4/5/18 per chart. *    Thank you,   Mayelin Patel CPhT

## 2019-03-18 RX ORDER — ALLOPURINOL 300 MG/1
TABLET ORAL
Qty: 30 TAB | Refills: 0 | Status: SHIPPED | OUTPATIENT
Start: 2019-03-18 | End: 2020-01-10 | Stop reason: SDUPTHER

## 2019-04-19 DIAGNOSIS — Z86.73 HISTORY OF STROKE: ICD-10-CM

## 2019-04-19 NOTE — TELEPHONE ENCOUNTER
Last Visit: 11/26/2018 with MD Abbe Rene  Next Appointment: noted to f/u in 6 months  Previous Refill Encounter(s): 04/05/2018 per MD Abbe Rene #[id number] with 3 refills     Requested Prescriptions     Pending Prescriptions Disp Refills    clopidogrel (PLAVIX) 75 mg tab 90 Tab 3     Sig: Take 1 Tab by mouth daily.

## 2019-04-22 RX ORDER — CLOPIDOGREL BISULFATE 75 MG/1
75 TABLET ORAL DAILY
Qty: 90 TAB | Refills: 3 | Status: SHIPPED | OUTPATIENT
Start: 2019-04-22 | End: 2020-03-23

## 2019-05-04 DIAGNOSIS — I10 ESSENTIAL HYPERTENSION: ICD-10-CM

## 2019-05-06 RX ORDER — HYDROCHLOROTHIAZIDE 25 MG/1
TABLET ORAL
Qty: 90 TAB | Refills: 3 | Status: SHIPPED | OUTPATIENT
Start: 2019-05-06 | End: 2020-01-10 | Stop reason: ALTCHOICE

## 2019-05-21 ENCOUNTER — HOSPITAL ENCOUNTER (OUTPATIENT)
Dept: MAMMOGRAPHY | Age: 69
Discharge: HOME | End: 2019-05-21
Attending: SURGERY
Payer: MEDICARE

## 2019-05-21 ENCOUNTER — HOSPITAL ENCOUNTER (OUTPATIENT)
Dept: CT IMAGING | Age: 69
Discharge: HOME | End: 2019-05-21
Attending: NURSE PRACTITIONER
Payer: MEDICARE

## 2019-05-21 DIAGNOSIS — R91.1 LUNG NODULE, SOLITARY: ICD-10-CM

## 2019-05-21 DIAGNOSIS — Z85.3 HISTORY OF BREAST CANCER: ICD-10-CM

## 2019-05-21 DIAGNOSIS — N64.59 ABNORMAL BREAST EXAM: ICD-10-CM

## 2019-05-21 PROCEDURE — 71250 CT THORAX DX C-: CPT

## 2019-05-21 PROCEDURE — 77062 BREAST TOMOSYNTHESIS BI: CPT

## 2019-08-20 DIAGNOSIS — C50.212 MALIGNANT NEOPLASM OF UPPER-INNER QUADRANT OF LEFT BREAST IN FEMALE, ESTROGEN RECEPTOR POSITIVE (HCC): ICD-10-CM

## 2019-08-20 DIAGNOSIS — Z17.0 MALIGNANT NEOPLASM OF UPPER-INNER QUADRANT OF LEFT BREAST IN FEMALE, ESTROGEN RECEPTOR POSITIVE (HCC): ICD-10-CM

## 2019-08-23 ENCOUNTER — OFFICE VISIT (OUTPATIENT)
Dept: ONCOLOGY | Age: 69
End: 2019-08-23

## 2019-08-23 VITALS
RESPIRATION RATE: 16 BRPM | HEART RATE: 65 BPM | HEIGHT: 63 IN | OXYGEN SATURATION: 98 % | SYSTOLIC BLOOD PRESSURE: 168 MMHG | TEMPERATURE: 98.1 F | BODY MASS INDEX: 23.91 KG/M2 | DIASTOLIC BLOOD PRESSURE: 80 MMHG

## 2019-08-23 DIAGNOSIS — Z17.0 MALIGNANT NEOPLASM OF UPPER-INNER QUADRANT OF LEFT BREAST IN FEMALE, ESTROGEN RECEPTOR POSITIVE (HCC): Primary | ICD-10-CM

## 2019-08-23 DIAGNOSIS — C50.212 MALIGNANT NEOPLASM OF UPPER-INNER QUADRANT OF LEFT BREAST IN FEMALE, ESTROGEN RECEPTOR POSITIVE (HCC): Primary | ICD-10-CM

## 2019-08-23 DIAGNOSIS — E55.9 VITAMIN D DEFICIENCY: ICD-10-CM

## 2019-08-23 RX ORDER — LETROZOLE 2.5 MG/1
TABLET, FILM COATED ORAL
Qty: 30 TAB | Refills: 6 | Status: SHIPPED | OUTPATIENT
Start: 2019-08-23 | End: 2020-10-22 | Stop reason: SDUPTHER

## 2019-08-23 NOTE — PROGRESS NOTES
Oncology Progress note        Patient: Tona Hart MRN: [id number]  SSN: [id number]    YOB: 1950  Age: 71 y.o. Sex: female        Diagnosis:     1. Left breast carcinoma:  T1b N0 M0 (Stage IA) infiltrating ductal carcinoma, Tumor size 1.0 cm, LN -ve, grade 1, %, DE 70%, Her 2 -ve  Dx: 6/22/2017    Treatment:     1. S/p lumpectomy on 6/22/2017  2. Adjuvant radiation completed on 1/5/2018  3. Letrozole - started 1/12/2018    Subjective:      Tona Hart is a 71 y.o. female who I am seeing for a diagnosis of left sided invasive ductal carcinoma of the breast. She has been referred by Dr. Chloe Arango. She underwent a screening mammogram on 3/27/2017 which demonstrated a density in the medial aspect of the left breast. She underwent dx mammogram and US demonstrating a 1.0 x 0.9 x 0.8 cm at 9:00, 2 cm medial to the nipple in the left breast.  US core bx demonstrated a G1 IDC ER pos DE pos Her2/jud unamplified tumor. She underwent a lumpectomy on 06/22/2017. She was also noted to have a JUDSON FDG avid lung mass. She is s/p left upper lobectomy by Dr. Jaxson Potts at [facility]. She completed adjuvant breast radiation. She has been taking Letrozole without any difficulty. She is doing well and denies any new symptoms.     Review of Systems:    Constitutional: negative  Eyes: negative  Ears, Nose, Mouth, Throat, and Face: negative  Respiratory: negative  Cardiovascular: negative  Gastrointestinal: negative  Genitourinary:negative  Integument/Breast: negative  Hematologic/Lymphatic: negative  Musculoskeletal:negative  Neurological: negative      Past Medical History:   Diagnosis Date    Arthritis     Breast cancer ([address] 04/2017    lumpectomy left breast, radiation    Cancer ([address]     breast cancer- left    Cerebrovascular accident (stroke) ([address]     no deficiets    CVA (cerebral vascular accident) ([address]     11/12/2018    Diabetes mellitus type 2, controlled ([address]     Gallstones     asymptomatic, pt denies having    GERD (gastroesophageal reflux disease)     dx with resolution of symptoms on ppi- pt denies having    Hypercholesterolemia     Hypertension     Ill-defined condition     gout    Long term current use of anticoagulant therapy     Non-nicotine vapor product user     used to use    PAD (peripheral artery disease) ([facility] 2013    right SFA angioplasty and athrectomy    Primary open angle glaucoma     Radiation therapy complication     Solitary lung nodule 2017    Type II diabetes mellitus, uncontrolled ([facility] 2017     Past Surgical History:   Procedure Laterality Date    HX BREAST BIOPSY Left 2017    HX BREAST LUMPECTOMY Left 2017    LEFT LUMPECTOMY WITH INTRAOPERATIVE ULTRASOUND GUIDED, LEFT AXILLARY SENTINEL LYMPH NODE BIOPSY, POSSIBLE AXILLARY DISECTION performed by John Moon MD at [facility] MAIN OR    HX HEENT Bilateral     eye lids surgery    VASCULAR SURGERY PROCEDURE UNLIST  2016    right leg stent      Family History   Problem Relation Age of Onset    Hypertension Mother     Diabetes Mother     Diabetes Son      Social History     Tobacco Use    Smoking status: Former Smoker     Types: Cigarettes     Last attempt to quit: 3/13/2013     Years since quittin.4    Smokeless tobacco: Never Used    Tobacco comment: estimate is one pack per week   Substance Use Topics    Alcohol use: Yes     Alcohol/week: 21.0 standard drinks     Types: 21 Cans of beer per week     Comment: 2 to 3 beers per day per patient      Prior to Admission medications    Medication Sig Start Date End Date Taking? Authorizing Provider   hydroCHLOROthiazide (HYDRODIURIL) 25 mg tablet take 1 tablet by mouth daily if needed as directed by prescriber 19   Britni Baca MD   clopidogrel (PLAVIX) 75 mg tab Take 1 Tab by mouth daily.  19   Britni Baca MD   allopurinol (ZYLOPRIM) 300 mg tablet Take 1 tablet by mouth daily 3/18/19 Linh Sinclair MD   letrozole [facility]) 2.5 mg tablet take 1 tablet by mouth once daily 3/11/19   Li Melgar NP   VITAMIN D2 50,000 unit capsule Take  by mouth every seven (7) days. 10/5/18   Provider, Historical   pravastatin (PRAVACHOL) 40 mg tablet TAKE 1 TABLET BY MOUTH NIGHTLY 10/15/18   Provider, Historical   aspirin 81 mg chewable tablet Take 1 Tab by mouth daily. 11/17/18   Toshia Nathan MD   acetaminophen (TYLENOL) 500 mg tablet Take 2 Tabs by mouth every six (6) hours as needed for Pain. Indications: Pain 10/15/18   Linh Sinclair MD   gabapentin (NEURONTIN) 300 mg capsule take 1 capsule by mouth twice a day THE DAY PRIOR TO SURGERY 9/13  Patient taking differently: 300 mg daily. take 1 capsule by mouth twice a day THE DAY PRIOR TO SURGERY 9/13 10/15/18   Linh Sinclair MD   timolol (TIMOPTIC) 0.5 % ophthalmic solution Administer  to both eyes two (2) times a day. 9/4/18   Provider, Historical   Blood-Glucose Meter (ACCU-CHEK OLIVIA PLUS METER) misc Test blood sugar daily 12/5/17   Linh Sinclair MD   glucose blood VI test strips (ACCU-CHEK OLIVIA PLUS TEST STRP) strip Test blood sugar once daily 12/5/17   Linh Sinclair MD   alcohol swabs (BD SINGLE USE SWABS REGULAR) padm Test blood sugar once daily 12/5/17   Linh Sinclair MD   ALPHAGAN P 0.1 % ophthalmic solution instill 1 drop into both eyes twice a day 10/9/17   Provider, Historical   dorzolamide (TRUSOPT) 2 % ophthalmic solution instill 1 drop into both eyes twice a day 10/9/17   Provider, Historical   latanoprost (XALATAN) 0.005 % ophthalmic solution Administer 1 Drop to both eyes nightly. 5/17/16   Ruth Crane MD   brimonidine (ALPHAGAN) 0.15 % ophthalmic solution Administer 1 Drop to both eyes two (2) times a day.  10/26/15   Ruth Crane MD   Blood-Glucose Meter monitoring kit accu chek gabino smartview meter 4/30/15   Ruth Crane MD          No Known Allergies        Objective:     Visit Vitals  /80 (BP 1 Location: Right arm, BP Patient Position: At rest)   Pulse 65   Temp 98.1 °F (36.7 °C) (Oral)   Resp 16   Ht 5' 3\" (1.6 m)   SpO2 98% Comment: RA   BMI 23.91 kg/m²       Pain Scale: 0 - No pain/10  Pain Location:     Physical Exam:    GENERAL: alert, cooperative, appears stated age  EYE: negative  LYMPHATIC: Cervical, supraclavicular, and axillary nodes normal.   THROAT & NECK: normal and no erythema or exudates noted. LUNG: clear to auscultation bilaterally  HEART: regular rate and rhythm  ABDOMEN: soft, non-tender  EXTREMITIES:  no edema  SKIN: Normal.  NEUROLOGIC: negative        Assessment:     1. Left breast carcinoma:  T1b N0 M0 (Stage IA) infiltrating ductal carcinoma, Tumor size 1.0 cm, LN -ve, grade 1, %, NM 70%, Her 2 -ve  Dx: 6/22/2017    ECOG PS 0  Intent of treatment - curative    S/P left sided lumpectomy and sentinel LN excision. Completed adjuvant radiation to the left breast on 1/5/2018     Letrozole 2.5 mg daily - started 1/12/2018  Tolerating well  No side effects attributed to AI  In remission    Mammogram (5/21/2019): normal     Symptom management form reviewed with patient. 2. Lung cancer    S/p left upper lobectomy from Dr. Ave Meneses at [facility]      3. Vitamin D deficiency    > Patient stopped weekly Vitamin D 50,000 after 1 month  > Recheck Vitamin D      Plan:       > Continue Letrozole  > Recheck Vitamin D  > Follow-up in 1 year      Signed by: Easton Mo MD                     August 23, 2019        CC. Odilia Hugo MD  CC.  MD Sharri Stoddard MD  Dearl MD Gabbi

## 2019-08-23 NOTE — PROGRESS NOTES
70 y/o AAF here for f/u appt for breast ca. Pt is on letrozole but needs a refill, has not been in our office for over a year. Pt did had mammo done with [facility]. Pt does not take vitamin D.      1. Have you been to the ER, urgent care clinic since your last visit? Hospitalized since your last visit? Yes stroke in (L) eye 11/11/2018    2. Have you seen or consulted any other health care providers outside of the [address] [place] [address] since your last visit? Include any pap smears or colon screening.  No      VORB FROM DR Krunal Garcia FEMERA 2.5MG TAB TAKE ONE TAB BY MOUTH ONCE DAILY DISPENSE 30 REFILL 6

## 2019-08-24 LAB — 25(OH)D3+25(OH)D2 SERPL-MCNC: 27.8 NG/ML (ref 30–100)

## 2019-08-26 RX ORDER — LETROZOLE 2.5 MG/1
TABLET, FILM COATED ORAL
Qty: 150 TAB | Refills: 0 | Status: SHIPPED | OUTPATIENT
Start: 2019-08-26 | End: 2020-02-03

## 2019-09-03 ENCOUNTER — TELEPHONE (OUTPATIENT)
Dept: ONCOLOGY | Age: 69
End: 2019-09-03

## 2019-09-03 DIAGNOSIS — E55.9 VITAMIN D DEFICIENCY: ICD-10-CM

## 2019-09-03 DIAGNOSIS — C50.212 MALIGNANT NEOPLASM OF UPPER-INNER QUADRANT OF LEFT FEMALE BREAST, UNSPECIFIED ESTROGEN RECEPTOR STATUS (HCC): Primary | ICD-10-CM

## 2019-09-03 DIAGNOSIS — Z79.811 AROMATASE INHIBITOR USE: ICD-10-CM

## 2019-09-03 RX ORDER — ERGOCALCIFEROL 1.25 MG/1
50000 CAPSULE ORAL
Qty: 12 CAP | Refills: 6 | Status: SHIPPED | OUTPATIENT
Start: 2019-09-03 | End: 2021-12-01

## 2019-09-03 NOTE — TELEPHONE ENCOUNTER
----- Message from Arlene Ritter MD sent at 8/24/2019 10:20 AM EDT -----  Start Vit D 50,000 units weekly.

## 2019-09-25 PROBLEM — Z23 ENCOUNTER FOR IMMUNIZATION: Status: RESOLVED | Noted: 2017-02-01 | Resolved: 2019-09-25

## 2019-10-10 ENCOUNTER — OFFICE VISIT (OUTPATIENT)
Dept: FAMILY MEDICINE CLINIC | Age: 69
End: 2019-10-10

## 2019-10-10 VITALS
OXYGEN SATURATION: 100 % | HEIGHT: 63 IN | TEMPERATURE: 97 F | HEART RATE: 61 BPM | RESPIRATION RATE: 20 BRPM | DIASTOLIC BLOOD PRESSURE: 69 MMHG | SYSTOLIC BLOOD PRESSURE: 147 MMHG | WEIGHT: 137.3 LBS | BODY MASS INDEX: 24.33 KG/M2

## 2019-10-10 DIAGNOSIS — M25.511 CHRONIC PAIN OF BOTH SHOULDERS: ICD-10-CM

## 2019-10-10 DIAGNOSIS — E11.21 TYPE 2 DIABETES WITH NEPHROPATHY (HCC): ICD-10-CM

## 2019-10-10 DIAGNOSIS — M89.9 DISORDER OF BONE AND CARTILAGE: ICD-10-CM

## 2019-10-10 DIAGNOSIS — Z00.00 MEDICARE ANNUAL WELLNESS VISIT, SUBSEQUENT: Primary | ICD-10-CM

## 2019-10-10 DIAGNOSIS — Z71.89 ADVANCED DIRECTIVES, COUNSELING/DISCUSSION: ICD-10-CM

## 2019-10-10 DIAGNOSIS — I73.9 PAD (PERIPHERAL ARTERY DISEASE) (HCC): ICD-10-CM

## 2019-10-10 DIAGNOSIS — M25.512 CHRONIC PAIN OF BOTH SHOULDERS: ICD-10-CM

## 2019-10-10 DIAGNOSIS — G89.29 CHRONIC PAIN OF BOTH SHOULDERS: ICD-10-CM

## 2019-10-10 DIAGNOSIS — Z12.11 SCREEN FOR COLON CANCER: ICD-10-CM

## 2019-10-10 DIAGNOSIS — I63.89 CEREBROVASCULAR ACCIDENT (CVA) DUE TO OTHER MECHANISM (HCC): ICD-10-CM

## 2019-10-10 DIAGNOSIS — E11.65 UNCONTROLLED TYPE 2 DIABETES MELLITUS WITH HYPERGLYCEMIA (HCC): ICD-10-CM

## 2019-10-10 DIAGNOSIS — C50.212 MALIGNANT NEOPLASM OF UPPER-INNER QUADRANT OF LEFT FEMALE BREAST, UNSPECIFIED ESTROGEN RECEPTOR STATUS (HCC): ICD-10-CM

## 2019-10-10 DIAGNOSIS — Z23 ENCOUNTER FOR IMMUNIZATION: ICD-10-CM

## 2019-10-10 DIAGNOSIS — Z13.39 SCREENING FOR ALCOHOLISM: ICD-10-CM

## 2019-10-10 DIAGNOSIS — Z13.31 SCREENING FOR DEPRESSION: ICD-10-CM

## 2019-10-10 DIAGNOSIS — E11.40 TYPE 2 DIABETES MELLITUS WITH DIABETIC NEUROPATHY, WITHOUT LONG-TERM CURRENT USE OF INSULIN (HCC): ICD-10-CM

## 2019-10-10 DIAGNOSIS — M94.9 DISORDER OF BONE AND CARTILAGE: ICD-10-CM

## 2019-10-10 LAB
BILIRUB UR QL STRIP: NEGATIVE
GLUCOSE UR-MCNC: NEGATIVE MG/DL
KETONES P FAST UR STRIP-MCNC: NEGATIVE MG/DL
PH UR STRIP: 6 [PH] (ref 4.6–8)
PROT UR QL STRIP: NEGATIVE
SP GR UR STRIP: 1.02 (ref 1–1.03)
UA UROBILINOGEN AMB POC: NORMAL (ref 0.2–1)
URINALYSIS CLARITY POC: CLEAR
URINALYSIS COLOR POC: YELLOW
URINE BLOOD POC: NEGATIVE
URINE LEUKOCYTES POC: NORMAL
URINE NITRITES POC: NEGATIVE

## 2019-10-10 RX ORDER — CYCLOBENZAPRINE HCL 10 MG
10 TABLET ORAL 2 TIMES DAILY
Qty: 30 TAB | Refills: 0 | Status: SHIPPED | OUTPATIENT
Start: 2019-10-10 | End: 2020-02-03

## 2019-10-10 NOTE — ACP (ADVANCE CARE PLANNING)
Advance Care Planning        Other Legally Authorized Decision Maker would be Gisselle Schools as SDM     For My patients who has currently great Decision Making Capacity:     Patient is on presence of no family member,, stated that the pt wants to be not DNR at this time,  The pt likes to be a full code individual,  The patient states that there is a lot of will to live,  Pt was given the form,   will sign and bring us a copy on the later date.

## 2019-10-10 NOTE — PROGRESS NOTES
This is the Subsequent Medicare Annual Wellness Exam, performed 12 months or more after the Initial AWV or the last Subsequent AWV    I have reviewed the patient's medical history in detail and updated the computerized patient record. History     Present for CPE, last Complete Physical exam was last yr ,  Up todate w/ all vaccination, last tetanus vaccine was in <5ys ago  . With hx of lung cancer resection in 2017, requesting to have pain meds since the surgical , used to smoke, has not smoked since then     last mammog was abnl and In 2019, with lombectomy,   last pap smear normal and was in 2 yrs ago .        last colonoscopy was normal and was>10 yrs ago, ++++ past surgical hx,  last bone dexa scan was normal and was in 2016,      No family hx of breast cancer   no family hx of prostate cancer   no family hx of colon cancer, not sexaully active and uses [employer], not physically active,  compliant w/ meds,++ Rf needed for today for her meds.      No hx of fall not depressed , unfortunately patient had significant history of tobacco abuse for which recently had a CT scan low-dose which was positive for malignancy was sent to pulmonologist and oncologist for further care stating that they had to remove part of her long and she is awaiting the decision regarding possible chemotherapy or radiation therapy currently is in pain secondary to excision requesting medication refill otherwise doing well and very optimistic at this time     Shoulder Pain   The history is provided by the patient. This is a chronic problem. Episode onset: few yrs ago, not obese, patient has a lot of difficulty with overhead activity, raising arm is very painful and the pain  awakens the patient at night,  The problem occurs constantly. The problem has not changed since onset. The pain is present in the lt shoulder. The quality of the pain is described as dull. The pain is at a severity of 8/10.  Associated symptoms include limited range of motion. Pertinent negatives include no numbness, ++stiffness, no tingling, no itching, no back pain and + neck pain. The symptoms are aggravated by movement and palpation. There has been no history of extremity trauma. Past Medical History:   Diagnosis Date    Arthritis     Breast cancer ([facility] [address] 04/2017    lumpectomy left breast, radiation    Cancer [facility])     breast cancer- left    Cerebrovascular accident (stroke) ([facility] [address]     no deficiets    CVA (cerebral vascular accident) ([facility] [address]     11/12/2018    Diabetes mellitus type 2, controlled ([address]     Gallstones     asymptomatic, pt denies having    GERD (gastroesophageal reflux disease)     dx with resolution of symptoms on ppi- pt denies having    Hypercholesterolemia     Hypertension     Ill-defined condition     gout    Long term current use of anticoagulant therapy     Non-nicotine vapor product user     used to use    PAD (peripheral artery disease) ([facility] [address] 2/20/2013    right SFA angioplasty and athrectomy    Primary open angle glaucoma     Radiation therapy complication     Solitary lung nodule 5/9/2017    Type II diabetes mellitus, uncontrolled ([facility] [address] 2/1/2017      Past Surgical History:   Procedure Laterality Date    HX BREAST BIOPSY Left 2017    HX BREAST LUMPECTOMY Left 6/22/2017    LEFT LUMPECTOMY WITH INTRAOPERATIVE ULTRASOUND GUIDED, LEFT AXILLARY SENTINEL LYMPH NODE BIOPSY, POSSIBLE AXILLARY DISECTION performed by [facility]les [facility] [place] [facility]    HX HEENT Bilateral     eye lids surgery    VASCULAR SURGERY PROCEDURE UNLIST  April 2016    right leg stent     Current Outpatient Medications   Medication Sig Dispense Refill    ergocalciferol (ERGOCALCIFEROL) 50,000 unit capsule Take 1 Cap by mouth every seven (7) days. 12 Cap 6    hydroCHLOROthiazide (HYDRODIURIL) 25 mg tablet take 1 tablet by mouth daily if needed as directed by prescriber 90 Tab 3    clopidogrel (PLAVIX) 75 mg tab Take 1 Tab by mouth daily.  90 Tab 3    pravastatin (PRAVACHOL) 40 mg tablet TAKE 1 TABLET BY MOUTH NIGHTLY  0    aspirin 81 mg chewable tablet Take 1 Tab by mouth daily. 30 Tab 6    acetaminophen (TYLENOL) 500 mg tablet Take 2 Tabs by mouth every six (6) hours as needed for Pain. Indications: Pain 40 Tab 1    timolol (TIMOPTIC) 0.5 % ophthalmic solution Administer  to both eyes two (2) times a day.  0    alcohol swabs (BD SINGLE USE SWABS REGULAR) padm Test blood sugar once daily 50 Pad 11    ALPHAGAN P 0.1 % ophthalmic solution instill 1 drop into both eyes twice a day  0    dorzolamide (TRUSOPT) 2 % ophthalmic solution instill 1 drop into both eyes twice a day  0    latanoprost (XALATAN) 0.005 % ophthalmic solution Administer 1 Drop to both eyes nightly. 2.5 mL 1    brimonidine (ALPHAGAN) 0.15 % ophthalmic solution Administer 1 Drop to both eyes two (2) times a day. 15 mL 6    letrozole (FEMARA) 2.5 mg tablet TAKE 1 TABLET BY MOUTH ONCE DAILY 150 Tab 0    letrozole (FEMARA) 2.5 mg tablet take 1 tablet by mouth once daily 30 Tab 6    allopurinol (ZYLOPRIM) 300 mg tablet Take 1 tablet by mouth daily 30 Tab 0    gabapentin (NEURONTIN) 300 mg capsule take 1 capsule by mouth twice a day THE DAY PRIOR TO SURGERY  (Patient taking differently: 300 mg daily.  take 1 capsule by mouth twice a day THE DAY PRIOR TO SURGERY ) 180 Cap 1    Blood-Glucose Meter (ACCU-CHEK OLIVIA PLUS METER) misc Test blood sugar daily 1 Each 0    glucose blood VI test strips (ACCU-CHEK OLIVIA PLUS TEST STRP) strip Test blood sugar once daily 50 Strip 11    Blood-Glucose Meter monitoring kit accu chek gabino smartview meter 1 Kit 0     No Known Allergies  Family History   Problem Relation Age of Onset    Hypertension Mother     Diabetes Mother     Diabetes Son      Social History     Tobacco Use    Smoking status: Former Smoker     Types: Cigarettes     Last attempt to quit: 3/13/2013     Years since quittin.5    Smokeless tobacco: Never Used    Tobacco comment: estimate is one pack per week   Substance Use Topics    Alcohol use: Yes     Alcohol/week: 21.0 standard drinks     Types: 21 Cans of beer per week     Comment: 2 to 3 beers per day per patient     Patient Active Problem List   Diagnosis Code    Hypertension I10    PAD (peripheral artery disease) ([facility] I73.9    Glaucoma H40.9    Gout M10.9    Leg pain, bilateral M79.604, M79.605    Nonallopathic lesion of lumbar region, not elsewhere classified M99.9    History of stroke Z86.73    Type II diabetes mellitus, uncontrolled ([facility] [address] E11.65    Tobacco abuse disorder Z72.0    Solitary lung nodule R91.1    Breast cancer of upper-inner quadrant of left female breast ([facility] [address] C50.212    Type 2 diabetes mellitus with diabetic neuropathy ([facility]) E11.40    Type 2 diabetes with nephropathy ([facility]) E11.21    CVA (cerebral vascular accident) ([facility] [address] I63.9    Bilateral carotid artery stenosis I65.23       Depression Risk Factor Screening:     3 most recent PHQ Screens 10/10/2019   Little interest or pleasure in doing things Not at all   Feeling down, depressed, irritable, or hopeless Not at all   Total Score PHQ 2 0     Alcohol Risk Factor Screening: You do not drink alcohol or very rarely. Functional Ability and Level of Safety:   Hearing Loss  Hearing is good. Activities of Daily Living  The home contains: handrails, grab bars and rugs  Patient does total self care    Fall Risk  Fall Risk Assessment, last 12 mths 10/10/2019   Able to walk? Yes   Fall in past 12 months? No   Fall with injury? -   Number of falls in past 12 months -   Fall Risk Score -       Abuse Screen  Patient is not abused    Cognitive Screening   Evaluation of Cognitive Function:  Has your family/caregiver stated any concerns about your memory: no  Normal    Patient Care Team   Patient Care Team:  Sunita Mason MD as PCP - General (Family Practice)  Polly Vazquez DPM as Physician (Podiatry)  NOAM Jarquin as Physician (Vascular Surgery)  Marianna Panda MD as Physician (Ophthalmology)  Leigh Ballard MD as Surgeon (General Surgery)  Demarcus Moralez NP as Nurse Practitioner (Oncology)    Assessment/Plan   Education and counseling provided:  Are appropriate based on today's review and evaluation  End-of-Life planning (with patient's consent)  Influenza Vaccine    Diagnoses and all orders for this visit:    1. Medicare annual wellness visit, subsequent  -     COLLECTION VENOUS BLOOD,VENIPUNCTURE    2. Encounter for immunization  -     INFLUENZA VACCINE INACTIVATED (IIV), SUBUNIT, ADJUVANTED, IM  -     ADMIN INFLUENZA VIRUS VAC    3. Type II diabetes mellitus with complication, uncontrolled (HCC)  -     MICROALBUMIN, UR, RAND W/ MICROALB/CREAT RATIO  -     REFERRAL TO OPTOMETRY  -     CBC W/O DIFF  -     METABOLIC PANEL, COMPREHENSIVE  -     LIPID PANEL  -     TSH 3RD GENERATION  -     HEMOGLOBIN A1C WITH EAG  -     AMB POC URINALYSIS DIP STICK AUTO W/O MICRO  -     COLLECTION VENOUS BLOOD,VENIPUNCTURE    4. Advanced directives, counseling/discussion  -     ADVANCE CARE PLANNING FIRST 30 MINS  -     FULL CODE    5. Screening for alcoholism  -     ID ANNUAL ALCOHOL SCREEN 15 MIN    6. Screening for depression  -     DEPRESSION SCREEN ANNUAL    7. Screen for colon cancer  -     REFERRAL FOR COLONOSCOPY    8. Disorder of bone and cartilage  -     DEXA BONE DENSITY STUDY AXIAL; Future    9. Chronic pain of both shoulders  -     CBC W/O DIFF  -     METABOLIC PANEL, COMPREHENSIVE  -     LIPID PANEL  -     TSH 3RD GENERATION  -     HEMOGLOBIN A1C WITH EAG  -     AMB POC URINALYSIS DIP STICK AUTO W/O MICRO  -     REFERRAL TO PHYSICAL THERAPY  -     cyclobenzaprine (FLEXERIL) 10 mg tablet; Take 1 Tab by mouth two (2) times a day.     10. PAD (peripheral artery disease) (HCC)  -     CBC W/O DIFF  -     METABOLIC PANEL, COMPREHENSIVE  -     LIPID PANEL  -     TSH 3RD GENERATION  -     HEMOGLOBIN A1C WITH EAG  -     AMB POC URINALYSIS DIP STICK AUTO W/O MICRO    11. Uncontrolled type 2 diabetes mellitus with hyperglycemia (HCC)  -     CBC W/O DIFF  -     METABOLIC PANEL, COMPREHENSIVE  -     LIPID PANEL    12. Malignant neoplasm of upper-inner quadrant of left female breast, unspecified estrogen receptor status (HCC)  -     CBC W/O DIFF    13. Cerebrovascular accident (CVA) due to other mechanism ([facility] [address]  -     LIPID PANEL  -     TSH 3RD GENERATION    14. Type 2 diabetes with nephropathy (HCC)  -     HEMOGLOBIN A1C WITH EAG    15. Type 2 diabetes mellitus with diabetic neuropathy, without long-term current use of insulin (HCC)  -     HEMOGLOBIN A1C WITH EAG  -     AMB POC URINALYSIS DIP STICK AUTO W/O MICRO      [facility] education and counseling provided:  Age appropriate evidence-based preventive care recommendations based on today's review and evaluation; including relevant cancer screening guidelines, and vaccination recommendations. An After Visit Summary was printed and given to the patient with information about these guidelines, and a personalized schedule for health maintenance items. When appropriate and with patient agreement, orders noted below were placed to complete missing health maintenance items.         Health Maintenance Due   Topic Date Due    COLONOSCOPY  06/01/2015    MEDICARE YEARLY EXAM  10/17/2018    MICROALBUMIN Q1  04/05/2019    EYE EXAM RETINAL OR DILATED  04/20/2019    HEMOGLOBIN A1C Q6M  05/16/2019    Influenza Age 9 to Adult  08/01/2019

## 2019-10-10 NOTE — PROGRESS NOTES
Name and  verified        Health Maintenance reviewed-discussed with patient. Chief Complaint   Patient presents with    Hypertension    Annual Wellness Visit       1. Have you been to the ER, urgent care clinic since your last visit? Hospitalized since your last visit? no    2. Have you seen or consulted any other health care providers outside of the [address] [place] [address] since your last visit? Include any pap smears or colon screening. No      Order placed for flu vaccine, per Verbal Order from Dr. Phoebe Pelaez on 10/10/2019 due to HM. Patient tolerated flu vaccine well in right deltoid informed to wait 15 minutes for adverse reactions she acknowledged understanding she left clinic without any distress.

## 2019-10-10 NOTE — PATIENT INSTRUCTIONS
Vaccine Information Statement Influenza (Flu) Vaccine (Inactivated or Recombinant): What You Need to Know Many Vaccine Information Statements are available in Irish and other languages. See www.immunize.org/vis Hojas de información sobre vacunas están disponibles en español y en muchos otros idiomas. Visite www.immunize.org/vis 1. Why get vaccinated? Influenza vaccine can prevent influenza (flu). Flu is a contagious disease that spreads around the [facility] every year, usually between October and May. Anyone can get the flu, but it is more dangerous for some people. Infants and young children, people 72years of age and older, pregnant women, and people with certain health conditions or a weakened immune system are at greatest risk of flu complications. Pneumonia, bronchitis, sinus infections and ear infections are examples of flu-related complications. If you have a medical condition, such as heart disease, cancer or diabetes, flu can make it worse. Flu can cause fever and chills, sore throat, muscle aches, fatigue, cough, headache, and runny or stuffy nose. Some people may have vomiting and diarrhea, though this is more common in children than adults. Each year thousands of people in the [facility] die from flu, and many more are hospitalized. Flu vaccine prevents millions of illnesses and flu-related visits to the doctor each year. 2. Influenza vaccines CDC recommends everyone 10months of age and older get vaccinated every flu season. Children 6 months through 6years of age may need 2 doses during a single flu season. Everyone else needs only 1 dose each flu season. It takes about 2 weeks for protection to develop after vaccination. There are many flu viruses, and they are always changing. Each year a new flu vaccine is made to protect against three or four viruses that are likely to cause disease in the upcoming flu season.  Even when the vaccine doesnt exactly match these viruses, it may still provide some protection. Influenza vaccine does not cause flu. Influenza vaccine may be given at the same time as other vaccines. 3. Talk with your health care provider Tell your vaccine provider if the person getting the vaccine: 
 Has had an allergic reaction after a previous dose of influenza vaccine, or has any severe, life-threatening allergies.  Has ever had Guillain-Barré Syndrome (also called GBS). In some cases, your health care provider may decide to postpone influenza vaccination to a future visit. People with minor illnesses, such as a cold, may be vaccinated. People who are moderately or severely ill should usually wait until they recover before getting influenza vaccine. Your health care provider can give you more information. 4. Risks of a reaction  Soreness, redness, and swelling where shot is given, fever, muscle aches, and headache can happen after influenza vaccine.  There may be a very small increased risk of Guillain-Barré Syndrome (GBS) after inactivated influenza vaccine (the flu shot). Cornel Ready children who get the flu shot along with pneumococcal vaccine (PCV13), and/or DTaP vaccine at the same time might be slightly more likely to have a seizure caused by fever. Tell your health care provider if a child who is getting flu vaccine has ever had a seizure. People sometimes faint after medical procedures, including vaccination. Tell your provider if you feel dizzy or have vision changes or ringing in the ears. As with any medicine, there is a very remote chance of a vaccine causing a severe allergic reaction, other serious injury, or death. 5. What if there is a serious problem? An allergic reaction could occur after the vaccinated person leaves the clinic.  If you see signs of a severe allergic reaction (hives, swelling of the face and throat, difficulty breathing, a fast heartbeat, dizziness, or weakness), call 9-1-1 and get the person to the nearest hospital. 
 
For other signs that concern you, call your health care provider. Adverse reactions should be reported to the Vaccine Adverse Event Reporting System (VAERS). Your health care provider will usually file this report, or you can do it yourself. Visit the VAERS website at www.vaers. hhs.gov or call 2-567.542.8343. VAERS is only for reporting reactions, and VAERS staff do not give medical advice. 6. The National Vaccine Injury Compensation Program 
 
The [facility] Vaccine Injury Compensation Program (VICP) is a federal program that was created to compensate people who may have been injured by certain vaccines. Visit the VICP website at www.[facility].gov/vaccinecompensation or call 8-411.512.9294 to learn about the program and about filing a claim. There is a time limit to file a claim for compensation. 7. How can I learn more?  Ask your health care provider.  Call your local or state health department.  Contact the Centers for Disease Control and Prevention (CDC): 
- Call 0-819.794.8889 (4-419-LMG-INFO) or 
- Visit CDCs influenza website at www.cdc.gov/flu Vaccine Information Statement (Interim) Inactivated Influenza Vaccine 8/15/2019 
42 ANNEL Maxwell [id number]-[id number] Department of Health and [employer] Centers for Disease Control and Prevention Office Use Only Medicare Wellness Visit, Female The best way to live healthy is to have a lifestyle where you eat a well-balanced diet, exercise regularly, limit alcohol use, and quit all forms of tobacco/nicotine, if applicable. Regular preventive services are another way to keep healthy. Preventive services (vaccines, screening tests, monitoring & exams) can help personalize your care plan, which helps you manage your own care. Screening tests can find health problems at the earliest stages, when they are easiest to treat. Edward Sheriff follows the current, evidence-based guidelines published by the [facility] States Danyel Marcela ([facility]) when recommending preventive services for our patients. Because we follow these guidelines, sometimes recommendations change over time as research supports it. (For example, mammograms used to be recommended annually. Even though Medicare will still pay for an annual mammogram, the newer guidelines recommend a mammogram every two years for women of average risk.) Of course, you and your doctor may decide to screen more often for some diseases, based on your risk and your health status. Preventive services for you include: - Medicare offers their members a free annual wellness visit, which is time for you and your primary care provider to discuss and plan for your preventive service needs. Take advantage of this benefit every year! 
-All adults over the age of 72 should receive the recommended pneumonia vaccines. Current USPSTF guidelines recommend a series of two vaccines for the best pneumonia protection.  
-All adults should have a flu vaccine yearly and a tetanus vaccine every 10 years. All adults age 61 and older should receive a shingles vaccine once in their lifetime.   
-A bone mass density test is recommended when a woman turns 65 to screen for osteoporosis. This test is only recommended one time, as a screening. Some providers will use this same test as a disease monitoring tool if you already have osteoporosis. -All adults age 38-68 who are overweight should have a diabetes screening test once every three years.  
-Other screening tests and preventive services for persons with diabetes include: an eye exam to screen for diabetic retinopathy, a kidney function test, a foot exam, and stricter control over your cholesterol.  
-Cardiovascular screening for adults with routine risk involves an electrocardiogram (ECG) at intervals determined by your doctor. -Colorectal cancer screenings should be done for adults age 54-65 with no increased risk factors for colorectal cancer. There are a number of acceptable methods of screening for this type of cancer. Each test has its own benefits and drawbacks. Discuss with your doctor what is most appropriate for you during your annual wellness visit. The different tests include: colonoscopy (considered the best screening method), a fecal occult blood test, a fecal DNA test, and sigmoidoscopy. -Breast cancer screenings are recommended every other year for women of normal risk, age 54-69. 
-Cervical cancer screenings for women over age 72 are only recommended with certain risk factors.  
-All adults born between [facility] should be screened once for Hepatitis C. Here is a list of your current Health Maintenance items (your personalized list of preventive services) with a due date: 
Health Maintenance Due Topic Date Due  
 Colonoscopy  06/01/2015 [place] [facility] Well Visit  10/17/2018  Albumin Urine Test  04/05/2019 [facility]  04/20/2019  Hemoglobin A1C    05/16/2019  Flu Vaccine  08/01/2019 Colon Cancer Screening: Care Instructions Your Care Instructions Colorectal cancer occurs in the colon or rectum. That's the lower part of your digestive system. It is the second-leading cause of cancer deaths in the [place]. It often starts with small growths called polyps in the colon or rectum. Polyps are usually found with screening tests. Depending on the type of test, any polyps found may be removed during the tests. Colorectal cancer usually does not cause symptoms at first. But regular tests can help find it early, before it spreads and becomes harder to treat. Experts advise routine tests for colon cancer for people starting at age 48. And they advise people with a higher risk of colon cancer to get tested sooner.  Talk with your doctor about when you should start testing. Discuss which tests you need. Follow-up care is a key part of your treatment and safety. Be sure to make and go to all appointments, and call your doctor if you are having problems. It's also a good idea to know your test results and keep a list of the medicines you take. What are the main screening tests for colon cancer? · Stool tests. These include the fecal immunochemical test (FIT) and the fecal occult blood test (FOBT). These tests check stool samples for signs of cancer. If your test is positive, you will need to have a colonoscopy. · Sigmoidoscopy. This test lets your doctor look at the lining of your rectum and the lowest part of your colon. Your doctor uses a lighted tube called a sigmoidoscope. This test can't find cancers or polyps in the upper part of your colon. In some cases, polyps that are found can be removed. But if your doctor finds polyps, you will need to have a colonoscopy to check the upper part of your colon. · Colonoscopy. This test lets your doctor look at the lining of your rectum and your entire colon. The doctor uses a thin, flexible tool called a colonoscope. It can also be used to remove polyps or get a tissue sample (biopsy). What tests do you need? The following guidelines are for people age 48 and over who are not at high risk for colorectal cancer. You may have at least one of these tests as directed by your doctor. · Fecal immunochemical test (FIT) or fecal occult blood test (FOBT) every year · Sigmoidoscopy every 5 years · Colonoscopy every 10 years If you are age 68 to 80, you can work with your doctor to decide if screening is a good option. If you are age 80 or older, your doctor will likely advise that screening is not helpful. Talk with your doctor about when you need to be tested. And discuss which tests are right for you. Your doctor may recommend earlier or more frequent testing if you: 
· Have had colorectal cancer before. · Have had colon polyps. · Have symptoms of colorectal cancer. These include blood in your stool and changes in your bowel habits. · Have a parent, brother or sister, or child with colon polyps or colorectal cancer. · Have a bowel disease. This includes ulcerative colitis and Crohn's disease. · Have a rare polyp syndrome that runs in families, such as familial adenomatous polyposis (FAP). · Have had radiation treatments to the belly or pelvis. When should you call for help? Watch closely for changes in your health, and be sure to contact your doctor if: 
  · You have any changes in your bowel habits.  
  · You have any problems. Where can you learn more? Go to http://ines-jojo.info/. Enter M541 in the search box to learn more about \"Colon Cancer Screening: Care Instructions. \" Current as of: March 28, 2018 Content Version: 11.8 © 0326-5105 [employer]. Care instructions adapted under license by [employer] (which disclaims liability or warranty for this information). If you have questions about a medical condition or this instruction, always ask your healthcare professional. [place] [address] any warranty or liability for your use of this information. Osteoporosis: Care Instructions Your Care Instructions Osteoporosis causes bones to become thin and weak. It is much more common in women than in men. Osteoporosis may be very advanced before you know you have it. Sometimes the first sign is a broken bone in the hip, spine, or wrist or sudden pain in your middle or lower back. Follow-up care is a key part of your treatment and safety. Be sure to make and go to all appointments, and call your doctor if you are having problems. It's also a good idea to know your test results and keep a list of the medicines you take. How can you care for yourself at home?  
· Your doctor may prescribe a bisphosphonate, such as risedronate (Actonel) or alendronate (Fosamax), for osteoporosis. If you are taking one of these medicines by mouth: 
? Take your medicine with a full glass of water when you first get up in the morning. ? Do not lie down, eat, drink a beverage, or take any other medicine for at least 30 minutes after taking the drug. This helps prevent stomach problems. ? Do not take your medicine late in the day if you forgot to take it in the morning. Skip it, and take the usual dose the next morning. ? If you have side effects, tell your doctor. He or she may prescribe another medicine. · Get enough calcium and vitamin D. The [place] of Medicine recommends adults younger than age 46 need 1,000 mg of calcium and 600 IU of vitamin D each day. Women ages 46 to 79 need 1,200 mg of calcium and 600 IU of vitamin D each day. Men ages 46 to 79 need 1,000 mg of calcium and 600 IU of vitamin D each day. Adults 71 and older need 1,200 mg of calcium and 800 IU of vitamin D each day. ? Eat foods rich in calcium, like yogurt, cheese, milk, and dark green vegetables. This is a good way to get the calcium you need. You can get vitamin D from eggs, fatty fish, cereal, and milk. ? Talk to your doctor about taking a calcium plus vitamin D supplement. Be careful, though. Adults ages 23 to 48 should not get more than 2,500 mg of calcium and 4,000 IU of vitamin D each day, whether it is from supplements and/or food. Adults ages 46 and older should not get more than 2,000 mg of calcium and 4,000 IU of vitamin D each day from supplements and/or food. · Limit alcohol to 2 drinks a day for men and 1 drink a day for women. Too much alcohol can cause health problems. · Do not smoke. Smoking puts you at a much higher risk for osteoporosis. If you need help quitting, talk to your doctor about stop-smoking programs and medicines. These can increase your chances of quitting for good. · Get regular bone-building exercise.  Weight-bearing and resistance exercises keep bones healthy by working the muscles and bones against gravity. Start out at an exercise level that feels right for you. Add a little at a time until you can do the following: ? Do 30 minutes of weight-bearing exercise on most days of the week. Walking, jogging, stair climbing, and dancing are good choices. ? Do resistance exercises with weights or elastic bands 2 to 3 days a week. · Reduce your risk of falls: 
? Wear supportive shoes with low heels and nonslip soles. ? Use a cane or walker, if you need it. Use shower chairs and bath benches. Put in handrails on stairways, around your shower or tub area, and near the toilet. ? Keep stairs, porches, and walkways well lit. Use night-lights. ? Remove throw rugs and other objects that are in the way. ? Avoid icy, wet, or slippery surfaces. ? Keep a cordless phone and a flashlight with new batteries by your bed. When should you call for help? Watch closely for changes in your health, and be sure to contact your doctor if you have any problems. Where can you learn more? Go to http://ines-jojo.info/. Enter K100 in the search box to learn more about \"Osteoporosis: Care Instructions. \" Current as of: November 7, 2018 Content Version: 12.2 © 0893-8496 [employer]. Care instructions adapted under license by [employer] (which disclaims liability or warranty for this information). If you have questions about a medical condition or this instruction, always ask your healthcare professional. [place] [address] any warranty or liability for your use of this information. Emmanuel Marti 1729 What is a living will? A living will is a legal form you use to write down the kind of care you want at the end of your life. It is used by the health professionals who will treat you if you aren't able to decide for yourself. If you put your wishes in writing, your loved ones and others will know what kind of care you want. They won't need to guess. This can ease your mind and be helpful to others. A living will is not the same as an estate or property will. An estate will explains what you want to happen with your money and property after you die. Is a living will a legal document? A living will is a legal document. Each state has its own laws about living long. If you move to another state, make sure that your living will is legal in the state where you now live. Or you might use a universal form that has been approved by many states. This kind of form can sometimes be completed and stored online. Your electronic copy will then be available wherever you have a connection to the Internet. In most cases, doctors will respect your wishes even if you have a form from a different state. · You don't need an  to complete a living will. But legal advice can be helpful if your state's laws are unclear, your health history is complicated, or your family can't agree on what should be in your living will. · You can change your living will at any time. Some people find that their wishes about end-of-life care change as their health changes. · In addition to making a living will, think about completing a medical power of  form. This form lets you name the person you want to make end-of-life treatment decisions for you (your \"health care agent\") if you're not able to. Many hospitals and nursing homes will give you the forms you need to complete a living will and a medical power of . · Your living will is used only if you can't make or communicate decisions for yourself anymore. If you become able to make decisions again, you can accept or refuse any treatment, no matter what you wrote in your living will. · Your state may offer an online registry.  This is a place where you can store your living will online so the doctors and nurses who need to treat you can find it right away. What should you think about when creating a living will? Talk about your end-of-life wishes with your family members and your doctor. Let them know what you want. That way the people making decisions for you won't be surprised by your choices. Think about these questions as you make your living will: · Do you know enough about life support methods that might be used? If not, talk to your doctor so you know what might be done if you can't breathe on your own, your heart stops, or you're unable to swallow. · What things would you still want to be able to do after you receive life-support methods? Would you want to be able to walk? To speak? To eat on your own? To live without the help of machines? · If you have a choice, where do you want to be cared for? In your home? At a hospital or nursing home? · Do you want certain Taoism practices performed if you become very ill? · If you have a choice at the end of your life, where would you prefer to die? At home? In a hospital or nursing home? Somewhere else? · Would you prefer to be buried or cremated? · Do you want your organs to be donated after you die? What should you do with your living will? · Make sure that your family members and your health care agent have copies of your living will. · Give your doctor a copy of your living will to keep in your medical record. If you have more than one doctor, make sure that each one has a copy. · You may want to put a copy of your living will where it can be easily found. Where can you learn more? Go to http://ines-jojo.info/. Enter J392 in the search box to learn more about \"Learning About Living Vickey Wolff. \" Current as of: August 8, 2016 Content Version: 11.3 © 2566-4145 [employer].  Care instructions adapted under license by [address] (which disclaims liability or warranty for this information). If you have questions about a medical condition or this instruction, always ask your healthcare professional. [address] any warranty or liability for your use of this information. Preventing Falls: Care Instructions Your Care Instructions Getting around your home safely can be a challenge if you have injuries or health problems that make it easy for you to fall. Loose rugs and furniture in walkways are among the dangers for many older people who have problems walking or who have poor eyesight. People who have conditions such as arthritis, osteoporosis, or dementia also have to be careful not to fall. You can make your home safer with a few simple measures. Follow-up care is a key part of your treatment and safety. Be sure to make and go to all appointments, and call your doctor if you are having problems. It's also a good idea to know your test results and keep a list of the medicines you take. How can you care for yourself at home? Taking care of yourself · You may get dizzy if you do not drink enough water. To prevent dehydration, drink plenty of fluids, enough so that your urine is light yellow or clear like water. Choose water and other caffeine-free clear liquids. If you have kidney, heart, or liver disease and have to limit fluids, talk with your doctor before you increase the amount of fluids you drink. · Exercise regularly to improve your strength, muscle tone, and balance. Walk if you can. Swimming may be a good choice if you cannot walk easily. · Have your vision and hearing checked each year or any time you notice a change. If you have trouble seeing and hearing, you might not be able to avoid objects and could lose your balance. · Know the side effects of the medicines you take. Ask your doctor or pharmacist whether the medicines you take can affect your balance. Sleeping pills or sedatives can affect your balance. · Limit the amount of alcohol you drink. Alcohol can impair your balance and other senses. · Ask your doctor whether calluses or corns on your feet need to be removed. If you wear loose-fitting shoes because of calluses or corns, you can lose your balance and fall. · Talk to your doctor if you have numbness in your feet. Preventing falls at home · Remove raised doorway thresholds, throw rugs, and clutter. Repair loose carpet or raised areas in the floor. · Move furniture and electrical cords to keep them out of walking paths. · Use nonskid floor wax, and wipe up spills right away, especially on ceramic tile floors. · If you use a walker or cane, put rubber tips on it. If you use crutches, clean the bottoms of them regularly with an abrasive pad, such as steel wool. · Keep your house well lit, especially [place] [employer], and outside walkways. Use night-lights in areas such as hallways and bathrooms. Add extra light switches or use remote switches (such as switches that go on or off when you clap your hands) to make it easier to turn lights on if you have to get up during the night. · Install sturdy handrails on stairways. · Move items in your cabinets so that the things you use a lot are on the lower shelves (about waist level). · Keep a cordless phone and a flashlight with new batteries by your bed. If possible, put a phone in each of the main rooms of your house, or carry a cell phone in case you fall and cannot reach a phone. Or, you can wear a device around your neck or wrist. You push a button that sends a signal for help. · Wear low-heeled shoes that fit well and give your feet good support. Use footwear with nonskid soles. Check the heels and soles of your shoes for wear. Repair or replace worn heels or soles. · Do not wear socks without shoes on wood floors. · Walk on the grass when the sidewalks are slippery.  If you live in an area that gets snow and ice in the winter, sprinkle salt on slippery steps and sidewalks. Preventing falls in the bath · Install grab bars and nonskid mats inside and outside your shower or tub and near the toilet and sinks. · Use shower chairs and bath benches. · Use a hand-held shower head that will allow you to sit while showering. · Get into a tub or shower by putting the weaker leg in first. Get out of a tub or shower with your strong side first. 
· Repair loose toilet seats and consider installing a raised toilet seat to make getting on and off the toilet easier. · Keep your bathroom door unlocked while you are in the shower. Where can you learn more? Go to http://ines-jojo.info/. Enter [id number] [id number] in the search box to learn more about \"Preventing Falls: Care Instructions. \" Current as of: March 16, 2018 Content Version: 11.8 © 3569-7378 Healthwise, [facility]. Care instructions adapted under license by [employer] (which disclaims liability or warranty for this information). If you have questions about a medical condition or this instruction, always ask your healthcare professional. [place] [address] any warranty or liability for your use of this information.

## 2019-10-11 LAB
ALBUMIN SERPL-MCNC: 4.6 G/DL (ref 3.6–4.8)
ALBUMIN/CREAT UR: 12.3 MG/G CREAT (ref 0–30)
ALBUMIN/GLOB SERPL: 1.6 {RATIO} (ref 1.2–2.2)
ALP SERPL-CCNC: 67 IU/L (ref 39–117)
ALT SERPL-CCNC: 12 IU/L (ref 0–32)
AST SERPL-CCNC: 25 IU/L (ref 0–40)
BILIRUB SERPL-MCNC: 0.3 MG/DL (ref 0–1.2)
BUN SERPL-MCNC: 16 MG/DL (ref 8–27)
BUN/CREAT SERPL: 18 (ref 12–28)
CALCIUM SERPL-MCNC: 10.4 MG/DL (ref 8.7–10.3)
CHLORIDE SERPL-SCNC: 102 MMOL/L (ref 96–106)
CHOLEST SERPL-MCNC: 305 MG/DL (ref 100–199)
CO2 SERPL-SCNC: 22 MMOL/L (ref 20–29)
CREAT SERPL-MCNC: 0.89 MG/DL (ref 0.57–1)
CREAT UR-MCNC: 151.3 MG/DL
ERYTHROCYTE [DISTWIDTH] IN BLOOD BY AUTOMATED COUNT: 12.7 % (ref 12.3–15.4)
EST. AVERAGE GLUCOSE BLD GHB EST-MCNC: 111 MG/DL
GLOBULIN SER CALC-MCNC: 2.8 G/DL (ref 1.5–4.5)
GLUCOSE SERPL-MCNC: 95 MG/DL (ref 65–99)
HBA1C MFR BLD: 5.5 % (ref 4.8–5.6)
HCT VFR BLD AUTO: 31.3 % (ref 34–46.6)
HDLC SERPL-MCNC: 77 MG/DL
HGB BLD-MCNC: 10.6 G/DL (ref 11.1–15.9)
LDLC SERPL CALC-MCNC: 197 MG/DL (ref 0–99)
Lab: NORMAL
MCH RBC QN AUTO: 30.5 PG (ref 26.6–33)
MCHC RBC AUTO-ENTMCNC: 33.9 G/DL (ref 31.5–35.7)
MCV RBC AUTO: 90 FL (ref 79–97)
MICROALBUMIN UR-MCNC: 18.6 UG/ML
PLATELET # BLD AUTO: 279 X10E3/UL (ref 150–450)
POTASSIUM SERPL-SCNC: 4.2 MMOL/L (ref 3.5–5.2)
PROT SERPL-MCNC: 7.4 G/DL (ref 6–8.5)
RBC # BLD AUTO: 3.47 X10E6/UL (ref 3.77–5.28)
SODIUM SERPL-SCNC: 142 MMOL/L (ref 134–144)
TRIGL SERPL-MCNC: 157 MG/DL (ref 0–149)
TSH SERPL DL<=0.005 MIU/L-ACNC: 0.67 UIU/ML (ref 0.45–4.5)
VLDLC SERPL CALC-MCNC: 31 MG/DL (ref 5–40)
WBC # BLD AUTO: 5.4 X10E3/UL (ref 3.4–10.8)

## 2019-12-30 ENCOUNTER — TELEPHONE (OUTPATIENT)
Dept: FAMILY MEDICINE CLINIC | Age: 69
End: 2019-12-30

## 2019-12-30 NOTE — TELEPHONE ENCOUNTER
----- Message from Tabby Bhatti sent at 12/30/2019 11:51 AM EST -----  Regarding: Dr. Latonia Barker: [phone]  Patient would like to be seen today for tendinitis. There was no appt available today. Please follow up with the patient to be worked in.

## 2019-12-30 NOTE — TELEPHONE ENCOUNTER
Message left on patient voice mail instructing patient that pcp is out office today and will return this Thurs 1/2/20. If patient is unable to wait patient is instructed to go to  on [address]. For evaluation.

## 2020-01-03 ENCOUNTER — OFFICE VISIT (OUTPATIENT)
Dept: FAMILY MEDICINE CLINIC | Age: 70
End: 2020-01-03

## 2020-01-03 VITALS
HEART RATE: 84 BPM | HEIGHT: 63 IN | TEMPERATURE: 96.7 F | DIASTOLIC BLOOD PRESSURE: 58 MMHG | WEIGHT: 129 LBS | RESPIRATION RATE: 20 BRPM | SYSTOLIC BLOOD PRESSURE: 136 MMHG | OXYGEN SATURATION: 100 % | BODY MASS INDEX: 22.86 KG/M2

## 2020-01-03 DIAGNOSIS — M10.00 ACUTE IDIOPATHIC GOUT, UNSPECIFIED SITE: ICD-10-CM

## 2020-01-03 DIAGNOSIS — M79.671 RIGHT FOOT PAIN: Primary | ICD-10-CM

## 2020-01-03 DIAGNOSIS — M79.604 PAIN OF RIGHT LOWER EXTREMITY: ICD-10-CM

## 2020-01-03 RX ORDER — INDOMETHACIN 50 MG/1
50 CAPSULE ORAL 3 TIMES DAILY
Qty: 18 CAP | Refills: 0 | Status: SHIPPED | OUTPATIENT
Start: 2020-01-03 | End: 2020-01-09

## 2020-01-03 RX ORDER — DICLOFENAC SODIUM 10 MG/G
4 GEL TOPICAL 4 TIMES DAILY
Qty: 100 G | Refills: 1 | Status: SHIPPED | OUTPATIENT
Start: 2020-01-03 | End: 2021-12-01

## 2020-01-03 RX ORDER — TRAMADOL HYDROCHLORIDE 50 MG/1
50 TABLET ORAL
Qty: 20 TAB | Refills: 0 | Status: SHIPPED | OUTPATIENT
Start: 2020-01-03 | End: 2020-01-10 | Stop reason: SDUPTHER

## 2020-01-03 NOTE — PATIENT INSTRUCTIONS
Foot Pain: Care Instructions  Your Care Instructions  Foot injuries that cause pain and swelling are fairly common. Almost all sports or home repair projects can cause a misstep that ends up as foot pain. Normal wear and tear, especially as you get older, also can cause foot pain. Most minor foot injuries will heal on their own, and home treatment is usually all you need to do. If you have a severe injury, you may need tests and treatment. Follow-up care is a key part of your treatment and safety. Be sure to make and go to all appointments, and call your doctor if you are having problems. It's also a good idea to know your test results and keep a list of the medicines you take. How can you care for yourself at home? · Take pain medicines exactly as directed. ? If the doctor gave you a prescription medicine for pain, take it as prescribed. ? If you are not taking a prescription pain medicine, ask your doctor if you can take an over-the-counter medicine. · Rest and protect your foot. Take a break from any activity that may cause pain. · Put ice or a cold pack on your foot for 10 to 20 minutes at a time. Put a thin cloth between the ice and your skin. · Prop up the sore foot on a pillow when you ice it or anytime you sit or lie down during the next 3 days. Try to keep it above the level of your heart. This will help reduce swelling. · Your doctor may recommend that you wrap your foot with an elastic bandage. Keep your foot wrapped for as long as your doctor advises. · If your doctor recommends crutches, use them as directed. · Wear roomy footwear. · As soon as pain and swelling end, begin gentle exercises of your foot. Your doctor can tell you which exercises will help. When should you call for help? Call 911 anytime you think you may need emergency care.  For example, call if:    · Your foot turns pale, white, blue, or cold.    Call your doctor now or seek immediate medical care if:    · You cannot move or stand on your foot.     · Your foot looks twisted or out of its normal position.     · Your foot is not stable when you step down.     · You have signs of infection, such as:  ? Increased pain, swelling, warmth, or redness. ? Red streaks leading from the sore area. ? Pus draining from a place on your foot. ? A fever.     · Your foot is numb or tingly.    Watch closely for changes in your health, and be sure to contact your doctor if:    · You do not get better as expected.     · You have bruises from an injury that last longer than 2 weeks. Where can you learn more? Go to http://ines-jojo.info/. Enter G654 in the search box to learn more about \"Foot Pain: Care Instructions. \"  Current as of: June 26, 2019  Content Version: 12.2  © 5787-1087 [employer]. Care instructions adapted under license by [employer] (which disclaims liability or warranty for this information). If you have questions about a medical condition or this instruction, always ask your healthcare professional. [place] [address] any warranty or liability for your use of this information. Nosebleeds: Care Instructions  Your Care Instructions    Nosebleeds are common, especially if you have colds or allergies. Many things can cause a nosebleed. Some nosebleeds stop on their own with pressure. Others need packing. Some get cauterized (sealed). If you have gauze or other packing materials in your nose, you will need to follow up with your doctor to have the packing removed. You may need more treatment if you get nosebleeds a lot. The doctor has checked you carefully, but problems can develop later. If you notice any problems or new symptoms, get medical treatment right away. Follow-up care is a key part of your treatment and safety. Be sure to make and go to all appointments, and call your doctor if you are having problems.  It's also a good idea to know your test results and keep a list of the medicines you take. How can you care for yourself at home? · If you get another nosebleed:  ? Sit up and tilt your head slightly forward. This keeps blood from going down your throat. ? Use your thumb and index finger to pinch your nose shut for 10 minutes. Use a clock. Do not check to see if the bleeding has stopped before the 10 minutes are up. If the bleeding has not stopped, pinch your nose shut for another 10 minutes. ? When the bleeding has stopped, try not to pick, rub, or blow your nose for 12 hours. Avoiding these things helps keep your nose from bleeding again. · If your doctor prescribed antibiotics, take them as directed. Do not stop taking them just because you feel better. You need to take the full course of antibiotics. To prevent nosebleeds  · Do not blow your nose too hard. · Try not to lift or strain after a nosebleed. · Raise your head on a pillow while you sleep. · Put a thin layer of a saline- or water-based nasal gel, such as NasoGel, inside your nose. Put it on the septum, which divides your nostrils. This will prevent dryness that can cause nosebleeds. · Use a vaporizer or humidifier to add moisture to your bedroom. Follow the directions for cleaning the machine. · Do not use aspirin, ibuprofen (Advil, Motrin), or naproxen (Aleve) for 36 to 48 hours after a nosebleed unless your doctor tells you to. You can use acetaminophen (Tylenol) for pain relief. · Talk to your doctor about stopping any other medicines you are taking. Some medicines may make you more likely to get a nosebleed. · Do not use cold medicines or nasal sprays without first talking to your doctor. They can make your nose dry. When should you call for help? Call 911 anytime you think you may need emergency care.  For example, call if:    · You passed out (lost consciousness).    Call your doctor now or seek immediate medical care if:    · You get another nosebleed and your nose is still bleeding after you have applied pressure 3 times for 10 minutes each time (30 minutes total).     · There is a lot of blood running down the back of your throat even after you pinch your nose and tilt your head forward.     · You have a fever.     · You have sinus pain.    Watch closely for changes in your health, and be sure to contact your doctor if:    · You get nosebleeds often, even if they stop.     · You do not get better as expected. Where can you learn more? Go to http://ines-jojo.info/. Enter S156 in the search box to learn more about \"Nosebleeds: Care Instructions. \"  Current as of: June 26, 2019  Content Version: 12.2  © 8790-8289 [employer]. Care instructions adapted under license by [employer] (which disclaims liability or warranty for this information). If you have questions about a medical condition or this instruction, always ask your healthcare professional. [place] [address] any warranty or liability for your use of this information. Gout: Care Instructions  Your Care Instructions    Gout is a form of arthritis caused by a buildup of uric acid crystals in a joint. It causes sudden attacks of pain, swelling, redness, and stiffness, usually in one joint, especially the big toe. Gout usually comes on without a cause. But it can be brought on by drinking alcohol (especially beer) or eating seafood and red meat. Taking certain medicines, such as diuretics or aspirin, also can bring on an attack of gout. Taking your medicines as prescribed and following up with your doctor regularly can help you avoid gout attacks in the future. Follow-up care is a key part of your treatment and safety. Be sure to make and go to all appointments, and call your doctor if you are having problems. It's also a good idea to know your test results and keep a list of the medicines you take. How can you care for yourself at home?   · If the joint is swollen, put ice or a cold pack on the area for 10 to 20 minutes at a time. Put a thin cloth between the ice and your skin. · Prop up the sore limb on a pillow when you ice it or anytime you sit or lie down during the next 3 days. Try to keep it above the level of your heart. This will help reduce swelling. · Rest sore joints. Avoid activities that put weight or strain on the joints for a few days. Take short rest breaks from your regular activities during the day. · Take your medicines exactly as prescribed. Call your doctor if you think you are having a problem with your medicine. · Take pain medicines exactly as directed. ? If the doctor gave you a prescription medicine for pain, take it as prescribed. ? If you are not taking a prescription pain medicine, ask your doctor if you can take an over-the-counter medicine. · Eat less seafood and red meat. · Check with your doctor before drinking alcohol. · Losing weight, if you are overweight, may help reduce attacks of gout. But do not go on a [employer]. \" Losing a lot of weight in a short amount of time can cause a gout attack. When should you call for help? Call your doctor now or seek immediate medical care if:    · You have a fever.     · The joint is so painful you cannot use it.     · You have sudden, unexplained swelling, redness, warmth, or severe pain in one or more joints.    Watch closely for changes in your health, and be sure to contact your doctor if:    · You have joint pain.     · Your symptoms get worse or are not improving after 2 or 3 days. Where can you learn more? Go to http://ines-jojo.info/. Enter E206 in the search box to learn more about \"Gout: Care Instructions. \"  Current as of: April 1, 2019  Content Version: 12.2  © 7338-1049 [employer]. Care instructions adapted under license by [employer] (which disclaims liability or warranty for this information).  If you have questions about a medical condition or this instruction, always ask your healthcare professional. [place] [address] any warranty or liability for your use of this information. Current every day smoker

## 2020-01-04 LAB
ALBUMIN SERPL-MCNC: 4.3 G/DL (ref 3.6–4.8)
ALBUMIN/GLOB SERPL: 1.5 {RATIO} (ref 1.2–2.2)
ALP SERPL-CCNC: 81 IU/L (ref 39–117)
ALT SERPL-CCNC: 14 IU/L (ref 0–32)
AST SERPL-CCNC: 26 IU/L (ref 0–40)
BILIRUB SERPL-MCNC: <0.2 MG/DL (ref 0–1.2)
BUN SERPL-MCNC: 37 MG/DL (ref 8–27)
BUN/CREAT SERPL: 20 (ref 12–28)
CALCIUM SERPL-MCNC: 10.1 MG/DL (ref 8.7–10.3)
CHLORIDE SERPL-SCNC: 103 MMOL/L (ref 96–106)
CO2 SERPL-SCNC: 18 MMOL/L (ref 20–29)
CREAT SERPL-MCNC: 1.81 MG/DL (ref 0.57–1)
ERYTHROCYTE [DISTWIDTH] IN BLOOD BY AUTOMATED COUNT: 12.4 % (ref 12.3–15.4)
GLOBULIN SER CALC-MCNC: 2.8 G/DL (ref 1.5–4.5)
GLUCOSE SERPL-MCNC: 130 MG/DL (ref 65–99)
HCT VFR BLD AUTO: 30.2 % (ref 34–46.6)
HGB BLD-MCNC: 10.4 G/DL (ref 11.1–15.9)
INTERPRETATION: NORMAL
MCH RBC QN AUTO: 31 PG (ref 26.6–33)
MCHC RBC AUTO-ENTMCNC: 34.4 G/DL (ref 31.5–35.7)
MCV RBC AUTO: 90 FL (ref 79–97)
PLATELET # BLD AUTO: 285 X10E3/UL (ref 150–450)
POTASSIUM SERPL-SCNC: 3.4 MMOL/L (ref 3.5–5.2)
PROT SERPL-MCNC: 7.1 G/DL (ref 6–8.5)
RBC # BLD AUTO: 3.36 X10E6/UL (ref 3.77–5.28)
SODIUM SERPL-SCNC: 142 MMOL/L (ref 134–144)
TSH SERPL DL<=0.005 MIU/L-ACNC: 0.39 UIU/ML (ref 0.45–4.5)
URATE SERPL-MCNC: 13.2 MG/DL (ref 2.5–7.1)
WBC # BLD AUTO: 5.7 X10E3/UL (ref 3.4–10.8)

## 2020-01-10 ENCOUNTER — OFFICE VISIT (OUTPATIENT)
Dept: FAMILY MEDICINE CLINIC | Age: 70
End: 2020-01-10

## 2020-01-10 VITALS
OXYGEN SATURATION: 96 % | TEMPERATURE: 96 F | SYSTOLIC BLOOD PRESSURE: 126 MMHG | RESPIRATION RATE: 20 BRPM | DIASTOLIC BLOOD PRESSURE: 61 MMHG | HEIGHT: 63 IN | HEART RATE: 67 BPM | BODY MASS INDEX: 23.21 KG/M2 | WEIGHT: 131 LBS

## 2020-01-10 DIAGNOSIS — E87.6 HYPOKALEMIA: ICD-10-CM

## 2020-01-10 DIAGNOSIS — I10 ESSENTIAL HYPERTENSION: ICD-10-CM

## 2020-01-10 DIAGNOSIS — M79.671 RIGHT FOOT PAIN: Primary | ICD-10-CM

## 2020-01-10 DIAGNOSIS — N17.9 ACUTE RENAL FAILURE, UNSPECIFIED ACUTE RENAL FAILURE TYPE (HCC): ICD-10-CM

## 2020-01-10 DIAGNOSIS — M10.00 ACUTE IDIOPATHIC GOUT, UNSPECIFIED SITE: ICD-10-CM

## 2020-01-10 DIAGNOSIS — M79.604 PAIN OF RIGHT LOWER EXTREMITY: ICD-10-CM

## 2020-01-10 LAB — HBA1C MFR BLD HPLC: 5.6 %

## 2020-01-10 RX ORDER — ALLOPURINOL 100 MG/1
TABLET ORAL
Qty: 60 TAB | Refills: 2 | Status: SHIPPED | OUTPATIENT
Start: 2020-01-10 | End: 2020-04-09

## 2020-01-10 RX ORDER — POTASSIUM CHLORIDE 20 MEQ/1
20 TABLET, EXTENDED RELEASE ORAL DAILY
Qty: 30 TAB | Refills: 1 | Status: SHIPPED | OUTPATIENT
Start: 2020-01-10 | End: 2020-04-06

## 2020-01-10 RX ORDER — TRAMADOL HYDROCHLORIDE 50 MG/1
50 TABLET ORAL
Qty: 60 TAB | Refills: 0 | Status: SHIPPED | OUTPATIENT
Start: 2020-01-10 | End: 2020-02-09

## 2020-01-10 NOTE — PROGRESS NOTES
HISTORY OF PRESENT ILLNESS  Cuauhtemoc Roper is a 71 y.o. female.   HPI pt with hx of breast cancer on Letrozole, for the last 2yrs seen oncologist 3 months ago, was told that she has to be on such meds for 5yrs,  Patient with history of gout last uric acid was elevated most recently U acid 13.2 which not taken the given medication on the last few weks,  visit, the pain and the swelling has subsided significantly pain is 1 out of 10 dull nonradiating improved since last visit  Was been taken a lot of motrin high dose for the pain,  Uric acid 13.2  High       TSH 0.389  Low      Hemoglobin A1c 5.5        BUN 37  High   8 - 27 mg/dL Final   Creatinine 1.81  High   0.57 - 1.00 mg/dL Final   GFR est non-AA 28  Low   >59 mL/min/1.73 Final   GFR est AA 32  Low        10/2019  BUN 16   8 - 27 mg/dL Final   Creatinine 0.89   0.57 - 1.00 mg/dL Final   GFR est non-AA 66   >59 mL/min/1.73 Final   GFR est AA 76   >59 mL/min/1.73 Final   BUN/Creatinine ratio 18   12 - 28  Final   Sodium 142   134 - 144 mmol/L Final   Potassium 4.2        HTN  Today pt present for Bp check and noCompliancy w/ the bp meds, is supposed to be taking hctz, last time taken it was >7 days ago,  having had the low salt diet ,  has been active, patien does not obtain the bp at home ,, today the pt denies Chest Pain, has no legs swelling no lightheadedness,    /61 136/58 147/69     Anemia  Present with history of low blood count and currently stating that is not aware of any personal nor family history of any coagulopathy as far as the pt aware, the pt is on no anticoagulative meds, currently on no iron therapy,  patient has currently no gross bleeding, no blood in stool nor in urine, no tarry stool, no hx of SS or Thalassemia,  has no skin discoloration and there is no constipation,  no bleeding gum, not a vegetarian    RBC 3.36  Low   3.77 - 5.28 x10E6/uL Final   HGB 10.4  Low   11.1 - 15.9 g/dL Final   HCT 30.2  Low              Current Outpatient Medications   Medication Sig Dispense Refill    allopurinol (ZYLOPRIM) 100 mg tablet Take 2 tablet by mouth daily 60 Tab 2    traMADol (ULTRAM) 50 mg tablet Take 1 Tab by mouth two (2) times daily as needed for Pain for up to 30 days. Max Daily Amount: 100 mg. Indications: pain 60 Tab 0    potassium chloride (K-DUR, KLOR-CON) 20 mEq tablet Take 1 Tab by mouth daily. 30 Tab 1    diclofenac (VOLTAREN) 1 % gel Apply 4 g to affected area four (4) times daily. 100 g 1    cyclobenzaprine (FLEXERIL) 10 mg tablet Take 1 Tab by mouth two (2) times a day. 30 Tab 0    ergocalciferol (ERGOCALCIFEROL) 50,000 unit capsule Take 1 Cap by mouth every seven (7) days. 12 Cap 6    letrozole (FEMARA) 2.5 mg tablet TAKE 1 TABLET BY MOUTH ONCE DAILY 150 Tab 0    letrozole (FEMARA) 2.5 mg tablet take 1 tablet by mouth once daily 30 Tab 6    clopidogrel (PLAVIX) 75 mg tab Take 1 Tab by mouth daily. 90 Tab 3    pravastatin (PRAVACHOL) 40 mg tablet TAKE 1 TABLET BY MOUTH NIGHTLY  0    aspirin 81 mg chewable tablet Take 1 Tab by mouth daily. 30 Tab 6    acetaminophen (TYLENOL) 500 mg tablet Take 2 Tabs by mouth every six (6) hours as needed for Pain. Indications: Pain 40 Tab 1    timolol (TIMOPTIC) 0.5 % ophthalmic solution Administer  to both eyes two (2) times a day.  0    ALPHAGAN P 0.1 % ophthalmic solution instill 1 drop into both eyes twice a day  0    dorzolamide (TRUSOPT) 2 % ophthalmic solution instill 1 drop into both eyes twice a day  0    latanoprost (XALATAN) 0.005 % ophthalmic solution Administer 1 Drop to both eyes nightly. 2.5 mL 1    brimonidine (ALPHAGAN) 0.15 % ophthalmic solution Administer 1 Drop to both eyes two (2) times a day. 15 mL 6    gabapentin (NEURONTIN) 300 mg capsule take 1 capsule by mouth twice a day THE DAY PRIOR TO SURGERY 9/13 (Patient taking differently: 300 mg daily.  take 1 capsule by mouth twice a day THE DAY PRIOR TO SURGERY 9/13) 180 Cap 1    Blood-Glucose Meter (ACCU-CHEK OLIVIA PLUS METER) misc Test blood sugar daily 1 Each 0    glucose blood VI test strips (ACCU-CHEK OLIVIA PLUS TEST STRP) strip Test blood sugar once daily 50 Strip 11    alcohol swabs (BD SINGLE USE SWABS REGULAR) padm Test blood sugar once daily 50 Pad 11    Blood-Glucose Meter monitoring kit accu chek gabino smartview meter 1 Kit 0     Allergies   Allergen Reactions    Statins-Hmg-Coa Reductase Inhibitors Myalgia     Past Medical History:   Diagnosis Date    Arthritis     Breast cancer ([facility] 04/2017    lumpectomy left breast, radiation    Cancer ([facility]     breast cancer- left    Cerebrovascular accident (stroke) ([facility]     no deficiets    CVA (cerebral vascular accident) ([facility]     11/12/2018    Diabetes mellitus type 2, controlled ([facility]     Gallstones     asymptomatic, pt denies having    GERD (gastroesophageal reflux disease)     dx with resolution of symptoms on ppi- pt denies having    Hypercholesterolemia     Hypertension     Ill-defined condition     gout    Long term current use of anticoagulant therapy     Non-nicotine vapor product user     used to use    PAD (peripheral artery disease) ([facility] 2/20/2013    right SFA angioplasty and athrectomy    Primary open angle glaucoma     Radiation therapy complication     Solitary lung nodule 5/9/2017    Type II diabetes mellitus, uncontrolled ([facility] 2/1/2017     Past Surgical History:   Procedure Laterality Date    HX BREAST BIOPSY Left 2017    HX BREAST LUMPECTOMY Left 6/22/2017    LEFT LUMPECTOMY WITH INTRAOPERATIVE ULTRASOUND GUIDED, LEFT AXILLARY SENTINEL LYMPH NODE BIOPSY, POSSIBLE AXILLARY DISECTION performed by Alisia Hunt MD at [facility] MAIN OR    HX HEENT Bilateral     eye lids surgery    VASCULAR SURGERY PROCEDURE UNLIST  April 2016    right leg stent     Family History   Problem Relation Age of Onset    Hypertension Mother     Diabetes Mother     Diabetes Son      Social History     Tobacco Use    Smoking status: Former Smoker Packs/day: 0.25     Years: 47.00     Pack years: 11.75     Types: Cigarettes     Start date: 1966     Last attempt to quit: 3/13/2013     Years since quittin.8    Smokeless tobacco: Never Used    Tobacco comment: estimate is one pack per week   Substance Use Topics    Alcohol use: Yes     Alcohol/week: 21.0 standard drinks     Types: 21 Cans of beer per week     Comment: 2 to 3 beers per day per patient      Lab Results   Component Value Date/Time    WBC 6.4 01/10/2020 02:18 PM    HGB 10.2 (L) 01/10/2020 02:18 PM    Hemoglobin (POC) 10.5 (L) 2017 07:57 AM    HCT 29.4 (L) 01/10/2020 02:18 PM    Hematocrit (POC) 31 (L) 2017 07:57 AM    PLATELET 614  02:18 PM    MCV 89 01/10/2020 02:18 PM     Lab Results   Component Value Date/Time    TSH 0.389 (L) 2020 03:49 PM    T4, Free 1.1 11/15/2018 04:50 PM         Review of Systems   Constitutional: Negative for chills and fever. HENT: Negative for ear pain and nosebleeds. Eyes: Negative for blurred vision, pain and discharge. Respiratory: Negative for shortness of breath. Cardiovascular: Negative for chest pain and leg swelling. Gastrointestinal: Negative for constipation, diarrhea, nausea and vomiting. Genitourinary: Negative for frequency. Musculoskeletal: Positive for back pain, joint pain and myalgias. Skin: Negative for itching and rash. Neurological: Positive for weakness and headaches. Psychiatric/Behavioral: Negative for depression. The patient has insomnia. The patient is not nervous/anxious. Physical Exam  Vitals signs and nursing note reviewed. Constitutional:       Appearance: She is well-developed. HENT:      Head: Normocephalic and atraumatic. Eyes:      Conjunctiva/sclera: Conjunctivae normal.      Pupils: Pupils are equal, round, and reactive to light. Neck:      Musculoskeletal: Normal range of motion and neck supple. Thyroid: No thyromegaly. Vascular: No JVD. Cardiovascular:      Rate and Rhythm: Normal rate and regular rhythm. Heart sounds: Normal heart sounds. No murmur. No friction rub. No gallop. Pulmonary:      Effort: Pulmonary effort is normal. No respiratory distress. Breath sounds: Normal breath sounds. No stridor. No wheezing or rales. Abdominal:      General: Bowel sounds are normal. There is no distension. Palpations: Abdomen is soft. There is no mass. Tenderness: There is no tenderness. Musculoskeletal:         General: Tenderness present. Thoracic back: She exhibits decreased range of motion, tenderness, pain and spasm. Lumbar back: She exhibits decreased range of motion, tenderness, pain and spasm. Lymphadenopathy:      Cervical: No cervical adenopathy. Skin:     Findings: No erythema or rash. Neurological:      Mental Status: She is alert and oriented to person, place, and time. Cranial Nerves: No cranial nerve deficit. Deep Tendon Reflexes: Reflexes are normal and symmetric. Psychiatric:         Behavior: Behavior normal.         ASSESSMENT and PLAN  Diagnoses and all orders for this visit:    1. Right foot pain  -     URIC ACID  -     METABOLIC PANEL, BASIC  -     CBC W/O DIFF  -     AMB POC HEMOGLOBIN A1C  -     traMADol (ULTRAM) 50 mg tablet; Take 1 Tab by mouth two (2) times daily as needed for Pain for up to 30 days. Max Daily Amount: 100 mg. Indications: pain  -     potassium chloride (K-DUR, KLOR-CON) 20 mEq tablet; Take 1 Tab by mouth daily. 2. Pain of right lower extremity  -     URIC ACID  -     METABOLIC PANEL, BASIC  -     CBC W/O DIFF  -     AMB POC HEMOGLOBIN A1C  -     traMADol (ULTRAM) 50 mg tablet; Take 1 Tab by mouth two (2) times daily as needed for Pain for up to 30 days. Max Daily Amount: 100 mg. Indications: pain  -     potassium chloride (K-DUR, KLOR-CON) 20 mEq tablet; Take 1 Tab by mouth daily.     3. Acute idiopathic gout, unspecified site  -     URIC ACID  - METABOLIC PANEL, BASIC  -     CBC W/O DIFF  -     AMB POC HEMOGLOBIN A1C  -     traMADol (ULTRAM) 50 mg tablet; Take 1 Tab by mouth two (2) times daily as needed for Pain for up to 30 days. Max Daily Amount: 100 mg. Indications: pain  -     potassium chloride (K-DUR, KLOR-CON) 20 mEq tablet; Take 1 Tab by mouth daily. 4. Essential hypertension  -     URIC ACID  -     METABOLIC PANEL, BASIC  -     CBC W/O DIFF  -     AMB POC HEMOGLOBIN A1C  -     potassium chloride (K-DUR, KLOR-CON) 20 mEq tablet; Take 1 Tab by mouth daily. 5. Acute renal failure, unspecified acute renal failure type (HCC)  -     URIC ACID  -     METABOLIC PANEL, BASIC  -     CBC W/O DIFF  -     AMB POC HEMOGLOBIN A1C  -     potassium chloride (K-DUR, KLOR-CON) 20 mEq tablet; Take 1 Tab by mouth daily. 6. Hypokalemia  -     potassium chloride (K-DUR, KLOR-CON) 20 mEq tablet; Take 1 Tab by mouth daily. Other orders  -     allopurinol (ZYLOPRIM) 100 mg tablet;  Take 2 tablet by mouth daily  -     CKD REPORT

## 2020-01-10 NOTE — PATIENT INSTRUCTIONS
Low Sodium Diet (2,000 Milligram): Care Instructions  Your Care Instructions    Too much sodium causes your body to hold on to extra water. This can raise your blood pressure and force your heart and kidneys to work harder. In very serious cases, this could cause you to be put in the hospital. It might even be life-threatening. By limiting sodium, you will feel better and lower your risk of serious problems. The most common source of sodium is salt. People get most of the salt in their diet from canned, prepared, and packaged foods. Fast food and restaurant meals also are very high in sodium. Your doctor will probably limit your sodium to less than 2,000 milligrams (mg) a day. This limit counts all the sodium in prepared and packaged foods and any salt you add to your food. Follow-up care is a key part of your treatment and safety. Be sure to make and go to all appointments, and call your doctor if you are having problems. It's also a good idea to know your test results and keep a list of the medicines you take. How can you care for yourself at home? Read food labels  · Read labels on cans and food packages. The labels tell you how much sodium is in each serving. Make sure that you look at the serving size. If you eat more than the serving size, you have eaten more sodium. · Food labels also tell you the Percent Daily Value for sodium. Choose products with low Percent Daily Values for sodium. · Be aware that sodium can come in forms other than salt, including monosodium glutamate (MSG), sodium citrate, and sodium bicarbonate (baking soda). MSG is often added to Asian food. When you eat out, you can sometimes ask for food without MSG or added salt. Buy low-sodium foods  · Buy foods that are labeled \"unsalted\" (no salt added), \"sodium-free\" (less than 5 mg of sodium per serving), or \"low-sodium\" (less than 140 mg of sodium per serving).  Foods labeled \"reduced-sodium\" and \"light sodium\" may still have too much sodium. Be sure to read the label to see how much sodium you are getting. · Buy fresh vegetables, or frozen vegetables without added sauces. Buy low-sodium versions of canned vegetables, soups, and other canned goods. Prepare low-sodium meals  · Cut back on the amount of salt you use in cooking. This will help you adjust to the taste. Do not add salt after cooking. One teaspoon of salt has about 2,300 mg of sodium. · Take the salt shaker off the table. · Flavor your food with garlic, lemon juice, onion, vinegar, herbs, and spices. Do not use soy sauce, lite soy sauce, steak sauce, onion salt, garlic salt, celery salt, mustard, or ketchup on your food. · Use low-sodium salad dressings, sauces, and ketchup. Or make your own salad dressings and sauces without adding salt. · Use less salt (or none) when recipes call for it. You can often use half the salt a recipe calls for without losing flavor. Other foods such as rice, pasta, and grains do not need added salt. · Rinse canned vegetables, and cook them in fresh water. This removes somebut not allof the salt. · Avoid water that is naturally high in sodium or that has been treated with water softeners, which add sodium. Call your local water company to find out the sodium content of your water supply. If you buy bottled water, read the label and choose a sodium-free brand. Avoid high-sodium foods  · Avoid eating:  ? Smoked, cured, salted, and canned meat, fish, and poultry. ? Ham, de paz, hot dogs, and luncheon meats. ? Regular, hard, and processed cheese and regular peanut butter. ? Crackers with salted tops, and other salted snack foods such as pretzels, chips, and salted popcorn. ? Frozen prepared meals, unless labeled low-sodium. ? Canned and dried soups, broths, and bouillon, unless labeled sodium-free or low-sodium. ? Canned vegetables, unless labeled sodium-free or low-sodium. ? [place] fries, pizza, tacos, and other fast foods.   ? Juan M Knight, olives, ketchup, and other condiments, especially soy sauce, unless labeled sodium-free or low-sodium. Where can you learn more? Go to http://ines-jojo.info/. Enter J697 in the search box to learn more about \"Low Sodium Diet (2,000 Milligram): Care Instructions. \"  Current as of: November 7, 2018  Content Version: 12.2  © 9540-6344 [employer]. Care instructions adapted under license by [employer] (which disclaims liability or warranty for this information). If you have questions about a medical condition or this instruction, always ask your healthcare professional. [address] any warranty or liability for your use of this information. Potassium-Rich Diet: Care Instructions  Your Care Instructions    Potassium is a mineral. It helps keep the right mix of fluids in your body. It also helps your nerves and muscles work as they should. You'll find it in milk and meats. It's also in all fresh foods, including fruits and vegetables. Most adults need about 5 grams of potassium a day. The foods you eat should supply all that you need. Some health conditions can cause a loss of potassium. For example, kidney problems and stomach problems with vomiting and diarrhea can cause you to lose this mineral. Some medicines, such as water pills (diuretics), can cause low potassium. If you can't get enough potassium from what you eat, your doctor may advise you to take supplements. Follow-up care is a key part of your treatment and safety. Be sure to make and go to all appointments, and call your doctor if you are having problems. It's also a good idea to know your test results and keep a list of the medicines you take. How can you care for yourself at home? · Plan your diet around foods that are rich in potassium. Fresh, unprocessed whole foods have the most. These foods include:  ? Milk and other dairy products.   ? Vegetables, especially broccoli, cooked dry beans, tomatoes, potatoes, artichokes, winter squash, and spinach. ? Fruits, especially citrus fruits, bananas, and apricots. Dried apricots contain more potassium than fresh apricots. ? Meat, poultry, and fish. · Ask your doctor about using a salt substitute or \"light\" salt. These often contain potassium. Where can you learn more? Go to http://ines-jojo.info/. Enter H315 in the search box to learn more about \"Potassium-Rich Diet: Care Instructions. \"  Current as of: November 7, 2018  Content Version: 12.2  © 5878-4451 [employer]. Care instructions adapted under license by [employer] (which disclaims liability or warranty for this information). If you have questions about a medical condition or this instruction, always ask your healthcare professional. [place] [address] any warranty or liability for your use of this information. Gout: Care Instructions  Your Care Instructions    Gout is a form of arthritis caused by a buildup of uric acid crystals in a joint. It causes sudden attacks of pain, swelling, redness, and stiffness, usually in one joint, especially the big toe. Gout usually comes on without a cause. But it can be brought on by drinking alcohol (especially beer) or eating seafood and red meat. Taking certain medicines, such as diuretics or aspirin, also can bring on an attack of gout. Taking your medicines as prescribed and following up with your doctor regularly can help you avoid gout attacks in the future. Follow-up care is a key part of your treatment and safety. Be sure to make and go to all appointments, and call your doctor if you are having problems. It's also a good idea to know your test results and keep a list of the medicines you take. How can you care for yourself at home? · If the joint is swollen, put ice or a cold pack on the area for 10 to 20 minutes at a time.  Put a thin cloth between the ice and your skin. · Prop up the sore limb on a pillow when you ice it or anytime you sit or lie down during the next 3 days. Try to keep it above the level of your heart. This will help reduce swelling. · Rest sore joints. Avoid activities that put weight or strain on the joints for a few days. Take short rest breaks from your regular activities during the day. · Take your medicines exactly as prescribed. Call your doctor if you think you are having a problem with your medicine. · Take pain medicines exactly as directed. ? If the doctor gave you a prescription medicine for pain, take it as prescribed. ? If you are not taking a prescription pain medicine, ask your doctor if you can take an over-the-counter medicine. · Eat less seafood and red meat. · Check with your doctor before drinking alcohol. · Losing weight, if you are overweight, may help reduce attacks of gout. But do not go on a [employer]. \" Losing a lot of weight in a short amount of time can cause a gout attack. When should you call for help? Call your doctor now or seek immediate medical care if:    · You have a fever.     · The joint is so painful you cannot use it.     · You have sudden, unexplained swelling, redness, warmth, or severe pain in one or more joints.    Watch closely for changes in your health, and be sure to contact your doctor if:    · You have joint pain.     · Your symptoms get worse or are not improving after 2 or 3 days. Where can you learn more? Go to http://ines-jojo.info/. Enter D246 in the search box to learn more about \"Gout: Care Instructions. \"  Current as of: April 1, 2019  Content Version: 12.2  © 7369-3653 [employer]. Care instructions adapted under license by [employer] (which disclaims liability or warranty for this information).  If you have questions about a medical condition or this instruction, always ask your healthcare professional. Blair Brown disclaims any warranty or liability for your use of this information. Medicines to Avoid With Kidney Disease: Care Instructions  Your Care Instructions    Kidney disease means that your kidneys are not able to get rid of waste from the blood. So they can't keep your body's fluids and chemicals in balance. Usually, the kidneys get rid of waste from the blood through the urine. And they balance the fluids in the body. When your kidneys don't work as they should, you have to be careful about some medicines. They may harm your kidneys. Your doctor may tell you not to take them. Or he or she may change the dose. Medicines for pain and swelling, such as ibuprofen (Advil or Motrin) or naproxen (Aleve), can cause harm. So can some antibiotics and antacids. And you need to be careful about some drugs that treat cancer, lower blood pressure, or get rid of water from the body. Some herbal products could cause harm too. Follow-up care is a key part of your treatment and safety. Be sure to make and go to all appointments, and call your doctor if you are having problems. It's also a good idea to know your test results and keep a list of the medicines you take. How can you care for yourself at home? · Tell your doctor all the prescription, herbal, or over-the-counter medicines you take. Do not take any new ones unless you talk to your doctor first.  · Do not take anti-inflammatory medicines. These include ibuprofen (Advil, Motrin) and naproxen (Aleve). You can use acetaminophen (Tylenol) for pain. · Do not take two or more pain medicines at the same time unless the doctor told you to. Many pain medicines have acetaminophen, which is Tylenol. Too much acetaminophen (Tylenol) can be harmful. · Tell all doctors and others who work with your health care that you have kidney disease. · Wear medical alert jewelry that lists your health problem. You can buy this at most drugstores. Where can you learn more?   Go to http://ines-jojo.info/. Enter H869 in the search box to learn more about \"Medicines to Avoid With Kidney Disease: Care Instructions. \"  Current as of: October 31, 2018  Content Version: 12.2  © 3348-4047 [employer]. Care instructions adapted under license by [employer] (which disclaims liability or warranty for this information). If you have questions about a medical condition or this instruction, always ask your healthcare professional. [address] any warranty or liability for your use of this information.

## 2020-01-11 LAB
BUN SERPL-MCNC: 56 MG/DL (ref 8–27)
BUN/CREAT SERPL: 30 (ref 12–28)
CALCIUM SERPL-MCNC: 10.1 MG/DL (ref 8.7–10.3)
CHLORIDE SERPL-SCNC: 100 MMOL/L (ref 96–106)
CO2 SERPL-SCNC: 18 MMOL/L (ref 20–29)
CREAT SERPL-MCNC: 1.87 MG/DL (ref 0.57–1)
ERYTHROCYTE [DISTWIDTH] IN BLOOD BY AUTOMATED COUNT: 12.3 % (ref 11.7–15.4)
GLUCOSE SERPL-MCNC: 119 MG/DL (ref 65–99)
HCT VFR BLD AUTO: 29.4 % (ref 34–46.6)
HGB BLD-MCNC: 10.2 G/DL (ref 11.1–15.9)
INTERPRETATION: NORMAL
MCH RBC QN AUTO: 30.9 PG (ref 26.6–33)
MCHC RBC AUTO-ENTMCNC: 34.7 G/DL (ref 31.5–35.7)
MCV RBC AUTO: 89 FL (ref 79–97)
PLATELET # BLD AUTO: 332 X10E3/UL (ref 150–450)
POTASSIUM SERPL-SCNC: 3.5 MMOL/L (ref 3.5–5.2)
RBC # BLD AUTO: 3.3 X10E6/UL (ref 3.77–5.28)
SODIUM SERPL-SCNC: 137 MMOL/L (ref 134–144)
URATE SERPL-MCNC: 13.5 MG/DL (ref 2.5–7.1)
WBC # BLD AUTO: 6.4 X10E3/UL (ref 3.4–10.8)

## 2020-02-07 ENCOUNTER — OFFICE VISIT (OUTPATIENT)
Dept: FAMILY MEDICINE CLINIC | Age: 70
End: 2020-02-07

## 2020-02-07 VITALS
HEIGHT: 63 IN | OXYGEN SATURATION: 100 % | DIASTOLIC BLOOD PRESSURE: 80 MMHG | HEART RATE: 63 BPM | BODY MASS INDEX: 23.28 KG/M2 | SYSTOLIC BLOOD PRESSURE: 138 MMHG | RESPIRATION RATE: 20 BRPM | TEMPERATURE: 96.7 F | WEIGHT: 131.4 LBS

## 2020-02-07 DIAGNOSIS — N17.9 ACUTE RENAL FAILURE, UNSPECIFIED ACUTE RENAL FAILURE TYPE (HCC): ICD-10-CM

## 2020-02-07 DIAGNOSIS — M10.00 ACUTE IDIOPATHIC GOUT, UNSPECIFIED SITE: Primary | ICD-10-CM

## 2020-02-07 RX ORDER — METHYLPREDNISOLONE 4 MG/1
TABLET ORAL
Qty: 1 DOSE PACK | Refills: 0 | Status: SHIPPED | OUTPATIENT
Start: 2020-02-07 | End: 2020-03-06 | Stop reason: ALTCHOICE

## 2020-02-07 NOTE — PATIENT INSTRUCTIONS
Medicines to Avoid With Kidney Disease: Care Instructions Your Care Instructions Kidney disease means that your kidneys are not able to get rid of waste from the blood. So they can't keep your body's fluids and chemicals in balance. Usually, the kidneys get rid of waste from the blood through the urine. And they balance the fluids in the body. When your kidneys don't work as they should, you have to be careful about some medicines. They may harm your kidneys. Your doctor may tell you not to take them. Or he or she may change the dose. Medicines for pain and swelling, such as ibuprofen (Advil or Motrin) or naproxen (Aleve), can cause harm. So can some antibiotics and antacids. And you need to be careful about some drugs that treat cancer, lower blood pressure, or get rid of water from the body. Some herbal products could cause harm too. Follow-up care is a key part of your treatment and safety. Be sure to make and go to all appointments, and call your doctor if you are having problems. It's also a good idea to know your test results and keep a list of the medicines you take. How can you care for yourself at home? · Tell your doctor all the prescription, herbal, or over-the-counter medicines you take. Do not take any new ones unless you talk to your doctor first. 
· Do not take anti-inflammatory medicines. These include ibuprofen (Advil, Motrin) and naproxen (Aleve). You can use acetaminophen (Tylenol) for pain. · Do not take two or more pain medicines at the same time unless the doctor told you to. Many pain medicines have acetaminophen, which is Tylenol. Too much acetaminophen (Tylenol) can be harmful. · Tell all doctors and others who work with your health care that you have kidney disease. · Wear medical alert jewelry that lists your health problem. You can buy this at most drugstores. Where can you learn more? Go to http://ines-jojo.info/. Enter Y651 in the search box to learn more about \"Medicines to Avoid With Kidney Disease: Care Instructions. \" Current as of: October 31, 2018 Content Version: 12.2 © 1973-7373 [employer]. Care instructions adapted under license by [employer] (which disclaims liability or warranty for this information). If you have questions about a medical condition or this instruction, always ask your healthcare professional. [address] any warranty or liability for your use of this information. Purine-Restricted Diet: Care Instructions Your Care Instructions Purines are substances that are found in some foods. Your body turns purines into uric acid. High levels of uric acid can cause gout, which is a form of arthritis that causes pain and inflammation in joints. You may be able to help control the amount of uric acid in your body by limiting high-purine foods in your diet. Follow-up care is a key part of your treatment and safety. Be sure to make and go to all appointments, and call your doctor if you are having problems. It's also a good idea to know your test results and keep a list of the medicines you take. How can you care for yourself at home? · Plan your meals and snacks around foods that are low in purines and are safe for you to eat. These foods include: ? Green vegetables and tomatoes. ? Fruits. ? Whole-grain breads, rice, and cereals. ? Eggs, peanut butter, and nuts. ? Low-fat milk, cheese, and other milk products. ? Popcorn. ? Gelatin desserts, chocolate, cocoa, and cakes and sweets, in small amounts. · You can eat certain foods that are medium-high in purines, but eat them only once in a while. These foods include: 
? Legumes, such as dried beans and dried peas. You can have 1 cup cooked legumes each day. ? Asparagus, cauliflower, spinach, mushrooms, and green peas. ? Fish and seafood (other than very high-purine seafood). ? Oatmeal, wheat bran, and wheat germ. · Limit very high-purine foods, including: 
? Organ meats, such as liver, kidneys, sweetbreads, and brains. ? Meats, including de paz, beef, pork, and lamb. ? Game meats and any other meats in large amounts. ? Anchovies, sardines, herring, mackerel, and scallops. ? Gravy. ? Beer. Where can you learn more? Go to http://ines-jojo.info/. Enter F448 in the search box to learn more about \"Purine-Restricted Diet: Care Instructions. \" Current as of: November 7, 2018 Content Version: 12.2 © 6000-5903 [employer]. Care instructions adapted under license by [employer] (which disclaims liability or warranty for this information). If you have questions about a medical condition or this instruction, always ask your healthcare professional. [address] any warranty or liability for your use of this information.

## 2020-02-07 NOTE — PROGRESS NOTES
Name and  verified        Chief Complaint   Patient presents with    Follow-up     Gout in right foot patient stated feels some better. Health Maintenance reviewed-discussed with patient. 1. Have you been to the ER, urgent care clinic since your last visit? Hospitalized since your last visit? no    2. Have you seen or consulted any other health care providers outside of the [address] [place] [address] since your last visit? Include any pap smears or colon screening.  no

## 2020-02-07 NOTE — PROGRESS NOTES
HISTORY OF PRESENT ILLNESS  Justa Sanabria is a 71 y.o. female. HPI   Rt foot pain stating that the big toe pain not wanted go away,   Glucose 119 65 - 99 mg/dL Final BUN 56 8 - 27 mg/dL Final Creatinine 1.87 0.57 - 1.00 mg/dL Final GFR est non-AA 27 >59 mL/min/1.73 Final GFR est AA 31 >59 mL/min/1.73 Final BUN/Creatinine ratio 30   Uric acid 13.5   Has been only 200mg couple wks ago, was on no preventive meds before that, was on thiazide diuretic was taken off of couple wks ago,  The pain is 2/10 wearing nicely high boot    Current Outpatient Medications   Medication Sig Dispense Refill    allopurinol (ZYLOPRIM) 100 mg tablet Take 2 tablet by mouth daily 60 Tab 2    traMADol (ULTRAM) 50 mg tablet Take 1 Tab by mouth two (2) times daily as needed for Pain for up to 30 days. Max Daily Amount: 100 mg. Indications: pain 60 Tab 0    potassium chloride (K-DUR, KLOR-CON) 20 mEq tablet Take 1 Tab by mouth daily. 30 Tab 1    ergocalciferol (ERGOCALCIFEROL) 50,000 unit capsule Take 1 Cap by mouth every seven (7) days. 12 Cap 6    letrozole (FEMARA) 2.5 mg tablet take 1 tablet by mouth once daily 30 Tab 6    clopidogrel (PLAVIX) 75 mg tab Take 1 Tab by mouth daily. 90 Tab 3    aspirin 81 mg chewable tablet Take 1 Tab by mouth daily. 30 Tab 6    acetaminophen (TYLENOL) 500 mg tablet Take 2 Tabs by mouth every six (6) hours as needed for Pain. Indications: Pain 40 Tab 1    timolol (TIMOPTIC) 0.5 % ophthalmic solution Administer  to both eyes two (2) times a day.  0    dorzolamide (TRUSOPT) 2 % ophthalmic solution instill 1 drop into both eyes twice a day  0    latanoprost (XALATAN) 0.005 % ophthalmic solution Administer 1 Drop to both eyes nightly. 2.5 mL 1    brimonidine (ALPHAGAN) 0.15 % ophthalmic solution Administer 1 Drop to both eyes two (2) times a day. 15 mL 6    diclofenac (VOLTAREN) 1 % gel Apply 4 g to affected area four (4) times daily.  100 g 1    gabapentin (NEURONTIN) 300 mg capsule take 1 capsule by mouth twice a day THE DAY PRIOR TO SURGERY 9/13 (Patient taking differently: 300 mg daily.  take 1 capsule by mouth twice a day THE DAY PRIOR TO SURGERY 9/13) 180 Cap 1     Allergies   Allergen Reactions    Statins-Hmg-Coa Reductase Inhibitors Myalgia     Past Medical History:   Diagnosis Date    Arthritis     Breast cancer ([facility] [address] 04/2017    lumpectomy left breast, radiation; lung    Cancer ([facility] [address]     breast cancer- left    Cerebrovascular accident (stroke) ([facility] [address] 2007    no deficiets    CVA (cerebral vascular accident) ([facility] [address]     11/12/2018    Diabetes mellitus type 2, controlled ([address]     no longer medicated    Gallstones     asymptomatic, pt denies having    GERD (gastroesophageal reflux disease)     dx with resolution of symptoms on ppi- pt denies having    Hypercholesterolemia     Hypertension     Ill-defined condition     gout    Long term current use of anticoagulant therapy     Non-nicotine vapor product user     used to use    PAD (peripheral artery disease) ([facility] [address] 2/20/2013    right SFA angioplasty and athrectomy    Primary open angle glaucoma     Radiation therapy complication     Solitary lung nodule 5/9/2017    Type II diabetes mellitus, uncontrolled ([address] 2/1/2017     Past Surgical History:   Procedure Laterality Date    CHEST SURGERY PROCEDURE UNLISTED Left     HX BREAST BIOPSY Left 2017    HX BREAST LUMPECTOMY Left 6/22/2017    LEFT LUMPECTOMY WITH INTRAOPERATIVE ULTRASOUND GUIDED, LEFT AXILLARY SENTINEL LYMPH NODE BIOPSY, POSSIBLE AXILLARY DISECTION performed [facility]    HX HEENT Bilateral     eye lids surgery    VASCULAR SURGERY PROCEDURE UNLIST  April 2016    right leg stent     Family History   Problem Relation Age of Onset    Hypertension Mother     Diabetes Mother     Diabetes Son      Social History     Tobacco Use    Smoking status: Former Smoker     Packs/day: 0.25     Years: 47.00     Pack years: 11.75     Types: Cigarettes Start date: 1966     Last attempt to quit: 3/13/2013     Years since quittin.9    Smokeless tobacco: Never Used    Tobacco comment: estimate is one pack per week   Substance Use Topics    Alcohol use: Yes     Alcohol/week: 21.0 standard drinks     Types: 21 Cans of beer per week     Comment: 2 to 3 beers per day per patient      Lab Results   Component Value Date/Time    Hemoglobin A1c 5.5 10/10/2019 12:04 PM    Hemoglobin A1c 5.8 2018 07:45 AM    Hemoglobin A1c 5.2 2017 03:05 PM    Glucose 119 (H) 01/10/2020 02:18 PM    Glucose (POC) 145 (H) 2018 12:02 PM    Microalbumin/Creat ratio (mg/g creat) 8 2014 10:16 AM    Microalb/Creat ratio (ug/mg creat.) 12.3 10/10/2019 12:03 PM    Microalbumin,urine random 0.68 2014 10:16 AM    LDL, calculated 197 (H) 10/10/2019 12:04 PM    Creatinine (POC) 0.9 2017 07:57 AM    Creatinine 1.87 (H) 01/10/2020 02:18 PM      Lab Results   Component Value Date/Time    GFR est non-AA 27 (L) 01/10/2020 02:18 PM    GFRNA, POC >60 2017 07:57 AM    GFR est AA 31 (L) 01/10/2020 02:18 PM    GFRAA, POC >60 2017 07:57 AM    Creatinine 1.87 (H) 01/10/2020 02:18 PM    Creatinine (POC) 0.9 2017 07:57 AM    BUN 56 (H) 01/10/2020 02:18 PM    BUN (POC) 15 2017 07:57 AM    Sodium 137 01/10/2020 02:18 PM    Sodium (POC) 139 2017 07:57 AM    Potassium 3.5 01/10/2020 02:18 PM    Potassium (POC) 4.3 2017 07:57 AM    Chloride 100 01/10/2020 02:18 PM    Chloride (POC) 109 (H) 2017 07:57 AM    CO2 18 (L) 01/10/2020 02:18 PM    Magnesium 2.0 2018 07:45 AM        Review of Systems   Constitutional: Negative for fever. HENT: Negative for nosebleeds. Eyes: Negative for blurred vision, pain and discharge. Respiratory: Negative for shortness of breath. Cardiovascular: Negative for chest pain and leg swelling. Gastrointestinal: Negative for constipation, diarrhea, nausea and vomiting.    Genitourinary: Negative for frequency. Musculoskeletal: Negative for joint pain. Skin: Negative for itching and rash. Psychiatric/Behavioral: Negative for depression. The patient is not nervous/anxious. Physical Exam  Vitals signs and nursing note reviewed. Constitutional:       Appearance: She is well-developed. HENT:      Head: Normocephalic and atraumatic. Eyes:      Conjunctiva/sclera: Conjunctivae normal.   Neck:      Musculoskeletal: Normal range of motion and neck supple. Cardiovascular:      Rate and Rhythm: Normal rate and regular rhythm. Heart sounds: Normal heart sounds. No murmur. Pulmonary:      Effort: Pulmonary effort is normal.      Breath sounds: Normal breath sounds. Abdominal:      General: Bowel sounds are normal. There is no distension. Palpations: Abdomen is soft. Musculoskeletal: Normal range of motion. Skin:     Findings: No erythema. Neurological:      Mental Status: She is alert and oriented to person, place, and time. Psychiatric:         Behavior: Behavior normal.         ASSESSMENT and PLAN  Diagnoses and all orders for this visit:    1. Acute idiopathic gout, unspecified site  -     URIC ACID  -     methylPREDNISolone (MEDROL DOSEPACK) 4 mg tablet; As directed for 6 days one package  -     METABOLIC PANEL, BASIC    2. Acute renal failure, unspecified acute renal failure type (HCC)  -     URIC ACID  -     methylPREDNISolone (MEDROL DOSEPACK) 4 mg tablet;  As directed for 6 days one package  -     METABOLIC PANEL, BASIC

## 2020-02-08 LAB
BUN SERPL-MCNC: 11 MG/DL (ref 8–27)
BUN/CREAT SERPL: 12 (ref 12–28)
CALCIUM SERPL-MCNC: 10.3 MG/DL (ref 8.7–10.3)
CHLORIDE SERPL-SCNC: 107 MMOL/L (ref 96–106)
CO2 SERPL-SCNC: 20 MMOL/L (ref 20–29)
CREAT SERPL-MCNC: 0.94 MG/DL (ref 0.57–1)
GLUCOSE SERPL-MCNC: 100 MG/DL (ref 65–99)
POTASSIUM SERPL-SCNC: 4.6 MMOL/L (ref 3.5–5.2)
SODIUM SERPL-SCNC: 141 MMOL/L (ref 134–144)
URATE SERPL-MCNC: 5.7 MG/DL (ref 2.5–7.1)

## 2020-03-02 DIAGNOSIS — M79.671 RIGHT FOOT PAIN: ICD-10-CM

## 2020-03-02 DIAGNOSIS — M79.604 PAIN OF RIGHT LOWER EXTREMITY: ICD-10-CM

## 2020-03-02 DIAGNOSIS — M10.00 ACUTE IDIOPATHIC GOUT, UNSPECIFIED SITE: ICD-10-CM

## 2020-03-02 RX ORDER — ISOPROPYL ALCOHOL 70 ML/100ML
SWAB TOPICAL
Qty: 100 PAD | Refills: 3 | Status: SHIPPED | OUTPATIENT
Start: 2020-03-02

## 2020-03-06 ENCOUNTER — OFFICE VISIT (OUTPATIENT)
Dept: FAMILY MEDICINE CLINIC | Age: 70
End: 2020-03-06

## 2020-03-06 VITALS
WEIGHT: 126.5 LBS | RESPIRATION RATE: 20 BRPM | OXYGEN SATURATION: 99 % | TEMPERATURE: 96.8 F | HEART RATE: 67 BPM | SYSTOLIC BLOOD PRESSURE: 132 MMHG | BODY MASS INDEX: 22.41 KG/M2 | HEIGHT: 63 IN | DIASTOLIC BLOOD PRESSURE: 78 MMHG

## 2020-03-06 DIAGNOSIS — M25.511 CHRONIC PAIN OF BOTH SHOULDERS: ICD-10-CM

## 2020-03-06 DIAGNOSIS — R52 GENERALIZED BODY ACHES: Primary | ICD-10-CM

## 2020-03-06 DIAGNOSIS — I73.9 PAD (PERIPHERAL ARTERY DISEASE) (HCC): ICD-10-CM

## 2020-03-06 DIAGNOSIS — M25.512 CHRONIC PAIN OF BOTH SHOULDERS: ICD-10-CM

## 2020-03-06 DIAGNOSIS — G89.29 CHRONIC PAIN OF BOTH SHOULDERS: ICD-10-CM

## 2020-03-06 LAB
FLUAV+FLUBV AG NOSE QL IA.RAPID: NEGATIVE POS/NEG
FLUAV+FLUBV AG NOSE QL IA.RAPID: NEGATIVE POS/NEG
VALID INTERNAL CONTROL?: YES

## 2020-03-06 RX ORDER — PROMETHAZINE HYDROCHLORIDE AND CODEINE PHOSPHATE 6.25; 1 MG/5ML; MG/5ML
5 SOLUTION ORAL
Qty: 120 ML | Refills: 0 | Status: SHIPPED | OUTPATIENT
Start: 2020-03-06 | End: 2020-03-13

## 2020-03-06 RX ORDER — PREDNISONE 10 MG/1
10 TABLET ORAL
Qty: 14 TAB | Refills: 0 | Status: SHIPPED | OUTPATIENT
Start: 2020-03-06 | End: 2020-03-20

## 2020-03-06 RX ORDER — INDOMETHACIN 50 MG/1
50 CAPSULE ORAL 3 TIMES DAILY
Qty: 18 CAP | Refills: 0 | Status: SHIPPED | OUTPATIENT
Start: 2020-03-06 | End: 2020-03-12

## 2020-03-06 RX ORDER — AZITHROMYCIN 250 MG/1
TABLET, FILM COATED ORAL
Qty: 6 TAB | Refills: 0 | Status: SHIPPED | OUTPATIENT
Start: 2020-03-06 | End: 2020-07-24 | Stop reason: CLARIF

## 2020-03-06 RX ORDER — INDOMETHACIN 50 MG/1
50 CAPSULE ORAL 3 TIMES DAILY
Qty: 18 CAP | Refills: 0 | Status: SHIPPED | OUTPATIENT
Start: 2020-03-06 | End: 2020-03-06 | Stop reason: SDUPTHER

## 2020-03-06 NOTE — PROGRESS NOTES
Name and  verified      Chief Complaint   Patient presents with    Generalized Body Aches     Patient reported started 3/1/2020    Follow-up     Right foot pain     Order placed for flu swab, per Verbal Order from Dr. Haim Rich on 3/6/2020 due to generalized body aches. .      Health Maintenance reviewed-discussed with patient. 1. Have you been to the ER, urgent care clinic since your last visit? Hospitalized since your last visit? no    2. Have you seen or consulted any other health care providers outside of the [address] [place] [address] since your last visit? Include any pap smears or colon screening.  no

## 2020-03-10 NOTE — PROGRESS NOTES
HISTORY OF PRESENT ILLNESS  Miles Cavanaugh is a 79 y.o. female. HPI     Upper respiratory problem    Started >9 days ago not better,   otc not helping, have a very bad Sore throat, with a lot of painful Cough which are Productive yellowish , no hx of asthma but maybe of COPD, there has been a lot of decrease in the patient's sleep pattern, in addition there has been some muscle ache responding to OTC , no diarhea, no ear ache,also there has been a decrease in the appetite, has been had an exposure to sick person, fortunately an XXX smoker      Back pain  The history is provided by the patient. This is a chronic problem. Episode onset: few yrs ago, ++ obese, th pt is currently working, the pt is able to wash dishes and hang clothes, the pain does worsen by going up and down the steps . The problem occurs constantly. The problem has changed and worsening since onset. The pain is present in the lower back. The quality of the pain is described as dull. The pain is at a severity of 8/10. Associated symptoms include limited range of motion. Pertinent negatives include no numbness, ++ stiffness, no tingling b/l, no itching, + back pain and no neck pain. The symptoms are aggravated by movement and palpation. There has been no history of extremity trauma, the patient has no incontinence of urine nor of stool,     Current Outpatient Medications   Medication Sig Dispense Refill    promethazine-codeine (PHENERGAN WITH CODEINE) 6.25-10 mg/5 mL syrup Take 5 mL by mouth four (4) times daily as needed (pain) for up to 7 days. Max Daily Amount: 20 mL. 120 mL 0    predniSONE (DELTASONE) 10 mg tablet Take 10 mg by mouth daily (with breakfast) for 14 days. 14 Tab 0    azithromycin (ZITHROMAX) 250 mg tablet 2 first day then one tab daily till finished 6 Tab 0    indomethacin (INDOCIN) 50 mg capsule Take 1 Cap by mouth three (3) times daily for 6 days.  18 Cap 0    alcohol swabs (BD SINGLE USE SWABS REGULAR) padm Use to sanitize the affected area prior to application of medication as indicated. 100 Pad 3    allopurinol (ZYLOPRIM) 100 mg tablet Take 2 tablet by mouth daily 60 Tab 2    potassium chloride (K-DUR, KLOR-CON) 20 mEq tablet Take 1 Tab by mouth daily. 30 Tab 1    diclofenac (VOLTAREN) 1 % gel Apply 4 g to affected area four (4) times daily. 100 g 1    ergocalciferol (ERGOCALCIFEROL) 50,000 unit capsule Take 1 Cap by mouth every seven (7) days. 12 Cap 6    letrozole (FEMARA) 2.5 mg tablet take 1 tablet by mouth once daily 30 Tab 6    clopidogrel (PLAVIX) 75 mg tab Take 1 Tab by mouth daily. 90 Tab 3    aspirin 81 mg chewable tablet Take 1 Tab by mouth daily. 30 Tab 6    acetaminophen (TYLENOL) 500 mg tablet Take 2 Tabs by mouth every six (6) hours as needed for Pain. Indications: Pain 40 Tab 1    timolol (TIMOPTIC) 0.5 % ophthalmic solution Administer  to both eyes two (2) times a day.  0    dorzolamide (TRUSOPT) 2 % ophthalmic solution instill 1 drop into both eyes twice a day  0    latanoprost (XALATAN) 0.005 % ophthalmic solution Administer 1 Drop to both eyes nightly. 2.5 mL 1    brimonidine (ALPHAGAN) 0.15 % ophthalmic solution Administer 1 Drop to both eyes two (2) times a day. 15 mL 6    gabapentin (NEURONTIN) 300 mg capsule take 1 capsule by mouth twice a day THE DAY PRIOR TO SURGERY 9/13 (Patient taking differently: 300 mg daily.  take 1 capsule by mouth twice a day THE DAY PRIOR TO SURGERY 9/13) 180 Cap 1     Allergies   Allergen Reactions    Statins-Hmg-Coa Reductase Inhibitors Myalgia     Past Medical History:   Diagnosis Date    Arthritis     Breast cancer ([facility] [address] 04/2017    lumpectomy left breast, radiation; lung    Cancer ([facility] [address]     breast cancer- left    Cerebrovascular accident (stroke) ([facility] [address] 2007    no deficiets    CVA (cerebral vascular accident) ([facility] [address]     11/12/2018    Diabetes mellitus type 2, controlled ([facility] [address]     no longer medicated    Gallstones     asymptomatic, pt denies having    GERD (gastroesophageal reflux disease)     dx with resolution of symptoms on ppi- pt denies having    Hypercholesterolemia     Hypertension     Ill-defined condition     gout    Long term current use of anticoagulant therapy     Non-nicotine vapor product user     used to use    PAD (peripheral artery disease) ([facility] [address] 2013    right SFA angioplasty and athrectomy    Primary open angle glaucoma     Radiation therapy complication     Solitary lung nodule 2017    Type II diabetes mellitus, uncontrolled ([facility] [address] 2017     Past Surgical History:   Procedure Laterality Date    CHEST SURGERY PROCEDURE UNLISTED Left     HX BREAST BIOPSY Left 2017    HX BREAST LUMPECTOMY Left 2017    LEFT LUMPECTOMY WITH INTRAOPERATIVE ULTRASOUND GUIDED, LEFT AXILLARY SENTINEL LYMPH NODE BIOPSY, POSSIBLE AXILLARY DISECTION performed by Subhash Downing MD at [facility] MAIN OR    HX HEENT Bilateral     eye lids surgery    VASCULAR SURGERY PROCEDURE UNLIST  2016    right leg stent     Family History   Problem Relation Age of Onset    Hypertension Mother     Diabetes Mother     Diabetes Son      Social History     Tobacco Use    Smoking status: Former Smoker     Packs/day: 0.25     Years: 47.00     Pack years: 11.75     Types: Cigarettes     Start date: 1966     Last attempt to quit: 3/13/2013     Years since quittin.9    Smokeless tobacco: Never Used    Tobacco comment: estimate is one pack per week   Substance Use Topics    Alcohol use:  Yes     Alcohol/week: 21.0 standard drinks     Types: 21 Cans of beer per week     Comment: 2 to 3 beers per day per patient      Lab Results   Component Value Date/Time    WBC 6.4 01/10/2020 02:18 PM    HGB 10.2 (L) 01/10/2020 02:18 PM    Hemoglobin (POC) 10.5 (L) 2017 07:57 AM    HCT 29.4 (L) 01/10/2020 02:18 PM    Hematocrit (POC) 31 (L) 2017 07:57 AM    PLATELET 044  02:18 PM    MCV 89 01/10/2020 02:18 PM     Lab Results   Component Value Date/Time    Hemoglobin A1c 5.5 10/10/2019 12:04 PM    Hemoglobin A1c 5.8 11/16/2018 07:45 AM    Hemoglobin A1c 5.2 02/01/2017 03:05 PM    Glucose 100 (H) 02/07/2020 11:50 AM    Glucose (POC) 145 (H) 11/16/2018 12:02 PM    Microalbumin/Creat ratio (mg/g creat) 8 07/03/2014 10:16 AM    Microalb/Creat ratio (ug/mg creat.) 12.3 10/10/2019 12:03 PM    Microalbumin,urine random 0.68 07/03/2014 10:16 AM    LDL, calculated 197 (H) 10/10/2019 12:04 PM    Creatinine (POC) 0.9 06/22/2017 07:57 AM    Creatinine 0.94 02/07/2020 11:50 AM         Review of Systems   Constitutional: Positive for malaise/fatigue. Negative for chills and fever. HENT: Positive for congestion, sinus pain and sore throat. Negative for ear pain and nosebleeds. Eyes: Positive for redness. Negative for blurred vision, pain and discharge. Respiratory: Positive for cough and sputum production. Negative for shortness of breath. Cardiovascular: Negative for chest pain and leg swelling. Gastrointestinal: Negative for constipation, diarrhea, nausea and vomiting. Genitourinary: Negative for frequency. Musculoskeletal: Positive for back pain, joint pain and myalgias. Skin: Negative for itching and rash. Neurological: Positive for weakness and headaches. Psychiatric/Behavioral: Negative for depression. The patient has insomnia. The patient is not nervous/anxious. Physical Exam  Vitals signs and nursing note reviewed. Constitutional:       Appearance: She is well-developed. HENT:      Head: Normocephalic and atraumatic. Salivary Glands: Left salivary gland is diffusely enlarged. Right Ear: Tenderness present. Left Ear: Tenderness present. Nose: Nasal tenderness, mucosal edema, congestion and rhinorrhea present. Right Turbinates: Enlarged and swollen. Left Turbinates: Enlarged and swollen. Left Sinus: Maxillary sinus tenderness present. Mouth/Throat:      Mouth: Mucous membranes are dry. Pharynx: Pharyngeal swelling and posterior oropharyngeal erythema present. Eyes:      Conjunctiva/sclera: Conjunctivae normal.      Pupils: Pupils are equal, round, and reactive to light. Neck:      Musculoskeletal: Normal range of motion and neck supple. Thyroid: No thyromegaly. Vascular: No JVD. Cardiovascular:      Rate and Rhythm: Normal rate and regular rhythm. Heart sounds: Normal heart sounds. No murmur. No friction rub. No gallop. Pulmonary:      Effort: Pulmonary effort is normal. No respiratory distress. Breath sounds: Normal breath sounds. No stridor. No wheezing or rales. Abdominal:      General: Bowel sounds are normal. There is no distension. Palpations: Abdomen is soft. There is no mass. Tenderness: There is no abdominal tenderness. Musculoskeletal:         General: Tenderness present. Thoracic back: She exhibits decreased range of motion, tenderness, pain and spasm. Lumbar back: She exhibits decreased range of motion, tenderness, pain and spasm. Lymphadenopathy:      Cervical: No cervical adenopathy. Skin:     Findings: No erythema or rash. Neurological:      Mental Status: She is alert and oriented to person, place, and time. Cranial Nerves: No cranial nerve deficit. Deep Tendon Reflexes: Reflexes are normal and symmetric. Psychiatric:         Behavior: Behavior normal.         ASSESSMENT and PLAN  Diagnoses and all orders for this visit:    1. Generalized body aches  -     AMB POC KENDRICK INFLUENZA A/B TEST  -     promethazine-codeine (PHENERGAN WITH CODEINE) 6.25-10 mg/5 mL syrup; Take 5 mL by mouth four (4) times daily as needed (pain) for up to 7 days. Max Daily Amount: 20 mL. -     predniSONE (DELTASONE) 10 mg tablet; Take 10 mg by mouth daily (with breakfast) for 14 days. -     azithromycin (ZITHROMAX) 250 mg tablet; 2 first day then one tab daily till finished  -     indomethacin (INDOCIN) 50 mg capsule;  Take 1 Cap by mouth three (3) times daily for 6 days. 2. PAD (peripheral artery disease) (HCC)  -     [facility] POC KENDRICK INFLUENZA A/B TEST  -     promethazine-codeine (PHENERGAN WITH CODEINE) 6.25-10 mg/5 mL syrup; Take 5 mL by mouth four (4) times daily as needed (pain) for up to 7 days. Max Daily Amount: 20 mL. -     predniSONE (DELTASONE) 10 mg tablet; Take 10 mg by mouth daily (with breakfast) for 14 days. -     azithromycin (ZITHROMAX) 250 mg tablet; 2 first day then one tab daily till finished  -     indomethacin (INDOCIN) 50 mg capsule; Take 1 Cap by mouth three (3) times daily for 6 days. 3. Chronic pain of both shoulders  -     [facility] KENDRICK INFLUENZA A/B TEST  -     promethazine-codeine (PHENERGAN WITH CODEINE) 6.25-10 mg/5 mL syrup; Take 5 mL by mouth four (4) times daily as needed (pain) for up to 7 days. Max Daily Amount: 20 mL. -     predniSONE (DELTASONE) 10 mg tablet; Take 10 mg by mouth daily (with breakfast) for 14 days. -     azithromycin (ZITHROMAX) 250 mg tablet; 2 first day then one tab daily till finished  -     indomethacin (INDOCIN) 50 mg capsule; Take 1 Cap by mouth three (3) times daily for 6 days.

## 2020-03-21 DIAGNOSIS — Z86.73 HISTORY OF STROKE: ICD-10-CM

## 2020-03-23 RX ORDER — CLOPIDOGREL BISULFATE 75 MG/1
TABLET ORAL
Qty: 90 TAB | Refills: 3 | Status: SHIPPED | OUTPATIENT
Start: 2020-03-23 | End: 2020-12-24 | Stop reason: SDUPTHER

## 2020-04-05 DIAGNOSIS — N17.9 ACUTE RENAL FAILURE, UNSPECIFIED ACUTE RENAL FAILURE TYPE (HCC): ICD-10-CM

## 2020-04-05 DIAGNOSIS — E87.6 HYPOKALEMIA: ICD-10-CM

## 2020-04-05 DIAGNOSIS — I10 ESSENTIAL HYPERTENSION: ICD-10-CM

## 2020-04-05 DIAGNOSIS — M79.604 PAIN OF RIGHT LOWER EXTREMITY: ICD-10-CM

## 2020-04-05 DIAGNOSIS — M79.671 RIGHT FOOT PAIN: ICD-10-CM

## 2020-04-05 DIAGNOSIS — M10.00 ACUTE IDIOPATHIC GOUT, UNSPECIFIED SITE: ICD-10-CM

## 2020-04-06 DIAGNOSIS — M10.00 ACUTE IDIOPATHIC GOUT, UNSPECIFIED SITE: ICD-10-CM

## 2020-04-06 DIAGNOSIS — E87.6 HYPOKALEMIA: ICD-10-CM

## 2020-04-06 DIAGNOSIS — I10 ESSENTIAL HYPERTENSION: ICD-10-CM

## 2020-04-06 DIAGNOSIS — M79.604 PAIN OF RIGHT LOWER EXTREMITY: ICD-10-CM

## 2020-04-06 DIAGNOSIS — N17.9 ACUTE RENAL FAILURE, UNSPECIFIED ACUTE RENAL FAILURE TYPE (HCC): ICD-10-CM

## 2020-04-06 DIAGNOSIS — M79.671 RIGHT FOOT PAIN: ICD-10-CM

## 2020-04-06 RX ORDER — POTASSIUM CHLORIDE 20 MEQ/1
TABLET, EXTENDED RELEASE ORAL
Qty: 30 TAB | Refills: 1 | Status: SHIPPED | OUTPATIENT
Start: 2020-04-06 | End: 2020-04-07

## 2020-04-07 RX ORDER — POTASSIUM CHLORIDE 20 MEQ/1
TABLET, EXTENDED RELEASE ORAL
Qty: 90 TAB | Refills: 0 | Status: SHIPPED | OUTPATIENT
Start: 2020-04-07 | End: 2020-08-19 | Stop reason: SDUPTHER

## 2020-04-09 RX ORDER — ALLOPURINOL 100 MG/1
TABLET ORAL
Qty: 60 TAB | Refills: 2 | Status: SHIPPED | OUTPATIENT
Start: 2020-04-09 | End: 2020-08-19 | Stop reason: SDUPTHER

## 2020-07-24 ENCOUNTER — HOSPITAL ENCOUNTER (OUTPATIENT)
Dept: PREADMISSION TESTING | Age: 70
Discharge: HOME | End: 2020-07-24
Payer: MEDICARE

## 2020-07-24 PROCEDURE — 87635 SARS-COV-2 COVID-19 AMP PRB: CPT

## 2020-07-25 LAB
SARS-COV-2, COV2NT: NOT DETECTED
SOURCE, COVRS: NORMAL
SPECIMEN SOURCE, FCOV2M: NORMAL

## 2020-07-28 ENCOUNTER — ANESTHESIA (OUTPATIENT)
Dept: ENDOSCOPY | Age: 70
End: 2020-07-28
Payer: MEDICARE

## 2020-07-28 ENCOUNTER — HOSPITAL ENCOUNTER (OUTPATIENT)
Age: 70
Setting detail: OUTPATIENT SURGERY
Discharge: HOME | End: 2020-07-28
Attending: INTERNAL MEDICINE | Admitting: INTERNAL MEDICINE
Payer: MEDICARE

## 2020-07-28 ENCOUNTER — ANESTHESIA EVENT (OUTPATIENT)
Dept: ENDOSCOPY | Age: 70
End: 2020-07-28
Payer: MEDICARE

## 2020-07-28 VITALS
HEIGHT: 63 IN | SYSTOLIC BLOOD PRESSURE: 123 MMHG | BODY MASS INDEX: 22.86 KG/M2 | RESPIRATION RATE: 14 BRPM | HEART RATE: 70 BPM | WEIGHT: 129 LBS | OXYGEN SATURATION: 100 % | DIASTOLIC BLOOD PRESSURE: 70 MMHG | TEMPERATURE: 97.4 F

## 2020-07-28 PROCEDURE — 88305 TISSUE EXAM BY PATHOLOGIST: CPT

## 2020-07-28 PROCEDURE — 74011250636 HC RX REV CODE- 250/636: Performed by: INTERNAL MEDICINE

## 2020-07-28 PROCEDURE — 76060000031 HC ANESTHESIA FIRST 0.5 HR: Performed by: INTERNAL MEDICINE

## 2020-07-28 PROCEDURE — 74011000250 HC RX REV CODE- 250: Performed by: ANESTHESIOLOGY

## 2020-07-28 PROCEDURE — 74011250636 HC RX REV CODE- 250/636: Performed by: ANESTHESIOLOGY

## 2020-07-28 PROCEDURE — 76040000019: Performed by: INTERNAL MEDICINE

## 2020-07-28 PROCEDURE — 77030013992 HC SNR POLYP ENDOSC BSC -B: Performed by: INTERNAL MEDICINE

## 2020-07-28 RX ORDER — EPINEPHRINE 0.1 MG/ML
1 INJECTION INTRACARDIAC; INTRAVENOUS
Status: DISCONTINUED | OUTPATIENT
Start: 2020-07-28 | End: 2020-07-28

## 2020-07-28 RX ORDER — FENTANYL CITRATE 50 UG/ML
25 INJECTION, SOLUTION INTRAMUSCULAR; INTRAVENOUS
Status: DISCONTINUED | OUTPATIENT
Start: 2020-07-28 | End: 2020-07-28 | Stop reason: HOSPADM

## 2020-07-28 RX ORDER — ATROPINE SULFATE 0.1 MG/ML
0.5 INJECTION INTRAVENOUS
Status: DISCONTINUED | OUTPATIENT
Start: 2020-07-28 | End: 2020-07-28

## 2020-07-28 RX ORDER — FLUMAZENIL 0.1 MG/ML
0.2 INJECTION INTRAVENOUS
Status: DISCONTINUED | OUTPATIENT
Start: 2020-07-28 | End: 2020-07-28

## 2020-07-28 RX ORDER — LIDOCAINE HYDROCHLORIDE 20 MG/ML
INJECTION, SOLUTION EPIDURAL; INFILTRATION; INTRACAUDAL; PERINEURAL AS NEEDED
Status: DISCONTINUED | OUTPATIENT
Start: 2020-07-28 | End: 2020-07-28 | Stop reason: HOSPADM

## 2020-07-28 RX ORDER — SODIUM CHLORIDE 0.9 % (FLUSH) 0.9 %
5-40 SYRINGE (ML) INJECTION EVERY 8 HOURS
Status: DISCONTINUED | OUTPATIENT
Start: 2020-07-28 | End: 2020-07-28 | Stop reason: HOSPADM

## 2020-07-28 RX ORDER — FLUMAZENIL 0.1 MG/ML
0.2 INJECTION INTRAVENOUS
Status: DISCONTINUED | OUTPATIENT
Start: 2020-07-28 | End: 2020-07-28 | Stop reason: HOSPADM

## 2020-07-28 RX ORDER — FENTANYL CITRATE 50 UG/ML
25 INJECTION, SOLUTION INTRAMUSCULAR; INTRAVENOUS
Status: DISCONTINUED | OUTPATIENT
Start: 2020-07-28 | End: 2020-07-28

## 2020-07-28 RX ORDER — SODIUM CHLORIDE 9 MG/ML
75 INJECTION, SOLUTION INTRAVENOUS CONTINUOUS
Status: DISCONTINUED | OUTPATIENT
Start: 2020-07-28 | End: 2020-07-28 | Stop reason: HOSPADM

## 2020-07-28 RX ORDER — ATROPINE SULFATE 0.1 MG/ML
0.5 INJECTION INTRAVENOUS
Status: DISCONTINUED | OUTPATIENT
Start: 2020-07-28 | End: 2020-07-28 | Stop reason: HOSPADM

## 2020-07-28 RX ORDER — SODIUM CHLORIDE 0.9 % (FLUSH) 0.9 %
5-40 SYRINGE (ML) INJECTION EVERY 8 HOURS
Status: DISCONTINUED | OUTPATIENT
Start: 2020-07-28 | End: 2020-07-28

## 2020-07-28 RX ORDER — EPINEPHRINE 0.1 MG/ML
1 INJECTION INTRACARDIAC; INTRAVENOUS
Status: DISCONTINUED | OUTPATIENT
Start: 2020-07-28 | End: 2020-07-28 | Stop reason: HOSPADM

## 2020-07-28 RX ORDER — SODIUM CHLORIDE 9 MG/ML
75 INJECTION, SOLUTION INTRAVENOUS CONTINUOUS
Status: DISCONTINUED | OUTPATIENT
Start: 2020-07-28 | End: 2020-07-28

## 2020-07-28 RX ORDER — MIDAZOLAM HYDROCHLORIDE 1 MG/ML
.25-5 INJECTION INTRAMUSCULAR; INTRAVENOUS
Status: DISCONTINUED | OUTPATIENT
Start: 2020-07-28 | End: 2020-07-28 | Stop reason: HOSPADM

## 2020-07-28 RX ORDER — MIDAZOLAM HYDROCHLORIDE 1 MG/ML
.25-5 INJECTION, SOLUTION INTRAMUSCULAR; INTRAVENOUS
Status: DISCONTINUED | OUTPATIENT
Start: 2020-07-28 | End: 2020-07-28

## 2020-07-28 RX ORDER — SODIUM CHLORIDE 0.9 % (FLUSH) 0.9 %
5-40 SYRINGE (ML) INJECTION AS NEEDED
Status: DISCONTINUED | OUTPATIENT
Start: 2020-07-28 | End: 2020-07-28

## 2020-07-28 RX ORDER — DEXTROMETHORPHAN/PSEUDOEPHED 2.5-7.5/.8
1.2 DROPS ORAL
Status: DISCONTINUED | OUTPATIENT
Start: 2020-07-28 | End: 2020-07-28 | Stop reason: HOSPADM

## 2020-07-28 RX ORDER — DEXTROMETHORPHAN/PSEUDOEPHED 2.5-7.5/.8
1.2 DROPS ORAL
Status: DISCONTINUED | OUTPATIENT
Start: 2020-07-28 | End: 2020-07-28

## 2020-07-28 RX ORDER — NALOXONE HYDROCHLORIDE 0.4 MG/ML
0.4 INJECTION, SOLUTION INTRAMUSCULAR; INTRAVENOUS; SUBCUTANEOUS
Status: DISCONTINUED | OUTPATIENT
Start: 2020-07-28 | End: 2020-07-28

## 2020-07-28 RX ORDER — PROPOFOL 10 MG/ML
INJECTION, EMULSION INTRAVENOUS AS NEEDED
Status: DISCONTINUED | OUTPATIENT
Start: 2020-07-28 | End: 2020-07-28 | Stop reason: HOSPADM

## 2020-07-28 RX ORDER — NALOXONE HYDROCHLORIDE 0.4 MG/ML
0.4 INJECTION, SOLUTION INTRAMUSCULAR; INTRAVENOUS; SUBCUTANEOUS
Status: DISCONTINUED | OUTPATIENT
Start: 2020-07-28 | End: 2020-07-28 | Stop reason: HOSPADM

## 2020-07-28 RX ORDER — SODIUM CHLORIDE 0.9 % (FLUSH) 0.9 %
5-40 SYRINGE (ML) INJECTION AS NEEDED
Status: DISCONTINUED | OUTPATIENT
Start: 2020-07-28 | End: 2020-07-28 | Stop reason: HOSPADM

## 2020-07-28 RX ADMIN — LIDOCAINE HYDROCHLORIDE 40 MG: 20 INJECTION, SOLUTION EPIDURAL; INFILTRATION; INTRACAUDAL; PERINEURAL at 08:33

## 2020-07-28 RX ADMIN — SODIUM CHLORIDE 75 ML/HR: 900 INJECTION, SOLUTION INTRAVENOUS at 08:14

## 2020-07-28 RX ADMIN — PROPOFOL 250 MG: 10 INJECTION, EMULSION INTRAVENOUS at 08:49

## 2020-07-28 NOTE — H&P
Gastroenterology Outpatient History and Physical    [facility]    Physician: Argelia Leone MD    Chief Complaint: CRC screening  History of Present Illness: 67yo F with need for CRC screening.   Off of Plavix for 5d    History:  Past Medical History:   Diagnosis Date    Arthritis     Breast cancer ([facility] 04/2017    lumpectomy left breast, radiation; lung    Cancer ([address]     breast cancer- left    Cerebrovascular accident (stroke) ([address] 2007    no deficiets    CVA (cerebral vascular accident) ([facility] [facility]     11/12/2018    Diabetes mellitus type 2, controlled ([address]     no longer medicated    Gallstones     asymptomatic, pt denies having    GERD (gastroesophageal reflux disease)     dx with resolution of symptoms on ppi- pt denies having    Hypercholesterolemia     Hypertension     Ill-defined condition     gout    Long term current use of anticoagulant therapy     Non-nicotine vapor product user     used to use    PAD (peripheral artery disease) ([facility] [facility] 2/20/2013    right SFA angioplasty and athrectomy    Primary open angle glaucoma     Radiation therapy complication     Solitary lung nodule 5/9/2017    Type II diabetes mellitus, uncontrolled ([address] 2/1/2017      Past Surgical History:   Procedure Laterality Date    CHEST SURGERY PROCEDURE UNLISTED Left     HX BREAST BIOPSY Left 2017    HX BREAST LUMPECTOMY Left 6/22/2017    LEFT LUMPECTOMY WITH INTRAOPERATIVE ULTRASOUND GUIDED, LEFT AXILLARY SENTINEL LYMPH NODE BIOPSY, POSSIBLE AXILLARY DISECTION performed by Tabatha Sharma MD at [facility] MAIN OR    HX HEENT Bilateral     eye lids surgery    VASCULAR SURGERY PROCEDURE UNLIST  April 2016    right leg stent      Social History     Socioeconomic History    Marital status:      Spouse name: Not on file    Number of children: Not on file    Years of education: Not on file    Highest education level: Not on file   Tobacco Use    Smoking status: Former Smoker     Packs/day: 0.25 Years: 47.00     Pack years: 11.75     Types: Cigarettes     Start date: 1966     Last attempt to quit: 3/13/2013     Years since quittin.3    Smokeless tobacco: Former User    Tobacco comment: estimate is one pack per week/ used to vape   Substance and Sexual Activity    Alcohol use: Yes     Alcohol/week: 21.0 standard drinks     Types: 21 Cans of beer per week     Comment: 2 to 3 beers per day per patient    Drug use: No    Sexual activity: Yes     Partners: Male      Family History   Problem Relation Age of Onset    Hypertension Mother     Diabetes Mother     Diabetes Son       Patient Active Problem List   Diagnosis Code    Hypertension I10    PAD (peripheral artery disease) ([facility] I73.9    Glaucoma H40.9    Gout M10.9    Leg pain, bilateral M79.604, M79.605    Nonallopathic lesion of lumbar region, not elsewhere classified M99.9    History of stroke Z86.73    Type II diabetes mellitus, uncontrolled ([facility] [facility] E11.65    Tobacco abuse disorder Z72.0    Solitary lung nodule R91.1    Breast cancer of upper-inner quadrant of left female breast ([facility] [facility] H59.244    Type 2 diabetes mellitus with diabetic neuropathy ([facility]) E11.40    Type 2 diabetes with nephropathy ([facility] [address] [facility] E11.21    CVA (cerebral vascular accident) ([facility] [address] I63.9    Bilateral carotid artery stenosis I65.23       Allergies: Allergies   Allergen Reactions    Statins-Hmg-Coa Reductase Inhibitors Myalgia     Medications:   Prior to Admission medications    Medication Sig Start Date End Date Taking? Authorizing Provider   asenapine maleate (SAPHRIS, BLACK CHERRY, SL) by SubLINGual route.    Yes Provider, Historical   allopurinoL (ZYLOPRIM) 100 mg tablet TAKE 2 TABLETS BY MOUTH DAILY 20  Yes Jaime Rosales MD   potassium chloride (K-DUR, KLOR-CON) 20 mEq tablet TAKE 1 TABLET BY MOUTH EVERY DAY 20  Yes Jaime Rosales MD   clopidogreL (PLAVIX) 75 mg tab TAKE 1 TABLET BY MOUTH ONCE DAILY 3/23/20  Yes Jaime Rosales MD diclofenac (VOLTAREN) 1 % gel Apply 4 g to affected area four (4) times daily. 1/3/20  Yes Iftikhar Starks MD   ergocalciferol (ERGOCALCIFEROL) 50,000 unit capsule Take 1 Cap by mouth every seven (7) days. 9/3/19  Yes Dominik Montoya MD   letrozole [facility]) 2.5 mg tablet take 1 tablet by mouth once daily 8/23/19  Yes Dominik Montoya MD   aspirin 81 mg chewable tablet Take 1 Tab by mouth daily. 11/17/18  Yes Ermelinda Bullock MD   acetaminophen (TYLENOL) 500 mg tablet Take 2 Tabs by mouth every six (6) hours as needed for Pain. Indications: Pain 10/15/18  Yes Iftikhar Starks MD   timolol (TIMOPTIC) 0.5 % ophthalmic solution Administer  to both eyes two (2) times a day. 9/4/18  Yes Provider, Historical   dorzolamide (TRUSOPT) 2 % ophthalmic solution instill 1 drop into both eyes twice a day 10/9/17  Yes Provider, Historical   latanoprost (XALATAN) 0.005 % ophthalmic solution Administer 1 Drop to both eyes nightly. 5/17/16  Yes Mike Little MD   brimonidine (ALPHAGAN) 0.15 % ophthalmic solution Administer 1 Drop to both eyes two (2) times a day. 10/26/15  Yes Mike Little MD   alcohol swabs (BD SINGLE USE SWABS REGULAR) padm Use to sanitize the affected area prior to application of medication as indicated. 3/2/20   Iftikhar Starks MD     Physical Exam:   Vital Signs: Blood pressure 145/86, pulse 62, temperature 98.2 °F (36.8 °C), resp. rate 14, height 5' 3\" (1.6 m), weight 58.5 kg (129 lb), SpO2 99 %.   General: well developed, well nourished   HEENT: unremarkable   Heart: regular rhythm no mumur    Lungs: clear   Abdominal:  benign   Neurological: unremarkable   Extremities: no edema     Findings/Diagnosis: CRC screening  Plan of Care/Planned Procedure: Colonoscopy with conscious/deep sedation    Signed:  Karan Ford MD 7/28/2020

## 2020-07-28 NOTE — ROUTINE PROCESS
Dia Arcos  1950  [id number]    Situation:  Verbal report received from: Venancio Treviño  Procedure: Procedure(s):  COLONOSCOPY,  ENDOSCOPIC POLYPECTOMY    Background:    Preoperative diagnosis: ANTIPLATELET OR ANTITHROMBOTIC LONG-TERM USE, COLON CANCER SCREENIG  Postoperative diagnosis: Colon polyps, diverticulosis    :  Dr. Gulshan Venegas  Assistant(s): Endoscopy Technician-1: Elvin Ford  Endoscopy RN-1: Mal Johns RN    Specimens:   ID Type Source Tests Collected by Time Destination   1 : Sigmoid polyps Preservative Sigmoid  Precious Vital MD 7/28/2020 0396 Pathology     H. Pylori  no    Assessment:  Intra-procedure medications     Anesthesia gave intra-procedure sedation and medications, see anesthesia flow sheet yes    Intravenous fluids: NS@ KVO     Vital signs stable     Abdominal assessment: round and soft     Recommendation:  Discharge patient per MD order.   Return to floor  Family or Friend   Permission to share finding with family or friend yes

## 2020-07-28 NOTE — PROCEDURES
NAME:  Lolly Nicole   :   1950   MRN:   [id number]     Date/Time:  2020 8:55 AM    Colonoscopy Operative Report    Procedure Type:   Colonoscopy with polypectomy (cold snare), polypectomy (snare cautery)     Indications:     Screening colonoscopy  Pre-operative Diagnosis: see indication above  Post-operative Diagnosis:  See findings below  :  Rani Marie MD  Referring Provider: Tushar Roper MD    Exam:  Airway: clear, no airway problems anticipated  Heart: RRR, without gallops or rubs  Lungs: clear bilaterally without wheezes, crackles, or rhonchi  Abdomen: soft, nontender, nondistended, bowel sounds present  Mental Status: awake, alert and oriented to person, place and time    Sedation:  MAC anesthesia Propofol 250mg IV  Procedure Details:  After informed consent was obtained with all risks and benefits of procedure explained and preoperative exam completed, the patient was taken to the endoscopy suite and placed in the left lateral decubitus position. Upon sequential sedation as per above, a digital rectal exam was performed demonstrating no hemorrhoids. The Olympus videocolonoscope  was inserted in the rectum and carefully advanced to the cecum, which was identified by the ileocecal valve and appendiceal orifice. The quality of preparation was adequate. The colonoscope was slowly withdrawn with careful evaluation between folds. Retroflexion in the rectum was completed demonstrating no hemorrhoids. Findings:     -Single benign-appearing diminutive 2mm sessile polyp in rectum at 85cm; removed with cold snare, but not retrieved  -Two benign-appearing 2-5mm sessile polyps in the sigmoid colon at 40-50cm; removed with cold and hot snare and retrieved  -Sigmoid diverticulosis    Specimen Removed:  #1 sigmoid polyps (2)  Complications: None. EBL:  None.     Impression:    -Single benign-appearing diminutive 2mm sessile polyp in rectum at 85cm; removed with cold snare, but not retrieved  -Two benign-appearing 2-5mm sessile polyps in the sigmoid colon at 40-50cm; removed with cold and hot snare and retrieved  -Sigmoid diverticulosis    Recommendations: --Await pathology. , -Repeat colonoscopy in 3 years. High fiber diet. Resume normal medication(s). Resume Plavix itomorrow . You will receive a letter about the biopsy results in about 10 days. You may be asked to call your doctor's office for the results. Discharge Disposition:  Home in the company of a  when able to ambulate.     Shane Hooks MD

## 2020-07-28 NOTE — ANESTHESIA POSTPROCEDURE EVALUATION
Procedure(s):  COLONOSCOPY,  ENDOSCOPIC POLYPECTOMY. total IV anesthesia    Anesthesia Post Evaluation        Patient location during evaluation: PACU  Note status: Adequate. Level of consciousness: responsive to verbal stimuli and sleepy but conscious  Pain management: satisfactory to patient  Airway patency: patent  Anesthetic complications: no  Cardiovascular status: acceptable  Respiratory status: acceptable  Hydration status: acceptable  Comments: +Post-Anesthesia Evaluation and Assessment    Patient: Charity Hinojosa MRN: [id number]  SSN: [id number]   YOB: 1950  Age: 79 y.o. Sex: female      Cardiovascular Function/Vital Signs    /70   Pulse 69   Temp 36.3 °C (97.4 °F)   Resp 13   Ht 5' 3\" (1.6 m)   Wt 58.5 kg (129 lb)   SpO2 100%   BMI 22.85 kg/m²     Patient is status post Procedure(s):  COLONOSCOPY,  ENDOSCOPIC POLYPECTOMY. Nausea/Vomiting: Controlled. Postoperative hydration reviewed and adequate. Pain:  Pain Scale 1: Numeric (0 - 10) (07/28/20 0910)  Pain Intensity 1: 0 (07/28/20 0910)   Managed. Neurological Status: At baseline. Mental Status and Level of Consciousness: Arousable. Pulmonary Status:   O2 Device: Room air (07/28/20 0910)   Adequate oxygenation and airway patent. Complications related to anesthesia: None    Post-anesthesia assessment completed. No concerns. Signed By: Jeff Bustamante MD    7/28/2020  Post anesthesia nausea and vomiting:  controlled      INITIAL Post-op Vital signs:   Vitals Value Taken Time   /74 7/28/2020  9:11 AM   Temp 36.3 °C (97.4 °F) 7/28/2020  9:00 AM   Pulse 62 7/28/2020  9:12 AM   Resp 11 7/28/2020  9:12 AM   SpO2 100 % 7/28/2020  9:12 AM   Vitals shown include unvalidated device data.

## 2020-07-28 NOTE — PROGRESS NOTES
Endoscope was pre-cleaned at the bedside immediately following procedure by Hernan Peterson RN    Anesthesia reports 250mg Propofol, 40mg Lidocaine and 450mL NS given during procedure. Received report from anesthesia staff on vital signs and status of patient. Kamryn Castellanos

## 2020-07-28 NOTE — DISCHARGE INSTRUCTIONS
[facility]  [id number]  1950    COLON DISCHARGE INSTRUCTIONS  Discomfort:  Redness at IV site- apply warm compress to area; if redness or soreness persist- contact your physician  There may be a slight amount of blood passed from the rectum  Gaseous discomfort- walking, belching will help relieve any discomfort  You may not operate a vehicle for 12 hours  You may not engage in an occupation involving machinery or appliances for rest of today  You may not drink alcoholic beverages for at least 12 hours  Avoid making any critical decisions for at least 24 hour  DIET:   High fiber diet. - however -  remember your colon is empty and a heavy meal will produce gas. Avoid these foods:  vegetables, fried / greasy foods, carbonated drinks for today  MEDICATION:         ACTIVITY:  You may not resume your normal daily activities until tomorrow AM; it is recommended that you spend the remainder of the day resting -  avoid any strenuous activity. CALL M.D.   ANY SIGN OF:   Increasing pain, nausea, vomiting  Abdominal distension (swelling)  New increased bleeding (oral or rectal)  Fever (chills)  Pain in chest area  Bloody discharge from nose or mouth  Shortness of breath    IMPRESSION:  -Single benign-appearing diminutive 2mm sessile polyp in rectum at 85cm; removed with cold snare, but not retrieved  -Two benign-appearing 2-5mm sessile polyps in the sigmoid colon at 40-50cm; removed with cold and hot snare and retrieved  -Sigmoid diverticulosis    Follow-up Instructions:   Call  [facility] for the results of procedure / biopsy in 7-10 days  Telephone # [phone]  Repeat colonoscopy in 3 years  Resume Plavix tomorrow    Pedro Fischer MD

## 2020-07-28 NOTE — PROGRESS NOTES
Both written and verbal discharge instructions provided,opportunity for questions given with patient verbalizing understanding.

## 2020-07-28 NOTE — ANESTHESIA PREPROCEDURE EVALUATION
Anesthetic History   No history of anesthetic complications            Review of Systems / Medical History  Patient summary reviewed, nursing notes reviewed and pertinent labs reviewed    Pulmonary          Smoker      Comments: Hx Solitary lung nodule  Former Smoker - 11.75 pack years   Neuro/Psych       CVA      Comments: CVA x 2 Cardiovascular    Hypertension          PAD and hyperlipidemia    Exercise tolerance: >4 METS  Comments: Bilateral carotid artery stenosis      GI/Hepatic/Renal             Pertinent negatives: GERD: patient denies.   Comments: Hx Gallstones Endo/Other    Diabetes    Arthritis, cancer (L breast CA) and anemia     Other Findings   Comments: open angle glaucoma   Gout   Hx Left Breast cancer            Physical Exam    Airway  Mallampati: II  TM Distance: 4 - 6 cm  Neck ROM: normal range of motion   Mouth opening: Normal     Cardiovascular  Regular rate and rhythm,  S1 and S2 normal,  no murmur, click, rub, or gallop             Dental    Dentition: Poor dentition     Pulmonary  Breath sounds clear to auscultation               Abdominal  GI exam deferred       Other Findings            Anesthetic Plan    ASA: 3  Anesthesia type: total IV anesthesia          Induction: Intravenous  Anesthetic plan and risks discussed with: Patient

## 2020-08-18 DIAGNOSIS — M10.00 ACUTE IDIOPATHIC GOUT, UNSPECIFIED SITE: ICD-10-CM

## 2020-08-18 DIAGNOSIS — C50.212 MALIGNANT NEOPLASM OF UPPER-INNER QUADRANT OF LEFT BREAST IN FEMALE, ESTROGEN RECEPTOR POSITIVE (HCC): ICD-10-CM

## 2020-08-18 DIAGNOSIS — M79.671 RIGHT FOOT PAIN: ICD-10-CM

## 2020-08-18 DIAGNOSIS — I10 ESSENTIAL HYPERTENSION: ICD-10-CM

## 2020-08-18 DIAGNOSIS — C50.212 MALIGNANT NEOPLASM OF UPPER-INNER QUADRANT OF LEFT FEMALE BREAST, UNSPECIFIED ESTROGEN RECEPTOR STATUS (HCC): Primary | ICD-10-CM

## 2020-08-18 DIAGNOSIS — E87.6 HYPOKALEMIA: ICD-10-CM

## 2020-08-18 DIAGNOSIS — N17.9 ACUTE RENAL FAILURE, UNSPECIFIED ACUTE RENAL FAILURE TYPE (HCC): ICD-10-CM

## 2020-08-18 DIAGNOSIS — Z17.0 MALIGNANT NEOPLASM OF UPPER-INNER QUADRANT OF LEFT BREAST IN FEMALE, ESTROGEN RECEPTOR POSITIVE (HCC): ICD-10-CM

## 2020-08-18 DIAGNOSIS — M79.604 PAIN OF RIGHT LOWER EXTREMITY: ICD-10-CM

## 2020-08-18 NOTE — PROGRESS NOTES
Pt has an upcoming appointment. She has not had a mammo since 5/2019. Will order this. She hasn't seen Dr. Carlota Sosa in a while either. She should get mammo done then make a VV appt. With us or either reschedule in office appt. To after mammo.     CHARI FROM DR Sherryle Games BILL 3D  KIRIT W MAMMO BILAT DX INCL CAD

## 2020-08-19 RX ORDER — INDOMETHACIN 50 MG/1
CAPSULE ORAL
Qty: 18 CAP | Refills: 0 | Status: SHIPPED | OUTPATIENT
Start: 2020-08-19 | End: 2020-08-25

## 2020-08-19 RX ORDER — TRAMADOL HYDROCHLORIDE 50 MG/1
TABLET ORAL
Qty: 60 TAB | OUTPATIENT
Start: 2020-08-19

## 2020-08-19 RX ORDER — ALLOPURINOL 100 MG/1
TABLET ORAL
Qty: 60 TAB | Refills: 2 | Status: SHIPPED | OUTPATIENT
Start: 2020-08-19 | End: 2020-11-16

## 2020-08-19 RX ORDER — POTASSIUM CHLORIDE 20 MEQ/1
TABLET, EXTENDED RELEASE ORAL
Qty: 90 TAB | Refills: 0 | Status: SHIPPED | OUTPATIENT
Start: 2020-08-19 | End: 2020-11-23

## 2020-09-03 ENCOUNTER — HOSPITAL ENCOUNTER (OUTPATIENT)
Dept: MAMMOGRAPHY | Age: 70
Discharge: HOME | End: 2020-09-03
Attending: INTERNAL MEDICINE
Payer: MEDICARE

## 2020-09-03 DIAGNOSIS — C50.212 MALIGNANT NEOPLASM OF UPPER-INNER QUADRANT OF LEFT FEMALE BREAST, UNSPECIFIED ESTROGEN RECEPTOR STATUS (HCC): ICD-10-CM

## 2020-09-03 PROCEDURE — 77066 DX MAMMO INCL CAD BI: CPT

## 2020-10-29 NOTE — PROGRESS NOTES
Discussed the risks/benefits of YAG Laser Capsulotomy. HISTORY OF PRESENT ILLNESS  Jackie Tucker is a 71 y.o. female. HPI   [facility] to have [facility] foot tendonitis was [place] medrol 6days pacakag did not help is the rt foot,  foot pain with hx of rt foot fx few yrs ago,  With hs of breast cancer on letrozole daily,  With hx of gout refusing to take any statin tx,   The history is provided by the patient. This is a chronic problem. No history of flat-footed patient denies any long periods of standing and walking on hard concrete floor denies any current calluses or plantar warts, stating that this episode onset was almost few days ago, BMI of nl not obese not ow, the patient is able to  walk for >1-2 blocks, the pain worsens with working mopping, and any going up and down the steps and it is becoming to be constantly regardless the pain intensity has changed and worsening since onset. Patient states that it is a dull nonradiating no numbness ++++ tingling sensation disabling, morning stiffness which gets better with activity and with the severity of 8/10. in addition stating that current pain is also aggravated by movement and palpation. So for patient denies any history of extremity trauma, and has no leg swelling no skin lesion noted. Nose bleeding with congestion and on plavix slowing down      Current Outpatient Medications   Medication Sig Dispense Refill    ergocalciferol (ERGOCALCIFEROL) 50,000 unit capsule Take 1 Cap by mouth every seven (7) days. 12 Cap 6    letrozole (FEMARA) 2.5 mg tablet TAKE 1 TABLET BY MOUTH ONCE DAILY 150 Tab 0    letrozole (FEMARA) 2.5 mg tablet take 1 tablet by mouth once daily 30 Tab 6    hydroCHLOROthiazide (HYDRODIURIL) 25 mg tablet take 1 tablet by mouth daily if needed as directed by prescriber 90 Tab 3    clopidogrel (PLAVIX) 75 mg tab Take 1 Tab by mouth daily. 90 Tab 3    aspirin 81 mg chewable tablet Take 1 Tab by mouth daily.  30 Tab 6    acetaminophen (TYLENOL) 500 mg tablet Take 2 Tabs by mouth every six (6) hours as needed for Pain. Indications: Pain 40 Tab 1    timolol (TIMOPTIC) 0.5 % ophthalmic solution Administer  to both eyes two (2) times a day.  0    Blood-Glucose Meter (ACCU-CHEK OLIVIA PLUS METER) misc Test blood sugar daily 1 Each 0    glucose blood VI test strips (ACCU-CHEK OLIVIA PLUS TEST STRP) strip Test blood sugar once daily 50 Strip 11    alcohol swabs (BD SINGLE USE SWABS REGULAR) padm Test blood sugar once daily 50 Pad 11    ALPHAGAN P 0.1 % ophthalmic solution instill 1 drop into both eyes twice a day  0    dorzolamide (TRUSOPT) 2 % ophthalmic solution instill 1 drop into both eyes twice a day  0    latanoprost (XALATAN) 0.005 % ophthalmic solution Administer 1 Drop to both eyes nightly. 2.5 mL 1    brimonidine (ALPHAGAN) 0.15 % ophthalmic solution Administer 1 Drop to both eyes two (2) times a day. 15 mL 6    Blood-Glucose Meter monitoring kit accu chek gabino smartview meter 1 Kit 0    cyclobenzaprine (FLEXERIL) 10 mg tablet Take 1 Tab by mouth two (2) times a day. 30 Tab 0    allopurinol (ZYLOPRIM) 300 mg tablet Take 1 tablet by mouth daily 30 Tab 0    pravastatin (PRAVACHOL) 40 mg tablet TAKE 1 TABLET BY MOUTH NIGHTLY  0    gabapentin (NEURONTIN) 300 mg capsule take 1 capsule by mouth twice a day THE DAY PRIOR TO SURGERY 9/13 (Patient taking differently: 300 mg daily.  take 1 capsule by mouth twice a day THE DAY PRIOR TO SURGERY 9/13) 180 Cap 1     No Known Allergies  Past Medical History:   Diagnosis Date    Arthritis     Breast cancer ([facility] [address] 04/2017    lumpectomy left breast, radiation    Cancer ([facility] [address]     breast cancer- left    Cerebrovascular accident (stroke) ([facility] [address]     no deficiets    CVA (cerebral vascular accident) ([facility] [address]     11/12/2018    Diabetes mellitus type 2, controlled ([facility] [address]     Gallstones     asymptomatic, pt denies having    GERD (gastroesophageal reflux disease)     dx with resolution of symptoms on ppi- pt denies having    Hypercholesterolemia  Hypertension     Ill-defined condition     gout    Long term current use of anticoagulant therapy     Non-nicotine vapor product user     used to use    PAD (peripheral artery disease) ([facility] [address] 2013    right SFA angioplasty and athrectomy    Primary open angle glaucoma     Radiation therapy complication     Solitary lung nodule 2017    Type II diabetes mellitus, uncontrolled ([facility] [address] 2017     Past Surgical History:   Procedure Laterality Date    HX BREAST BIOPSY Left 2017    HX BREAST LUMPECTOMY Left 2017    LEFT LUMPECTOMY WITH INTRAOPERATIVE ULTRASOUND GUIDED, LEFT AXILLARY SENTINEL LYMPH NODE BIOPSY, POSSIBLE AXILLARY DISECTION performed by Ton Khalil MD at [facility] MAIN OR    HX HEENT Bilateral     eye lids surgery    VASCULAR SURGERY PROCEDURE UNLIST  2016    right leg stent     Family History   Problem Relation Age of Onset    Hypertension Mother     Diabetes Mother     Diabetes Son      Social History     Tobacco Use    Smoking status: Former Smoker     Types: Cigarettes     Last attempt to quit: 3/13/2013     Years since quittin.8    Smokeless tobacco: Never Used    Tobacco comment: estimate is one pack per week   Substance Use Topics    Alcohol use:  Yes     Alcohol/week: 21.0 standard drinks     Types: 21 Cans of beer per week     Comment: 2 to 3 beers per day per patient      Lab Results   Component Value Date/Time    WBC 5.4 10/10/2019 12:04 PM    HGB 10.6 (L) 10/10/2019 12:04 PM    Hemoglobin (POC) 10.5 (L) 2017 07:57 AM    HCT 31.3 (L) 10/10/2019 12:04 PM    Hematocrit (POC) 31 (L) 2017 07:57 AM    PLATELET 729  12:04 PM    MCV 90 10/10/2019 12:04 PM     Lab Results   Component Value Date/Time    Cholesterol, total 305 (H) 10/10/2019 12:04 PM    HDL Cholesterol 77 10/10/2019 12:04 PM    LDL, calculated 197 (H) 10/10/2019 12:04 PM    Triglyceride 157 (H) 10/10/2019 12:04 PM    CHOL/HDL Ratio 4.6 2018 07:45 AM        Review of Systems   Constitutional: Negative for chills and fever. HENT: Negative for ear pain and nosebleeds. Eyes: Negative for blurred vision, pain and discharge. Respiratory: Negative for shortness of breath. Cardiovascular: Negative for chest pain and leg swelling. Gastrointestinal: Negative for constipation, diarrhea, nausea and vomiting. Genitourinary: Negative for frequency. Musculoskeletal: Positive for joint pain. Skin: Negative for itching and rash. Neurological: Negative for headaches. Psychiatric/Behavioral: Negative for depression. The patient is not nervous/anxious. Physical Exam  Vitals signs and nursing note reviewed. Constitutional:       Appearance: She is well-developed. HENT:      Head: Normocephalic and atraumatic. Eyes:      Conjunctiva/sclera: Conjunctivae normal.      Pupils: Pupils are equal, round, and reactive to light. Neck:      Thyroid: No thyromegaly. Vascular: No JVD. Cardiovascular:      Rate and Rhythm: Normal rate and regular rhythm. Heart sounds: Normal heart sounds. No murmur. No friction rub. No gallop. Pulmonary:      Effort: Pulmonary effort is normal. No respiratory distress. Breath sounds: Normal breath sounds. No stridor. No wheezing or rales. Abdominal:      General: Bowel sounds are normal. There is no distension. Palpations: Abdomen is soft. There is no mass. Tenderness: There is no tenderness. Musculoskeletal:         General: Swelling and deformity present. No tenderness. Lymphadenopathy:      Cervical: No cervical adenopathy. Skin:     Findings: No erythema or rash. Neurological:      Mental Status: She is alert and oriented to person, place, and time. Cranial Nerves: No cranial nerve deficit. Deep Tendon Reflexes: Reflexes are normal and symmetric. Psychiatric:         Behavior: Behavior normal.         ASSESSMENT and PLAN  Diagnoses and all orders for this visit:    1.  Right foot pain  - URIC ACID  -     XR FOOT RT MIN 3 V; Future  -     DUPLEX LOWER EXT VENOUS BILAT  -     indomethacin (INDOCIN) 50 mg capsule; Take 1 Cap by mouth three (3) times daily for 6 days. -     traMADol (ULTRAM) 50 mg tablet; Take 1 Tab by mouth every six (6) hours as needed for Pain for up to 7 days. Max Daily Amount: 200 mg. Indications: pain  -     diclofenac (VOLTAREN) 1 % gel; Apply 4 g to affected area four (4) times daily.  -     CBC W/O DIFF  -     TSH 3RD GENERATION  -     METABOLIC PANEL, COMPREHENSIVE    2. Pain of right lower extremity  -     URIC ACID  -     XR FOOT RT MIN 3 V; Future  -     DUPLEX LOWER EXT VENOUS BILAT  -     indomethacin (INDOCIN) 50 mg capsule; Take 1 Cap by mouth three (3) times daily for 6 days. -     traMADol (ULTRAM) 50 mg tablet; Take 1 Tab by mouth every six (6) hours as needed for Pain for up to 7 days. Max Daily Amount: 200 mg. Indications: pain  -     diclofenac (VOLTAREN) 1 % gel; Apply 4 g to affected area four (4) times daily.  -     CBC W/O DIFF  -     TSH 3RD GENERATION  -     METABOLIC PANEL, COMPREHENSIVE    3. Acute idiopathic gout, unspecified site  -     URIC ACID  -     XR FOOT RT MIN 3 V; Future  -     DUPLEX LOWER EXT VENOUS BILAT  -     indomethacin (INDOCIN) 50 mg capsule; Take 1 Cap by mouth three (3) times daily for 6 days. -     traMADol (ULTRAM) 50 mg tablet; Take 1 Tab by mouth every six (6) hours as needed for Pain for up to 7 days. Max Daily Amount: 200 mg. Indications: pain  -     diclofenac (VOLTAREN) 1 % gel;  Apply 4 g to affected area four (4) times daily.  -     CBC W/O DIFF  -     TSH 3RD GENERATION  -     METABOLIC PANEL, COMPREHENSIVE    Other orders  -     CKD REPORT    stop plavix for now and next 3 days if bleeding doesnot stop pt wa told to go to Er otherwise no nosepicking no blowing, rtc in 7 days

## 2020-11-16 RX ORDER — ALLOPURINOL 100 MG/1
TABLET ORAL
Qty: 60 TAB | Refills: 2 | Status: SHIPPED | OUTPATIENT
Start: 2020-11-16 | End: 2020-12-24 | Stop reason: SDUPTHER

## 2020-11-23 DIAGNOSIS — M10.00 ACUTE IDIOPATHIC GOUT, UNSPECIFIED SITE: ICD-10-CM

## 2020-11-23 DIAGNOSIS — I10 ESSENTIAL HYPERTENSION: ICD-10-CM

## 2020-11-23 DIAGNOSIS — N17.9 ACUTE RENAL FAILURE, UNSPECIFIED ACUTE RENAL FAILURE TYPE (HCC): ICD-10-CM

## 2020-11-23 DIAGNOSIS — M79.604 PAIN OF RIGHT LOWER EXTREMITY: ICD-10-CM

## 2020-11-23 DIAGNOSIS — M79.671 RIGHT FOOT PAIN: ICD-10-CM

## 2020-11-23 DIAGNOSIS — E87.6 HYPOKALEMIA: ICD-10-CM

## 2020-11-23 RX ORDER — POTASSIUM CHLORIDE 20 MEQ/1
TABLET, EXTENDED RELEASE ORAL
Qty: 90 TAB | Refills: 0 | Status: SHIPPED | OUTPATIENT
Start: 2020-11-23 | End: 2020-12-24 | Stop reason: SDUPTHER

## 2020-11-25 NOTE — PROGRESS NOTES
Dakota Fish is a 79 y.o. female  Here for one year follow up for left breast cancer. She is on Letrozole. 1. Have you been to the ER, urgent care clinic since your last visit? Hospitalized since your last visit? no    2. Have you seen or consulted any other health care providers outside of the [address] [place] [address] since your last visit? Include any pap smears or colon screening. no    She needs refill on Letrozole. Has not had for 5 days.

## 2020-11-30 ENCOUNTER — VIRTUAL VISIT (OUTPATIENT)
Dept: ONCOLOGY | Age: 70
End: 2020-11-30
Payer: MEDICARE

## 2020-11-30 DIAGNOSIS — Z17.0 MALIGNANT NEOPLASM OF UPPER-INNER QUADRANT OF LEFT BREAST IN FEMALE, ESTROGEN RECEPTOR POSITIVE (HCC): Primary | ICD-10-CM

## 2020-11-30 DIAGNOSIS — C50.212 MALIGNANT NEOPLASM OF UPPER-INNER QUADRANT OF LEFT BREAST IN FEMALE, ESTROGEN RECEPTOR POSITIVE (HCC): Primary | ICD-10-CM

## 2020-11-30 DIAGNOSIS — Z17.0 MALIGNANT NEOPLASM OF UPPER-INNER QUADRANT OF LEFT BREAST IN FEMALE, ESTROGEN RECEPTOR POSITIVE (HCC): ICD-10-CM

## 2020-11-30 DIAGNOSIS — C50.212 MALIGNANT NEOPLASM OF UPPER-INNER QUADRANT OF LEFT BREAST IN FEMALE, ESTROGEN RECEPTOR POSITIVE (HCC): ICD-10-CM

## 2020-11-30 PROCEDURE — 99213 OFFICE O/P EST LOW 20 MIN: CPT | Performed by: INTERNAL MEDICINE

## 2020-11-30 RX ORDER — LETROZOLE 2.5 MG/1
TABLET, FILM COATED ORAL
Qty: 30 TAB | Refills: 5 | Status: SHIPPED | OUTPATIENT
Start: 2020-11-30 | End: 2021-06-23 | Stop reason: SDUPTHER

## 2020-11-30 NOTE — TELEPHONE ENCOUNTER
PER CHARI from Dr. Dwight Rodriguez LETROZOLE 2.5MG [facility]) ONE TAB ONCE A DAY QUANTITY 30 REFILL 5

## 2020-12-24 DIAGNOSIS — M79.671 RIGHT FOOT PAIN: ICD-10-CM

## 2020-12-24 DIAGNOSIS — E87.6 HYPOKALEMIA: ICD-10-CM

## 2020-12-24 DIAGNOSIS — N17.9 ACUTE RENAL FAILURE, UNSPECIFIED ACUTE RENAL FAILURE TYPE (HCC): ICD-10-CM

## 2020-12-24 DIAGNOSIS — I10 ESSENTIAL HYPERTENSION: ICD-10-CM

## 2020-12-24 DIAGNOSIS — M79.604 PAIN OF RIGHT LOWER EXTREMITY: ICD-10-CM

## 2020-12-24 DIAGNOSIS — M10.00 ACUTE IDIOPATHIC GOUT, UNSPECIFIED SITE: ICD-10-CM

## 2020-12-24 DIAGNOSIS — Z86.73 HISTORY OF STROKE: ICD-10-CM

## 2020-12-24 RX ORDER — POTASSIUM CHLORIDE 20 MEQ/1
20 TABLET, EXTENDED RELEASE ORAL DAILY
Qty: 90 TAB | Refills: 0 | Status: SHIPPED | OUTPATIENT
Start: 2020-12-24 | End: 2021-05-18 | Stop reason: SDUPTHER

## 2020-12-24 RX ORDER — CLOPIDOGREL BISULFATE 75 MG/1
75 TABLET ORAL DAILY
Qty: 90 TAB | Refills: 3 | Status: SHIPPED | OUTPATIENT
Start: 2020-12-24 | End: 2021-05-18 | Stop reason: SDUPTHER

## 2020-12-24 RX ORDER — ALLOPURINOL 100 MG/1
200 TABLET ORAL DAILY
Qty: 180 TAB | Refills: 3 | Status: SHIPPED | OUTPATIENT
Start: 2020-12-24 | End: 2021-02-25

## 2020-12-24 NOTE — TELEPHONE ENCOUNTER
[address] requests a 90 d/s of medication on the patient's behalf    Last Visit: 3/6/2020 with MD Nicole Spain  Next Appointment: noted to f/u in 2 weeks     Requested Prescriptions     Pending Prescriptions Disp Refills    potassium chloride (K-DUR, KLOR-CON) 20 mEq tablet 90 Tab 0     Sig: Take 1 Tab by mouth daily.  clopidogreL (PLAVIX) 75 mg tab 90 Tab 3     Sig: Take 1 Tab by mouth daily.  allopurinoL (ZYLOPRIM) 100 mg tablet 180 Tab 3     Sig: Take 2 Tabs by mouth daily.

## 2021-02-02 RX ORDER — LETROZOLE 2.5 MG/1
TABLET, FILM COATED ORAL
Qty: 30 TAB | Refills: 6 | OUTPATIENT
Start: 2021-02-02

## 2021-02-25 RX ORDER — ALLOPURINOL 100 MG/1
TABLET ORAL
Qty: 60 TAB | Refills: 2 | Status: SHIPPED | OUTPATIENT
Start: 2021-02-25 | End: 2021-05-18 | Stop reason: SDUPTHER

## 2021-05-18 ENCOUNTER — OFFICE VISIT (OUTPATIENT)
Dept: FAMILY MEDICINE CLINIC | Age: 71
End: 2021-05-18
Payer: MEDICARE

## 2021-05-18 VITALS
WEIGHT: 139 LBS | RESPIRATION RATE: 18 BRPM | HEART RATE: 75 BPM | BODY MASS INDEX: 24.63 KG/M2 | SYSTOLIC BLOOD PRESSURE: 165 MMHG | OXYGEN SATURATION: 99 % | HEIGHT: 63 IN | DIASTOLIC BLOOD PRESSURE: 70 MMHG

## 2021-05-18 DIAGNOSIS — Z00.00 LABORATORY EXAM ORDERED AS PART OF ROUTINE GENERAL MEDICAL EXAMINATION: ICD-10-CM

## 2021-05-18 DIAGNOSIS — Z86.73 HISTORY OF STROKE: ICD-10-CM

## 2021-05-18 DIAGNOSIS — Z00.00 MEDICARE ANNUAL WELLNESS VISIT, SUBSEQUENT: Primary | ICD-10-CM

## 2021-05-18 DIAGNOSIS — Z12.31 ENCOUNTER FOR SCREENING MAMMOGRAM FOR MALIGNANT NEOPLASM OF BREAST: ICD-10-CM

## 2021-05-18 DIAGNOSIS — I10 ESSENTIAL HYPERTENSION: ICD-10-CM

## 2021-05-18 DIAGNOSIS — Z13.39 SCREENING FOR ALCOHOLISM: ICD-10-CM

## 2021-05-18 DIAGNOSIS — I73.9 PAD (PERIPHERAL ARTERY DISEASE) (HCC): ICD-10-CM

## 2021-05-18 DIAGNOSIS — I63.89 CEREBROVASCULAR ACCIDENT (CVA) DUE TO OTHER MECHANISM (HCC): ICD-10-CM

## 2021-05-18 DIAGNOSIS — C50.212 MALIGNANT NEOPLASM OF UPPER-INNER QUADRANT OF LEFT FEMALE BREAST, UNSPECIFIED ESTROGEN RECEPTOR STATUS (HCC): ICD-10-CM

## 2021-05-18 DIAGNOSIS — E87.6 HYPOKALEMIA: ICD-10-CM

## 2021-05-18 DIAGNOSIS — Z87.891 HISTORY OF TOBACCO ABUSE: ICD-10-CM

## 2021-05-18 DIAGNOSIS — M10.00 ACUTE IDIOPATHIC GOUT, UNSPECIFIED SITE: ICD-10-CM

## 2021-05-18 DIAGNOSIS — M89.9 BONE DISORDER: ICD-10-CM

## 2021-05-18 DIAGNOSIS — Z72.0 TOBACCO ABUSE DISORDER: ICD-10-CM

## 2021-05-18 LAB
ALBUMIN SERPL-MCNC: 4 G/DL (ref 3.5–5)
ALBUMIN/GLOB SERPL: 1.2 {RATIO} (ref 1.1–2.2)
ALP SERPL-CCNC: 132 U/L (ref 45–117)
ALT SERPL-CCNC: 34 U/L (ref 12–78)
ANION GAP SERPL CALC-SCNC: 9 MMOL/L (ref 5–15)
APPEARANCE UR: ABNORMAL
AST SERPL-CCNC: 59 U/L (ref 15–37)
BACTERIA URNS QL MICRO: ABNORMAL /HPF
BILIRUB SERPL-MCNC: 0.2 MG/DL (ref 0.2–1)
BILIRUB UR QL: NEGATIVE
BUN SERPL-MCNC: 16 MG/DL (ref 6–20)
BUN/CREAT SERPL: 22 (ref 12–20)
CALCIUM SERPL-MCNC: 9.8 MG/DL (ref 8.5–10.1)
CHLORIDE SERPL-SCNC: 111 MMOL/L (ref 97–108)
CHOLEST SERPL-MCNC: 246 MG/DL
CO2 SERPL-SCNC: 23 MMOL/L (ref 21–32)
COLOR UR: ABNORMAL
CREAT SERPL-MCNC: 0.74 MG/DL (ref 0.55–1.02)
EPITH CASTS URNS QL MICRO: ABNORMAL /LPF
ERYTHROCYTE [DISTWIDTH] IN BLOOD BY AUTOMATED COUNT: 13.5 % (ref 11.5–14.5)
EST. AVERAGE GLUCOSE BLD GHB EST-MCNC: 108 MG/DL
GLOBULIN SER CALC-MCNC: 3.4 G/DL (ref 2–4)
GLUCOSE SERPL-MCNC: 89 MG/DL (ref 65–100)
GLUCOSE UR STRIP.AUTO-MCNC: NEGATIVE MG/DL
HBA1C MFR BLD: 5.4 % (ref 4–5.6)
HCT VFR BLD AUTO: 33.6 % (ref 35–47)
HDLC SERPL-MCNC: 75 MG/DL
HDLC SERPL: 3.3 {RATIO} (ref 0–5)
HGB BLD-MCNC: 10.8 G/DL (ref 11.5–16)
HGB UR QL STRIP: NEGATIVE
KETONES UR QL STRIP.AUTO: NEGATIVE MG/DL
LDLC SERPL CALC-MCNC: 112.2 MG/DL (ref 0–100)
LEUKOCYTE ESTERASE UR QL STRIP.AUTO: ABNORMAL
MCH RBC QN AUTO: 32.3 PG (ref 26–34)
MCHC RBC AUTO-ENTMCNC: 32.1 G/DL (ref 30–36.5)
MCV RBC AUTO: 100.6 FL (ref 80–99)
MUCOUS THREADS URNS QL MICRO: ABNORMAL /LPF
NITRITE UR QL STRIP.AUTO: NEGATIVE
NRBC # BLD: 0 K/UL (ref 0–0.01)
NRBC BLD-RTO: 0 PER 100 WBC
PH UR STRIP: 5 [PH] (ref 5–8)
PLATELET # BLD AUTO: 210 K/UL (ref 150–400)
PMV BLD AUTO: 9.5 FL (ref 8.9–12.9)
POTASSIUM SERPL-SCNC: 3.9 MMOL/L (ref 3.5–5.1)
PROT SERPL-MCNC: 7.4 G/DL (ref 6.4–8.2)
PROT UR STRIP-MCNC: NEGATIVE MG/DL
RBC # BLD AUTO: 3.34 M/UL (ref 3.8–5.2)
RBC #/AREA URNS HPF: ABNORMAL /HPF (ref 0–5)
SODIUM SERPL-SCNC: 143 MMOL/L (ref 136–145)
SP GR UR REFRACTOMETRY: 1.02 (ref 1–1.03)
TRIGL SERPL-MCNC: 294 MG/DL (ref ?–150)
TSH SERPL DL<=0.05 MIU/L-ACNC: 1.63 UIU/ML (ref 0.36–3.74)
URATE SERPL-MCNC: 3.6 MG/DL (ref 2.6–6)
UROBILINOGEN UR QL STRIP.AUTO: 0.2 EU/DL (ref 0.2–1)
VLDLC SERPL CALC-MCNC: 58.8 MG/DL
WBC # BLD AUTO: 4.6 K/UL (ref 3.6–11)
WBC URNS QL MICRO: ABNORMAL /HPF (ref 0–4)

## 2021-05-18 PROCEDURE — G8536 NO DOC ELDER MAL SCRN: HCPCS | Performed by: FAMILY MEDICINE

## 2021-05-18 PROCEDURE — G9899 SCRN MAM PERF RSLTS DOC: HCPCS | Performed by: FAMILY MEDICINE

## 2021-05-18 PROCEDURE — 99213 OFFICE O/P EST LOW 20 MIN: CPT | Performed by: FAMILY MEDICINE

## 2021-05-18 PROCEDURE — G0439 PPPS, SUBSEQ VISIT: HCPCS | Performed by: FAMILY MEDICINE

## 2021-05-18 PROCEDURE — G8754 DIAS BP LESS 90: HCPCS | Performed by: FAMILY MEDICINE

## 2021-05-18 PROCEDURE — G8510 SCR DEP NEG, NO PLAN REQD: HCPCS | Performed by: FAMILY MEDICINE

## 2021-05-18 PROCEDURE — 3044F HG A1C LEVEL LT 7.0%: CPT | Performed by: FAMILY MEDICINE

## 2021-05-18 PROCEDURE — 2022F DILAT RTA XM EVC RTNOPTHY: CPT | Performed by: FAMILY MEDICINE

## 2021-05-18 PROCEDURE — G8399 PT W/DXA RESULTS DOCUMENT: HCPCS | Performed by: FAMILY MEDICINE

## 2021-05-18 PROCEDURE — 3017F COLORECTAL CA SCREEN DOC REV: CPT | Performed by: FAMILY MEDICINE

## 2021-05-18 PROCEDURE — 1090F PRES/ABSN URINE INCON ASSESS: CPT | Performed by: FAMILY MEDICINE

## 2021-05-18 PROCEDURE — G8420 CALC BMI NORM PARAMETERS: HCPCS | Performed by: FAMILY MEDICINE

## 2021-05-18 PROCEDURE — G8427 DOCREV CUR MEDS BY ELIG CLIN: HCPCS | Performed by: FAMILY MEDICINE

## 2021-05-18 PROCEDURE — G8753 SYS BP > OR = 140: HCPCS | Performed by: FAMILY MEDICINE

## 2021-05-18 PROCEDURE — 1101F PT FALLS ASSESS-DOCD LE1/YR: CPT | Performed by: FAMILY MEDICINE

## 2021-05-18 RX ORDER — POTASSIUM CHLORIDE 20 MEQ/1
20 TABLET, EXTENDED RELEASE ORAL DAILY
Qty: 90 TAB | Refills: 1 | Status: SHIPPED | OUTPATIENT
Start: 2021-05-18 | End: 2021-12-01

## 2021-05-18 RX ORDER — CLOPIDOGREL BISULFATE 75 MG/1
75 TABLET ORAL DAILY
Qty: 90 TAB | Refills: 3 | Status: SHIPPED | OUTPATIENT
Start: 2021-05-18 | End: 2022-03-01 | Stop reason: SDUPTHER

## 2021-05-18 RX ORDER — HYDROCHLOROTHIAZIDE 12.5 MG/1
12.5 TABLET ORAL DAILY
Qty: 90 TAB | Refills: 2 | Status: SHIPPED | OUTPATIENT
Start: 2021-05-18

## 2021-05-18 RX ORDER — ALLOPURINOL 100 MG/1
TABLET ORAL
Qty: 180 TAB | Refills: 2 | Status: SHIPPED | OUTPATIENT
Start: 2021-05-18 | End: 2022-04-27 | Stop reason: ALTCHOICE

## 2021-05-18 NOTE — PROGRESS NOTES
This is the Subsequent Medicare Annual Wellness Exam, performed 12 months or more after the Initial AWV or the last Subsequent AWV    I have reviewed the patient's medical history in detail and updated the computerized patient record. Patient able to drive no recent accident, Patient compare self health the same to others with the same age,   patient with normal hearing normal vision able to do all ADL currently working full-time, having had no fall and no incontinence having normal appetite no weight loss since last seen eating fruits and vegetables every day does not do exercises denies any substance abuse,     Unfortunately patient has couple other problems and concerns which were not included in annual wellness exam visit,     Last pap was many yrs ago nl reading, last colonoscopy was in 2021,   Last mammogram was also normal was last year  Last DEXA scan was normal in 2017, used to smoke quit 4yrs ago,    non-smoker ,   chest CT scan was nl w/out nodules  Immunization not uptodate offered but opted    Getting dental extraction few getting dentures done currently on plavix and Asprin, likes to know sekou to stop her asa and plavix by the surgeon    HTN  Today pt present for Bp check and on no bp meds currently, having had the low salt diet ,  has been active, patien does obtain the bp at home 120/70 , today the pt denies Chest Pain, has no legs swelling no lightheadedness,  Last a1c was at 5.6% and only in 2014 pt a1c was 7.2% thereafter all into borderline nl prediabetic state      Constitutional: no chills and fever, obese, nad     HENT: no ear head pain and nosebleeds. No blurred vision, pain and discharge. Respiratory: no shortness of breath, wheezing cough nor sore throat. Cardiovascular: Has no chest pain, leg swelling ,and racing heart . Gastrointestinal: No constipation, diarrhea, nausea and vomiting. Genitourinary: No frequency. Musculoskeletal: Negative for joint pain.    Skin: no itching, pimples or acne rash. Neurological: Negative for dizziness, no tremors  Psychiatric/Behavioral: Negative for depression has normal interest to do things and not depressed the patient is not nervous/anxious. General: Patient alert cooperative appears stated for the age  [de-identified]; normocephalic and atraumatic PERRLA extraocular motor intact normal tympanic membrane normal external ear bilaterally normal sinuses with mucosal normal no drainage  Neck: supple no adenopathy no JVD no bruit  Lungs: Clear to auscultation bilaterally no rales rhonchi or wheezes  Heart: Normal regular S1-S2 no gallops rubs or clicks no murmur  Breast exam deferred  Abdomen: Soft nontender normal bowel sounds no organomegaly  Extremities: abnormal range of motion no point tenderness normal pulses no edema  Pelvic/: No lesion, no lymphadenopathy  Skin: Normal no lesion no rash      Assessment/Plan   Education and counseling provided:  Are appropriate based on today's review and evaluation  End-of-Life planning (with patient's consent)  Pneumococcal Vaccine  Screening Pap and pelvic (covered once every 2 years)  Colorectal cancer screening tests  Bone mass measurement (DEXA)  Diabetes outpatient self-management training services    1. Medicare annual wellness visit, subsequent  2. Encounter for screening mammogram for malignant neoplasm of breast  -     BILL MAMMO BI SCREENING INCL CAD; Future  3. Screening for alcoholism  -     LA ANNUAL ALCOHOL SCREEN 15 MIN  4. History of tobacco abuse  -     CT LOW DOSE LUNG CANCER SCREENING; Future  5. Bone disorder  -     DEXA BONE DENSITY STUDY AXIAL; Future  6. Essential hypertension  -     CBC W/O DIFF; Future  -     METABOLIC PANEL, COMPREHENSIVE; Future  -     LIPID PANEL; Future  -     HEMOGLOBIN A1C WITH EAG; Future  -     TSH 3RD GENERATION; Future  -     URINALYSIS W/ RFLX MICROSCOPIC; Future  -     potassium chloride (K-DUR, KLOR-CON) 20 mEq tablet; Take 1 Tab by mouth daily. , Normal, Disp-90 Tab, R-1  -     hydroCHLOROthiazide (HYDRODIURIL) 12.5 mg tablet; Take 1 Tab by mouth daily. , Normal, Disp-90 Tab, R-2  7. Hypokalemia  -     CBC W/O DIFF; Future  -     METABOLIC PANEL, COMPREHENSIVE; Future  -     LIPID PANEL; Future  -     HEMOGLOBIN A1C WITH EAG; Future  -     TSH 3RD GENERATION; Future  -     URINALYSIS W/ RFLX MICROSCOPIC; Future  -     potassium chloride (K-DUR, KLOR-CON) 20 mEq tablet; Take 1 Tab by mouth daily. , Normal, Disp-90 Tab, R-1  8. History of stroke  -     CBC W/O DIFF; Future  -     METABOLIC PANEL, COMPREHENSIVE; Future  -     LIPID PANEL; Future  -     HEMOGLOBIN A1C WITH EAG; Future  -     TSH 3RD GENERATION; Future  -     URINALYSIS W/ RFLX MICROSCOPIC; Future  -     clopidogreL (PLAVIX) 75 mg tab; Take 1 Tab by mouth daily. , Normal, Disp-90 Tab, R-3  9. Malignant neoplasm of upper-inner quadrant of left female breast, unspecified estrogen receptor status [facility]  -     CBC W/O DIFF; Future  -     METABOLIC PANEL, COMPREHENSIVE; Future  -     LIPID PANEL; Future  -     HEMOGLOBIN A1C WITH EAG; Future  -     TSH 3RD GENERATION; Future  -     URINALYSIS W/ RFLX MICROSCOPIC; Future  10. Cerebrovascular accident (CVA) due to other mechanism ([facility]  -     CBC W/O DIFF; Future  -     METABOLIC PANEL, COMPREHENSIVE; Future  -     LIPID PANEL; Future  -     HEMOGLOBIN A1C WITH EAG; Future  -     TSH 3RD GENERATION; Future  -     URINALYSIS W/ RFLX MICROSCOPIC; Future  11. Tobacco abuse disorder  -     CBC W/O DIFF; Future  -     METABOLIC PANEL, COMPREHENSIVE; Future  -     LIPID PANEL; Future  -     HEMOGLOBIN A1C WITH EAG; Future  -     TSH 3RD GENERATION; Future  -     URINALYSIS W/ RFLX MICROSCOPIC; Future  12. Acute idiopathic gout, unspecified site  -     potassium chloride (K-DUR, KLOR-CON) 20 mEq tablet; Take 1 Tab by mouth daily. , Normal, Disp-90 Tab, R-1  -     URIC ACID; Future  13.  Laboratory exam ordered as part of routine general medical examination  - URIC ACID; Future  14. Type II diabetes mellitus with complication, uncontrolled ([facility]  15. PAD (peripheral artery disease) ([facility]       Will stop asa and plavix usually 7 days before procedure but in her case secondary to comorbid med hx and including Letrozole will stop both 5 days in advance,   No alchol intake hydration, no herbal meds      Depression Risk Factor Screening     3 most recent PHQ Screens 5/18/2021   Little interest or pleasure in doing things Not at all   Feeling down, depressed, irritable, or hopeless Not at all   Total Score PHQ 2 0       Alcohol Risk Screen    Do you average more than 1 drink per night or more than 7 drinks a week:  Yes    On any one occasion in the past three months have you have had more than 3 drinks containing alcohol:  Yes        Functional Ability and Level of Safety    Hearing: Hearing is good. Activities of Daily Living: The home contains: handrails, grab bars and rugs  Patient does total self care      Ambulation: with no difficulty     Fall Risk:  Fall Risk Assessment, last 12 mths 5/18/2021   Able to walk? Yes   Fall in past 12 months? 1   Do you feel unsteady? 1   Are you worried about falling 0   Is TUG test greater than 12 seconds? 0   Is the gait abnormal? 0   Number of falls in past 12 months 2   Fall with injury?  0      Abuse Screen:  Patient is not abused       Cognitive Screening    Has your family/caregiver stated any concerns about your memory: no     Cognitive Screening: Normal - MMSE (Mini Mental Status Exam)    Health Maintenance Due     Health Maintenance Due   Topic Date Due    COVID-19 Vaccine (1) Never done    Eye Exam Retinal or Dilated  04/20/2019    Foot Exam Q1  03/04/2020    MICROALBUMIN Q1  10/10/2020    Lipid Screen  10/10/2020    A1C test (Diabetic or Prediabetic)  01/10/2021       Patient Care Team   Patient Care Team:  Sunita Mason MD as PCP - General (Family Medicine)  Sunita Mason MD as PCP - [facility]  [facility], DPM as Physician (Podiatry)  NOAM Simons as Physician (Vascular Surgery)  Yecenia Alvarado MD as Physician (Ophthalmology)  Clarita Israel MD as Surgeon (General Surgery)  Marysol Dunham NP as Nurse Practitioner (Oncology)    History     Patient Active Problem List   Diagnosis Code    Hypertension I10    PAD (peripheral artery disease) ([address] I73.9    Glaucoma H40.9    Gout M10.9    Leg pain, bilateral M79.604, M79.605    Nonallopathic lesion of lumbar region, not elsewhere classified M99.9    History of stroke Z86.73    Type II diabetes mellitus, uncontrolled ([address] E11.65    Tobacco abuse disorder Z72.0    Solitary lung nodule R91.1    Breast cancer of upper-inner quadrant of left female breast ([address] C50.212    Type 2 diabetes mellitus with diabetic neuropathy ([address] E11.40    Type 2 diabetes with nephropathy ([address] E11.21    CVA (cerebral vascular accident) ([address] I63.9    Bilateral carotid artery stenosis I65.23     Past Medical History:   Diagnosis Date    Arthritis     Breast cancer ([address] 04/2017    lumpectomy left breast, radiation; lung    Cancer ([address]     breast cancer- left    Cerebrovascular accident (stroke) ([address] 2007    no deficiets    CVA (cerebral vascular accident) ([address]     11/12/2018    Diabetes mellitus type 2, controlled ([address]     no longer medicated    Gallstones     asymptomatic, pt denies having    GERD (gastroesophageal reflux disease)     dx with resolution of symptoms on ppi- pt denies having    Hypercholesterolemia     Hypertension     Ill-defined condition     gout    Long term current use of anticoagulant therapy     Non-nicotine vapor product user     used to use    PAD (peripheral artery disease) ([address] 2/20/2013    right SFA angioplasty and athrectomy    Primary open angle glaucoma     Radiation therapy complication     Solitary lung nodule 5/9/2017    Type II diabetes mellitus, uncontrolled ([address] 2/1/2017 Past Surgical History:   Procedure Laterality Date    COLONOSCOPY N/A 7/28/2020    COLONOSCOPY, performed by Juan Ponce MD at [address] [place] [address]  7/28/2020         HX BREAST BIOPSY Left 2017    HX BREAST LUMPECTOMY Left 6/22/2017    LEFT LUMPECTOMY WITH INTRAOPERATIVE ULTRASOUND GUIDED, LEFT AXILLARY SENTINEL LYMPH NODE BIOPSY, POSSIBLE AXILLARY DISECTION performed by Elizabeth [facility] at [facility] MA[facility] OR    HX HEENT Bilateral     eye lids surgery    DE CHEST SURGERY PROCEDURE UNLISTED Left     VASCULAR SURGERY PROCEDURE UNLIST  April 2016    right leg stent     Current Outpatient Medications   Medication Sig Dispense Refill    allopurinoL (ZYLOPRIM) 100 mg tablet TAKE 2 TABLETS BY MOUTH EVERY DAY 60 Tab 2    potassium chloride (K-DUR, KLOR-CON) 20 mEq tablet Take 1 Tab by mouth daily. 90 Tab 0    clopidogreL (PLAVIX) 75 mg tab Take 1 Tab by mouth daily. 90 Tab 3    letrozole (FEMARA) 2.5 mg tablet take 1 tablet by mouth once daily 30 Tab 5    asenapine maleate (SAPHRIS, BLACK CHERRY, SL) by SubLINGual route.  alcohol swabs (BD SINGLE USE SWABS REGULAR) padm Use to sanitize the affected area prior to application of medication as indicated. 100 Pad 3    aspirin 81 mg chewable tablet Take 1 Tab by mouth daily. 30 Tab 6    acetaminophen (TYLENOL) 500 mg tablet Take 2 Tabs by mouth every six (6) hours as needed for Pain. Indications: Pain 40 Tab 1    timolol (TIMOPTIC) 0.5 % ophthalmic solution Administer  to both eyes two (2) times a day.  0    dorzolamide (TRUSOPT) 2 % ophthalmic solution instill 1 drop into both eyes twice a day  0    latanoprost (XALATAN) 0.005 % ophthalmic solution Administer 1 Drop to both eyes nightly. 2.5 mL 1    brimonidine (ALPHAGAN) 0.15 % ophthalmic solution Administer 1 Drop to both eyes two (2) times a day. 15 mL 6    diclofenac (VOLTAREN) 1 % gel Apply 4 g to affected area four (4) times daily.  (Patient not taking: Reported on 2021) 100 g 1    ergocalciferol (ERGOCALCIFEROL) 50,000 unit capsule Take 1 Cap by mouth every seven (7) days. (Patient not taking: Reported on 2021) 12 Cap 6     Allergies   Allergen Reactions    Statins-Hmg-Coa Reductase Inhibitors Myalgia       Family History   Problem Relation Age of Onset    Hypertension Mother     Diabetes Mother     Diabetes Son      Social History     Tobacco Use    Smoking status: Former Smoker     Packs/day: 0.25     Years: 47.00     Pack years: 11.75     Types: Cigarettes     Start date: 1966     Quit date: 3/13/2013     Years since quittin.1    Smokeless tobacco: Former User    Tobacco comment: estimate is one pack per week/ used to vape   Substance Use Topics    Alcohol use:  Yes     Alcohol/week: 21.0 standard drinks     Types: 21 Cans of beer per week     Comment: 2 to 3 beers per day per patient       pt was advised about not to have an  increased salt intake, avoid long flying or long car trips, used the diuretics as needed, and do the support stockings daily and off night, elevation of involved area, if any shortness of breath, high weight gain call for advise, avoid smoking/ tobacco exposure,and  sedentary life style, Leg elevation at all times or most of the times, use of two pillows at nights while in bed, ambulation as possible, pt agreed       Kecia Ferguson MD

## 2021-05-18 NOTE — PROGRESS NOTES
Chief Complaint   Patient presents with   [facility] Annual Wellness Visit     1. Have you been to the ER, urgent care clinic since your last visit? Hospitalized since your last visit? No    2. Have you seen or consulted any other health care providers outside of the [address] [place] [address] since your last visit? Include any pap smears or colon screening. No    Verified patient with two type of identifiers.   denies c/o at present

## 2021-05-18 NOTE — PATIENT INSTRUCTIONS
Medicare Wellness Visit, Female     The best way to live healthy is to have a lifestyle where you eat a well-balanced diet, exercise regularly, limit alcohol use, and quit all forms of tobacco/nicotine, if applicable. Regular preventive services are another way to keep healthy. Preventive services (vaccines, screening tests, monitoring & exams) can help personalize your care plan, which helps you manage your own care. Screening tests can find health problems at the earliest stages, when they are easiest to treat. [facility] follows the current, evidence-based guidelines published by the [facility] Danyel Medrano ([facility]) when recommending preventive services for our patients. Because we follow these guidelines, sometimes recommendations change over time as research supports it. (For example, mammograms used to be recommended annually. Even though Medicare will still pay for an annual mammogram, the newer guidelines recommend a mammogram every two years for women of average risk). Of course, you and your doctor may decide to screen more often for some diseases, based on your risk and your co-morbidities (chronic disease you are already diagnosed with). Preventive services for you include:  - Medicare offers their members a free annual wellness visit, which is time for you and your primary care provider to discuss and plan for your preventive service needs. Take advantage of this benefit every year!  -All adults over the age of 72 should receive the recommended pneumonia vaccines. Current USPSTF guidelines recommend a series of two vaccines for the best pneumonia protection.   -All adults should have a flu vaccine yearly and a tetanus vaccine every 10 years.   -All adults age 48 and older should receive the shingles vaccines (series of two vaccines).       -All adults age 38-68 who are overweight should have a diabetes screening test once every three years.   -All adults born between [birth year] and 1965 should be screened once for Hepatitis C.  -Other screening tests and preventive services for persons with diabetes include: an eye exam to screen for diabetic retinopathy, a kidney function test, a foot exam, and stricter control over your cholesterol.   -Cardiovascular screening for adults with routine risk involves an electrocardiogram (ECG) at intervals determined by your doctor.   -Colorectal cancer screenings should be done for adults age 54-65 with no increased risk factors for colorectal cancer. There are a number of acceptable methods of screening for this type of cancer. Each test has its own benefits and drawbacks. Discuss with your doctor what is most appropriate for you during your annual wellness visit. The different tests include: colonoscopy (considered the best screening method), a fecal occult blood test, a fecal DNA test, and sigmoidoscopy.    -A bone mass density test is recommended when a woman turns 65 to screen for osteoporosis. This test is only recommended one time, as a screening. Some providers will use this same test as a disease monitoring tool if you already have osteoporosis. -Breast cancer screenings are recommended every other year for women of normal risk, age 54-69.  -Cervical cancer screenings for women over age 72 are only recommended with certain risk factors.      Here is a list of your current Health Maintenance items (your personalized list of preventive services) with a due date:  Health Maintenance Due   Topic Date Due    COVID-19 Vaccine (1) Never done    Eye Exam  04/20/2019    Diabetic Foot Care  03/04/2020    Albumin Urine Test  10/10/2020    Cholesterol Test   10/10/2020    Hemoglobin A1C    01/10/2021

## 2021-06-23 DIAGNOSIS — C50.212 MALIGNANT NEOPLASM OF UPPER-INNER QUADRANT OF LEFT BREAST IN FEMALE, ESTROGEN RECEPTOR POSITIVE (HCC): ICD-10-CM

## 2021-06-23 DIAGNOSIS — Z17.0 MALIGNANT NEOPLASM OF UPPER-INNER QUADRANT OF LEFT BREAST IN FEMALE, ESTROGEN RECEPTOR POSITIVE (HCC): ICD-10-CM

## 2021-06-23 RX ORDER — LETROZOLE 2.5 MG/1
TABLET, FILM COATED ORAL
Qty: 30 TABLET | Refills: 5 | Status: SHIPPED | OUTPATIENT
Start: 2021-06-23 | End: 2021-09-23 | Stop reason: SDUPTHER

## 2021-06-23 NOTE — TELEPHONE ENCOUNTER
PER CHARI from Dr. Yee Peralta LETROZOLE 2.5MG [facility]) ONE TAB ONCE A DAY QUANTITY 30 REFILL 5

## 2021-08-10 ENCOUNTER — TELEPHONE (OUTPATIENT)
Dept: FAMILY MEDICINE CLINIC | Age: 71
End: 2021-08-10

## 2021-08-10 NOTE — TELEPHONE ENCOUNTER
Received call from Judy Garcia NP with [facility],  pts in home visit completed today. Brachial index abnormal,   Left foot significant,   Right foot severe.   Message given to Dr Blanche Stroud

## 2021-08-11 DIAGNOSIS — C50.012 MALIGNANT NEOPLASM OF AREOLA OF LEFT BREAST IN FEMALE, ESTROGEN RECEPTOR POSITIVE (HCC): Primary | ICD-10-CM

## 2021-08-11 DIAGNOSIS — C50.212 MALIGNANT NEOPLASM OF UPPER-INNER QUADRANT OF LEFT FEMALE BREAST, UNSPECIFIED ESTROGEN RECEPTOR STATUS (HCC): Primary | ICD-10-CM

## 2021-08-11 DIAGNOSIS — Z17.0 MALIGNANT NEOPLASM OF AREOLA OF LEFT BREAST IN FEMALE, ESTROGEN RECEPTOR POSITIVE (HCC): Primary | ICD-10-CM

## 2021-09-23 DIAGNOSIS — Z17.0 MALIGNANT NEOPLASM OF UPPER-INNER QUADRANT OF LEFT BREAST IN FEMALE, ESTROGEN RECEPTOR POSITIVE (HCC): ICD-10-CM

## 2021-09-23 DIAGNOSIS — C50.212 MALIGNANT NEOPLASM OF UPPER-INNER QUADRANT OF LEFT BREAST IN FEMALE, ESTROGEN RECEPTOR POSITIVE (HCC): ICD-10-CM

## 2021-09-23 RX ORDER — LETROZOLE 2.5 MG/1
TABLET, FILM COATED ORAL
Qty: 90 TABLET | Refills: 0 | Status: SHIPPED | OUTPATIENT
Start: 2021-09-23 | End: 2021-12-01 | Stop reason: SDUPTHER

## 2021-09-23 NOTE — TELEPHONE ENCOUNTER
PER CHARI from Dr. Marlo Sy LETROZOLE 2.5MG [facility]) ONE TAB ONCE A DAY [id number] REFILL 0

## 2021-10-05 ENCOUNTER — HOSPITAL ENCOUNTER (OUTPATIENT)
Dept: MAMMOGRAPHY | Age: 71
Discharge: HOME | End: 2021-10-05
Attending: FAMILY MEDICINE
Payer: MEDICARE

## 2021-10-05 ENCOUNTER — HOSPITAL ENCOUNTER (OUTPATIENT)
Dept: CT IMAGING | Age: 71
Discharge: HOME | End: 2021-10-05
Attending: FAMILY MEDICINE
Payer: MEDICARE

## 2021-10-05 DIAGNOSIS — Z87.891 HISTORY OF TOBACCO ABUSE: ICD-10-CM

## 2021-10-05 DIAGNOSIS — C50.212 MALIGNANT NEOPLASM OF UPPER-INNER QUADRANT OF LEFT FEMALE BREAST, UNSPECIFIED ESTROGEN RECEPTOR STATUS (HCC): ICD-10-CM

## 2021-10-05 DIAGNOSIS — Z00.00 MEDICARE ANNUAL WELLNESS VISIT, SUBSEQUENT: ICD-10-CM

## 2021-10-05 PROCEDURE — 77062 BREAST TOMOSYNTHESIS BI: CPT

## 2021-10-05 PROCEDURE — 71271 CT THORAX LUNG CANCER SCR C-: CPT

## 2021-11-24 DIAGNOSIS — C50.212 MALIGNANT NEOPLASM OF UPPER-INNER QUADRANT OF LEFT BREAST IN FEMALE, ESTROGEN RECEPTOR POSITIVE (HCC): ICD-10-CM

## 2021-11-24 DIAGNOSIS — Z17.0 MALIGNANT NEOPLASM OF UPPER-INNER QUADRANT OF LEFT BREAST IN FEMALE, ESTROGEN RECEPTOR POSITIVE (HCC): ICD-10-CM

## 2021-11-29 NOTE — PROGRESS NOTES
Elver Cardoso is a 70 y.o. female here for one year follow up for left breast cancer. She is on Letrozole. Pt taking every day. Pt states she has been well. No concerns brought up. She does think she has tendonitis in her right ankle. 1. Have you been to the ER, urgent care clinic since your last visit? Hospitalized since your last visit? no    2. Have you seen or consulted any other health care providers outside of the [address] [place] [address] since your last visit? Include any pap smears or colon screening.   no

## 2021-12-01 ENCOUNTER — OFFICE VISIT (OUTPATIENT)
Dept: ONCOLOGY | Age: 71
End: 2021-12-01
Payer: MEDICARE

## 2021-12-01 VITALS
HEART RATE: 63 BPM | OXYGEN SATURATION: 94 % | HEIGHT: 63 IN | DIASTOLIC BLOOD PRESSURE: 77 MMHG | TEMPERATURE: 97.8 F | WEIGHT: 130 LBS | BODY MASS INDEX: 23.04 KG/M2 | SYSTOLIC BLOOD PRESSURE: 172 MMHG

## 2021-12-01 DIAGNOSIS — C50.212 MALIGNANT NEOPLASM OF UPPER-INNER QUADRANT OF LEFT BREAST IN FEMALE, ESTROGEN RECEPTOR POSITIVE (HCC): Primary | ICD-10-CM

## 2021-12-01 DIAGNOSIS — Z78.0 POST-MENOPAUSAL: ICD-10-CM

## 2021-12-01 DIAGNOSIS — Z79.811 AROMATASE INHIBITOR USE: ICD-10-CM

## 2021-12-01 DIAGNOSIS — Z17.0 MALIGNANT NEOPLASM OF UPPER-INNER QUADRANT OF LEFT BREAST IN FEMALE, ESTROGEN RECEPTOR POSITIVE (HCC): Primary | ICD-10-CM

## 2021-12-01 PROCEDURE — G8420 CALC BMI NORM PARAMETERS: HCPCS | Performed by: INTERNAL MEDICINE

## 2021-12-01 PROCEDURE — G8754 DIAS BP LESS 90: HCPCS | Performed by: INTERNAL MEDICINE

## 2021-12-01 PROCEDURE — 3017F COLORECTAL CA SCREEN DOC REV: CPT | Performed by: INTERNAL MEDICINE

## 2021-12-01 PROCEDURE — 1101F PT FALLS ASSESS-DOCD LE1/YR: CPT | Performed by: INTERNAL MEDICINE

## 2021-12-01 PROCEDURE — G8399 PT W/DXA RESULTS DOCUMENT: HCPCS | Performed by: INTERNAL MEDICINE

## 2021-12-01 PROCEDURE — 99213 OFFICE O/P EST LOW 20 MIN: CPT | Performed by: INTERNAL MEDICINE

## 2021-12-01 PROCEDURE — G8427 DOCREV CUR MEDS BY ELIG CLIN: HCPCS | Performed by: INTERNAL MEDICINE

## 2021-12-01 PROCEDURE — G8432 DEP SCR NOT DOC, RNG: HCPCS | Performed by: INTERNAL MEDICINE

## 2021-12-01 PROCEDURE — 1090F PRES/ABSN URINE INCON ASSESS: CPT | Performed by: INTERNAL MEDICINE

## 2021-12-01 PROCEDURE — G9899 SCRN MAM PERF RSLTS DOC: HCPCS | Performed by: INTERNAL MEDICINE

## 2021-12-01 PROCEDURE — G8753 SYS BP > OR = 140: HCPCS | Performed by: INTERNAL MEDICINE

## 2021-12-01 PROCEDURE — G8536 NO DOC ELDER MAL SCRN: HCPCS | Performed by: INTERNAL MEDICINE

## 2021-12-01 RX ORDER — LETROZOLE 2.5 MG/1
TABLET, FILM COATED ORAL
Qty: 90 TABLET | Refills: 3 | Status: SHIPPED | OUTPATIENT
Start: 2021-12-01 | End: 2022-10-17

## 2021-12-01 NOTE — LETTER
12/1/2021    Patient: Kenneth Molina   YOB: 1950   Date of Visit: 12/1/2021     Elijah Tomas MD  [address] [place] [address]    Dear Elijah Tomas MD,      Thank you for referring Ms. Kenneth Molina to [address] [place] [address] for evaluation. My notes for this consultation are attached. If you have questions, please do not hesitate to call me. I look forward to following your patient along with you.       Sincerely,    Marli Costello MD

## 2021-12-01 NOTE — PROGRESS NOTES
[address] [facility] [address], 210 [facility], 45 [facility], [address] [place] [address]  [phone]      Oncology Progress note        Patient: Mira Yeung MRN: [id number]  SSN: [id number]    YOB: 1950  Age: 70 y.o. Sex: female          Diagnosis:     1. Left breast carcinoma:  T1b N0 M0 (Stage IA) infiltrating ductal carcinoma, Tumor size 1.0 cm, LN -ve, grade 1, %, MD 70%, Her 2 -ve  Dx: 6/22/2017    Treatment:     1. S/p lumpectomy on 6/22/2017  2. Adjuvant radiation completed on 1/5/2018  3. Radiation Dr. Truman Yanes completed 1/10/2018  3. Letrozole - started 1/12/2018    Subjective:      Mira Yeung is a 70 y.o. female who I am seeing for a diagnosis of left sided invasive ductal carcinoma of the breast. She has been referred by Dr. Soumya Copeland. She underwent a screening mammogram on 3/27/2017 which demonstrated a density in the medial aspect of the left breast. She underwent dx mammogram and US demonstrating a 1.0 x 0.9 x 0.8 cm at 9:00, 2 cm medial to the nipple in the left breast.  US core bx demonstrated a G1 IDC ER pos MD pos Her2/jud unamplified tumor. She underwent a lumpectomy on 06/22/2017. She was also noted to have a JUDSON FDG avid lung mass. She is s/p left upper lobectomy by Dr. Greta Diaz at [facility]. She completed adjuvant breast radiation. She has been taking Letrozole without any difficulty. She is doing well and denies any new symptoms. She is planning on going on a cruise in 2 weeks.      Review of Systems:    Constitutional: negative  Eyes: negative  Ears, Nose, Mouth, Throat, and Face: negative  Respiratory: negative  Cardiovascular: negative  Gastrointestinal: negative  Genitourinary:negative  Integument/Breast: negative  Hematologic/Lymphatic: negative  Musculoskeletal:negative  Neurological: negative    Review of systems was reviewed and updated as needed on 12/01/21     Past Medical History:   Diagnosis Date    Arthritis     Breast cancer ([facility] 2017    lumpectomy left breast, radiation; lung    Cancer ([facility]     breast cancer- left    Cerebrovascular accident (stroke) ([facility]     no deficiets    CVA (cerebral vascular accident) ([facility]     2018    Diabetes mellitus type 2, controlled ([facility]     no longer medicated    Gallstones     asymptomatic, pt denies having    GERD (gastroesophageal reflux disease)     dx with resolution of symptoms on ppi- pt denies having    Hypercholesterolemia     Hypertension     Ill-defined condition     gout    Long term current use of anticoagulant therapy     Non-nicotine vapor product user     used to use    PAD (peripheral artery disease) ([facility] 2013    right SFA angioplasty and athrectomy    Primary open angle glaucoma     Radiation therapy complication     Solitary lung nodule 2017    Type II diabetes mellitus, uncontrolled ([facility] 2017     Past Surgical History:   Procedure Laterality Date    COLONOSCOPY N/A 2020    COLONOSCOPY, performed by Digna Vera MD at [address] [place] [address]  2020         HX BREAST BIOPSY Left 2017    HX BREAST LUMPECTOMY Left 2017    LEFT LUMPECTOMY WITH INTRAOPERATIVE ULTRASOUND GUIDED, LEFT AXILLARY SENTINEL LYMPH NODE BIOPSY, POSSIBLE AXILLARY DISECTION performed by Shayna Velasco MD at [facility] MAIN OR    HX HEENT Bilateral     eye lids surgery    [place] CHEST SURGERY PROCEDURE UNLISTED Left     VASCULAR SURGERY PROCEDURE UNLIST  2016    right leg stent      Family History   Problem Relation Age of Onset    Hypertension Mother     Diabetes Mother     Diabetes Son      Social History     Tobacco Use    Smoking status: Former Smoker     Packs/day: 0.25     Years: 47.00     Pack years: 11.75     Types: Cigarettes     Start date: 1966     Quit date: 3/13/2013     Years since quittin.7    Smokeless tobacco: Former User    Tobacco comment: estimate is one pack per week/ used to vape   Substance Use Topics    Alcohol use: Yes     Alcohol/week: 21.0 standard drinks     Types: 21 Cans of beer per week     Comment: 2 to 3 beers per day per patient      Prior to Admission medications    Medication Sig Start Date End Date Taking? Authorizing Provider   letrozole [facility]) 2.5 mg tablet take 1 tablet by mouth once daily 9/23/21  Yes Moises Davila MD   allopurinoL (ZYLOPRIM) 100 mg tablet TAKE 2 TABLETS BY MOUTH EVERY DAY 5/18/21  Yes Mini Pace MD   clopidogreL (PLAVIX) 75 mg tab Take 1 Tab by mouth daily. 5/18/21  Yes Mini Pace MD   hydroCHLOROthiazide (HYDRODIURIL) 12.5 mg tablet Take 1 Tab by mouth daily. 5/18/21  Yes Mini Pace MD   asenapine maleate (SAPHRIS, BLACK CHERRY, SL) by SubLINGual route. Yes Provider, Historical   alcohol swabs (BD SINGLE USE SWABS REGULAR) padm Use to sanitize the affected area prior to application of medication as indicated. 3/2/20  Yes Mini Pace MD   aspirin 81 mg chewable tablet Take 1 Tab by mouth daily. 11/17/18  Yes Waldo Choi MD   acetaminophen (TYLENOL) 500 mg tablet Take 2 Tabs by mouth every six (6) hours as needed for Pain. Indications: Pain 10/15/18  Yes Mini Pace MD   timolol (TIMOPTIC) 0.5 % ophthalmic solution Administer  to both eyes two (2) times a day. 9/4/18  Yes Provider, Historical   dorzolamide (TRUSOPT) 2 % ophthalmic solution instill 1 drop into both eyes twice a day 10/9/17  Yes Provider, Historical   latanoprost (XALATAN) 0.005 % ophthalmic solution Administer 1 Drop to both eyes nightly. 5/17/16  Yes Owen Fish MD   brimonidine (ALPHAGAN) 0.15 % ophthalmic solution Administer 1 Drop to both eyes two (2) times a day. 10/26/15  Yes Owen Fish MD   potassium chloride (K-DUR, KLOR-CON) 20 mEq tablet Take 1 Tab by mouth daily.   Patient not taking: Reported on 12/1/2021 5/18/21   Mini Pace MD   diclofenac (VOLTAREN) 1 % gel Apply 4 g to affected area four (4) times daily. Patient not taking: Reported on 5/18/2021 1/3/20   Amandeep Rae MD   ergocalciferol (ERGOCALCIFEROL) 50,000 unit capsule Take 1 Cap by mouth every seven (7) days. Patient not taking: Reported on 5/18/2021 9/3/19   Bo Helm MD          Allergies   Allergen Reactions    Statins-Hmg-Coa Reductase Inhibitors Myalgia           Objective:     Visit Vitals  BP (!) 172/77 (BP 1 Location: Left upper arm, BP Patient Position: Sitting)   Pulse 63   Temp 97.8 °F (36.6 °C) (Temporal)   Ht 5' 3\" (1.6 m)   Wt 130 lb (59 kg)   SpO2 94%   BMI 23.03 kg/m²       Physical Exam:     GENERAL: alert, cooperative, appears stated age  EYE: negative  LYMPHATIC: Cervical, supraclavicular, and axillary nodes normal.   THROAT & NECK: normal and no erythema or exudates noted. LUNG: clear to auscultation bilaterally  HEART: regular rate and rhythm  ABDOMEN: soft, non-tender  EXTREMITIES:  no edema  BREAST: left scar inner upper quadrant   SKIN: Normal.  NEUROLOGIC: negative    The above physical exam was reviewed and updated as needed on 12/01/21. Assessment:     1. Left breast carcinoma:  T1b N0 M0 (Stage IA) infiltrating ductal carcinoma, Tumor size 1.0 cm, LN -ve, grade 1, %, DC 70%, Her 2 -ve  Dx: 6/22/2017    ECOG PS 0  Intent of treatment - curative    S/P left sided lumpectomy and sentinel LN excision. Completed adjuvant radiation to the left breast on 1/5/2018     Letrozole 2.5 mg daily - started 1/12/2018  Tolerating well  No side effects attributed to AI  In remission    Mammogram (10/5/2021): normal     Symptom management form reviewed with patient.       2. Lung cancer    S/p left upper lob[facility] from [facility]. Andreina Melgar [facility]      3. Vitamin D deficiency    > Patient stopped weekly Vitamin D 50,000 after 1 month  > Recheck Vitamin D      Plan:     > Continue Letrozole  > Dexa scan ordered  > Follow-up in 1 year    I performed a history and physical examination of the patient and discussed his management with the NPP. I reviewed the NPP note and agree with the documented findings and plan of care. The patient was seen in conjunction with Ms. Macielny. Ms. Emery Castanon is a women with Stage I left breast ca. She is taking Letrozole and is doing well. Physical exam is normal.       Signed by: Claudetta Harrier, MD                     December 1, 2021        CC. Henry Rodriguez MD  CC.  MD Jer Pettit MD Ian Pou, MD

## 2021-12-06 RX ORDER — LETROZOLE 2.5 MG/1
TABLET, FILM COATED ORAL
Qty: 90 TABLET | Refills: 3 | Status: SHIPPED | OUTPATIENT
Start: 2021-12-06 | End: 2021-12-08 | Stop reason: SDUPTHER

## 2021-12-08 ENCOUNTER — OFFICE VISIT (OUTPATIENT)
Dept: FAMILY MEDICINE CLINIC | Age: 71
End: 2021-12-08
Payer: MEDICARE

## 2021-12-08 VITALS
SYSTOLIC BLOOD PRESSURE: 102 MMHG | DIASTOLIC BLOOD PRESSURE: 79 MMHG | WEIGHT: 131.2 LBS | OXYGEN SATURATION: 96 % | HEART RATE: 74 BPM | HEIGHT: 63 IN | RESPIRATION RATE: 18 BRPM | BODY MASS INDEX: 23.25 KG/M2

## 2021-12-08 DIAGNOSIS — M79.671 RIGHT FOOT PAIN: Primary | ICD-10-CM

## 2021-12-08 DIAGNOSIS — Z23 NEEDS FLU SHOT: ICD-10-CM

## 2021-12-08 PROCEDURE — 99213 OFFICE O/P EST LOW 20 MIN: CPT | Performed by: FAMILY MEDICINE

## 2021-12-08 PROCEDURE — G8399 PT W/DXA RESULTS DOCUMENT: HCPCS | Performed by: FAMILY MEDICINE

## 2021-12-08 PROCEDURE — G8420 CALC BMI NORM PARAMETERS: HCPCS | Performed by: FAMILY MEDICINE

## 2021-12-08 PROCEDURE — G8510 SCR DEP NEG, NO PLAN REQD: HCPCS | Performed by: FAMILY MEDICINE

## 2021-12-08 PROCEDURE — G8536 NO DOC ELDER MAL SCRN: HCPCS | Performed by: FAMILY MEDICINE

## 2021-12-08 PROCEDURE — G9899 SCRN MAM PERF RSLTS DOC: HCPCS | Performed by: FAMILY MEDICINE

## 2021-12-08 PROCEDURE — G0008 ADMIN INFLUENZA VIRUS VAC: HCPCS | Performed by: FAMILY MEDICINE

## 2021-12-08 PROCEDURE — 90694 VACC AIIV4 NO PRSRV 0.5ML IM: CPT | Performed by: FAMILY MEDICINE

## 2021-12-08 PROCEDURE — 3017F COLORECTAL CA SCREEN DOC REV: CPT | Performed by: FAMILY MEDICINE

## 2021-12-08 PROCEDURE — 1090F PRES/ABSN URINE INCON ASSESS: CPT | Performed by: FAMILY MEDICINE

## 2021-12-08 PROCEDURE — G8427 DOCREV CUR MEDS BY ELIG CLIN: HCPCS | Performed by: FAMILY MEDICINE

## 2021-12-08 PROCEDURE — 1101F PT FALLS ASSESS-DOCD LE1/YR: CPT | Performed by: FAMILY MEDICINE

## 2021-12-08 PROCEDURE — G8752 SYS BP LESS 140: HCPCS | Performed by: FAMILY MEDICINE

## 2021-12-08 PROCEDURE — G8754 DIAS BP LESS 90: HCPCS | Performed by: FAMILY MEDICINE

## 2021-12-08 NOTE — PATIENT INSTRUCTIONS
Vaccine Information Statement    Influenza (Flu) Vaccine (Inactivated or Recombinant): What You Need to Know    Many vaccine information statements are available in Urdu and other languages. See www.immunize.org/vis. Hojas de información sobre vacunas están disponibles en español y en muchos otros idiomas. Visite www.immunize.org/vis. 1. Why get vaccinated? Influenza vaccine can prevent influenza (flu). Flu is a contagious disease that spreads around the [facility] every year, usually between October and May. Anyone can get the flu, but it is more dangerous for some people. Infants and young children, people 72 years and older, pregnant people, and people with certain health conditions or a weakened immune system are at greatest risk of flu complications. Pneumonia, bronchitis, sinus infections, and ear infections are examples of flu-related complications. If you have a medical condition, such as heart disease, cancer, or diabetes, flu can make it worse. Flu can cause fever and chills, sore throat, muscle aches, fatigue, cough, headache, and runny or stuffy nose. Some people may have vomiting and diarrhea, though this is more common in children than adults. In an average year, thousands of people in the [facility] die from flu, and many more are hospitalized. Flu vaccine prevents millions of illnesses and flu-related visits to the doctor each year. 2. Influenza vaccines     CDC recommends everyone 6 months and older get vaccinated every flu season. Children 6 months through 6years of age may need 2 doses during a single flu season. Everyone else needs only 1 dose each flu season. It takes about 2 weeks for protection to develop after vaccination. There are many flu viruses, and they are always changing. Each year a new flu vaccine is made to protect against the influenza viruses believed to be likely to cause disease in the upcoming flu season.  Even when the vaccine doesnt exactly match these viruses, it may still provide some protection. Influenza vaccine does not cause flu. Influenza vaccine may be given at the same time as other vaccines. 3. Talk with your health care provider    Tell your vaccination provider if the person getting the vaccine:   Has had an allergic reaction after a previous dose of influenza vaccine, or has any severe, life-threatening allergies    Has ever had Guillain-Barré Syndrome (also called GBS)    In some cases, your health care provider may decide to postpone influenza vaccination until a future visit. Influenza vaccine can be administered at any time during pregnancy. People who are or will be pregnant during influenza season should receive inactivated influenza vaccine. People with minor illnesses, such as a cold, may be vaccinated. People who are moderately or severely ill should usually wait until they recover before getting influenza vaccine. Your health care provider can give you more information. 4. Risks of a vaccine reaction     Soreness, redness, and swelling where the shot is given, fever, muscle aches, and headache can happen after influenza vaccination.  There may be a very small increased risk of Guillain-Barré Syndrome (GBS) after inactivated influenza vaccine (the flu shot). Rupal Lambert children who get the flu shot along with pneumococcal vaccine (PCV13) and/or DTaP vaccine at the same time might be slightly more likely to have a seizure caused by fever. Tell your health care provider if a child who is getting flu vaccine has ever had a seizure. People sometimes faint after medical procedures, including vaccination. Tell your provider if you feel dizzy or have vision changes or ringing in the ears. As with any medicine, there is a very remote chance of a vaccine causing a severe allergic reaction, other serious injury, or death. 5. What if there is a serious problem?     An allergic reaction could occur after the vaccinated person leaves the clinic. If you see signs of a severe allergic reaction (hives, swelling of the face and throat, difficulty breathing, a fast heartbeat, dizziness, or weakness), call 9-1-1 and get the person to the nearest hospital.    For other signs that concern you, call your health care provider. Adverse reactions should be reported to the Vaccine Adverse Event Reporting System (VAERS). Your health care provider will usually file this report, or you can do it yourself. Visit the VAERS website at www.vaers. [facility].gov or call 5-965.295.6864. VAERS is only for reporting reactions, and VAERS staff members do not give medical advice. 6. The National Vaccine Injury Compensation Program    The [facility], located within [facility] Vaccine Injury Compensation Program (VICP) is a federal program that was created to compensate people who may have been injured by certain vaccines. Claims regarding alleged injury or death due to vaccination have a time limit for filing, which may be as short as two years. Visit the VICP website at www.[facility].gov/vaccinecompensation or call 8-193.467.4531 to learn about the program and about filing a claim. 7. How can I learn more?  Ask your health care provider.  Call your local or state health department.  Visit the website of the Food and Drug Administration (FDA) for vaccine package inserts and additional information at www.fda.gov/vaccines-blood-biologics/vaccines.  Contact the Centers for Disease Control and Prevention (CDC):  - Call 2-857.441.2337 (3-635-MTM-INFO) or  - Visit CDCs influenza website at www.cdc.gov/flu. Vaccine Information Statement   Inactivated Influenza Vaccine   8/6/2021  [address] [id number]   Department of Health and Human Services  Centers for Disease Control and Prevention    Office Use Only

## 2021-12-08 NOTE — PROGRESS NOTES
Chief Complaint   Patient presents with    Hypertension     1. Have you been to the ER, urgent care clinic since your last visit? Hospitalized since your last visit? No    2. Have you seen or consulted any other health care providers outside of the [address] [place] [address] since your last visit? Include any pap smears or colon screening. No     Verified patient with two type of identifiers. c/o right foot pain x 3 weeks,  Denies injury, not taking any meds for pain    After obtaining consent, and per orders of ,flu vaccine  given to  Right deltoid Im  . Patient instructed to remain in clinic for 15 minutes afterwards, and to report any adverse reaction to me immediately.  Patient did not have any adverse reactions during this office visit

## 2021-12-08 NOTE — PROGRESS NOTES
HISTORY OF PRESENT ILLNESS  Mojgan Crow is a 70 y.o. female. Today's Covid vaccinated Pt main concerns were provided in the clinic,    pt is w/ comorbid history and   Pt has been trying to wear mask most of the times,  pt has no fever no cough no dyspnea, no ha, not dizzy, nl smell nl taste, no N/V/D, has no orbital pain,  no body ache. rt foot pain  Has been bothering the patient for few months, Has no history of being flat-footed patient does have history of long periods of standing and walking on hard concrete floor, currently has no calluses or plantar warts, unfortunately patient has abnormal BMI the pain is worsened with walking more than 1-2 blocks, and going up and down the steps and worsening since onset. Patient states that it is a dull nonradiating no numbness ++++ tingling sensation disabling, morning stiffness which gets better with activity and with the severity of 3/10. So for patient denies any history of extremity trauma, and has no leg swelling no skin lesion noted. Current Outpatient Medications   Medication Sig Dispense Refill    letrozole (FEMARA) 2.5 mg tablet take 1 tablet by mouth once daily 90 Tablet 3    allopurinoL (ZYLOPRIM) 100 mg tablet TAKE 2 TABLETS BY MOUTH EVERY  Tab 2    clopidogreL (PLAVIX) 75 mg tab Take 1 Tab by mouth daily. 90 Tab 3    hydroCHLOROthiazide (HYDRODIURIL) 12.5 mg tablet Take 1 Tab by mouth daily. (Patient taking differently: Take 12.5 mg by mouth daily. As needed) 90 Tab 2    asenapine maleate (SAPHRIS, BLACK CHERRY, DONTAE) by SubLINGual route.  alcohol swabs (BD SINGLE USE SWABS REGULAR) padm Use to sanitize the affected area prior to application of medication as indicated. 100 Pad 3    aspirin 81 mg chewable tablet Take 1 Tab by mouth daily. 30 Tab 6    acetaminophen (TYLENOL) 500 mg tablet Take 2 Tabs by mouth every six (6) hours as needed for Pain.  Indications: Pain 40 Tab 1    timolol (TIMOPTIC) 0.5 % ophthalmic solution Administer  to both eyes two (2) times a day.  0    dorzolamide (TRUSOPT) 2 % ophthalmic solution instill 1 drop into both eyes twice a day  0    latanoprost (XALATAN) 0.005 % ophthalmic solution Administer 1 Drop to both eyes nightly. 2.5 mL 1    brimonidine (ALPHAGAN) 0.15 % ophthalmic solution Administer 1 Drop to both eyes two (2) times a day.  15 mL 6     Allergies   Allergen Reactions    Statins-Hmg-Coa Reductase Inhibitors Myalgia     Past Medical History:   Diagnosis Date    Arthritis     Breast cancer ([address] 04/2017    lumpectomy left breast, radiation; lung    Cancer ([address]     breast cancer- left    Cerebrovascular accident (stroke) ([address] 2007    no deficiets    CVA (cerebral vascular accident) ([address]     11/12/2018    Diabetes mellitus type 2, controlled ([address]     no longer medicated    Gallstones     asymptomatic, pt denies having    GERD (gastroesophageal reflux disease)     dx with resolution of symptoms on ppi- pt denies having    Hypercholesterolemia     Hypertension     Ill-defined condition     gout    Long term current use of anticoagulant therapy     Non-nicotine vapor product user     used to use    PAD (peripheral artery disease) ([address] 2/20/2013    right SFA angioplasty and athrectomy    Primary open angle glaucoma     Radiation therapy complication     Solitary lung nodule 5/9/2017    Type II diabetes mellitus, uncontrolled ([address] 2/1/2017     Past Surgical History:   Procedure Laterality Date    COLONOSCOPY N/A 7/28/2020    COLONOSCOPY, performed by Samuel Roblero MD at [address] [place] [address]  7/28/2020         HX BREAST BIOPSY Left 2017    HX BREAST LUMPECTOMY Left 6/22/2017    LEFT LUMPECTOMY WITH INTRAOPERATIVE ULTRASOUND GUIDED, LEFT AXILLARY SENTINEL LYMPH NODE BIOPSY, POSSIBLE AXILLARY DISECTION performed by Colten Diaz MD at [facility] MAIN OR    HX HEENT Bilateral     eye lids surgery    [place] CHEST SURGERY PROCEDURE UNLISTED Left     VASCULAR SURGERY PROCEDURE UNLIST  2016    right leg stent     Family History   Problem Relation Age of Onset   Joshua Hypertension Mother     Diabetes Mother     Diabetes Son      Social History     Tobacco Use    Smoking status: Former Smoker     Packs/day: 0.25     Years: 47.00     Pack years: 11.75     Types: Cigarettes     Start date: 1966     Quit date: 3/13/2013     Years since quittin.7    Smokeless tobacco: Former User    Tobacco comment: estimate is one pack per week/ used to vape   Substance Use Topics    Alcohol use: Yes     Alcohol/week: 21.0 standard drinks     Types: 21 Cans of beer per week     Comment: 2 to 3 beers per day per patient      Lab Results   Component Value Date/Time    Cholesterol, total 246 (H) 2021 09:33 AM    HDL Cholesterol 75 2021 09:33 AM    LDL, calculated 112.2 (H) 2021 09:33 AM    Triglyceride 294 (H) 2021 09:33 AM    CHOL/HDL Ratio 3.3 2021 09:33 AM     Lab Results   Component Value Date/Time    ALT (SGPT) 34 2021 09:33 AM    Alk. phosphatase 132 (H) 2021 09:33 AM    Bilirubin, direct 0.0 2011 10:16 AM    Bilirubin, total 0.2 2021 09:33 AM    Albumin 4.0 2021 09:33 AM    Protein, total 7.4 2021 09:33 AM    INR 1.0 11/15/2018 04:50 PM    Prothrombin time 9.8 11/15/2018 04:50 PM    PLATELET 539  09:33 AM        Review of Systems   Constitutional: Negative for chills and fever. HENT: Negative for ear pain and nosebleeds. Eyes: Negative for blurred vision, pain and discharge. Respiratory: Negative for shortness of breath. Cardiovascular: Negative for chest pain and leg swelling. Gastrointestinal: Negative for constipation, diarrhea, nausea and vomiting. Genitourinary: Negative for frequency. Musculoskeletal: Negative for back pain and myalgias. Skin: Negative for itching and rash. Neurological: Positive for weakness and headaches.    Psychiatric/Behavioral: Negative for depression. The patient has insomnia. The patient is not nervous/anxious. Physical Exam  Vitals and nursing note reviewed. Constitutional:       Appearance: She is well-developed. HENT:      Head: Normocephalic and atraumatic. Eyes:      Conjunctiva/sclera: Conjunctivae normal.      Pupils: Pupils are equal, round, and reactive to light. Neck:      Thyroid: No thyromegaly. Vascular: No JVD. Cardiovascular:      Rate and Rhythm: Normal rate and regular rhythm. Heart sounds: Normal heart sounds. No murmur heard. No friction rub. No gallop. Pulmonary:      Effort: Pulmonary effort is normal. No respiratory distress. Breath sounds: Normal breath sounds. No stridor. No wheezing or rales. Abdominal:      General: Bowel sounds are normal. There is no distension. Palpations: Abdomen is soft. There is no mass. Tenderness: There is no abdominal tenderness. Musculoskeletal:         General: Tenderness present. Normal range of motion. Lymphadenopathy:      Cervical: No cervical adenopathy. Skin:     Findings: No erythema or rash. Neurological:      Mental Status: She is alert and oriented to person, place, and time. Cranial Nerves: No cranial nerve deficit. Deep Tendon Reflexes: Reflexes are normal and symmetric. Psychiatric:         Behavior: Behavior normal.         ASSESSMENT and PLAN  Diagnoses and all orders for this visit:    1. Right foot pain  -     HM DIABETES FOOT EXAM  -     REFERRAL TO OPTOMETRY  -     XR FOOT RT MIN 3 V; Future  -     CBC W/O DIFF  -     REFERRAL TO ORTHOPEDICS    2. Needs flu shot  -     FLU (FLUAD QUAD INFLUENZA VACCINE,QUAD,ADJUVANTED)      Follow-up and Dispositions    · Return if symptoms worsen or fail to improve. moderately severe osteoarthritis in  the first MTP joint.          Today's Covid vaccinated Pt main concerns were provided in the clinic,    pt is w/ comorbid history and   Pt has been trying to wear mask most of the times,  pt has no fever no cough no dyspnea, no ha, not dizzy, nl smell nl taste, no N/V/D, has no orbital pain,  no body ache.

## 2021-12-10 LAB
ERYTHROCYTE [DISTWIDTH] IN BLOOD BY AUTOMATED COUNT: 14.1 % (ref 11.5–14.5)
HCT VFR BLD AUTO: 34.3 % (ref 35–47)
HGB BLD-MCNC: 10.6 G/DL (ref 11.5–16)
MCH RBC QN AUTO: 32.1 PG (ref 26–34)
MCHC RBC AUTO-ENTMCNC: 30.9 G/DL (ref 30–36.5)
MCV RBC AUTO: 103.9 FL (ref 80–99)
NRBC # BLD: 0 K/UL (ref 0–0.01)
NRBC BLD-RTO: 0 PER 100 WBC
PLATELET # BLD AUTO: 398 K/UL (ref 150–400)
PMV BLD AUTO: 9.1 FL (ref 8.9–12.9)
RBC # BLD AUTO: 3.3 M/UL (ref 3.8–5.2)
WBC # BLD AUTO: 5.8 K/UL (ref 3.6–11)

## 2022-03-01 DIAGNOSIS — Z86.73 HISTORY OF STROKE: ICD-10-CM

## 2022-03-01 DIAGNOSIS — Z00.00 MEDICARE ANNUAL WELLNESS VISIT, SUBSEQUENT: ICD-10-CM

## 2022-03-01 RX ORDER — CLOPIDOGREL BISULFATE 75 MG/1
75 TABLET ORAL DAILY
Qty: 90 TABLET | Refills: 3 | Status: SHIPPED | OUTPATIENT
Start: 2022-03-01 | End: 2022-09-16 | Stop reason: SDUPTHER

## 2022-03-01 NOTE — TELEPHONE ENCOUNTER
Last Visit: 12/8/21 with MD Pamela Howell  Next Appointment: none  Previous Refill Encounter(s): 5/18/21 #90 with 3 refills    Requested Prescriptions     Pending Prescriptions Disp Refills    clopidogreL (PLAVIX) 75 mg tab 90 Tablet 3     Sig: Take 1 Tablet by mouth daily.

## 2022-03-18 PROBLEM — I65.23 BILATERAL CAROTID ARTERY STENOSIS: Status: ACTIVE | Noted: 2018-11-16

## 2022-03-19 PROBLEM — E11.21 TYPE 2 DIABETES WITH NEPHROPATHY (HCC): Status: ACTIVE | Noted: 2018-10-29

## 2022-03-19 PROBLEM — R91.1 SOLITARY LUNG NODULE: Status: ACTIVE | Noted: 2017-05-09

## 2022-03-19 PROBLEM — Z72.0 TOBACCO ABUSE DISORDER: Status: ACTIVE | Noted: 2017-02-20

## 2022-03-19 PROBLEM — E11.40 TYPE 2 DIABETES MELLITUS WITH DIABETIC NEUROPATHY (HCC): Status: ACTIVE | Noted: 2018-01-12

## 2022-03-19 PROBLEM — C50.212 BREAST CANCER OF UPPER-INNER QUADRANT OF LEFT FEMALE BREAST (HCC): Status: ACTIVE | Noted: 2017-05-31

## 2022-03-20 PROBLEM — I63.9 CVA (CEREBRAL VASCULAR ACCIDENT) (HCC): Status: ACTIVE | Noted: 2018-11-15

## 2022-04-06 NOTE — H&P (VIEW-ONLY)
HISTORY OF PRESENT ILLNESS  Marleen Coronel is a 79 y.o. female who comes in for follow up for her left breast cancer  Other   Pertinent negatives include no chest pain, no abdominal pain, no headaches and no shortness of breath. She went for screening mammogram 3/27/2017 demonstrating a density in the medial aspect of the left breast.   She underwent dx mammogram and US demonstrating a 1.0 x 0.9 x 0.8 cm at 9:00, 2 cm medial to the nipple in the left breast.  US core bx demonstrated a G1 IDC ER pos NJ pos Her2/jud unamplified tumor. She denies prior breast issues and had not had a mammogram in two years. She denies skin changes, feeling any masses, nipple changes or drainage, breast pain, unexplained weight loss or bone pain. She had menarche at 15, first of 5 pregnancies at 12, menopause at 48 and did not take HRT. She denies family hx of breast or ovarian cancer. Breast MRI 5/13/2017 demonstrated 1. Left BI-RADS 6, known malignancy. Right BI-RADS 2, benign. 2. LEFT BREAST: Known invasive ductal carcinoma in the left inner breast at 9:00, 1.2 x 1.1 x 0.8 cm. No MRI evidence for multicentric disease. No lymphadenopathy. 3. RIGHT BREAST: No MRI sign of malignancy.     Past Medical History:   Diagnosis Date    Arthritis     Cerebrovascular accident (stroke) ([address]     Diabetes mellitus type 2, controlled ([address]     Gallstones     asymptomatic    GERD (gastroesophageal reflux disease)     dx with resolution of symptoms on ppi    Hypercholesterolemia     PAD (peripheral artery disease) ([address] 2/20/2013    right SFA angioplasty and athrectomy    Primary open angle glaucoma     Solitary lung nodule 5/9/2017    Type II diabetes mellitus, uncontrolled ([address] 2/1/2017     Past Surgical History:   Procedure Laterality Date    VASCULAR SURGERY PROCEDURE UNLIST  April 2016     Family History   Problem Relation Age of Onset    Hypertension Mother     Diabetes Mother     Diabetes Son      Social History Substance Use Topics    Smoking status: Current Some Day Smoker     Types: Cigarettes     Last attempt to quit: 3/13/2013    Smokeless tobacco: None      Comment: estimate is one pack per week    Alcohol use 12.6 oz/week     21 Cans of beer per week      Comment: 2 to 3 beers per day per patient     Current Outpatient Prescriptions   Medication Sig    gabapentin (NEURONTIN) 100 mg capsule Take 1 Cap by mouth daily.  clopidogrel (PLAVIX) 75 mg tab Take 1 Tab by mouth daily.  hydroCHLOROthiazide (HYDRODIURIL) 25 mg tablet Take 1 Tab by mouth daily.  metFORMIN (GLUCOPHAGE) 500 mg tablet Take 0.5 Tabs by mouth daily (with breakfast).  ibuprofen (MOTRIN) 600 mg tablet Take 1 Tab by mouth every eight (8) hours as needed for Pain.  traMADol-acetaminophen (ULTRACET) 37.5-325 mg per tablet Take 1 Tab by mouth every eight (8) hours as needed for Pain. Max Daily Amount: 3 Tabs.  glucose blood VI test strips (ACCU-CHEK SMARTVIEW TEST STRIP) strip Check blood sugar twice a day    Lancets (ACCU-CHEK FASTCLIX) misc Check blood sugar twice a day    latanoprost (XALATAN) 0.005 % ophthalmic solution Administer 1 Drop to both eyes nightly.  dorzolamide-timolol (COSOPT) 22.3-6.8 mg/mL ophthalmic solution     brimonidine (ALPHAGAN) 0.15 % ophthalmic solution Administer 1 Drop to both eyes two (2) times a day.  Blood-Glucose Meter monitoring kit accu chek gabino smartview meter    cilostazol (PLETAL) 100 mg tablet TAKE 1 TABLET BEFORE BREAKFAST AND DINNER    atorvastatin (LIPITOR) 80 mg tablet Take 1 Tab by mouth daily.  metoprolol (LOPRESSOR) 25 mg tablet Take 0.5 Tabs by mouth two (2) times a day.  allopurinol (ZYLOPRIM) 300 mg tablet Take 1 Tab by mouth daily.  metFORMIN (GLUCOPHAGE) 1,000 mg tablet Take 1 Tab by mouth daily. No current facility-administered medications for this visit.       No Known Allergies    Review of Systems   Constitutional: Negative for chills, diaphoresis, fever, malaise/fatigue and weight loss. HENT: Negative for congestion, ear pain and sore throat. Eyes: Negative for blurred vision and pain. Respiratory: Negative for cough, hemoptysis, sputum production, shortness of breath, wheezing and stridor. Cardiovascular: Negative for chest pain, palpitations, orthopnea, claudication, leg swelling and PND. Gastrointestinal: Negative for abdominal pain, blood in stool, constipation, diarrhea, heartburn, melena, nausea and vomiting. Genitourinary: Negative for dysuria, flank pain, frequency, hematuria and urgency. Musculoskeletal: Positive for joint pain. Negative for back pain, myalgias and neck pain. Skin: Negative for itching and rash. Neurological: Negative for dizziness, tremors, focal weakness, seizures, weakness and headaches. Hx stroke without residual effect   Endo/Heme/Allergies: Negative for polydipsia. Psychiatric/Behavioral: Negative for depression and memory loss. The patient is not nervous/anxious. Visit Vitals    /73 (BP 1 Location: Right arm, BP Patient Position: Sitting)    Pulse 65    Ht 5' 3\" (1.6 m)    Wt 55.6 kg (122 lb 8 oz)    SpO2 100%    BMI 21.7 kg/m2       Physical Exam   Constitutional: She is oriented to person, place, and time. She appears well-developed and well-nourished. No distress. HENT:   Head: Normocephalic and atraumatic. Mouth/Throat: Oropharynx is clear and moist. No oropharyngeal exudate. Eyes: Conjunctivae and EOM are normal. Pupils are equal, round, and reactive to light. No scleral icterus. Neck: Normal range of motion. Neck supple. No JVD present. No tracheal deviation present. No thyromegaly present. Cardiovascular: Normal rate and regular rhythm. Exam reveals no gallop and no friction rub. No murmur heard. Pulmonary/Chest: Effort normal and breath sounds normal. No respiratory distress. She has no wheezes. She has no rales.  Right breast exhibits no inverted nipple, no mass, no nipple discharge, no skin change and no tenderness. Left breast exhibits mass (fullness at 0900 ) and tenderness. Left breast exhibits no inverted nipple, no nipple discharge and no skin change. Breasts are symmetrical (small/medium sized). Abdominal: Soft. Bowel sounds are normal. She exhibits no distension and no mass. There is no tenderness. There is no rebound and no guarding. Musculoskeletal: Normal range of motion. She exhibits no edema. Lymphadenopathy:     She has no cervical adenopathy. She has no axillary adenopathy. Right: No supraclavicular adenopathy present. Left: No supraclavicular adenopathy present. Neurological: She is alert and oriented to person, place, and time. No cranial nerve deficit. Skin: Skin is warm and dry. No rash noted. She is not diaphoretic. No erythema. No pallor. Psychiatric: She has a normal mood and affect. Her behavior is normal. Judgment and thought content normal.     I reviewed her mammogram and US  ASSESSMENT and PLAN  1. Newly diagnosed left breast cancer early stage, Clinically stage 1 ([id number]). I spent over 60 minutes discussing the anatomy and pathophysiology of breast cancer and treatment options, prognosis. We discussed breast conservation therapy vs mastectomy with and without reconstruction, sentinel lymph node biopsy and axillary dissection, various forms of radiation (whole vs accelerated partial breast), medical oncology therapy (SERM vs Aromatase inhibitors, chemotherapy, herceptin). I discussed about BRCA genetic testing and oncotype DX gene mapping of the tumor. I discussed the risks and benefits of surgery including bleeding, infection, paresthesias and numbness, cosmetic changes, lymphedema, seroma, chronic pain, and need for reoperation for positive margins/sentinel nodes. I discussed websites for her to review.   She desires a left lumpectomy with intraoperative US guidance and left axillary SLNB/possible ALND under general anesthesia as an outpatient  She will need adjuvant radiation and likely just endocrine therapy        Maribeth Maloney MD FACS Zyclara Pregnancy And Lactation Text: This medication is Pregnancy Category C. It is unknown if this medication is excreted in breast milk.

## 2022-04-27 ENCOUNTER — OFFICE VISIT (OUTPATIENT)
Dept: FAMILY MEDICINE CLINIC | Age: 72
End: 2022-04-27
Payer: MEDICARE

## 2022-04-27 VITALS
HEIGHT: 63 IN | BODY MASS INDEX: 21.81 KG/M2 | RESPIRATION RATE: 20 BRPM | DIASTOLIC BLOOD PRESSURE: 68 MMHG | OXYGEN SATURATION: 100 % | SYSTOLIC BLOOD PRESSURE: 138 MMHG | HEART RATE: 60 BPM | WEIGHT: 123.1 LBS | TEMPERATURE: 98.2 F

## 2022-04-27 DIAGNOSIS — R68.89 OTHER GENERAL SYMPTOMS AND SIGNS: ICD-10-CM

## 2022-04-27 DIAGNOSIS — Z00.00 MEDICARE ANNUAL WELLNESS VISIT, SUBSEQUENT: Primary | ICD-10-CM

## 2022-04-27 DIAGNOSIS — Z78.0 POSTMENOPAUSAL STATE: ICD-10-CM

## 2022-04-27 DIAGNOSIS — C50.212 MALIGNANT NEOPLASM OF UPPER-INNER QUADRANT OF LEFT BREAST IN FEMALE, ESTROGEN RECEPTOR POSITIVE (HCC): ICD-10-CM

## 2022-04-27 DIAGNOSIS — Z71.89 ADVANCED DIRECTIVES, COUNSELING/DISCUSSION: ICD-10-CM

## 2022-04-27 DIAGNOSIS — Z87.891 PERSONAL HISTORY OF TOBACCO USE, PRESENTING HAZARDS TO HEALTH: ICD-10-CM

## 2022-04-27 DIAGNOSIS — R63.0 DECREASE IN APPETITE: ICD-10-CM

## 2022-04-27 DIAGNOSIS — E11.21 TYPE 2 DIABETES WITH NEPHROPATHY (HCC): ICD-10-CM

## 2022-04-27 DIAGNOSIS — Z17.0 MALIGNANT NEOPLASM OF UPPER-INNER QUADRANT OF LEFT BREAST IN FEMALE, ESTROGEN RECEPTOR POSITIVE (HCC): ICD-10-CM

## 2022-04-27 DIAGNOSIS — E53.1 PYRIDOXINE DEFICIENCY: ICD-10-CM

## 2022-04-27 DIAGNOSIS — I10 ESSENTIAL HYPERTENSION: ICD-10-CM

## 2022-04-27 DIAGNOSIS — Z12.31 ENCOUNTER FOR SCREENING MAMMOGRAM FOR MALIGNANT NEOPLASM OF BREAST: ICD-10-CM

## 2022-04-27 DIAGNOSIS — I73.9 PAD (PERIPHERAL ARTERY DISEASE) (HCC): ICD-10-CM

## 2022-04-27 LAB
ALBUMIN SERPL-MCNC: 3.4 G/DL (ref 3.5–5)
ALBUMIN/GLOB SERPL: 1 {RATIO} (ref 1.1–2.2)
ALP SERPL-CCNC: 88 U/L (ref 45–117)
ALT SERPL-CCNC: 21 U/L (ref 12–78)
ANION GAP SERPL CALC-SCNC: 7 MMOL/L (ref 5–15)
AST SERPL-CCNC: 28 U/L (ref 15–37)
BILIRUB SERPL-MCNC: 0.2 MG/DL (ref 0.2–1)
BUN SERPL-MCNC: 9 MG/DL (ref 6–20)
BUN/CREAT SERPL: 11 (ref 12–20)
CALCIUM SERPL-MCNC: 9.4 MG/DL (ref 8.5–10.1)
CHLORIDE SERPL-SCNC: 110 MMOL/L (ref 97–108)
CHOLEST SERPL-MCNC: 248 MG/DL
CO2 SERPL-SCNC: 23 MMOL/L (ref 21–32)
CREAT SERPL-MCNC: 0.8 MG/DL (ref 0.55–1.02)
ERYTHROCYTE [DISTWIDTH] IN BLOOD BY AUTOMATED COUNT: 13 % (ref 11.5–14.5)
EST. AVERAGE GLUCOSE BLD GHB EST-MCNC: 100 MG/DL
FERRITIN SERPL-MCNC: 221 NG/ML (ref 26–388)
FOLATE SERPL-MCNC: 13.6 NG/ML (ref 5–21)
GLOBULIN SER CALC-MCNC: 3.5 G/DL (ref 2–4)
GLUCOSE SERPL-MCNC: 80 MG/DL (ref 65–100)
HBA1C MFR BLD: 5.1 % (ref 4–5.6)
HCT VFR BLD AUTO: 35.4 % (ref 35–47)
HDLC SERPL-MCNC: 79 MG/DL
HDLC SERPL: 3.1 {RATIO} (ref 0–5)
HGB BLD-MCNC: 11.1 G/DL (ref 11.5–16)
LDLC SERPL CALC-MCNC: 142.6 MG/DL (ref 0–100)
MCH RBC QN AUTO: 32 PG (ref 26–34)
MCHC RBC AUTO-ENTMCNC: 31.4 G/DL (ref 30–36.5)
MCV RBC AUTO: 102 FL (ref 80–99)
NRBC # BLD: 0 K/UL (ref 0–0.01)
NRBC BLD-RTO: 0 PER 100 WBC
PLATELET # BLD AUTO: 225 K/UL (ref 150–400)
PMV BLD AUTO: 9.5 FL (ref 8.9–12.9)
POTASSIUM SERPL-SCNC: 4.4 MMOL/L (ref 3.5–5.1)
PROT SERPL-MCNC: 6.9 G/DL (ref 6.4–8.2)
RBC # BLD AUTO: 3.47 M/UL (ref 3.8–5.2)
SODIUM SERPL-SCNC: 140 MMOL/L (ref 136–145)
TRIGL SERPL-MCNC: 132 MG/DL (ref ?–150)
TSH SERPL DL<=0.05 MIU/L-ACNC: 0.99 UIU/ML (ref 0.36–3.74)
VIT B12 SERPL-MCNC: 674 PG/ML (ref 193–986)
VLDLC SERPL CALC-MCNC: 26.4 MG/DL
WBC # BLD AUTO: 5.9 K/UL (ref 3.6–11)

## 2022-04-27 PROCEDURE — 99497 ADVNCD CARE PLAN 30 MIN: CPT | Performed by: FAMILY MEDICINE

## 2022-04-27 PROCEDURE — G8536 NO DOC ELDER MAL SCRN: HCPCS | Performed by: FAMILY MEDICINE

## 2022-04-27 PROCEDURE — G9899 SCRN MAM PERF RSLTS DOC: HCPCS | Performed by: FAMILY MEDICINE

## 2022-04-27 PROCEDURE — 2022F DILAT RTA XM EVC RTNOPTHY: CPT | Performed by: FAMILY MEDICINE

## 2022-04-27 PROCEDURE — G8752 SYS BP LESS 140: HCPCS | Performed by: FAMILY MEDICINE

## 2022-04-27 PROCEDURE — G8420 CALC BMI NORM PARAMETERS: HCPCS | Performed by: FAMILY MEDICINE

## 2022-04-27 PROCEDURE — G8399 PT W/DXA RESULTS DOCUMENT: HCPCS | Performed by: FAMILY MEDICINE

## 2022-04-27 PROCEDURE — G8754 DIAS BP LESS 90: HCPCS | Performed by: FAMILY MEDICINE

## 2022-04-27 PROCEDURE — G0439 PPPS, SUBSEQ VISIT: HCPCS | Performed by: FAMILY MEDICINE

## 2022-04-27 PROCEDURE — G8510 SCR DEP NEG, NO PLAN REQD: HCPCS | Performed by: FAMILY MEDICINE

## 2022-04-27 PROCEDURE — G8427 DOCREV CUR MEDS BY ELIG CLIN: HCPCS | Performed by: FAMILY MEDICINE

## 2022-04-27 PROCEDURE — G0296 VISIT TO DETERM LDCT ELIG: HCPCS | Performed by: FAMILY MEDICINE

## 2022-04-27 PROCEDURE — 3044F HG A1C LEVEL LT 7.0%: CPT | Performed by: FAMILY MEDICINE

## 2022-04-27 PROCEDURE — 3017F COLORECTAL CA SCREEN DOC REV: CPT | Performed by: FAMILY MEDICINE

## 2022-04-27 PROCEDURE — 1101F PT FALLS ASSESS-DOCD LE1/YR: CPT | Performed by: FAMILY MEDICINE

## 2022-04-27 NOTE — PROGRESS NOTES
Chief Complaint   Patient presents with    Dizziness    Annual Wellness Visit       When she wakes up in the morning she gets very lightheadness    1. \"Have you been to the ER, urgent care clinic since your last visit? Hospitalized since your last visit? \" No    2. \"Have you seen or consulted any other health care providers outside of the [address] [place] [address] since your last visit? \" No     3. For patients aged 39-70: Has the patient had a colonoscopy / FIT/ Cologuard? Yes - no Care Gap present      If the patient is female:    4. For patients aged 41-77: Has the patient had a mammogram within the past 2 years? Yes - no Care Gap present      5. For patients aged 21-65: Has the patient had a pap smear?  NA - based on age or sex    Health Maintenance Due   Topic Date Due    Eye Exam Retinal or Dilated  04/20/2019    MICROALBUMIN Q1  10/10/2020    COVID-19 Vaccine (2 - Booster for [employer] series) 05/29/2021    Medicare Yearly Exam  05/19/2022

## 2022-04-27 NOTE — ACP (ADVANCE CARE PLANNING)
Advance Care Planning           Conversation Conducted with:   Patient with Decision Making Capacity, stating that the Other Legally Authorized Decision Maker would be Patric adrian as SDM          Patient is on presence of no family member,, stated that the pt wants to be not DNR at this time,  The pt likes to be a full code individual,  The patient states that there is a lot of will to live,      Conversation Outcomes / Follow-Up Plan:   Completed new Advance Directive      Length of ACP Conversation in minutes:  12 minutes           Matias Kitchen MD

## 2022-04-27 NOTE — PATIENT INSTRUCTIONS
Medicare Wellness Visit, Female     The best way to live healthy is to have a lifestyle where you eat a well-balanced diet, exercise regularly, limit alcohol use, and quit all forms of tobacco/nicotine, if applicable. Regular preventive services are another way to keep healthy. Preventive services (vaccines, screening tests, monitoring & exams) can help personalize your care plan, which helps you manage your own care. Screening tests can find health problems at the earliest stages, when they are easiest to treat. [facility] follows the current, evidence-based guidelines published by the [facility] Med[facility] ([facility]) when recommending preventive services for our patients. Because we follow these guidelines, sometimes recommendations change over time as research supports it. (For example, mammograms used to be recommended annually. Even though Medicare will still pay for an annual mammogram, the newer guidelines recommend a mammogram every two years for women of average risk). Of course, you and your doctor may decide to screen more often for some diseases, based on your risk and your co-morbidities (chronic disease you are already diagnosed with). Preventive services for you include:  - Medicare offers their members a free annual wellness visit, which is time for you and your primary care provider to discuss and plan for your preventive service needs. Take advantage of this benefit every year!  -All adults over the age of 72 should receive the recommended pneumonia vaccines. Current USPSTF guidelines recommend a series of two vaccines for the best pneumonia protection.   -All adults should have a flu vaccine yearly and a tetanus vaccine every 10 years.   -All adults age 48 and older should receive the shingles vaccines (series of two vaccines).       -All adults age 38-68 who are overweight should have a diabetes screening test once every three years.   -All adults born between [birth year] and 1965 should be screened once for Hepatitis C.  -Other screening tests and preventive services for persons with diabetes include: an eye exam to screen for diabetic retinopathy, a kidney function test, a foot exam, and stricter control over your cholesterol.   -Cardiovascular screening for adults with routine risk involves an electrocardiogram (ECG) at intervals determined by your doctor.   -Colorectal cancer screenings should be done for adults age 54-65 with no increased risk factors for colorectal cancer. There are a number of acceptable methods of screening for this type of cancer. Each test has its own benefits and drawbacks. Discuss with your doctor what is most appropriate for you during your annual wellness visit. The different tests include: colonoscopy (considered the best screening method), a fecal occult blood test, a fecal DNA test, and sigmoidoscopy.    -A bone mass density test is recommended when a woman turns 65 to screen for osteoporosis. This test is only recommended one time, as a screening. Some providers will use this same test as a disease monitoring tool if you already have osteoporosis. -Breast cancer screenings are recommended every other year for women of normal risk, age 54-69.  -Cervical cancer screenings for women over age 72 are only recommended with certain risk factors.      Here is a list of your current Health Maintenance items (your personalized list of preventive services) with a due date:  Health Maintenance Due   Topic Date Due    Eye Exam  04/20/2019    Albumin Urine Test  10/10/2020    COVID-19 Vaccine (2 - Booster for [place] series) 05/29/2021    Annual Well Visit  05/19/2022

## 2022-04-27 NOTE — PROGRESS NOTES
This is the Subsequent Medicare Annual Wellness Exam, performed 12 months or more after the Initial AWV or the last Subsequent AWV    I have reviewed the patient's medical history in detail and updated the computerized patient record. Patient able to drive no recent accident, Patient compare self health the same to others with the same age,   patient with normal hearing normal vision able to do all ADL currently not working ,  having had no fall and no incontinence having normal appetite no weight loss since last seen eating fruits and vegetables every day does not do exercises denies any substance abuse, Immunization not uptodate offered but opted         Assessment/Plan   Education and counseling provided:  Are appropriate based on today's review and evaluation  End-of-Life planning (with patient's consent)  Pneumococcal Vaccine  Influenza Vaccine  Screening Mammography  Bone mass measurement (DEXA)    1. Medicare annual wellness visit, subsequent  2. Type 2 diabetes with nephropathy (HCC)  -     REFERRAL TO OPTOMETRY  -     CBC W/O DIFF; Future  -     LIPID PANEL; Future  -     METABOLIC PANEL, COMPREHENSIVE; Future  -     TSH 3RD GENERATION; Future  -     FERRITIN; Future  -     VITAMIN B12 & FOLATE; Future  -     VITAMIN B1, WHOLE BLOOD; Future  -     HEMOGLOBIN A1C WITH EAG; Future  3. PAD (peripheral artery disease) (HCC)  -     CBC W/O DIFF; Future  -     LIPID PANEL; Future  -     METABOLIC PANEL, COMPREHENSIVE; Future  -     TSH 3RD GENERATION; Future  -     FERRITIN; Future  -     VITAMIN B12 & FOLATE; Future  -     VITAMIN B1, WHOLE BLOOD; Future  -     HEMOGLOBIN A1C WITH EAG; Future  4. Malignant neoplasm of upper-inner quadrant of left breast in female, estrogen receptor positive ([facility]  -     CBC W/O DIFF; Future  -     LIPID PANEL; Future  -     METABOLIC PANEL, COMPREHENSIVE; Future  -     TSH 3RD GENERATION; Future  -     FERRITIN; Future  -     VITAMIN B12 & FOLATE;  Future  - VITAMIN B1, WHOLE BLOOD; Future  -     HEMOGLOBIN A1C WITH EAG; Future  5. Advanced directives, counseling/discussion  -     ADVANCE CARE PLANNING FIRST 30 MINS  -     FULL CODE  6. Encounter for screening mammogram for malignant neoplasm of breast  -     BILL MAMMO BI SCREENING INCL CAD; Future  7. Postmenopausal state  -     DEXA BONE DENSITY STUDY AXIAL; Future  8. Personal history of tobacco use, presenting hazards to health  -     CT LOW DOSE LUNG CANCER SCREENING; Future  9. Decrease in appetite  -     CBC W/O DIFF; Future  -     LIPID PANEL; Future  -     METABOLIC PANEL, COMPREHENSIVE; Future  -     TSH 3RD GENERATION; Future  -     FERRITIN; Future  -     VITAMIN B12 & FOLATE; Future  -     VITAMIN B1, WHOLE BLOOD; Future  -     HEMOGLOBIN A1C WITH EAG; Future  10. Essential hypertension  -     CBC W/O DIFF; Future  -     LIPID PANEL; Future  -     METABOLIC PANEL, COMPREHENSIVE; Future  -     TSH 3RD GENERATION; Future  -     FERRITIN; Future  -     VITAMIN B12 & FOLATE; Future  -     VITAMIN B1, WHOLE BLOOD; Future  -     HEMOGLOBIN A1C WITH EAG; Future  11. Other general symptoms and signs   -     VITAMIN B12 & FOLATE; Future  12. Pyridoxine deficiency        Depression Risk Factor Screening     3 most recent PHQ Screens 4/27/2022   Little interest or pleasure in doing things Not at all   Feeling down, depressed, irritable, or hopeless Not at all   Total Score PHQ 2 0       Alcohol & Drug Abuse Risk Screen    Do you average more than 1 drink per night or more than 7 drinks a week:  Yes    On any one occasion in the past three months have you have had more than 3 drinks containing alcohol:  Yes          Functional Ability and Level of Safety    Hearing: Hearing is good. Activities of Daily Living: The home contains: handrails, grab bars and rugs  Patient needs help with:  transportation      Ambulation: with no difficulty     Fall Risk:  Fall Risk Assessment, last 12 mths 4/27/2022   Able to walk? Yes   Fall in past 12 months? 0   Do you feel unsteady? 0   Are you worried about falling 1   Is TUG test greater than 12 seconds? 0   Is the gait abnormal? 0   Number of falls in past 12 months -   Fall with injury? -      Abuse Screen:  Patient is not abused       Cognitive Screening    Has your family/caregiver stated any concerns about your memory: no     Cognitive Screening: Normal - MMSE (Mini Mental Status Exam)    Health Maintenance Due     Health Maintenance Due   Topic Date Due    Eye Exam Retinal or Dilated  04/20/2019    MICROALBUMIN Q1  10/10/2020    COVID-19 Vaccine (2 - Booster for [employer] series) 05/29/2021    Medicare Yearly Exam  05/19/2022       Patient Care Team   Patient Care Team:  Sd Galol MD as PCP - General (Family Medicine)  Sd Gallo MD as PCP - [facility] Provider  Radha Gonzalez DPM as Physician (Podiatry)  NOAM Espinosa as Physician (Vascular Surgery)  Ligia Long MD as Physician (Ophthalmology)  Basilio Galeazzi, MD as Surgeon (General Surgery)    History     Patient Active Problem List   Diagnosis Code    Hypertension I10    PAD (peripheral artery disease) (HCC) I73.9    Glaucoma H40.9    Gout M10.9    Leg pain, bilateral M79.604, M79.605    Nonallopathic lesion of lumbar region, not elsewhere classified M99.9    History of stroke Z86.73    Type II diabetes mellitus, uncontrolled [id number]    Tobacco abuse disorder Z72.0    Solitary lung nodule R91.1    Breast cancer of upper-inner quadrant of left female breast ([address] C50.212    Type 2 diabetes mellitus with diabetic neuropathy ([address] E11.40    Type 2 diabetes with nephropathy ([address] E11.21    CVA (cerebral vascular accident) ([address] I63.9    Bilateral carotid artery stenosis I65.23     Past Medical History:   Diagnosis Date    Arthritis     Breast cancer ([address] 04/2017    lumpectomy left breast, radiation; lung    Cancer ([address]     breast cancer- left    [facility] 2007    no deficiets    CVA (cerebral vascular accident) ([facility]     11/12/2018    Diabetes mellitus type 2, controlled ([facility]     no longer medicated    Gallstones     asymptomatic, pt denies having    GERD (gastroesophageal reflux disease)     dx with resolution of symptoms on ppi- pt denies having    Hypercholesterolemia     Hypertension     Ill-defined condition     gout    Long term current use of anticoagulant therapy     Non-nicotine vapor product user     used to use    PAD (peripheral artery disease) ([facility] 2/20/2013    right SFA angioplasty and athrectomy    Primary open angle glaucoma     Radiation therapy complication     Solitary lung nodule 5/9/2017    Type II diabetes mellitus, uncontrolled 2/1/2017      Past Surgical History:   Procedure Laterality Date    COLONOSCOPY N/A 7/28/2020    COLONOSCOPY, performed by Lior Galindo MD at [address] [place] [address]  7/28/2020         HX BREAST BIOPSY Left 2017    HX BREAST LUMPECTOMY Left 6/22/2017    LEFT LUMPECTOMY WITH INTRAOPERATIVE ULTRASOUND GUIDED, LEFT AXILLARY SENTINEL LYMPH NODE BIOPSY, POSSIBLE AXILLARY DISECTION performed by Raquel Brown MD at [facility] MAIN OR    HX HEENT Bilateral     eye lids surgery    [place] CHEST SURGERY PROCEDURE UNLISTED Left     VASCULAR SURGERY PROCEDURE UNLIST  April 2016    right leg stent     Current Outpatient Medications   Medication Sig Dispense Refill    clopidogreL (PLAVIX) 75 mg tab Take 1 Tablet by mouth daily. 90 Tablet 3    allopurinoL (ZYLOPRIM) 100 mg tablet TAKE 2 TABLETS BY MOUTH EVERY DAY (Patient taking differently: TAKE 2 TABLETS BY MOUTH EVERY DAY  Not taking at the moment, keep it just in case) 180 Tab 2    hydroCHLOROthiazide (HYDRODIURIL) 12.5 mg tablet Take 1 Tab by mouth daily. (Patient taking differently: Take 12.5 mg by mouth daily. prn) 90 Tab 2    asenapine maleate (SAPHRIS, BLACK CHERRY, SL) by SubLINGual route.       aspirin 81 mg chewable tablet Take 1 Tab by mouth daily. 30 Tab 6    acetaminophen (TYLENOL) 500 mg tablet Take 2 Tabs by mouth every six (6) hours as needed for Pain. Indications: Pain 40 Tab 1    timolol (TIMOPTIC) 0.5 % ophthalmic solution Administer  to both eyes two (2) times a day.  0    dorzolamide (TRUSOPT) 2 % ophthalmic solution instill 1 drop into both eyes twice a day  0    latanoprost (XALATAN) 0.005 % ophthalmic solution Administer 1 Drop to both eyes nightly. 2.5 mL 1    brimonidine (ALPHAGAN) 0.15 % ophthalmic solution Administer 1 Drop to both eyes two (2) times a day. 15 mL 6    letrozole (FEMARA) 2.5 mg tablet take 1 tablet by mouth once daily (Patient not taking: Reported on 2022) 90 Tablet 3    alcohol swabs (BD SINGLE USE SWABS REGULAR) padm Use to sanitize the affected area prior to application of medication as indicated. (Patient not taking: Reported on 2022) 100 Pad 3     Allergies   Allergen Reactions    Statins-Hmg-Coa Reductase Inhibitors Myalgia       Family History   Problem Relation Age of Onset    Hypertension Mother     Diabetes Mother     Diabetes Son      Social History     Tobacco Use    Smoking status: Former Smoker     Packs/day: 0.25     Years: 47.00     Pack years: 11.75     Types: Cigarettes     Start date: 1966     Quit date: 3/13/2013     Years since quittin.1    Smokeless tobacco: Former User    Tobacco comment: estimate is one pack per week/ used to vape   Substance Use Topics    Alcohol use: Yes     Alcohol/week: 21.0 standard drinks     Types: 21 Cans of beer per week     Comment: 2 to 3 beers per day per patient         Chai Shannon MD   Discussed with the patient the current USPSTF guidelines released 2021 for screening for lung cancer.     For adults aged 48 to [de-identified] years who have a 20 pack-year smoking history and currently smoke or have quit within the past 15 years the grade B recommendation is to:  Screen for lung cancer with low-dose computed tomography (LDCT) every year. Stop screening once a person has not smoked for 15 years or has a health problem that limits life expectancy or the ability to have lung surgery. Discussed with patient the risks and benefits of screening, including over-diagnosis, false positive rate, and total radiation exposure. The patient currently exhibits no signs or symptoms suggestive of lung cancer. Discussed with patient the importance of compliance with yearly annual lung cancer screenings and willingness to undergo diagnosis and treatment if screening scan is positive. In addition, the patient was counseled regarding the importance of remaining smoke free and/or total smoking cessation.     Also reviewed the following if the patient has Medicare that as of February 10, 2022, Medicare only covers LDCT screening in patients aged 51-72 with at least a 20 pack-year smoking history who currently smoke or have quit in the last 15 years

## 2022-05-02 ENCOUNTER — OFFICE VISIT (OUTPATIENT)
Dept: FAMILY MEDICINE CLINIC | Age: 72
End: 2022-05-02

## 2022-05-02 DIAGNOSIS — R74.8 ABNORMAL LEVELS OF OTHER SERUM ENZYMES: ICD-10-CM

## 2022-05-02 DIAGNOSIS — R63.0 LOSS OF APPETITE: Primary | ICD-10-CM

## 2022-05-08 LAB — VIT B1 BLD-SCNC: 73.9 NMOL/L (ref 66.5–200)

## 2022-05-12 LAB — VIT B6 SERPL-MCNC: 17.8 UG/L (ref 3.4–65.2)

## 2022-09-14 ENCOUNTER — TELEPHONE (OUTPATIENT)
Dept: FAMILY MEDICINE CLINIC | Age: 72
End: 2022-09-14

## 2022-09-14 NOTE — TELEPHONE ENCOUNTER
----- Message from Ray Mir sent at 9/14/2022  2:41 PM EDT -----  Subject: Referral Request    Reason for referral request? pt is requesting to have test done please   call pt to get test scheduled. Provider patient wants to be referred to(if known):     Provider Phone Number(if known):     Additional Information for Provider?   ---------------------------------------------------------------------------  --------------  [id number]    [phone]; OK to leave message on voicemail  ---------------------------------------------------------------------------  --------------

## 2022-09-16 ENCOUNTER — OFFICE VISIT (OUTPATIENT)
Dept: FAMILY MEDICINE CLINIC | Age: 72
End: 2022-09-16
Payer: MEDICARE

## 2022-09-16 VITALS
DIASTOLIC BLOOD PRESSURE: 90 MMHG | TEMPERATURE: 98.2 F | HEART RATE: 63 BPM | HEIGHT: 63 IN | RESPIRATION RATE: 20 BRPM | BODY MASS INDEX: 21.12 KG/M2 | SYSTOLIC BLOOD PRESSURE: 181 MMHG | OXYGEN SATURATION: 100 % | WEIGHT: 119.2 LBS

## 2022-09-16 DIAGNOSIS — Z86.73 HISTORY OF STROKE: ICD-10-CM

## 2022-09-16 DIAGNOSIS — Z23 ENCOUNTER FOR IMMUNIZATION: Primary | ICD-10-CM

## 2022-09-16 DIAGNOSIS — I73.9 PAD (PERIPHERAL ARTERY DISEASE) (HCC): ICD-10-CM

## 2022-09-16 DIAGNOSIS — Z01.818 PREOPERATIVE CLEARANCE: ICD-10-CM

## 2022-09-16 PROCEDURE — G8536 NO DOC ELDER MAL SCRN: HCPCS | Performed by: FAMILY MEDICINE

## 2022-09-16 PROCEDURE — G8754 DIAS BP LESS 90: HCPCS | Performed by: FAMILY MEDICINE

## 2022-09-16 PROCEDURE — 1123F ACP DISCUSS/DSCN MKR DOCD: CPT | Performed by: FAMILY MEDICINE

## 2022-09-16 PROCEDURE — 1101F PT FALLS ASSESS-DOCD LE1/YR: CPT | Performed by: FAMILY MEDICINE

## 2022-09-16 PROCEDURE — G8420 CALC BMI NORM PARAMETERS: HCPCS | Performed by: FAMILY MEDICINE

## 2022-09-16 PROCEDURE — G8753 SYS BP > OR = 140: HCPCS | Performed by: FAMILY MEDICINE

## 2022-09-16 PROCEDURE — G0008 ADMIN INFLUENZA VIRUS VAC: HCPCS | Performed by: FAMILY MEDICINE

## 2022-09-16 PROCEDURE — G8427 DOCREV CUR MEDS BY ELIG CLIN: HCPCS | Performed by: FAMILY MEDICINE

## 2022-09-16 PROCEDURE — 99214 OFFICE O/P EST MOD 30 MIN: CPT | Performed by: FAMILY MEDICINE

## 2022-09-16 PROCEDURE — 1090F PRES/ABSN URINE INCON ASSESS: CPT | Performed by: FAMILY MEDICINE

## 2022-09-16 PROCEDURE — G9899 SCRN MAM PERF RSLTS DOC: HCPCS | Performed by: FAMILY MEDICINE

## 2022-09-16 PROCEDURE — G8432 DEP SCR NOT DOC, RNG: HCPCS | Performed by: FAMILY MEDICINE

## 2022-09-16 PROCEDURE — 90694 VACC AIIV4 NO PRSRV 0.5ML IM: CPT | Performed by: FAMILY MEDICINE

## 2022-09-16 PROCEDURE — G8399 PT W/DXA RESULTS DOCUMENT: HCPCS | Performed by: FAMILY MEDICINE

## 2022-09-16 PROCEDURE — 3017F COLORECTAL CA SCREEN DOC REV: CPT | Performed by: FAMILY MEDICINE

## 2022-09-16 RX ORDER — CLOPIDOGREL BISULFATE 75 MG/1
75 TABLET ORAL DAILY
Qty: 90 TABLET | Refills: 3 | Status: SHIPPED | OUTPATIENT
Start: 2022-09-16

## 2022-09-16 NOTE — PROGRESS NOTES
Chief Complaint   Patient presents with    Documentation       1. \"Have you been to the ER, urgent care clinic since your last visit? Hospitalized since your last visit? \" No    2. \"Have you seen or consulted any other health care providers outside of the [address] [place] [address] since your last visit? \" No     3. For patients aged 39-70: Has the patient had a colonoscopy / FIT/ Cologuard? Yes - no Care Gap present      If the patient is female:    4. For patients aged 41-77: Has the patient had a mammogram within the past 2 years? No      5. For patients aged 21-65: Has the patient had a pap smear?  No    Health Maintenance Due   Topic Date Due    Eye Exam Retinal or Dilated  04/20/2019    MICROALBUMIN Q1  10/10/2020    COVID-19 Vaccine (2 - Booster for [employer] series) 05/29/2021    Flu Vaccine (1) 09/01/2022    Breast Cancer Screen Mammogram  10/05/2022

## 2022-09-16 NOTE — PATIENT INSTRUCTIONS
Learning About What to Expect at Home After Surgery  What do you need to know when you leave the hospital after surgery? Each person recovers from surgery at a different pace. Your discharge plan will help you leave the hospital safely. It will outline the care you need. And it will give you information about the things you'll need to do at home. Make sure you get your plan in writing. Look for information on:  What your medicines are and how to take them. When you need to see the doctor again or get any follow-up tests. How and when to change bandages. What to do about any pain or infection. How active you can be. This may include physical therapy. How to prevent falls. Things you can eat or drink and things to avoid. What do you need to know about taking medicines? Your doctor will talk with you about restarting your medicines. The doctor will also tell you about taking any new medicines. If you take aspirin or some other blood thinner, be sure to talk to your doctor. You will be told if and when to start taking those medicines again. If your doctor gave you a prescription medicine for pain, take it as prescribed. If you aren't taking a prescription pain medicine, ask your doctor if you can take an over-the-counter medicine. How can you take care of your incision? If you have a cut (incision), follow your doctor's instructions. If you didn't get instructions, follow this general advice:  You'll have a bandage over the cut. This helps the incision heal and protects it. Change the bandage daily or more often if needed. If you have strips of tape on the cut, leave them on until they fall off. If you have stitches or staples, your doctor will tell you when to have them removed. If you have skin glue (liquid stitches), leave it on until it falls off. Gently wash the area daily with warm water, and pat it dry. Don't use hydrogen peroxide or alcohol.   You may shower 24 to 48 hours after surgery. Before showering, cover the bandage with a plastic bag or use another method of keeping it dry. Pat the incision dry if it gets damp. Don't swim or take a bath until your doctor tells you it's okay. When can you be active again? One of the most important things you can do for yourself after surgery is to get up and move around several times a day. But be careful not to do too much. Here are some tips:  Don't move quickly or lift anything heavy until your doctor says it is okay. Taking short walks is a good way to help your body heal.  Rest when you feel tired. Your doctor may give you instructions on when you can do your normal activities again, such as driving, having sex, and going back to work. What do you need to know about eating? If your doctor told you when you can start eating and what foods you can eat, follow your doctor's instructions. If you did not get instructions, follow this general advice:  You can eat your normal diet when you feel well. Start with small amounts of food. If your bowel movements aren't regular right after surgery, try to avoid constipation and straining. Drink plenty of water. Your doctor may suggest fiber, a stool softener, or a mild laxative. What do you do if you have infection or pain? If you have signs of infection, call your doctor. These signs include: Increased pain, swelling, warmth, or redness. Red streaks leading from the incision. Pus draining from the incision. A fever. Also call your doctor if you have pain that does not get better after you take pain medicine. Follow-up care is a key part of your treatment and safety. Be sure to make and go to all appointments, and call your doctor if you are having problems. It's also a good idea to know your test results and keep a list of the medicines you take. Where can you learn more?   Go to http://www.gray.com/  Enter U963 in the search box to learn more about \"Learning About What to Expect at Home After Surgery. \"  Current as of: October 6, 2021               Content Version: 13.2  © 8593-3757 [employer]. Care instructions adapted under license by [employer] (which disclaims liability or warranty for this information). If you have questions about a medical condition or this instruction, always ask your healthcare professional. [address] any warranty or liability for your use of this information.

## 2022-09-16 NOTE — PROGRESS NOTES
Preoperative Evaluation    Date of Exam: 2022    Brian Cooper is a 67 y.o. female (:1950) who presents for preoperative evaluation. who presents for preoperative evaluation with current medical hx of right eye cataract disorder for the last couple of years pt has been in a stable conditions w/out history of fall, and  stating that the current eyedrops prescription and the glasses are not helping  The vision any more and seeking alternative surgical correction for current medical condition at this time,      pt's physical functioning is capable of doing >5 blocks of walking w/out getting shortness of breath, at this time there is no assistive devices used physically,   Patient with a good social support which include living at home, the pt is self cared, and the pt's other primary care giver is  at home,  no recent major trauma, does not have any history of blood clots, patient currently on daily baby dose aspirin and plavix as the only blood thinner for many years stating that the pt has had no hx of abnormal bleeding or easy bruisabiltity.   In addition pt is currently not taking any herbal supplements, nor any illicit drugs, current status of  for low pt is stable except for morbid obesity       Latex Allergy: no    Medical History:     Past Medical History:   Diagnosis Date    Arthritis     Breast cancer ([address] 2017    lumpectomy left breast, radiation; lung    Cancer ([address]     breast cancer- left    Cerebrovascular accident (stroke) ([address]     no deficiets    CVA (cerebral vascular accident) ([address]     2018    Diabetes mellitus type 2, controlled ([address]     no longer medicated    Gallstones     asymptomatic, pt denies having    GERD (gastroesophageal reflux disease)     dx with resolution of symptoms on ppi- pt denies having    Hypercholesterolemia     Hypertension     Ill-defined condition     gout    Long term current use of anticoagulant therapy     Non-nicotine vapor product user     used to use    PAD (peripheral artery disease) ([facility] 2/20/2013    right SFA angioplasty and athrectomy    Primary open angle glaucoma     Radiation therapy complication     Solitary lung nodule 5/9/2017    Type II diabetes mellitus, uncontrolled 2/1/2017     Allergies: Allergies   Allergen Reactions    Statins-Hmg-Coa Reductase Inhibitors Myalgia      Medications:     Current Outpatient Medications   Medication Sig    clopidogreL (PLAVIX) 75 mg tab Take 1 Tablet by mouth daily. aspirin 81 mg chewable tablet Take 1 Tab by mouth daily. acetaminophen (TYLENOL) 500 mg tablet Take 2 Tabs by mouth every six (6) hours as needed for Pain. Indications: Pain    timolol (TIMOPTIC) 0.5 % ophthalmic solution Administer  to both eyes two (2) times a day. dorzolamide (TRUSOPT) 2 % ophthalmic solution instill 1 drop into both eyes twice a day    latanoprost (XALATAN) 0.005 % ophthalmic solution Administer 1 Drop to both eyes nightly. brimonidine (ALPHAGAN) 0.15 % ophthalmic solution Administer 1 Drop to both eyes two (2) times a day. letrozole (FEMARA) 2.5 mg tablet take 1 tablet by mouth once daily (Patient not taking: No sig reported)    hydroCHLOROthiazide (HYDRODIURIL) 12.5 mg tablet Take 1 Tab by mouth daily. (Patient not taking: Reported on 9/16/2022)    alcohol swabs (BD SINGLE USE SWABS REGULAR) padm Use to sanitize the affected area prior to application of medication as indicated. (Patient not taking: No sig reported)     No current facility-administered medications for this visit.      Surgical History:     Past Surgical History:   Procedure Laterality Date    COLONOSCOPY N/A 7/28/2020    COLONOSCOPY, performed by Leah Crawford MD at [facility]OSCOPY    COLONOSCOPY,[facility] Mumtaz Blind  7/28/2020         HX BREAST BIOPSY Left 2017    HX BREAST LUMPECTOMY Left 6/22/2017    LEFT LUMPECTOMY WITH INTRAOPERATIVE ULTRASOUND GUIDED, LEFT AXILLARY SENTINEL LYMPH NODE BIOPSY, POSSIBLE AXILLARY DISECTION performed by Marcie Coyne MD at \[facility]\"" MAIN OR    HX HEENT Bilateral     eye lids surgery    [place] CHEST SURGERY PROCEDURE UNLISTED Left     VASCULAR SURGERY PROCEDURE UNLIST  2016    right leg stent     Social History:     Social History     Socioeconomic History    Marital status:    Tobacco Use    Smoking status: Former     Packs/day: 0.25     Years: 47.00     Pack years: 11.75     Types: Cigarettes     Start date: 1966     Quit date: 3/13/2013     Years since quittin.5    Smokeless tobacco: Former    Tobacco comments:     estimate is one pack per week/ used to vape   Vaping Use    Vaping Use: Never used   Substance and Sexual Activity    Alcohol use: Yes     Alcohol/week: 21.0 standard drinks     Types: 21 Cans of beer per week     Comment: 2 to 3 beers per day per patient    Drug use: No    Sexual activity: Yes     Partners: Male       Anesthesia Complications: None  History of abnormal bleeding : None  History of Blood Transfusions: no  Health Care Directive or Living Will: no    Objective:         Constitutional: no chills and fever,  , nad     HENT: no ear pain or nosebleeds. No blurred vision    Respiratory: no shortness of breath, wheezing cough     Cardiovascular: Has no chest pain, ,and racing heart . Gastrointestinal: No constipation, diarrhea, nausea and vomiting. Genitourinary: No frequency. Musculoskeletal: Negative for joint pain. Skin: no itching, no rash. Neurological: Negative for dizziness, no tremors  Psychiatric/Behavioral: Negative for depression   is not nervous/anxious. and not depressed the patient is not nervous/anxious.       General:  alert cooperative appears stated for the age  [de-identified]; normocephalic and atraumatic PERRLA extraocular motor intact normal tympanic membrane normal external ear bilaterally, mucosal normal no drainage  Neck: supple no adenopathy no JVD no bruit  Lungs: Clear to auscultation bilaterally no rales rhonchi or wheezes  Heart: Normal regular S1-S2 ,  no murmur  Breast exam deferred  Abdomen: Soft nontender normal bowel sounds   Extremities: Normal range of motion no point tenderness normal pulses no edema  Pelvic/: No lesion, no lymphadenopathy  Skin: Normal no lesion no rash        DIAGNOSTICS:   1. EKG: EKG FINDINGS - not indicated  2. CXR: not indicated,   3.  Labs:   Lab Results   Component Value Date/Time    Hemoglobin A1c 5.1 04/27/2022 11:21 AM    Hemoglobin A1c 5.4 05/18/2021 09:33 AM    Hemoglobin A1c 5.5 10/10/2019 12:04 PM    Glucose 80 04/27/2022 11:21 AM    Glucose (POC) 145 (H) 11/16/2018 12:02 PM    Microalbumin/Creat ratio (mg/g creat) 8 07/03/2014 10:16 AM    Microalb/Creat ratio (ug/mg creat.) 12.3 10/10/2019 12:03 PM    Microalbumin,urine random 0.68 07/03/2014 10:16 AM    LDL, calculated 142.6 (H) 04/27/2022 11:21 AM    Creatinine (POC) 0.9 06/22/2017 07:57 AM    Creatinine 0.80 04/27/2022 11:21 AM      Lab Results   Component Value Date/Time    Cholesterol, total 248 (H) 04/27/2022 11:21 AM    HDL Cholesterol 79 04/27/2022 11:21 AM    LDL, calculated 142.6 (H) 04/27/2022 11:21 AM    Triglyceride 132 04/27/2022 11:21 AM    CHOL/HDL Ratio 3.1 04/27/2022 11:21 AM       IMPRESSION:   Low risk for planned surgery  No contraindications to planned surgery  Med complsera Hernandez MD   9/16/2022

## 2022-09-16 NOTE — PROGRESS NOTES
After obtaining consent, and per orders of ,flu vaccine  given to  right deltoid IM  . Patient instructed to remain in clinic for 15 minutes afterwards, and to report any adverse reaction to me immediately.  Patient did not have any adverse reactions during this office visit

## 2022-10-06 NOTE — PROGRESS NOTES
Lab result reviewed and discussed, stable results and none critical, advised to continue lifestyle modification and increase oral hydration repeat abnormal next visit

## 2022-11-01 ENCOUNTER — HOSPITAL ENCOUNTER (OUTPATIENT)
Dept: CT IMAGING | Age: 72
Discharge: HOME | End: 2022-11-01
Attending: FAMILY MEDICINE
Payer: MEDICARE

## 2022-11-01 ENCOUNTER — HOSPITAL ENCOUNTER (OUTPATIENT)
Dept: ULTRASOUND IMAGING | Age: 72
Discharge: HOME | End: 2022-11-01
Attending: FAMILY MEDICINE
Payer: MEDICARE

## 2022-11-01 ENCOUNTER — HOSPITAL ENCOUNTER (OUTPATIENT)
Dept: MAMMOGRAPHY | Age: 72
Discharge: HOME | End: 2022-11-01
Attending: FAMILY MEDICINE
Payer: MEDICARE

## 2022-11-01 DIAGNOSIS — Z87.891 PERSONAL HISTORY OF TOBACCO USE, PRESENTING HAZARDS TO HEALTH: ICD-10-CM

## 2022-11-01 DIAGNOSIS — N63.31 UNSPECIFIED LUMP IN AXILLARY TAIL OF THE RIGHT BREAST: ICD-10-CM

## 2022-11-01 DIAGNOSIS — Z12.31 ENCOUNTER FOR SCREENING MAMMOGRAM FOR MALIGNANT NEOPLASM OF BREAST: ICD-10-CM

## 2022-11-01 PROCEDURE — 77062 BREAST TOMOSYNTHESIS BI: CPT

## 2022-11-01 PROCEDURE — 71271 CT THORAX LUNG CANCER SCR C-: CPT

## 2022-11-16 ENCOUNTER — OFFICE VISIT (OUTPATIENT)
Dept: FAMILY MEDICINE CLINIC | Age: 72
End: 2022-11-16

## 2022-11-16 VITALS
BODY MASS INDEX: 21.05 KG/M2 | OXYGEN SATURATION: 100 % | DIASTOLIC BLOOD PRESSURE: 74 MMHG | SYSTOLIC BLOOD PRESSURE: 160 MMHG | RESPIRATION RATE: 15 BRPM | HEART RATE: 50 BPM | WEIGHT: 118.8 LBS | HEIGHT: 63 IN | TEMPERATURE: 98.6 F

## 2022-11-16 DIAGNOSIS — Z23 ENCOUNTER FOR ADMINISTRATION OF COVID-19 VACCINE: ICD-10-CM

## 2022-11-16 DIAGNOSIS — Z02.89 ENCOUNTER FOR COMPLETION OF FORM WITH PATIENT: ICD-10-CM

## 2022-11-16 DIAGNOSIS — M79.642 PAIN IN BOTH HANDS: Primary | ICD-10-CM

## 2022-11-16 DIAGNOSIS — M79.641 PAIN IN BOTH HANDS: Primary | ICD-10-CM

## 2022-11-16 RX ORDER — PREDNISOLONE ACETATE 10 MG/ML
SUSPENSION/ DROPS OPHTHALMIC
COMMUNITY
Start: 2022-10-24

## 2022-11-16 RX ORDER — METHYLPREDNISOLONE 4 MG/1
TABLET ORAL
Qty: 1 DOSE PACK | Refills: 0 | Status: SHIPPED | OUTPATIENT
Start: 2022-11-16

## 2022-11-16 NOTE — PROGRESS NOTES
HISTORY OF PRESENT ILLNESS  Aleksandr Shane is a 67 y.o. female,  presents for completion of form it is from department of motor vehicle regarding handicap status, patient states that hip, knees,and chronic back pain have been bothering him unable to walk more than 1-2 block the pat has to do repeated resting sometimes the pt is not able to make some of the appointments because of the current chronic pain, the intensity of the pt's pain is 7 out of 10 dull not better,     Hand b/l Pain    The history is provided by the patient. This is a recurrent problem. The current episode started more than 1 week ago. The problem occurs constantly. The problem has not changed since onset. The pain is present in the right wrist. The quality of the pain is described as dull. The pain is at a severity of 6/10. Associated symptoms include numbness, limited range of motion, stiffness, tingling and itching. The symptoms are aggravated by movement. She has tried arthritis medications and OTC ointments for the symptoms. Also present for blood pressure check having a low-salt diet currently 1 with mobility device  Patient also like to get the covid shot never had any adverse reaction for any vaccination, injection nicely tolerated no side effect noted    Current Outpatient Medications   Medication Sig Dispense Refill    prednisoLONE acetate (PRED FORTE) 1 % ophthalmic suspension SHAKE LIQUID AND INSTILL 1 DROP IN AFFECTED EYE FOUR TIMES DAILY AFTER SURGERY AS DIRECTED      methylPREDNISolone (MEDROL DOSEPACK) 4 mg tablet As directed  6 days 1 Dose Pack 0    letrozole (FEMARA) 2.5 mg tablet TAKE 1 TABLET BY MOUTH ONCE DAILY 90 Tablet 0    clopidogreL (PLAVIX) 75 mg tab Take 1 Tablet by mouth daily. 90 Tablet 3    hydroCHLOROthiazide (HYDRODIURIL) 12.5 mg tablet Take 1 Tab by mouth daily.  (Patient taking differently: Take 12.5 mg by mouth as needed.) 90 Tab 2    alcohol swabs (BD SINGLE USE SWABS REGULAR) padm Use to sanitize the affected area prior to application of medication as indicated. 100 Pad 3    aspirin 81 mg chewable tablet Take 1 Tab by mouth daily. 30 Tab 6    acetaminophen (TYLENOL) 500 mg tablet Take 2 Tabs by mouth every six (6) hours as needed for Pain. Indications: Pain 40 Tab 1    timolol (TIMOPTIC) 0.5 % ophthalmic solution Administer  to both eyes two (2) times a day.  0    dorzolamide (TRUSOPT) 2 % ophthalmic solution instill 1 drop into both eyes twice a day  0    latanoprost (XALATAN) 0.005 % ophthalmic solution Administer 1 Drop to both eyes nightly. 2.5 mL 1    brimonidine (ALPHAGAN) 0.15 % ophthalmic solution Administer 1 Drop to both eyes two (2) times a day.  15 mL 6     Allergies   Allergen Reactions    Statins-Hmg-Coa Reductase Inhibitors Myalgia     Past Medical History:   Diagnosis Date    Arthritis     Breast cancer ([facility] 04/2017    lumpectomy left breast, radiation; lung    Cancer ([facility]     breast cancer- left    Cerebrovascular accident (stroke) ([facility] 2007    no deficiets    CVA (cerebral vascular accident) ([facility]     11/12/2018    Diabetes mellitus type 2, controlled ([address]     no longer medicated    Gallstones     asymptomatic, pt denies having    GERD (gastroesophageal reflux disease)     dx with resolution of symptoms on ppi- pt denies having    Hypercholesterolemia     Hypertension     Ill-defined condition     gout    Long term current use of anticoagulant therapy     Non-nicotine vapor product user     used to use    PAD (peripheral artery disease) ([facility] 2/20/2013    right SFA angioplasty and athrectomy    Primary open angle glaucoma     Radiation therapy complication     Solitary lung nodule 5/9/2017    Type II diabetes mellitus, uncontrolled 2/1/2017     Past Surgical History:   Procedure Laterality Date    COLONOSCOPY N/A 7/28/2020    COLONOSCOPY, performed by Suzan Parks MD at [address] [place] [address],[address]  7/28/2020         HX BREAST BIOPSY Left 2017    HX BREAST LUMPECTOMY Left 2017    LEFT LUMPECTOMY WITH INTRAOPERATIVE ULTRASOUND GUIDED, LEFT AXILLARY SENTINEL LYMPH NODE BIOPSY, POSSIBLE AXILLARY DISECTION performed by Linda Hsu MD at [facility] MAIN OR    HX HEENT Bilateral     eye lids surgery    TN CHEST SURGERY PROCEDURE UNLISTED Left     VASCULAR SURGERY PROCEDURE UNLIST  2016    right leg stent     Family History   Problem Relation Age of Onset    Hypertension Mother     Diabetes Mother     Diabetes Son      Social History     Tobacco Use    Smoking status: Former     Packs/day: 0.25     Years: 47.00     Pack years: 11.75     Types: Cigarettes     Start date: 1966     Quit date: 3/13/2013     Years since quittin.6    Smokeless tobacco: Former    Tobacco comments:     estimate is one pack per week/ used to vape   Substance Use Topics    Alcohol use: Yes     Alcohol/week: 21.0 standard drinks     Types: 21 Cans of beer per week     Comment: 2 to 3 beers per day per patient      Lab Results   Component Value Date/Time    WBC 5.9 2022 11:21 AM    HGB 11.1 (L) 2022 11:21 AM    Hemoglobin (POC) 10.5 (L) 2017 07:57 AM    HCT 35.4 2022 11:21 AM    Hematocrit (POC) 31 (L) 2017 07:57 AM    PLATELET 237  11:21 AM    .0 (H) 2022 11:21 AM     Lab Results   Component Value Date/Time    Hemoglobin A1c 5.1 2022 11:21 AM    Hemoglobin A1c 5.4 2021 09:33 AM    Hemoglobin A1c 5.5 10/10/2019 12:04 PM    Glucose 80 2022 11:21 AM    Glucose (POC) 145 (H) 2018 12:02 PM    Microalbumin/Creat ratio (mg/g creat) 8 2014 10:16 AM    Microalb/Creat ratio (ug/mg creat.) 12.3 10/10/2019 12:03 PM    Microalbumin,urine random 0.68 2014 10:16 AM    LDL, calculated 142.6 (H) 2022 11:21 AM    Creatinine (POC) 0.9 2017 07:57 AM    Creatinine 0.80 2022 11:21 AM         Review of Systems   Constitutional:  Negative for chills and fever. HENT:  Negative for ear pain and nosebleeds. Eyes:  Negative for blurred vision, pain and discharge. Respiratory:  Negative for shortness of breath. Cardiovascular:  Negative for chest pain and leg swelling. Gastrointestinal:  Negative for constipation, diarrhea, nausea and vomiting. Genitourinary:  Negative for frequency. Musculoskeletal:  Positive for back pain and joint pain. Skin:  Negative for itching and rash. Neurological:  Positive for weakness. Negative for headaches. Psychiatric/Behavioral:  Negative for depression. The patient is not nervous/anxious and does not have insomnia. Physical Exam  Vitals and nursing note reviewed. Constitutional:       Appearance: She is well-developed. HENT:      Head: Normocephalic and atraumatic. Eyes:      Conjunctiva/sclera: Conjunctivae normal.      Pupils: Pupils are equal, round, and reactive to light. Neck:      Thyroid: No thyromegaly. Vascular: No JVD. Cardiovascular:      Rate and Rhythm: Normal rate and regular rhythm. Heart sounds: Normal heart sounds. No murmur heard. No friction rub. No gallop. Pulmonary:      Effort: Pulmonary effort is normal. No respiratory distress. Breath sounds: Normal breath sounds. No stridor. No wheezing or rales. Abdominal:      General: Bowel sounds are normal. There is no distension. Palpations: Abdomen is soft. There is no mass. Tenderness: There is no abdominal tenderness. Musculoskeletal:         General: Swelling, tenderness and signs of injury present. Right hand: Swelling and deformity present. Decreased range of motion. Decreased strength. Left hand: Swelling and deformity present. Decreased range of motion. Decreased strength. Lymphadenopathy:      Cervical: No cervical adenopathy. Skin:     Findings: No erythema or rash. Neurological:      Mental Status: She is alert and oriented to person, place, and time. Cranial Nerves: No cranial nerve deficit.       Deep Tendon Reflexes: Reflexes are normal and symmetric. Psychiatric:         Behavior: Behavior normal.       ASSESSMENT and PLAN    ICD-10-CM ICD-9-CM    1. Pain in both hands  M79.641 729.5 XR HAND RT MIN 3 V    M79.642  ABBY COMPREHENSIVE PLUS PANEL      TSH 3RD GENERATION      LIPID PANEL      CBC W/O DIFF      METABOLIC PANEL, COMPREHENSIVE      SED RATE (ESR)      C REACTIVE PROTEIN, QT      REFERRAL TO RHEUMATOLOGY      URIC ACID      URIC ACID      C REACTIVE PROTEIN, QT      SED RATE (ESR)      METABOLIC PANEL, COMPREHENSIVE      CBC W/O DIFF      LIPID PANEL      TSH 3RD GENERATION      ABBY COMPREHENSIVE PLUS PANEL      2. Encounter for completion of form with patient  Z02.89 V68.89       3.  Encounter for administration of COVID-19 vaccine  Z23 V04.89 COVID-19, PFIZER BIVALENT BOOSTER, (AGE 12Y+), IM, 30 MCG/0.3 ML        Secondary to the patient chronic medical conditions,   form was completed, w/ with multiple questions answered to the best knowledge will keep a copy in the chart for further correspondence patient was given signed completed patient was informed about the safety and  regulations regarding this condition patient was also advised to follow-up with HR for further guidelines patient acknowledged understanding and agreed with today's recommendations    lab results and schedule of future lab studies reviewed with patient  reviewed diet, exercise and weight control

## 2022-11-16 NOTE — PROGRESS NOTES
Patient identified with two identification factors, Name and Date of Birth. Chief Complaint   Patient presents with    Other     Pt has a lump visible on right pinky finger. Finger has been swollen over a week. Visit Vitals  BP (!) 160/74 (BP 1 Location: Right arm, BP Patient Position: Sitting, BP Cuff Size: Adult)   Pulse (!) 50   Temp 98.6 °F (37 °C) (Oral)   Resp 15   Ht 5' 3\" (1.6 m)   Wt 118 lb 12.8 oz (53.9 kg)   SpO2 100%   BMI 21.04 kg/m²       Health Maintenance Due   Topic    Eye Exam Retinal or Dilated     MICROALBUMIN Q1     COVID-19 Vaccine (2 - Booster for [place] series)    Foot Exam Q1         1. \"Have you been to the ER, urgent care clinic since your last visit? Hospitalized since your last visit? \" No    2. \"Have you seen or consulted any other health care providers outside of the [address] [place] [address] since your last visit? \" No     3. For patients aged 39-70: Has the patient had a colonoscopy / FIT/ Cologuard? Yes - no Care Gap present      If the patient is female:    4. For patients aged 41-77: Has the patient had a mammogram within the past 2 years? Yes - no Care Gap present      5. For patients aged 21-65: Has the patient had a pap smear?  NA - based on age or sex

## 2022-11-16 NOTE — LETTER
11/25/2022    Ms. Bertin Shell  [address] [facility] [address]      Dear Bertin Shell:    Please find your most recent results below. Recent Results (from the past 672 hour(s))   URIC ACID    Collection Time: 11/17/22 11:13 AM   Result Value Ref Range    Uric acid 11.4 (H) 2.6 - 6.0 MG/DL   C REACTIVE PROTEIN, QT    Collection Time: 11/17/22 11:13 AM   Result Value Ref Range    C-Reactive protein <0.29 0.00 - 0.60 mg/dL   SED RATE (ESR)    Collection Time: 11/17/22 11:13 AM   Result Value Ref Range    Sed rate, automated 35 (H) 0 - 30 mm/hr   METABOLIC PANEL, COMPREHENSIVE    Collection Time: 11/17/22 11:13 AM   Result Value Ref Range    Sodium 137 136 - 145 mmol/L    Potassium 4.0 3.5 - 5.1 mmol/L    Chloride 106 97 - 108 mmol/L    CO2 27 21 - 32 mmol/L    Anion gap 4 (L) 5 - 15 mmol/L    Glucose 101 (H) 65 - 100 mg/dL    BUN 17 6 - 20 MG/DL    Creatinine 0.94 0.55 - 1.02 MG/DL    BUN/Creatinine ratio 18 12 - 20      eGFR >60 >60 ml/min/1.73m2    Calcium 9.9 8.5 - 10.1 MG/DL    Bilirubin, total 0.5 0.2 - 1.0 MG/DL    ALT (SGPT) 35 12 - 78 U/L    AST (SGOT) 38 (H) 15 - 37 U/L    Alk.  phosphatase 100 45 - 117 U/L    Protein, total 7.5 6.4 - 8.2 g/dL    Albumin 4.1 3.5 - 5.0 g/dL    Globulin 3.4 2.0 - 4.0 g/dL    A-G Ratio 1.2 1.1 - 2.2     CBC W/O DIFF    Collection Time: 11/17/22 11:13 AM   Result Value Ref Range    WBC 5.7 3.6 - 11.0 K/uL    RBC 3.22 (L) 3.80 - 5.20 M/uL    HGB 10.6 (L) 11.5 - 16.0 g/dL    HCT 31.2 (L) 35.0 - 47.0 %    MCV 96.9 80.0 - 99.0 FL    MCH 32.9 26.0 - 34.0 PG    MCHC 34.0 30.0 - 36.5 g/dL    RDW 12.2 11.5 - 14.5 %    PLATELET 222 403 - 959 K/uL    MPV 9.0 8.9 - 12.9 FL    NRBC 0.0 0  WBC    ABSOLUTE NRBC 0.00 0.00 - 0.01 K/uL   LIPID PANEL    Collection Time: 11/17/22 11:13 AM   Result Value Ref Range    Cholesterol, total 235 (H) <200 MG/DL    Triglyceride 106 <150 MG/DL    HDL Cholesterol 95 MG/DL    LDL, calculated 118.8 (H) 0 - 100 MG/DL VLDL, calculated 21.2 MG/DL    CHOL/HDL Ratio 2.5 0.0 - 5.0     TSH 3RD GENERATION    Collection Time: 11/17/22 11:13 AM   Result Value Ref Range    TSH 0.95 0.36 - 3.74 uIU/mL   ABBY COMPREHENSIVE PLUS PANEL    Collection Time: 11/17/22 11:13 AM   Result Value Ref Range    Anti-DNA (DS) Ab, QT <1 0 - 9 IU/mL    RNP Abs 1.2 (H) 0.0 - 0.9 AI    Campos Abs <0.2 0.0 - 0.9 AI    Campos/RNP Abs <0.2 0.0 - 0.9 AI    Scleroderma-70 Ab <0.2 0.0 - 0.9 AI    Sjogren's Anti-SS-A <0.2 0.0 - 0.9 AI    Sjogren's Anti-SS-B <0.2 0.0 - 0.9 AI    Antichromatin Ab <0.2 0.0 - 0.9 AI    Anti Ribosomal P Ab <0.2 0.0 - 0.9 AI    Anti-Yudy-1 <0.2 0.0 - 0.9 AI    Centromere B Ab 0.2 0.0 - 0.9 AI    See below Comment           RECOMMENDATIONS:    I am writting to tell you that your lab results are normal except for abnormal autoimmune disease marker for which needs to be evaluated and further managed by rheumatologist.    Keep up the good work! Work on diet and exercise. Continue with current  diet and medications.        Please call me if you have any questions: [phone]    Sincerely,      Blanche Dhillon MD

## 2022-11-17 LAB
ALBUMIN SERPL-MCNC: 4.1 G/DL (ref 3.5–5)
ALBUMIN/GLOB SERPL: 1.2 {RATIO} (ref 1.1–2.2)
ALP SERPL-CCNC: 100 U/L (ref 45–117)
ALT SERPL-CCNC: 35 U/L (ref 12–78)
ANION GAP SERPL CALC-SCNC: 4 MMOL/L (ref 5–15)
AST SERPL-CCNC: 38 U/L (ref 15–37)
BILIRUB SERPL-MCNC: 0.5 MG/DL (ref 0.2–1)
BUN SERPL-MCNC: 17 MG/DL (ref 6–20)
BUN/CREAT SERPL: 18 (ref 12–20)
CALCIUM SERPL-MCNC: 9.9 MG/DL (ref 8.5–10.1)
CHLORIDE SERPL-SCNC: 106 MMOL/L (ref 97–108)
CHOLEST SERPL-MCNC: 235 MG/DL
CO2 SERPL-SCNC: 27 MMOL/L (ref 21–32)
CREAT SERPL-MCNC: 0.94 MG/DL (ref 0.55–1.02)
CRP SERPL-MCNC: <0.29 MG/DL (ref 0–0.6)
ERYTHROCYTE [DISTWIDTH] IN BLOOD BY AUTOMATED COUNT: 12.2 % (ref 11.5–14.5)
ERYTHROCYTE [SEDIMENTATION RATE] IN BLOOD: 35 MM/HR (ref 0–30)
GLOBULIN SER CALC-MCNC: 3.4 G/DL (ref 2–4)
GLUCOSE SERPL-MCNC: 101 MG/DL (ref 65–100)
HCT VFR BLD AUTO: 31.2 % (ref 35–47)
HDLC SERPL-MCNC: 95 MG/DL
HDLC SERPL: 2.5 {RATIO} (ref 0–5)
HGB BLD-MCNC: 10.6 G/DL (ref 11.5–16)
LDLC SERPL CALC-MCNC: 118.8 MG/DL (ref 0–100)
MCH RBC QN AUTO: 32.9 PG (ref 26–34)
MCHC RBC AUTO-ENTMCNC: 34 G/DL (ref 30–36.5)
MCV RBC AUTO: 96.9 FL (ref 80–99)
NRBC # BLD: 0 K/UL (ref 0–0.01)
NRBC BLD-RTO: 0 PER 100 WBC
PLATELET # BLD AUTO: 233 K/UL (ref 150–400)
PMV BLD AUTO: 9 FL (ref 8.9–12.9)
POTASSIUM SERPL-SCNC: 4 MMOL/L (ref 3.5–5.1)
PROT SERPL-MCNC: 7.5 G/DL (ref 6.4–8.2)
RBC # BLD AUTO: 3.22 M/UL (ref 3.8–5.2)
SODIUM SERPL-SCNC: 137 MMOL/L (ref 136–145)
TRIGL SERPL-MCNC: 106 MG/DL (ref ?–150)
TSH SERPL DL<=0.05 MIU/L-ACNC: 0.95 UIU/ML (ref 0.36–3.74)
URATE SERPL-MCNC: 11.4 MG/DL (ref 2.6–6)
VLDLC SERPL CALC-MCNC: 21.2 MG/DL
WBC # BLD AUTO: 5.7 K/UL (ref 3.6–11)

## 2022-11-17 NOTE — PROGRESS NOTES
Chief Complaint   Patient presents with    Other     Pt has a lump visible on right pinky finger. Finger has been swollen over a week. After obtaining consent, and per orders of Dr. Obey Rubalcava, injection of Covid Bivalent  given by Sherran Angelucci. Patient instructed to remain in clinic for 20 minutes afterwards, and to report any adverse reaction to me immediately. 1. \"Have you been to the ER, urgent care clinic since your last visit? Hospitalized since your last visit? \" No    2. \"Have you seen or consulted any other health care providers outside of the [address] [place] [address] since your last visit? \" No     3. For patients aged 39-70: Has the patient had a colonoscopy / FIT/ Cologuard? Yes - no Care Gap present      If the patient is female:    4. For patients aged 41-77: Has the patient had a mammogram within the past 2 years? Yes - no Care Gap present      5. For patients aged 21-65: Has the patient had a pap smear?  Yes - no Care Gap present    Health Maintenance Due   Topic Date Due    Eye Exam Retinal or Dilated  04/20/2019    MICROALBUMIN Q1  10/10/2020    Foot Exam Q1  12/08/2022

## 2022-11-18 LAB
CENTROMERE B AB SER-ACNC: 0.2 AI (ref 0–0.9)
CHROMATIN AB SERPL-ACNC: <0.2 AI (ref 0–0.9)
DSDNA AB SER-ACNC: <1 IU/ML (ref 0–9)
ENA JO1 AB SER-ACNC: <0.2 AI (ref 0–0.9)
ENA RNP AB SER-ACNC: 1.2 AI (ref 0–0.9)
ENA SCL70 AB SER-ACNC: <0.2 AI (ref 0–0.9)
ENA SM AB SER-ACNC: <0.2 AI (ref 0–0.9)
ENA SM+RNP AB SER-ACNC: <0.2 AI (ref 0–0.9)
ENA SS-A AB SER-ACNC: <0.2 AI (ref 0–0.9)
ENA SS-B AB SER-ACNC: <0.2 AI (ref 0–0.9)
RIBOSOMAL P AB SER-ACNC: <0.2 AI (ref 0–0.9)
SEE BELOW:, 164879: ABNORMAL

## 2022-11-29 NOTE — PROGRESS NOTES
Ramila Bauman is a 67 y.o. female here for one year follow up for left breast cancer. She is taking Letrozole. She will complete 5 years in January. States she is doing well. 1. Have you been to the ER, urgent care clinic since your last visit? Hospitalized since your last visit? no    2. Have you seen or consulted any other health care providers outside of the [address] [place] [address] since your last visit? Include any pap smears or colon screening.   no

## 2022-12-01 ENCOUNTER — OFFICE VISIT (OUTPATIENT)
Dept: ONCOLOGY | Age: 72
End: 2022-12-01
Payer: MEDICARE

## 2022-12-01 VITALS
HEART RATE: 75 BPM | HEIGHT: 63 IN | DIASTOLIC BLOOD PRESSURE: 70 MMHG | SYSTOLIC BLOOD PRESSURE: 128 MMHG | WEIGHT: 119.6 LBS | TEMPERATURE: 97.7 F | BODY MASS INDEX: 21.19 KG/M2 | OXYGEN SATURATION: 99 %

## 2022-12-01 DIAGNOSIS — C50.212 MALIGNANT NEOPLASM OF UPPER-INNER QUADRANT OF LEFT BREAST IN FEMALE, ESTROGEN RECEPTOR POSITIVE (HCC): Primary | ICD-10-CM

## 2022-12-01 DIAGNOSIS — Z17.0 MALIGNANT NEOPLASM OF UPPER-INNER QUADRANT OF LEFT BREAST IN FEMALE, ESTROGEN RECEPTOR POSITIVE (HCC): Primary | ICD-10-CM

## 2022-12-01 NOTE — LETTER
12/1/2022    Patient: Nelson Pena   YOB: 1950   Date of Visit: 12/1/2022     Yahaira Carbajal MD  [address] [place]    Dear Yahaira Carbajal MD,      Thank you for referring Ms. Nelson Pena to [address] [place] [address] for evaluation. My notes for this consultation are attached. If you have questions, please do not hesitate to call me. I look forward to following your patient along with you.       Sincerely,    Edith Baca MD

## 2022-12-01 NOTE — PROGRESS NOTES
[address] [facility] [address], [address] [facility], [address] [facility], [address] [place] [address]  [phone]      Oncology Progress note        Patient: Yaneth Heck MRN: [id number]  SSN: [id number]    YOB: 1950  Age: 67 y.o. Sex: female          Diagnosis:     1. Left breast carcinoma:  T1b N0 M0 (Stage IA) infiltrating ductal carcinoma, Tumor size 1.0 cm, LN -ve, grade 1, %, WA 70%, Her 2 -ve  Dx: 6/22/2017    Treatment:     1. S/p lumpectomy on 6/22/2017  2. Adjuvant radiation completed on 1/5/2018  3. Radiation Dr. Jonathan Ley completed 1/10/2018  3. Letrozole - 1/12/2018 - 01/2023    Subjective:      Yaneth Heck is a 67 y.o. female who I am seeing for a diagnosis of left sided invasive ductal carcinoma of the breast. She has been referred by Dr. Gordon Burnett. She underwent a screening mammogram on 3/27/2017 which demonstrated a density in the medial aspect of the left breast. She underwent dx mammogram and US demonstrating a 1.0 x 0.9 x 0.8 cm at 9:00, 2 cm medial to the nipple in the left breast.  US core bx demonstrated a G1 IDC ER pos WA pos Her2/jud unamplified tumor. She underwent a lumpectomy on 06/22/2017. She was also noted to have a JUDSON FDG avid lung mass. She is s/p left upper lobectomy by Dr. Abena Villegas at [facility]. She completed adjuvant breast radiation. She has been taking Letrozole without any difficulty. She is doing well and denies any new symptoms.          Review of Systems:    Constitutional: negative  Eyes: negative  Ears, Nose, Mouth, Throat, and Face: negative  Respiratory: negative  Cardiovascular: negative  Gastrointestinal: negative  Genitourinary:negative  Integument/Breast: negative  Hematologic/Lymphatic: negative  Musculoskeletal:negative  Neurological: negative    Review of systems was reviewed and updated as needed on 12/01/22     Past Medical History:   Diagnosis Date    Arthritis Breast cancer ([facility] 2017    lumpectomy left breast, radiation; lung    Cancer ([facility]     breast cancer- left    Cerebrovascular accident (stroke) ([facility]     no deficiets    CVA (cerebral vascular accident) ([facility]     2018    Diabetes mellitus type 2, controlled ([facility]     no longer medicated    Gallstones     asymptomatic, pt denies having    GERD (gastroesophageal reflux disease)     dx with resolution of symptoms on ppi- pt denies having    Hypercholesterolemia     Hypertension     Ill-defined condition     gout    Long term current use of anticoagulant therapy     Non-nicotine vapor product user     used to use    PAD (peripheral artery disease) ([facility] 2013    right SFA angioplasty and athrectomy    Primary open angle glaucoma     Radiation therapy complication     Solitary lung nodule 2017    Type II diabetes mellitus, uncontrolled 2017     Past Surgical History:   Procedure Laterality Date    COLONOSCOPY N/A 2020    COLONOSCOPY, performed by Shereen Ruffin MD at [address] [facility] [address]  2020         HX BREAST BIOPSY Left 2017    HX BREAST LUMPECTOMY Left 2017    LEFT LUMPECTOMY WITH INTRAOPERATIVE ULTRASOUND GUIDED, LEFT AXILLARY SENTINEL LYMPH NODE BIOPSY, POSSIBLE AXILLARY DISECTION performed by Kai Sousa MD at [facility] MAIN OR    HX HEENT Bilateral     eye lids surgery    CT CHEST SURGERY PROCEDURE UNLISTED Left     VASCULAR SURGERY PROCEDURE UNLIST  2016    right leg stent      Family History   Problem Relation Age of Onset    Hypertension Mother     Diabetes Mother     Diabetes Son      Social History     Tobacco Use    Smoking status: Former     Packs/day: 0.25     Years: 47.00     Pack years: 11.75     Types: Cigarettes     Start date: 1966     Quit date: 3/13/2013     Years since quittin.7    Smokeless tobacco: Former    Tobacco comments:     estimate is one pack per week/ used to vape   Substance Use Topics    Alcohol use:  Yes Alcohol/week: 21.0 standard drinks     Types: 21 Cans of beer per week     Comment: 2 to 3 beers per day per patient      Prior to Admission medications    Medication Sig Start Date End Date Taking? Authorizing Provider   prednisoLONE acetate (PRED FORTE) 1 % ophthalmic suspension SHAKE LIQUID AND INSTILL 1 DROP IN AFFECTED EYE FOUR TIMES DAILY AFTER SURGERY AS DIRECTED 10/24/22  Yes Provider, Historical   methylPREDNISolone (MEDROL DOSEPACK) 4 mg tablet As directed  6 days 11/16/22  Yes James Ortiz MD   letrozole (FEMARA) 2.5 mg tablet TAKE 1 TABLET BY MOUTH ONCE DAILY 10/17/22  Yes Li Melgar NP   clopidogreL (PLAVIX) 75 mg tab Take 1 Tablet by mouth daily. 9/16/22  Yes James Ortiz MD   hydroCHLOROthiazide (HYDRODIURIL) 12.5 mg tablet Take 1 Tab by mouth daily. Patient taking differently: Take 12.5 mg by mouth as needed. 5/18/21  Yes James Ortiz MD   alcohol swabs (BD SINGLE USE SWABS REGULAR) padm Use to sanitize the affected area prior to application of medication as indicated. 3/2/20  Yes James Ortiz MD   aspirin 81 mg chewable tablet Take 1 Tab by mouth daily. 11/17/18  Yes Nav Vera MD   acetaminophen (TYLENOL) 500 mg tablet Take 2 Tabs by mouth every six (6) hours as needed for Pain. Indications: Pain 10/15/18  Yes James Ortiz MD   timolol (TIMOPTIC) 0.5 % ophthalmic solution Administer  to both eyes two (2) times a day. 9/4/18  Yes Provider, Historical   dorzolamide (TRUSOPT) 2 % ophthalmic solution instill 1 drop into both eyes twice a day 10/9/17  Yes Provider, Historical   latanoprost (XALATAN) 0.005 % ophthalmic solution Administer 1 Drop to both eyes nightly. 5/17/16  Yes Concepcion Jacobsen MD   brimonidine (ALPHAGAN) 0.15 % ophthalmic solution Administer 1 Drop to both eyes two (2) times a day.  10/26/15  Yes Concepcion Jacobsen MD          Allergies   Allergen Reactions    Statins-Hmg-Coa Reductase Inhibitors Myalgia           Objective:     Visit Vitals  /70 (BP 1 Location: Left upper arm, BP Patient Position: Sitting)   Pulse 75   Temp 97.7 °F (36.5 °C) (Temporal)   Ht 5' 3\" (1.6 m)   Wt 119 lb 9.6 oz (54.3 kg)   SpO2 99%   BMI 21.19 kg/m²       Physical Exam:     GENERAL: alert, cooperative, appears stated age  EYE: negative  LYMPHATIC: Cervical, supraclavicular, and axillary nodes normal.   THROAT & NECK: normal and no erythema or exudates noted. LUNG: clear to auscultation bilaterally  HEART: regular rate and rhythm  ABDOMEN: soft, non-tender  EXTREMITIES:  no edema  BREAST: left scar inner upper quadrant   SKIN: Normal.  NEUROLOGIC: negative    The above physical exam was reviewed and updated as needed on 12/01/22. Assessment:     1. Left breast carcinoma:  T1b N0 M0 (Stage IA) infiltrating ductal carcinoma, Tumor size 1.0 cm, LN -ve, grade 1, %, WA 70%, Her 2 -ve  Dx: 6/22/2017    ECOG PS 0  Intent of treatment - curative    S/P left sided lumpectomy and sentinel LN excision. Completed adjuvant radiation to the left breast on 1/5/2018     Letrozole 2.5 mg daily - started 1/12/2018 - finish 01/2023  Tolerating well  No side effects attributed to AI  In remission    Mammogram (11/2022): normal     Symptom management form reviewed with patient. 2. Lung cancer    S/p left upper lob[facility] from [facility] Isidro Corona at [facility]      Plan:       > Continue Letrozole - complete 5 yrs in Jan 2023 and then stop      Signed by: Miguel Batista MD                     December 1, 2022        CC. Kenna Renae MD  CC.  MD Alcides Moreno MD Benjie Cedar, MD

## 2022-12-20 DIAGNOSIS — Z86.73 HISTORY OF STROKE: ICD-10-CM

## 2022-12-20 NOTE — TELEPHONE ENCOUNTER
New mailorder pharmacy, [facility] [id number], is requesting a new Rx. Last Visit: 11/16/22 with MD Saul Benítez  Next Appointment: none    Requested Prescriptions     Pending Prescriptions Disp Refills    clopidogreL (PLAVIX) 75 mg tab 90 Tablet 3     Sig: Take 1 Tablet by mouth daily. For Pharmacy Admin Tracking Only    Program: Medication Refill  CPA in place:   Recommendation Provided To:    Intervention Detail: New Rx: 1, reason: Patient Preference  Gap Closed?:   Intervention Accepted By:   Time Spent (min): 5

## 2022-12-22 RX ORDER — CLOPIDOGREL BISULFATE 75 MG/1
75 TABLET ORAL DAILY
Qty: 90 TABLET | Refills: 3 | Status: SHIPPED | OUTPATIENT
Start: 2022-12-22

## 2023-01-06 DIAGNOSIS — Z86.73 HISTORY OF STROKE: ICD-10-CM

## 2023-01-06 RX ORDER — CLOPIDOGREL BISULFATE 75 MG/1
75 TABLET ORAL DAILY
Qty: 90 TABLET | Refills: 3 | Status: SHIPPED | OUTPATIENT
Start: 2023-01-06

## 2023-01-06 NOTE — TELEPHONE ENCOUNTER
Patient would like this sent to [facility]. Previous Rx was sent to [facility]. Last Visit: 11/16/22 with MD Satish Alvarez  Next Appointment: none    Requested Prescriptions     Pending Prescriptions Disp Refills    clopidogreL (PLAVIX) 75 mg tab 90 Tablet 3     Sig: Take 1 Tablet by mouth daily. For Pharmacy Admin Tracking Only    Program: Medication Refill  CPA in place:   Recommendation Provided To:    Intervention Detail: New Rx: 1, reason: Patient Preference  Intervention Accepted By:   Alexandra Sorenson Closed?:   Time Spent (min): 5

## 2023-01-19 ENCOUNTER — OFFICE VISIT (OUTPATIENT)
Dept: FAMILY MEDICINE CLINIC | Age: 73
End: 2023-01-19
Payer: MEDICARE

## 2023-01-19 VITALS
DIASTOLIC BLOOD PRESSURE: 84 MMHG | TEMPERATURE: 98.2 F | WEIGHT: 118 LBS | SYSTOLIC BLOOD PRESSURE: 135 MMHG | BODY MASS INDEX: 20.91 KG/M2 | RESPIRATION RATE: 17 BRPM | OXYGEN SATURATION: 98 % | HEART RATE: 66 BPM | HEIGHT: 63 IN

## 2023-01-19 DIAGNOSIS — S99.922S FOOT INJURY, LEFT, SEQUELA: ICD-10-CM

## 2023-01-19 DIAGNOSIS — M25.441 FINGER JOINT SWELLING, RIGHT: Primary | ICD-10-CM

## 2023-01-19 DIAGNOSIS — C50.212 MALIGNANT NEOPLASM OF UPPER-INNER QUADRANT OF LEFT BREAST IN FEMALE, ESTROGEN RECEPTOR POSITIVE (HCC): ICD-10-CM

## 2023-01-19 DIAGNOSIS — E11.21 TYPE 2 DIABETES WITH NEPHROPATHY (HCC): ICD-10-CM

## 2023-01-19 DIAGNOSIS — I73.9 PAD (PERIPHERAL ARTERY DISEASE) (HCC): ICD-10-CM

## 2023-01-19 DIAGNOSIS — Z17.0 MALIGNANT NEOPLASM OF UPPER-INNER QUADRANT OF LEFT BREAST IN FEMALE, ESTROGEN RECEPTOR POSITIVE (HCC): ICD-10-CM

## 2023-01-19 DIAGNOSIS — S99.921S INJURY OF RIGHT FOOT, SEQUELA: ICD-10-CM

## 2023-01-19 DIAGNOSIS — S99.921S FOOT INJURY, RIGHT, SEQUELA: ICD-10-CM

## 2023-01-19 RX ORDER — METHYLPREDNISOLONE 4 MG/1
TABLET ORAL
Qty: 1 DOSE PACK | Refills: 0 | Status: SHIPPED | OUTPATIENT
Start: 2023-01-19

## 2023-01-19 RX ORDER — DICLOFENAC SODIUM 75 MG/1
75 TABLET, DELAYED RELEASE ORAL 2 TIMES DAILY
Qty: 18 TABLET | Refills: 0 | Status: SHIPPED | OUTPATIENT
Start: 2023-01-19

## 2023-01-19 NOTE — PROGRESS NOTES
Identified pt with two pt identifiers(name and ). Reviewed record in preparation for visit and have obtained necessary documentation. Chief Complaint   Patient presents with    Foot Swelling     Right foot swollen and painful         Vitals:    23 1542   BP: 135/84   Pulse: 66   Resp: 17   Temp: 98.2 °F (36.8 °C)   TempSrc: Oral   SpO2: 98%   Weight: 118 lb (53.5 kg)   Height: 5' 3\" (1.6 m)       Health Maintenance Due   Topic    Eye Exam Retinal or Dilated     Diabetic Alb to Cr ratio (uACR) test     Foot Exam Q1        Coordination of Care Questionnaire:  :   1) Have you been to an emergency room, urgent care, or hospitalized since your last visit? If yes, where when, and reason for visit? no       2. Have seen or consulted any other health care provider since your last visit? If yes, where when, and reason for visit? NO      Patient is accompanied by self I have received verbal consent from Leida Schroeder to discuss any/all medical information while they are present in the room.

## 2023-01-28 PROBLEM — I73.9 PERIPHERAL VASCULAR DISEASE (HCC): Status: ACTIVE | Noted: 2023-01-28

## 2023-01-28 NOTE — PROGRESS NOTES
Matthias Lindsey (: 1950) is a 67 y.o. female, established patient, here for evaluation of the following chief complaint(s): Foot Swelling (Right foot swollen and painful )      Rt foot pain  Has been bothering the patient for few months, Has no history of being flat-footed patient does have history of long periods of standing and walking on hard concrete floor, currently has no calluses or plantar warts, unfortunately patient has abnormal BMI the pain is worsened with walking more than 1-2 blocks, and going up and down the steps and worsening since onset. Patient states that it is a dull nonradiating no numbness ++++ tingling sensation disabling, morning stiffness which gets better with activity and with the severity of 8/10. So for patient denies any history of extremity trauma, and has no leg swelling no skin lesion noted. Constitutional: no chills and fever,  , nad     HENT: no ear pain or nosebleeds. No blurred vision    Respiratory: no shortness of breath, wheezing cough     Cardiovascular: Has no chest pain, ,and racing heart . Gastrointestinal: No constipation, diarrhea, nausea and vomiting. Genitourinary: No frequency. Musculoskeletal:  ++ for joint pain. Skin: no itching, no rash. Neurological: Negative for dizziness, no tremors  Psychiatric/Behavioral: Negative for depression   is not nervous/anxious. and not depressed the patient is not nervous/anxious.         General:  alert cooperative appears stated for the age  [de-identified]; normocephalic and atraumatic PERRLA extraocular motor intact normal tympanic membrane normal external ear bilaterally, mucosal normal no drainage  Neck: supple no adenopathy no JVD no bruit  Lungs: Clear to auscultation bilaterally no rales rhonchi or wheezes  Heart: Normal regular S1-S2 ,  no murmur  Breast exam deferred  Abdomen: Soft nontender normal bowel sounds   Extremities: Abnormal range of motion ++ point tenderness normal pulses no edema  Pelvic/: No lesion, no lymphadenopathy  Skin: Normal no lesion no rash      ASSESSMENT/PLAN:  Below is the assessment and plan developed based on review of pertinent history, physical exam, labs, studies, and medications. 1. Finger joint swelling, right  -     REFERRAL TO ORTHOPEDICS  -     methylPREDNISolone (MEDROL DOSEPACK) 4 mg tablet; For six days, Normal, Disp-1 Dose Pack, R-0  -     diclofenac EC (VOLTAREN) 75 mg EC tablet; Take 1 Tablet by mouth two (2) times a day., Normal, Disp-18 Tablet, R-0  2. Type 2 diabetes with nephropathy ([facility]  Assessment & Plan:   well controlled, continue current medications, continue current treatment plan  3. PAD (peripheral artery disease) ([facility].)  Assessment & Plan:   well controlled, continue current medications, continue current treatment plan  4. Malignant neoplasm of upper-inner quadrant of left breast in female, estrogen receptor positive ([facility].)  Assessment & Plan:   monitored by specialist. No acute findings meriting change in the plan  5. Foot injury, left, sequela  -     XR FOOT RT MIN 3 V; Future  -     methylPREDNISolone (MEDROL DOSEPACK) 4 mg tablet; For six days, Normal, Disp-1 Dose Pack, R-0  -     diclofenac EC (VOLTAREN) 75 mg EC tablet; Take 1 Tablet by mouth two (2) times a day., Normal, Disp-18 Tablet, R-0  6. Injury of right foot, sequela  -     XR FOOT RT MIN 3 V; Future  -     methylPREDNISolone (MEDROL DOSEPACK) 4 mg tablet; For six days, Normal, Disp-1 Dose Pack, R-0  -     diclofenac EC (VOLTAREN) 75 mg EC tablet; Take 1 Tablet by mouth two (2) times a day., Normal, Disp-18 Tablet, R-0  7. Foot injury, right, sequela  -     XR FOOT RT MIN 3 V; Future  -     methylPREDNISolone (MEDROL DOSEPACK) 4 mg tablet; For six days, Normal, Disp-1 Dose Pack, R-0  -     diclofenac EC (VOLTAREN) 75 mg EC tablet; Take 1 Tablet by mouth two (2) times a day., Normal, Disp-18 Tablet, R-0      Return if symptoms worsen or fail to improve.         An electronic signature was used to authenticate this note.   -- Dinesh Baker MD

## 2023-03-21 NOTE — PERIOP NOTES
Person named Danielle Becerra called and lvm stating that she is patients sister and is trying to find out what time patients surgery is scheduled for. Left phone # of [phone]. Called Lvm for rc to asu pat. There is a Danielle Becerra listed under discharge caregiver but patient also has a active healthcare decision maker in her chart of Hay Gonzalez. Have tried to reach out three times to patients listed phone numbers someone picks up the phone and you can hear sounds but no one answers or acknowledges. There is also a note under patients contact information in CC under comments under contact information that states that patient has lost her phone and to please contact Danielle Becerra to give surg info.

## 2023-03-22 ENCOUNTER — ANESTHESIA EVENT (OUTPATIENT)
Dept: SURGERY | Age: 73
End: 2023-03-22
Payer: MEDICARE

## 2023-03-22 RX ORDER — ASCORBIC ACID 500 MG
500 TABLET ORAL DAILY
COMMUNITY

## 2023-03-22 NOTE — PERIOP NOTES
[facility]  Ambulatory Surgery Unit  Pre-operative Instructions    Surgery/Procedure Date  Thursday 3/23/2023            Tentative Arrival Time 9:00 am       1. On the day of your surgery/procedure, please report to the Ambulatory Surgery Unit Registration Desk and sign in at your designated time. The Ambulatory Surgery Unit is located in [facility] on the [facility] side of the [facility] across from the [address] [place] [address]. Please have all of your health insurance cards, co-payment, and a photo ID.    **TWO adults may accompany you the day of the procedure. We have limited seating available. If our waiting room is at capacity, your ride may be asked to remain in their vehicle. No one under 15 is allowed in the waiting room. 2. You must have someone with you to drive you home, as you should not drive a car for 24 hours following anesthesia. Please make arrangements for a responsible adult friend or family member to stay with you for at least the first 24 hours after your surgery. 3. Do not have anything to eat or drink (including water, gum, mints, coffee, juice) after 11:59 PM on Wednesday 3/22 . This may not apply to medications prescribed by your physician. (Please note below the special instructions with medications to take the morning of surgery, if applicable.)    4. We recommend you do not drink any alcoholic beverages for 24 hours before and after your surgery. 5. Contact your surgeons office for instructions on the following medications: non-steroidal anti-inflammatory drugs (i.e. Advil, Aleve), vitamins, and supplements. (Some surgeons will want you to stop these medications prior to surgery and others may allow you to take them)   **If you are currently taking Plavix, Coumadin, Aspirin and/or other blood-thinning agents, contact your surgeon for instructions. ** Your surgeon will partner with the physician prescribing these medications to determine if it is safe to stop or if you need to continue taking. Please do not stop taking these medications without instructions from your surgeon. 6. In an effort to help prevent surgical site infection, we ask that you shower with an anti-bacterial soap (i.e. Dial/Safeguard, or the soap provided to you at your preadmission testing appointment) for 3 days prior to and on the morning of surgery, using a fresh towel after each shower. (Please begin this process with fresh bed linens.) Do not apply any lotions, powders, or deodorants after the shower on the day of your procedure. If applicable, please do not shave the operative site for 48 hours prior to surgery. 7. Wear comfortable clothes. Wear glasses instead of contacts. Do not bring any jewelry or money (other than copays or fees as instructed). Do not wear make-up, particularly mascara, the morning of your surgery. Do not wear nail polish, particularly if you are having foot /hand surgery. Wear your hair loose or down, no ponytails, buns, terrance pins or clips. All body piercings must be removed. 8. You should understand that if you do not follow these instructions your surgery may be cancelled. If your physical condition changes (i.e. fever, cold or flu) please contact your surgeon as soon as possible. 9. It is important that you be on time. If a situation occurs where you may be late, or if you have any questions or problems, please call [phone].    10. Your surgery time may be subject to change. You will receive a phone call the day prior to surgery to confirm your arrival time. 11. Pediatric patients: please bring a change of clothes, diapers, bottle/sippy cup, pacifier, etc.      Special Instructions:     Take all medications and inhalers, as prescribed, on the morning of surgery with a sip of water EXCEPT: per pt Dr Mamta White notified pt to stop plavix three days prior to surgery which pt states she has done      Insulin Dependent Diabetic patients: Take your diabetic medications as prescribed the day before surgery. Hold all diabetic medications the day of surgery. If you are scheduled to arrive for surgery after 8:00 AM, and your AM blood sugar is >200, please call Ambulatory Surgery. I understand a pre-operative phone call will be made to verify my surgery time. In the event that I am not available, I give permission for a message to be left on my answering service and/or with another person?       Yes    Reviewed instructions via telephone, pt verbalized understanding         ___________________      ___________________      ________________  (Signature of Patient)          (Witness)                   (Date and Time)

## 2023-03-23 ENCOUNTER — HOSPITAL ENCOUNTER (OUTPATIENT)
Age: 73
Setting detail: OUTPATIENT SURGERY
Discharge: HOME | End: 2023-03-23
Attending: ORTHOPAEDIC SURGERY | Admitting: ORTHOPAEDIC SURGERY
Payer: MEDICARE

## 2023-03-23 ENCOUNTER — ANESTHESIA (OUTPATIENT)
Dept: SURGERY | Age: 73
End: 2023-03-23
Payer: MEDICARE

## 2023-03-23 VITALS
SYSTOLIC BLOOD PRESSURE: 122 MMHG | RESPIRATION RATE: 12 BRPM | BODY MASS INDEX: 21.62 KG/M2 | WEIGHT: 122 LBS | DIASTOLIC BLOOD PRESSURE: 51 MMHG | OXYGEN SATURATION: 99 % | HEIGHT: 63 IN | HEART RATE: 72 BPM | TEMPERATURE: 97.9 F

## 2023-03-23 DIAGNOSIS — G89.18 POSTOPERATIVE PAIN OF EXTREMITY: Primary | ICD-10-CM

## 2023-03-23 DIAGNOSIS — M79.609 POSTOPERATIVE PAIN OF EXTREMITY: Primary | ICD-10-CM

## 2023-03-23 PROCEDURE — 74011250636 HC RX REV CODE- 250/636: Performed by: ANESTHESIOLOGY

## 2023-03-23 PROCEDURE — 77030040922 HC BLNKT HYPOTHRM STRY -A: Performed by: ORTHOPAEDIC SURGERY

## 2023-03-23 PROCEDURE — 74011000250 HC RX REV CODE- 250: Performed by: ORTHOPAEDIC SURGERY

## 2023-03-23 PROCEDURE — 88305 TISSUE EXAM BY PATHOLOGIST: CPT

## 2023-03-23 PROCEDURE — 77030040361 HC SLV COMPR DVT MDII -B: Performed by: ORTHOPAEDIC SURGERY

## 2023-03-23 PROCEDURE — 76030000000 HC AMB SURG OR TIME 0.5 TO 1: Performed by: ORTHOPAEDIC SURGERY

## 2023-03-23 PROCEDURE — 74011250636 HC RX REV CODE- 250/636: Performed by: ORTHOPAEDIC SURGERY

## 2023-03-23 PROCEDURE — 76060000061 HC AMB SURG ANES 0.5 TO 1 HR: Performed by: ORTHOPAEDIC SURGERY

## 2023-03-23 PROCEDURE — 77030040356 HC CORD BPLR FRCP COVD -A: Performed by: ORTHOPAEDIC SURGERY

## 2023-03-23 PROCEDURE — A4565 SLINGS: HCPCS | Performed by: ORTHOPAEDIC SURGERY

## 2023-03-23 PROCEDURE — 74011250636 HC RX REV CODE- 250/636: Performed by: NURSE ANESTHETIST, CERTIFIED REGISTERED

## 2023-03-23 PROCEDURE — 76210000050 HC AMBSU PH II REC 0.5 TO 1 HR: Performed by: ORTHOPAEDIC SURGERY

## 2023-03-23 PROCEDURE — 2709999900 HC NON-CHARGEABLE SUPPLY: Performed by: ORTHOPAEDIC SURGERY

## 2023-03-23 PROCEDURE — 77030031139 HC SUT VCRL2 J&J -A: Performed by: ORTHOPAEDIC SURGERY

## 2023-03-23 PROCEDURE — 76210000034 HC AMBSU PH I REC 0.5 TO 1 HR: Performed by: ORTHOPAEDIC SURGERY

## 2023-03-23 RX ORDER — HYDROCODONE BITARTRATE AND ACETAMINOPHEN 5; 325 MG/1; MG/1
1 TABLET ORAL
Qty: 12 TABLET | Refills: 0 | Status: SHIPPED | OUTPATIENT
Start: 2023-03-23 | End: 2023-03-26

## 2023-03-23 RX ORDER — LIDOCAINE HYDROCHLORIDE AND EPINEPHRINE 20; 10 MG/ML; UG/ML
INJECTION, SOLUTION INFILTRATION; PERINEURAL AS NEEDED
Status: DISCONTINUED | OUTPATIENT
Start: 2023-03-23 | End: 2023-03-23 | Stop reason: HOSPADM

## 2023-03-23 RX ORDER — CEFAZOLIN SODIUM 1 G/3ML
INJECTION, POWDER, FOR SOLUTION INTRAMUSCULAR; INTRAVENOUS
Status: DISCONTINUED
Start: 2023-03-23 | End: 2023-03-23 | Stop reason: HOSPADM

## 2023-03-23 RX ORDER — PROPOFOL 10 MG/ML
INJECTION, EMULSION INTRAVENOUS
Status: DISCONTINUED | OUTPATIENT
Start: 2023-03-23 | End: 2023-03-23 | Stop reason: HOSPADM

## 2023-03-23 RX ORDER — DIPHENHYDRAMINE HYDROCHLORIDE 50 MG/ML
12.5 INJECTION, SOLUTION INTRAMUSCULAR; INTRAVENOUS AS NEEDED
Status: DISCONTINUED | OUTPATIENT
Start: 2023-03-23 | End: 2023-03-23 | Stop reason: HOSPADM

## 2023-03-23 RX ORDER — SODIUM CHLORIDE 0.9 % (FLUSH) 0.9 %
5-40 SYRINGE (ML) INJECTION EVERY 8 HOURS
Status: DISCONTINUED | OUTPATIENT
Start: 2023-03-23 | End: 2023-03-23 | Stop reason: HOSPADM

## 2023-03-23 RX ORDER — DEXAMETHASONE SODIUM PHOSPHATE 4 MG/ML
INJECTION, SOLUTION INTRA-ARTICULAR; INTRALESIONAL; INTRAMUSCULAR; INTRAVENOUS; SOFT TISSUE AS NEEDED
Status: DISCONTINUED | OUTPATIENT
Start: 2023-03-23 | End: 2023-03-23 | Stop reason: HOSPADM

## 2023-03-23 RX ORDER — FENTANYL CITRATE 50 UG/ML
25 INJECTION, SOLUTION INTRAMUSCULAR; INTRAVENOUS
Status: DISCONTINUED | OUTPATIENT
Start: 2023-03-23 | End: 2023-03-23 | Stop reason: HOSPADM

## 2023-03-23 RX ORDER — WATER FOR INJECTION,STERILE
VIAL (ML) INJECTION
Status: DISCONTINUED
Start: 2023-03-23 | End: 2023-03-23 | Stop reason: HOSPADM

## 2023-03-23 RX ORDER — ONDANSETRON 2 MG/ML
INJECTION INTRAMUSCULAR; INTRAVENOUS AS NEEDED
Status: DISCONTINUED | OUTPATIENT
Start: 2023-03-23 | End: 2023-03-23 | Stop reason: HOSPADM

## 2023-03-23 RX ORDER — SODIUM CHLORIDE 0.9 % (FLUSH) 0.9 %
5-40 SYRINGE (ML) INJECTION AS NEEDED
Status: DISCONTINUED | OUTPATIENT
Start: 2023-03-23 | End: 2023-03-23 | Stop reason: HOSPADM

## 2023-03-23 RX ORDER — FENTANYL CITRATE 50 UG/ML
INJECTION, SOLUTION INTRAMUSCULAR; INTRAVENOUS AS NEEDED
Status: DISCONTINUED | OUTPATIENT
Start: 2023-03-23 | End: 2023-03-23 | Stop reason: HOSPADM

## 2023-03-23 RX ORDER — LIDOCAINE HYDROCHLORIDE 10 MG/ML
0.1 INJECTION, SOLUTION EPIDURAL; INFILTRATION; INTRACAUDAL; PERINEURAL AS NEEDED
Status: DISCONTINUED | OUTPATIENT
Start: 2023-03-23 | End: 2023-03-23 | Stop reason: HOSPADM

## 2023-03-23 RX ORDER — OXYCODONE AND ACETAMINOPHEN 5; 325 MG/1; MG/1
1 TABLET ORAL
Status: DISCONTINUED | OUTPATIENT
Start: 2023-03-23 | End: 2023-03-23 | Stop reason: HOSPADM

## 2023-03-23 RX ORDER — MIDAZOLAM HYDROCHLORIDE 1 MG/ML
INJECTION, SOLUTION INTRAMUSCULAR; INTRAVENOUS AS NEEDED
Status: DISCONTINUED | OUTPATIENT
Start: 2023-03-23 | End: 2023-03-23 | Stop reason: HOSPADM

## 2023-03-23 RX ORDER — ONDANSETRON 2 MG/ML
4 INJECTION INTRAMUSCULAR; INTRAVENOUS AS NEEDED
Status: DISCONTINUED | OUTPATIENT
Start: 2023-03-23 | End: 2023-03-23 | Stop reason: HOSPADM

## 2023-03-23 RX ORDER — SODIUM CHLORIDE, SODIUM LACTATE, POTASSIUM CHLORIDE, CALCIUM CHLORIDE 600; 310; 30; 20 MG/100ML; MG/100ML; MG/100ML; MG/100ML
25 INJECTION, SOLUTION INTRAVENOUS CONTINUOUS
Status: DISCONTINUED | OUTPATIENT
Start: 2023-03-23 | End: 2023-03-23 | Stop reason: HOSPADM

## 2023-03-23 RX ADMIN — PROPOFOL 50 MCG/KG/MIN: 10 INJECTION, EMULSION INTRAVENOUS at 10:55

## 2023-03-23 RX ADMIN — SODIUM CHLORIDE, POTASSIUM CHLORIDE, SODIUM LACTATE AND CALCIUM CHLORIDE 25 ML/HR: 600; 310; 30; 20 INJECTION, SOLUTION INTRAVENOUS at 09:53

## 2023-03-23 RX ADMIN — FENTANYL CITRATE 50 MCG: 50 INJECTION, SOLUTION INTRAMUSCULAR; INTRAVENOUS at 11:22

## 2023-03-23 RX ADMIN — WATER 2 G: 1 INJECTION INTRAMUSCULAR; INTRAVENOUS; SUBCUTANEOUS at 10:57

## 2023-03-23 RX ADMIN — MIDAZOLAM HYDROCHLORIDE 2 MG: 1 INJECTION, SOLUTION INTRAMUSCULAR; INTRAVENOUS at 10:52

## 2023-03-23 RX ADMIN — DEXAMETHASONE SODIUM PHOSPHATE 4 MG: 4 INJECTION, SOLUTION INTRAMUSCULAR; INTRAVENOUS at 11:04

## 2023-03-23 RX ADMIN — FENTANYL CITRATE 50 MCG: 50 INJECTION, SOLUTION INTRAMUSCULAR; INTRAVENOUS at 10:53

## 2023-03-23 RX ADMIN — SODIUM CHLORIDE, POTASSIUM CHLORIDE, SODIUM LACTATE AND CALCIUM CHLORIDE: 600; 310; 30; 20 INJECTION, SOLUTION INTRAVENOUS at 10:51

## 2023-03-23 RX ADMIN — ONDANSETRON HYDROCHLORIDE 4 MG: 2 INJECTION, SOLUTION INTRAMUSCULAR; INTRAVENOUS at 11:30

## 2023-03-23 NOTE — ANESTHESIA PREPROCEDURE EVALUATION
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 Cardiovascular    Hypertension: well controlled          PAD and hyperlipidemia    Exercise tolerance: >4 METS  Comments: Bilateral carotid artery stenosis (pt unaware)    Right SFA angioplasty      GI/Hepatic/Renal  Within defined limits             Comments:  Endo/Other        Arthritis, cancer (L breast CA) and anemia     Other Findings   Comments: Gout tophus, right little finger    open angle glaucoma     ETOH abuse: 21/week         Physical Exam    Airway  Mallampati: III  TM Distance: 4 - 6 cm  Neck ROM: normal range of motion   Mouth opening: Normal     Cardiovascular    Rhythm: regular  Rate: normal         Dental    Dentition: Poor dentition  Comments: Few remaining teeth; none loose   Pulmonary  Breath sounds clear to auscultation               Abdominal  GI exam deferred       Other Findings            Anesthetic Plan    ASA: 3  Anesthesia type: MAC          Induction: Intravenous  Anesthetic plan and risks discussed with: Patient

## 2023-03-23 NOTE — BRIEF OP NOTE
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   (2 cm mass)    Procedure(s):  EXCISION MASS 2 CM DORSAL RIGHT LITTLE FINGER    Surgeon(s):  Rebecca Easton MD    Surgical Assistant: None    Anesthesia: MAC     Estimated Blood Loss (mL): Minimal    Complications: None    Specimens:   ID Type Source Tests Collected by Time Destination   1 : Mass, Right Little Finger Preservative Finger  Rebecca Easton MD 3/23/2023 1122 Pathology        Implants: * No implants in log *    Drains: * No LDAs found *    Findings: As above    Electronically Signed by Sissy Mcgee MD on 3/23/2023 at 11:57 AM

## 2023-03-23 NOTE — PERIOP NOTES
Air Warming blanket placed on pt; turned on for comfort     Permission received to review discharge instructions and discuss private health information with sister in law  and will have someone with them after discharge

## 2023-03-23 NOTE — PERIOP NOTES
Pt. Alert. Denies pain or chill. Discharge instructions reviewed with caregiver and patient. Allowed and answered questions. Tolerating PO fluids. Both state ready for discharge. [phone] discharged to car after assisting to BR with RUE in sling.  given instructions and states understanding. Pt has cane and dentures and glasses on.

## 2023-03-23 NOTE — ANESTHESIA POSTPROCEDURE EVALUATION
Procedure(s):  EXCISION MASS 3CM DORSAL RIGHT LITTLE FINGER.     MAC    Anesthesia Post Evaluation      Multimodal analgesia: multimodal analgesia used between 6 hours prior to anesthesia start to PACU discharge  Patient location during evaluation: PACU  Patient participation: complete - patient participated  Level of consciousness: sleepy but conscious and responsive to verbal stimuli  Pain score: 0  Airway patency: patent  Anesthetic complications: no  Cardiovascular status: acceptable  Respiratory status: acceptable  Hydration status: acceptable  Post anesthesia nausea and vomiting:  none  Final Post Anesthesia Temperature Assessment:  Normothermia (36.0-37.5 degrees C)      INITIAL Post-op Vital signs:   Vitals Value Taken Time   /62 03/23/23 1201   Temp 36.3 °C (97.4 °F) 03/23/23 1149   Pulse 68 03/23/23 1201   Resp 11 03/23/23 1201   SpO2 99 % 03/23/23 1201

## 2023-03-23 NOTE — PERIOP NOTES
Tournicot applied on right little finger by Dr. Percy Rahman @ [id number]. Tournicot removed from right little finger by Dr. Percy Rahman @ [phone].

## 2023-03-23 NOTE — DISCHARGE INSTRUCTIONS
Discharge Instructions for:    Haley Alcaraz / [id number] : 1950    Admitted 3/23/2023 Discharged: 3/23/2023       Postoperative Hand Surgery Instructions      Elevation:  Elevation is one of the most important things to do following your surgery. Not only does it decrease swelling, but it also decreases pain. For hand or wrist surgery, keep the arm in your sling while up and about, with your hand above your elbow. When lying down, keep your arm propped up on a few pillows, so that your fingers are pointing towards the ceiling. Finger Motion:  **It is very important that you begin moving your fingers immediately after surgery, as often as you can, in order to prevent stiffness. Wiggling your fingers is not the same as moving them - moving your fingers involves bending them until they touch the dressing   (if possible). You should use your other hand to gently move the fingers on the operative hand if necessary. **DO NOT attempt to move your little finger. Dressings:  Please leave the surgical dressing in place until you are seen in the office for your first post-operative appointment with Dr Miller. The dressing helps to prevent infection and positions the extremity to protect the surgical procedure  that was performed. Bathing and showering are allowed as long as the dressing is kept dry. To keep your dressing dry while bathing, simply place a plastic bag (bread bag, newspaper bag, trash bag or subway sandwich bag) over the dressing and seal it with tape or a rubber band. Medications: You have likely been given a prescription for pain medication. Please follow the instructions closely. It is safe to take up to 600mg of Ibuprofen (Advil or Motrin) along with the pain prescription as long you are not allergic to it, are not on blood thinners, and do not have gastric reflux or an ulcer.   However, do not take any additional tylenol/acetaminophen if your prescription contains tylenol. Note that my policy is to give only one prescription for pain medication at the time of the surgery - if you use it up quickly, then you will have no more pain medication left after only a few days, and I will not give you another prescription. So use your pain medication sparingly, and only when necessary! If you have new or increasing numbness after any numbing medicine wears off (12-24 hours for regional blocks, 3 hours for local), call the office immediately. If you experience nausea, vomiting or itching with the pain medication, please call the office during regular office hours. Refills for pain medication prescriptions ARE NOT given on the weekend or after regular office hours. If antibiotics have been prescribed, please be sure to complete the entire prescription. Post-Operative Appointments:  Dr. Rissa Morin likes to see you back in the office about 10-14 days following your surgery to change the post-operative dressing and remove any necessary sutures or staples. If you have not received a follow up appointment card please contact our office at [phone]. *If you have any questions or concerns or experience any sudden changes in your condition, please call our office at [phone]*     DO NOT TAKE TYLENOL/ACETAMINOPHEN WITH PERCOCET, [address], [address] TAKE NARCOTIC PAIN MEDICATIONS WITH FOOD     Narcotics tend to be constipating, we suggest taking a stool softener such as Colace or Miralax (follow package instructions). DO NOT DRIVE WHILE TAKING NARCOTIC PAIN MEDICATIONS. DO NOT TAKE SLEEPING MEDICATIONS OR ANTIANXIETY MEDICATIONS WHILE TAKING NARCOTIC PAIN MEDICATIONS,  ESPECIALLY THE NIGHT OF ANESTHESIA! CPAP PATIENTS BE SURE TO WEAR MACHINE WHENEVER NAPPING OR SLEEPING!     DISCHARGE SUMMARY from Nurse    The following personal items collected during your admission are returned to you:   Dental Appliance: Dental Appliances: Uppers (IN PACU)  Vision: Visual Aid: Glasses  Hearing Aid:    Jewelry: Jewelry: None  Clothing: Clothing: With patient  Other Valuables: Other Valuables: Claire Basurto (IN PACU)  Valuables sent to safe:        PATIENT INSTRUCTIONS:    After General Anesthesia or Intravenous Sedation, for 24 hours or while taking prescription Narcotics:        Someone should be with you for the next 24 hours. For your own safety, a responsible adult must drive you home. Limit your activities  Recommended activity: Rest today, up with assistance today. Do not climb stairs or shower unattended for the next 24 hours. Please start with a soft bland diet and advance as tolerated (no nausea) to regular diet. If you have a sore throat you should try the following: fluids, warm salt water gargles, or throat lozenges. If it does not improve after several days please follow up with your primary physician. Do not drive and operate hazardous machinery  Do not make important personal or business decisions  Do  not drink alcoholic beverages  If you have not urinated within 8 hours after discharge, please contact your surgeon on call. Report the following to your surgeon:  Excessive pain, swelling, redness or odor of or around the surgical area  Temperature over 100.5  Nausea and vomiting lasting longer than 4 hours or if unable to take medications  Any signs of decreased circulation or nerve impairment to extremity: change in color, persistent  numbness, tingling, coldness or increase pain      You will receive a Post Operative Call from one of the Recovery Room Nurses on the day after your surgery to check on you. It is very important for us to know how you are recovering after your surgery. If you have an issue or need to speak with someone, please call your surgeon, do not wait for the post operative call. You may receive an e-mail or letter in the mail from [employer] regarding your experience with us in the Ambulatory Surgery Unit.  Your feedback is valuable to us and we appreciate your participation in the survey. If the above instructions are not adequate or you are having problems after your surgery, call the physician at their office number. We wish you a speedy recovery ? What to do at Home:      *  Please give a list of your current medications to your Primary Care Provider. *  Please update this list whenever your medications are discontinued, doses are      changed, or new medications (including over-the-counter products) are added. *  Please carry medication information at all times in case of emergency situations. If you have not received your influenza and/or pneumococcal vaccine, please follow up with your primary care physician. The discharge information has been reviewed with the patient and caregiver. The patient and caregiver verbalized understanding. TO PREVENT AN INFECTION      [address] YOUR HANDS    To prevent infection, good handwashing is the most important thing you or your caregiver can do. Wash your hands with soap and water or use the hand  we gave you before you touch any wounds. SHOWER    Use the antibacterial soap we gave you when you take a shower. Shower with this soap until your wounds are healed. To reach all areas of your body, you may need someone to help you. Dont forget to clean your belly button with every shower. USE CLEAN SHEETS    Use freshly cleaned sheets on your bed after surgery. To keep the surgery site clean, do not allow pets to sleep with you while your wound is still healing. CONTROL YOUR BLOOD SUGAR    High blood sugars slow wound healing. If you are diabetic, control your blood sugar levels before and after your surgery.

## 2023-03-23 NOTE — PERIOP NOTES
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 Tien Tracy    Assistant(s): Circ-1: Bandar Sargent RN  Scrub Tech-1: Viktoriya Smith    Specimens:   ID Type Source Tests Collected by Time Destination   1 : Mass, Right Little Finger Preservative Finger  Esther Méndez MD 3/23/2023 1122 Pathology       Assessment:  Intra-procedure medications         Anesthesia gave intra-procedure sedation and medications, see anesthesia flow sheet     Intravenous fluids: LR@ KVO     Vital signs stable. Pt sleepy but breathing well on own. Denies pain. RUE elevated. Heater for chill.        Recommendation:    Permission to share finding with  and friend : yes

## 2023-03-24 NOTE — OP NOTES
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 PROCEDURE PERFORMED:  Excision of 2 cm dorsal mass, right little finger. SURGEON:  May Beasley MD    ASSISTANT:  None. ANESTHESIA:  Local MAC. COMPLICATIONS:  None. SPECIMENS REMOVED:  The mass from the little finger. IMPLANTS:  None. ESTIMATED BLOOD LOSS:  Minimal.    DRAINS:  None. SUMMARY:  The patient was brought into the operating room, placed on the operating table in supine position and sedation administered. Note that the operative site had been identified and appropriate preoperative antibiotic prophylaxis administered in preop holding. The right upper extremity was prepped and draped in usual manner. After time-out, approximately 6 mL of 2% lidocaine with epinephrine were injected around the base of the finger in order to administer a digital block. A Tourni-Cot was then placed at the base of the finger for hemostatic control. A curved incision was made overlying the mass. Skin flap was raised. The mass was excised. It did seem to involve a portion of the extensor mechanism, which was excised with the mass. The mass was sent as specimen. The entire area was then thoroughly irrigated. The finger was flexed and extended, and I thought that enough of the extensor mechanism remained so that the finger would still actively extend. The wound was thoroughly irrigated. Closure was performed with 4-0 Vicryl Rapide suture. Xeroform, 4x4s, Kerlix and Coban were used as dressing. The Tourni-Cot was removed while placing the dressing.   The patient tolerated the procedure well, was taken back to the PACU in stable condition.       Ree Brunner, MD      [id number]  D:  03/23/2023 12:04  T:  03/23/2023 21:39  JOB #:  [id number]

## 2023-07-05 NOTE — INTERVAL H&P NOTE
Patient Name: Maggie Rolon  MRN: [id number]  : 1940    Referring Provider   Nav Wynn MD    HPI:     This 82-year-old female is being treated for atrial fibrillation with dronedarone.  She has not been exercising as of late.  She denies palpitations, dyspnea, angina, lightheadedness, or abnormal bleeding.  She has a history of pericarditis.  The patient was evaluated in 2022 with palpitations.  She was wearing an event monitor at the time (described below).  Although she presented in atrial fibrillation with a rapid rate response, she spontaneously converted.  She reports whitecoat hypertension.  She has a chronic cough which she ascribes to GERD, and which responds to antacid therapy.   cardiac catheterization: Ejection fraction 60 to 65%, LVEDP 12, no CAD.   TTE: Ejection fraction 65%, mild AI, mild LAE, mild TR, mild VT, no pericardial effusion.   event monitor: 13 episodes of PAF.  Total time in PAF 2 hours 44 minutes.  Average heart rate during recording 85 bpm.  Symptoms correlated with atrial fibrillation.  2022 lab results: Creatinine 0.92, potassium 3.2, sodium 133, normal LFTs, troponin 12, and hemoglobin 11.4.  She reports that a subsequent test result was \"okay\".    Coronary risk factors: Hyperlipidemia    Electrophysiology family history: Negative    Social History: .  Children.  Homemaker.  Immigrant from [place].  Non-smoker.      Allergies:  ALLERGIES:  No Known Allergies    Medications:  (Not in a hospital admission)    Current Outpatient Medications   Medication Sig Dispense Refill   • dronedarone (Multaq) 400 MG Tab Take 1 tablet by mouth in the morning and 1 tablet in the evening. 180 tablet 3   • apixaBAN (ELIQUIS) 2.5 MG Tab Take 1 tablet by mouth every 12 hours. 180 tablet 3   • fluticasone (FLONASE) 50 MCG/ACT nasal spray      • atorvastatin (LIPITOR) 10 MG tablet Take 10 mg by mouth daily.     • metoPROLOL succinate (TOPROL-XL) 25 MG 24 hr  H&P Update:  Gisell Womack was seen and examined. History and physical has been reviewed. The patient has been examined.  There have been no significant clinical changes since the completion of the originally dated History and Physical.    Signed By: Lyric Fried MD     June 22, 2017 10:14 AM tablet Take 1 tablet by mouth daily. 60 tablet 0     No current facility-administered medications for this visit.         Review of Systems:   Review of Systems   Constitutional: Negative.    HENT: Negative.    Eyes: Negative.    Respiratory: Positive for cough.    Cardiovascular: Negative.  Negative for palpitations.        As per HPI   Gastrointestinal: Negative.    Endocrine: Negative.    Genitourinary: Negative.    Musculoskeletal: Negative.    Skin: Negative.    Allergic/Immunologic: Negative.    Neurological: Negative.    Hematological: Negative.    Psychiatric/Behavioral: Negative.        Physical Examination:     Physical Exam  Constitutional:       Appearance: She is well-developed.   HENT:      Head: Normocephalic and atraumatic.      Right Ear: External ear normal.      Left Ear: External ear normal.      Nose: Nose normal.   Eyes:      Conjunctiva/sclera: Conjunctivae normal.      Pupils: Pupils are equal, round, and reactive to light.   Cardiovascular:      Rate and Rhythm: Normal rate and regular rhythm.      Heart sounds: Normal heart sounds.   Pulmonary:      Effort: Pulmonary effort is normal.      Breath sounds: Normal breath sounds.   Abdominal:      General: Bowel sounds are normal.      Palpations: Abdomen is soft.   Musculoskeletal:         General: Normal range of motion.      Cervical back: Normal range of motion and neck supple.   Skin:     General: Skin is warm.   Neurological:      Mental Status: She is alert and oriented to person, place, and time.      Deep Tendon Reflexes: Reflexes are normal and symmetric.   Psychiatric:         Behavior: Behavior normal.         Thought Content: Thought content normal.         Judgment: Judgment normal.           Labs:  Lab Results   Component Value Date    SODIUM 133 (L) 10/21/2022    POTASSIUM 3.2 (L) 10/21/2022    CHLORIDE 101 10/21/2022    CO2 24 10/21/2022    BUN 21 (H) 10/21/2022    CREATININE 0.92 10/21/2022       Lab Results   Component Value  Date    CHOLESTEROL 284 (H) 07/22/2020    TRIGLYCERIDE 138 07/22/2020    HDL 79 07/22/2020    CALCLDL 177 (H) 07/22/2020    GPT 25 10/21/2022    AST 22 10/21/2022       Lab Results   Component Value Date    HGBA1C 5.4 07/22/2020      Results for orders placed or performed in visit on 07/10/23   Electrocardiogram 12-Lead    Impression    Normal sinus rhythm    First-degree block    Possible left atrial enlargement    Possible septal infarct, age undetermined        Assessment/Plan:     Paroxysmal atrial fibrillation  The patient is tolerating dronedarone and she is currently in normal sinus rhythm.  At this point, remaining on metoprolol is not mandated.  She denies a history of hypertension.  Of note, dronedarone has mild antihypertensive properties.  Dronedarone tends to increase the serum creatinine level slightly, without an associated reduction in GFR.    Plan:    1.  Continue dronedarone 400 mg p.o. twice daily with food.  2.  EKG at least every 3 months while on dronedarone.    At risk for stroke  The patient's [id number] score is \"3\".  The risk/benefit favors continuing anticoagulation.  She understands that anticoagulation increases her bleeding risk.  She is on reduced dose apixaban because of her age and weight.    History of pericarditis  It seems likely that she had pericarditis when hospitalized in February 2022, although there is no obvious etiology for the pericarditis.  Currently, she has no findings suggestive of recurrent pericarditis.    Hyperlipidemia  The patient is on statin therapy.  She does not have CAD.  The LDL goal is less than 100.    Plan:    Periodic reassessment of her fasting lipid profile, LFTs and CK as indicated    Sergio Washington MD

## 2023-10-11 ENCOUNTER — OFFICE VISIT (OUTPATIENT)
Age: 73
End: 2023-10-11
Payer: MEDICARE

## 2023-10-11 VITALS
OXYGEN SATURATION: 99 % | SYSTOLIC BLOOD PRESSURE: 143 MMHG | HEIGHT: 63 IN | BODY MASS INDEX: 20.73 KG/M2 | HEART RATE: 86 BPM | WEIGHT: 117 LBS | RESPIRATION RATE: 16 BRPM | TEMPERATURE: 98.4 F | DIASTOLIC BLOOD PRESSURE: 81 MMHG

## 2023-10-11 DIAGNOSIS — H54.8 BLINDNESS, LEGAL: ICD-10-CM

## 2023-10-11 DIAGNOSIS — Z12.31 ENCOUNTER FOR SCREENING MAMMOGRAM FOR BREAST CANCER: ICD-10-CM

## 2023-10-11 DIAGNOSIS — I10 PRIMARY HYPERTENSION: ICD-10-CM

## 2023-10-11 DIAGNOSIS — Z13.820 ENCOUNTER FOR SCREENING FOR OSTEOPOROSIS: ICD-10-CM

## 2023-10-11 DIAGNOSIS — I63.9 CEREBROVASCULAR ACCIDENT (CVA), UNSPECIFIED MECHANISM (HCC): ICD-10-CM

## 2023-10-11 DIAGNOSIS — E28.310 SYMPTOMATIC PREMATURE MENOPAUSE: ICD-10-CM

## 2023-10-11 DIAGNOSIS — Z00.00 MEDICARE ANNUAL WELLNESS VISIT, SUBSEQUENT: Primary | ICD-10-CM

## 2023-10-11 DIAGNOSIS — R63.0 APPETITE LOSS: ICD-10-CM

## 2023-10-11 LAB
ERYTHROCYTE [DISTWIDTH] IN BLOOD BY AUTOMATED COUNT: 14 % (ref 11.5–14.5)
HCT VFR BLD AUTO: 33.9 % (ref 35–47)
HGB BLD-MCNC: 10.7 G/DL (ref 11.5–16)
MCH RBC QN AUTO: 32.2 PG (ref 26–34)
MCHC RBC AUTO-ENTMCNC: 31.6 G/DL (ref 30–36.5)
MCV RBC AUTO: 102.1 FL (ref 80–99)
NRBC # BLD: 0 K/UL (ref 0–0.01)
NRBC BLD-RTO: 0 PER 100 WBC
PLATELET # BLD AUTO: 257 K/UL (ref 150–400)
PMV BLD AUTO: 9.4 FL (ref 8.9–12.9)
RBC # BLD AUTO: 3.32 M/UL (ref 3.8–5.2)
WBC # BLD AUTO: 7 K/UL (ref 3.6–11)

## 2023-10-11 PROCEDURE — 90694 VACC AIIV4 NO PRSRV 0.5ML IM: CPT | Performed by: FAMILY MEDICINE

## 2023-10-11 PROCEDURE — 3078F DIAST BP <80 MM HG: CPT | Performed by: FAMILY MEDICINE

## 2023-10-11 PROCEDURE — 3017F COLORECTAL CA SCREEN DOC REV: CPT | Performed by: FAMILY MEDICINE

## 2023-10-11 PROCEDURE — G0439 PPPS, SUBSEQ VISIT: HCPCS | Performed by: FAMILY MEDICINE

## 2023-10-11 PROCEDURE — G8484 FLU IMMUNIZE NO ADMIN: HCPCS | Performed by: FAMILY MEDICINE

## 2023-10-11 PROCEDURE — 1123F ACP DISCUSS/DSCN MKR DOCD: CPT | Performed by: FAMILY MEDICINE

## 2023-10-11 PROCEDURE — 3077F SYST BP >= 140 MM HG: CPT | Performed by: FAMILY MEDICINE

## 2023-10-11 PROCEDURE — PBSHW INFLUENZA, FLUAD, (AGE 65 Y+), IM, PF, 0.5 ML: Performed by: FAMILY MEDICINE

## 2023-10-11 RX ORDER — MIRTAZAPINE 7.5 MG/1
7.5 TABLET, FILM COATED ORAL NIGHTLY
Qty: 30 TABLET | Refills: 5 | Status: SHIPPED | OUTPATIENT
Start: 2023-10-11

## 2023-10-11 RX ORDER — ALLOPURINOL 300 MG/1
TABLET ORAL
Qty: 90 TABLET | Refills: 3 | Status: SHIPPED | OUTPATIENT
Start: 2023-10-11

## 2023-10-11 RX ORDER — DORZOLAMIDE HYDROCHLORIDE AND TIMOLOL MALEATE 20; 5 MG/ML; MG/ML
SOLUTION/ DROPS OPHTHALMIC
COMMUNITY
Start: 2023-10-02

## 2023-10-11 RX ORDER — ALLOPURINOL 300 MG/1
TABLET ORAL
COMMUNITY
Start: 2016-08-05 | End: 2023-10-11 | Stop reason: SDUPTHER

## 2023-10-11 SDOH — ECONOMIC STABILITY: INCOME INSECURITY: HOW HARD IS IT FOR YOU TO PAY FOR THE VERY BASICS LIKE FOOD, HOUSING, MEDICAL CARE, AND HEATING?: NOT HARD AT ALL

## 2023-10-11 SDOH — ECONOMIC STABILITY: FOOD INSECURITY: WITHIN THE PAST 12 MONTHS, THE FOOD YOU BOUGHT JUST DIDN'T LAST AND YOU DIDN'T HAVE MONEY TO GET MORE.: NEVER TRUE

## 2023-10-11 SDOH — ECONOMIC STABILITY: FOOD INSECURITY: WITHIN THE PAST 12 MONTHS, YOU WORRIED THAT YOUR FOOD WOULD RUN OUT BEFORE YOU GOT MONEY TO BUY MORE.: NEVER TRUE

## 2023-10-11 SDOH — ECONOMIC STABILITY: HOUSING INSECURITY
IN THE LAST 12 MONTHS, WAS THERE A TIME WHEN YOU DID NOT HAVE A STEADY PLACE TO SLEEP OR SLEPT IN A SHELTER (INCLUDING NOW)?: NO

## 2023-10-11 ASSESSMENT — LIFESTYLE VARIABLES
HOW OFTEN DURING THE LAST YEAR HAVE YOU NEEDED AN ALCOHOLIC DRINK FIRST THING IN THE MORNING TO GET YOURSELF GOING AFTER A NIGHT OF HEAVY DRINKING: 0
HOW OFTEN DURING THE LAST YEAR HAVE YOU HAD A FEELING OF GUILT OR REMORSE AFTER DRINKING: 0
HOW MANY STANDARD DRINKS CONTAINING ALCOHOL DO YOU HAVE ON A TYPICAL DAY: 1 OR 2
HOW OFTEN DURING THE LAST YEAR HAVE YOU FAILED TO DO WHAT WAS NORMALLY EXPECTED FROM YOU BECAUSE OF DRINKING: 0
HOW OFTEN DURING THE LAST YEAR HAVE YOU BEEN UNABLE TO REMEMBER WHAT HAPPENED THE NIGHT BEFORE BECAUSE YOU HAD BEEN DRINKING: 0
HAVE YOU OR SOMEONE ELSE BEEN INJURED AS A RESULT OF YOUR DRINKING: 0
HOW OFTEN DURING THE LAST YEAR HAVE YOU FOUND THAT YOU WERE NOT ABLE TO STOP DRINKING ONCE YOU HAD STARTED: 0
HAS A RELATIVE, FRIEND, DOCTOR, OR ANOTHER HEALTH PROFESSIONAL EXPRESSED CONCERN ABOUT YOUR DRINKING OR SUGGESTED YOU CUT DOWN: 0
HOW OFTEN DO YOU HAVE A DRINK CONTAINING ALCOHOL: MONTHLY OR LESS

## 2023-10-11 ASSESSMENT — PATIENT HEALTH QUESTIONNAIRE - PHQ9
SUM OF ALL RESPONSES TO PHQ QUESTIONS 1-9: 0
2. FEELING DOWN, DEPRESSED OR HOPELESS: 0
SUM OF ALL RESPONSES TO PHQ QUESTIONS 1-9: 0
SUM OF ALL RESPONSES TO PHQ9 QUESTIONS 1 & 2: 0
1. LITTLE INTEREST OR PLEASURE IN DOING THINGS: 0

## 2023-10-11 NOTE — PROGRESS NOTES
Medicare Annual Wellness Visit    Claudia Rose is here for Medicare AWV    Assessment & Plan   Medicare annual wellness visit, subsequent  Blindness, legal  -     Uric Acid; Future  -     Lipid Panel; Future  -     TSH; Future  -     Comprehensive Metabolic Panel; Future  -     CBC; Future  Encounter for screening mammogram for breast cancer  -     FRIEDA Digital Screen Bilateral; Future  Encounter for screening for osteoporosis  -     DEXA BONE DENSITY AXIAL SKELETON; Future  Symptomatic premature menopause  -     DEXA BONE DENSITY AXIAL SKELETON; Future  Primary hypertension  -     Uric Acid; Future  -     Lipid Panel; Future  -     TSH; Future  -     Comprehensive Metabolic Panel; Future  -     CBC; Future  Cerebrovascular accident (CVA), unspecified mechanism ([address])  -     Uric Acid; Future  -     Lipid Panel; Future  -     TSH; Future  -     Comprehensive Metabolic Panel; Future  -     CBC; Future  Appetite loss  -     mirtazapine (REMERON) 7.5 MG tablet; Take 1 tablet by mouth nightly, Disp-30 tablet, R-5Normal    Recommendations for Preventive Services Due: see orders and patient instructions/AVS.  Recommended screening schedule for the next 5-10 years is provided to the patient in written form: see Patient Instructions/AVS.     No follow-ups on file. Subjective       Patient's complete Health Risk Assessment and screening values have been reviewed and are found in Flowsheets. The following problems were reviewed today and where indicated follow up appointments were made and/or referrals ordered.     Positive Risk Factor Screenings with Interventions:                 Weight and Activity:  Physical Activity: Inactive (10/11/2023)    Exercise Vital Sign     Days of Exercise per Week: 0 days     Minutes of Exercise per Session: 0 min     On average, how many days per week do you engage in moderate to strenuous exercise (like a brisk walk)?: 0 days  Have you lost any weight without trying in the past 3

## 2023-10-12 LAB
ALBUMIN SERPL-MCNC: 3.4 G/DL (ref 3.5–5)
ALBUMIN/GLOB SERPL: 1 (ref 1.1–2.2)
ALP SERPL-CCNC: 109 U/L (ref 45–117)
ALT SERPL-CCNC: 24 U/L (ref 12–78)
ANION GAP SERPL CALC-SCNC: 5 MMOL/L (ref 5–15)
AST SERPL-CCNC: 32 U/L (ref 15–37)
BILIRUB SERPL-MCNC: 0.2 MG/DL (ref 0.2–1)
BUN SERPL-MCNC: 12 MG/DL (ref 6–20)
BUN/CREAT SERPL: 14 (ref 12–20)
CALCIUM SERPL-MCNC: 9.6 MG/DL (ref 8.5–10.1)
CHLORIDE SERPL-SCNC: 112 MMOL/L (ref 97–108)
CHOLEST SERPL-MCNC: 208 MG/DL
CO2 SERPL-SCNC: 24 MMOL/L (ref 21–32)
CREAT SERPL-MCNC: 0.84 MG/DL (ref 0.55–1.02)
GLOBULIN SER CALC-MCNC: 3.5 G/DL (ref 2–4)
GLUCOSE SERPL-MCNC: 112 MG/DL (ref 65–100)
HDLC SERPL-MCNC: 74 MG/DL
HDLC SERPL: 2.8 (ref 0–5)
LDLC SERPL CALC-MCNC: 75.2 MG/DL (ref 0–100)
POTASSIUM SERPL-SCNC: 4.1 MMOL/L (ref 3.5–5.1)
PROT SERPL-MCNC: 6.9 G/DL (ref 6.4–8.2)
SODIUM SERPL-SCNC: 141 MMOL/L (ref 136–145)
TRIGL SERPL-MCNC: 294 MG/DL
TSH SERPL DL<=0.05 MIU/L-ACNC: 0.68 UIU/ML (ref 0.36–3.74)
URATE SERPL-MCNC: 7.7 MG/DL (ref 2.6–6)
VLDLC SERPL CALC-MCNC: 58.8 MG/DL

## 2023-11-21 ENCOUNTER — OFFICE VISIT (OUTPATIENT)
Age: 73
End: 2023-11-21
Payer: MEDICARE

## 2023-11-21 VITALS
DIASTOLIC BLOOD PRESSURE: 80 MMHG | WEIGHT: 122 LBS | OXYGEN SATURATION: 98 % | SYSTOLIC BLOOD PRESSURE: 147 MMHG | BODY MASS INDEX: 21.61 KG/M2 | RESPIRATION RATE: 16 BRPM | HEART RATE: 55 BPM | TEMPERATURE: 97.9 F

## 2023-11-21 DIAGNOSIS — C50.212 MALIGNANT NEOPLASM OF UPPER-INNER QUADRANT OF LEFT FEMALE BREAST, UNSPECIFIED ESTROGEN RECEPTOR STATUS (HCC): Primary | ICD-10-CM

## 2023-11-21 DIAGNOSIS — Z12.31 ENCOUNTER FOR SCREENING MAMMOGRAM FOR MALIGNANT NEOPLASM OF BREAST: ICD-10-CM

## 2023-11-21 DIAGNOSIS — M79.671 FOOT PAIN, RIGHT: ICD-10-CM

## 2023-11-21 DIAGNOSIS — Z72.0 TOBACCO ABUSE DISORDER: ICD-10-CM

## 2023-11-21 PROCEDURE — 3077F SYST BP >= 140 MM HG: CPT | Performed by: FAMILY MEDICINE

## 2023-11-21 PROCEDURE — G8420 CALC BMI NORM PARAMETERS: HCPCS | Performed by: FAMILY MEDICINE

## 2023-11-21 PROCEDURE — G8427 DOCREV CUR MEDS BY ELIG CLIN: HCPCS | Performed by: FAMILY MEDICINE

## 2023-11-21 PROCEDURE — G8484 FLU IMMUNIZE NO ADMIN: HCPCS | Performed by: FAMILY MEDICINE

## 2023-11-21 PROCEDURE — 3078F DIAST BP <80 MM HG: CPT | Performed by: FAMILY MEDICINE

## 2023-11-21 PROCEDURE — 1090F PRES/ABSN URINE INCON ASSESS: CPT | Performed by: FAMILY MEDICINE

## 2023-11-21 PROCEDURE — 1036F TOBACCO NON-USER: CPT | Performed by: FAMILY MEDICINE

## 2023-11-21 PROCEDURE — G8399 PT W/DXA RESULTS DOCUMENT: HCPCS | Performed by: FAMILY MEDICINE

## 2023-11-21 PROCEDURE — 3017F COLORECTAL CA SCREEN DOC REV: CPT | Performed by: FAMILY MEDICINE

## 2023-11-21 PROCEDURE — 99213 OFFICE O/P EST LOW 20 MIN: CPT | Performed by: FAMILY MEDICINE

## 2023-11-21 PROCEDURE — 1123F ACP DISCUSS/DSCN MKR DOCD: CPT | Performed by: FAMILY MEDICINE

## 2023-11-21 RX ORDER — LIDOCAINE 50 MG/G
1 PATCH TOPICAL DAILY
Qty: 10 PATCH | Refills: 0 | Status: SHIPPED | OUTPATIENT
Start: 2023-11-21 | End: 2023-12-01

## 2023-11-21 ASSESSMENT — PATIENT HEALTH QUESTIONNAIRE - PHQ9
1. LITTLE INTEREST OR PLEASURE IN DOING THINGS: 0
SUM OF ALL RESPONSES TO PHQ9 QUESTIONS 1 & 2: 0
SUM OF ALL RESPONSES TO PHQ QUESTIONS 1-9: 0
2. FEELING DOWN, DEPRESSED OR HOPELESS: 0

## 2023-11-21 NOTE — PROGRESS NOTES
Alber Manning (:  1950) is a 68 y.o. female,Established patient, here for evaluation of the following chief complaint(s): Foot Pain (Follow up right foot pain )      rt foot pain  Has been bothering the patient for few yrs ago had it injured , was told to  have swelling and pain as she gets older  currently has no calluses or plantar warts,  Patient states that it is a dull nonradiating no numbness ++++ tingling sensation disabling, morning stiffness which gets better with activity and with the severity of 5/10. So for patient denies any recent fall, nor history of extremity trauma, and has no leg swelling no skin lesion noted. Constitutional: no chills and fever,nad     HENT: no ear pain and nosebleeds. No blurred vision,   Respiratory: no shortness of breath, wheezing cough nor sore throat. Cardiovascular: Has no chest pain, leg swelling ,and racing heart . Gastrointestinal: No constipation, diarrhea, nausea and vomiting. Genitourinary: No frequency. Musculoskeletal: +++for multiple joint pain. Skin: no itching, pimples   Neurological: Negative for dizziness, no tremors  Psychiatric/Behavioral: Negative for depression,  not nervous/anxious. General:  alert cooperative appears stated for the age  HEENT; normocephalic and atraumatic PERRLA extraocular motor intact normal tympanic membrane normal external ear bilaterally, mucosal normal no drainage  Neck: supple no adenopathy no JVD no bruit  Lungs: Clear to auscultation bilaterally no rales rhonchi or wheezes  Heart: Normal regular S1-S2 ,  no murmur  Breast exam deferred  Abdomen: Soft nontender normal bowel sounds   Extremities: Normal range of motion no point tenderness normal pulses no edema  Pelvic/: No lesion, no lymphadenopathy  Skin: Normal no lesion no rash         ASSESSMENT/PLAN:  1.  Malignant neoplasm of upper-inner quadrant of left female breast, unspecified estrogen receptor status ([address])  -     [facility]IGITAL

## 2023-11-29 ENCOUNTER — TELEPHONE (OUTPATIENT)
Age: 73
End: 2023-11-29

## 2023-11-29 NOTE — TELEPHONE ENCOUNTER
Carmen ( [employer] ) with [facility] would like to get clarification on lidocaine (LIDODERM) 5 % she can be reached @ [phone]

## 2023-11-29 NOTE — TELEPHONE ENCOUNTER
Returned call to [facility], spoke with elias,  stated lidocaine patches only come in 30 patches, unable to dispense 10,   Verbal Order  give with [facility] for lidocaine 5% patches,  quantity 30 patches  per Benigno Noonan MD

## 2023-11-30 ENCOUNTER — CLINICAL DOCUMENTATION (OUTPATIENT)
Age: 73
End: 2023-11-30

## 2023-12-08 ENCOUNTER — HOSPITAL ENCOUNTER (OUTPATIENT)
Facility: HOSPITAL | Age: 73
End: 2023-12-08
Attending: FAMILY MEDICINE
Payer: MEDICARE

## 2023-12-08 DIAGNOSIS — M79.671 FOOT PAIN, RIGHT: ICD-10-CM

## 2023-12-08 DIAGNOSIS — Z72.0 TOBACCO ABUSE DISORDER: ICD-10-CM

## 2023-12-08 PROCEDURE — 71271 CT THORAX LUNG CANCER SCR C-: CPT

## 2023-12-08 RX ORDER — LIDOCAINE 50 MG/G
1 PATCH TOPICAL DAILY
Qty: 10 PATCH | Refills: 0 | Status: SHIPPED | OUTPATIENT
Start: 2023-12-08 | End: 2023-12-18

## 2023-12-28 ENCOUNTER — HOSPITAL ENCOUNTER (OUTPATIENT)
Facility: HOSPITAL | Age: 73
Discharge: HOME | End: 2023-12-28
Attending: FAMILY MEDICINE
Payer: MEDICARE

## 2023-12-28 ENCOUNTER — HOSPITAL ENCOUNTER (OUTPATIENT)
Facility: HOSPITAL | Age: 73
End: 2023-12-28
Attending: FAMILY MEDICINE
Payer: MEDICARE

## 2023-12-28 VITALS — BODY MASS INDEX: 20.02 KG/M2 | HEIGHT: 63 IN | WEIGHT: 113 LBS

## 2023-12-28 DIAGNOSIS — Z12.31 ENCOUNTER FOR SCREENING MAMMOGRAM FOR MALIGNANT NEOPLASM OF BREAST: ICD-10-CM

## 2023-12-28 DIAGNOSIS — E28.310 SYMPTOMATIC PREMATURE MENOPAUSE: ICD-10-CM

## 2023-12-28 DIAGNOSIS — Z13.820 ENCOUNTER FOR SCREENING FOR OSTEOPOROSIS: ICD-10-CM

## 2023-12-28 DIAGNOSIS — C50.212 MALIGNANT NEOPLASM OF UPPER-INNER QUADRANT OF LEFT FEMALE BREAST, UNSPECIFIED ESTROGEN RECEPTOR STATUS (HCC): ICD-10-CM

## 2023-12-28 PROCEDURE — 77080 DXA BONE DENSITY AXIAL: CPT

## 2023-12-28 PROCEDURE — 77063 BREAST TOMOSYNTHESIS BI: CPT

## 2024-01-29 NOTE — TELEPHONE ENCOUNTER
Patient call for refill hctz.  Leyla Thorpe    Last appointment: 11/21/23 [facility]  Next appointment: None  Previous refill encounter(s): 5/18/21 90 + 1    Requested Prescriptions     Pending Prescriptions Disp Refills    hydroCHLOROthiazide 12.5 MG tablet 90 tablet 1     Sig: Take 1 tablet by mouth daily     For Pharmacy Admin Tracking Only    Program: Medication Refill  CPA in place:    Recommendation Provided To:   Intervention Detail: New Rx: 1, reason: Patient Preference  Intervention Accepted By:   Gap Closed?:    Time Spent (min): 5

## 2024-01-30 RX ORDER — HYDROCHLOROTHIAZIDE 12.5 MG/1
12.5 TABLET ORAL DAILY
Qty: 90 TABLET | Refills: 1 | Status: SHIPPED | OUTPATIENT
Start: 2024-01-30

## 2024-02-15 NOTE — TELEPHONE ENCOUNTER
Last appointment: 11/21/23  Next appointment: 2/27/24  Previous refill encounter(s): 1/6/23    Requested Prescriptions     Pending Prescriptions Disp Refills    clopidogrel (PLAVIX) 75 MG tablet [Pharmacy Med Name: CLOPIDOGREL 75 MG Tablet] 90 tablet 3     Sig: TAKE 1 TABLET EVERY DAY         For Pharmacy Admin Tracking Only    Program: Medication Refill  CPA in place:    Recommendation Provided To:   Intervention Detail: New Rx: 1, reason: Patient Preference  Intervention Accepted By:   Gap Closed?:    Time Spent (min): 5

## 2024-02-16 RX ORDER — CLOPIDOGREL BISULFATE 75 MG/1
75 TABLET ORAL DAILY
Qty: 90 TABLET | Refills: 3 | Status: SHIPPED | OUTPATIENT
Start: 2024-02-16

## 2024-02-27 ENCOUNTER — OFFICE VISIT (OUTPATIENT)
Age: 74
End: 2024-02-27
Payer: MEDICARE

## 2024-02-27 ENCOUNTER — HOME HEALTH ADMISSION (OUTPATIENT)
Dept: HOME HEALTH SERVICES | Facility: HOME HEALTH | Age: 74
End: 2024-02-27

## 2024-02-27 VITALS
HEART RATE: 63 BPM | DIASTOLIC BLOOD PRESSURE: 69 MMHG | SYSTOLIC BLOOD PRESSURE: 136 MMHG | OXYGEN SATURATION: 100 % | RESPIRATION RATE: 14 BRPM | BODY MASS INDEX: 21.58 KG/M2 | WEIGHT: 121.8 LBS | TEMPERATURE: 96.8 F

## 2024-02-27 DIAGNOSIS — H54.7 LOSS OF VISION: ICD-10-CM

## 2024-02-27 DIAGNOSIS — C50.212 MALIGNANT NEOPLASM OF UPPER-INNER QUADRANT OF LEFT FEMALE BREAST, UNSPECIFIED ESTROGEN RECEPTOR STATUS (HCC): ICD-10-CM

## 2024-02-27 DIAGNOSIS — L60.0 INGROWN TOENAIL: ICD-10-CM

## 2024-02-27 DIAGNOSIS — G62.9 NEUROPATHY: Primary | ICD-10-CM
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
## 2024-02-27 DIAGNOSIS — I73.9 PERIPHERAL VASCULAR DISEASE, UNSPECIFIED (HCC): ICD-10-CM

## 2024-02-27 DIAGNOSIS — E11.21 TYPE 2 DIABETES MELLITUS WITH DIABETIC NEPHROPATHY, UNSPECIFIED WHETHER LONG TERM INSULIN USE (HCC): ICD-10-CM

## 2024-02-27 PROCEDURE — 3075F SYST BP GE 130 - 139MM HG: CPT | Performed by: FAMILY MEDICINE

## 2024-02-27 PROCEDURE — 3046F HEMOGLOBIN A1C LEVEL >9.0%: CPT | Performed by: FAMILY MEDICINE

## 2024-02-27 PROCEDURE — 1036F TOBACCO NON-USER: CPT | Performed by: FAMILY MEDICINE

## 2024-02-27 PROCEDURE — 2022F DILAT RTA XM EVC RTNOPTHY: CPT | Performed by: FAMILY MEDICINE

## 2024-02-27 PROCEDURE — 99214 OFFICE O/P EST MOD 30 MIN: CPT | Performed by: FAMILY MEDICINE

## 2024-02-27 PROCEDURE — G8484 FLU IMMUNIZE NO ADMIN: HCPCS | Performed by: FAMILY MEDICINE

## 2024-02-27 PROCEDURE — 11730 AVULSION NAIL PLATE SIMPLE 1: CPT | Performed by: FAMILY MEDICINE

## 2024-02-27 PROCEDURE — 1090F PRES/ABSN URINE INCON ASSESS: CPT | Performed by: FAMILY MEDICINE

## 2024-02-27 PROCEDURE — G8399 PT W/DXA RESULTS DOCUMENT: HCPCS | Performed by: FAMILY MEDICINE

## 2024-02-27 PROCEDURE — G8420 CALC BMI NORM PARAMETERS: HCPCS | Performed by: FAMILY MEDICINE

## 2024-02-27 PROCEDURE — 3017F COLORECTAL CA SCREEN DOC REV: CPT | Performed by: FAMILY MEDICINE

## 2024-02-27 PROCEDURE — 1123F ACP DISCUSS/DSCN MKR DOCD: CPT | Performed by: FAMILY MEDICINE

## 2024-02-27 PROCEDURE — G8427 DOCREV CUR MEDS BY ELIG CLIN: HCPCS | Performed by: FAMILY MEDICINE

## 2024-02-27 PROCEDURE — 3078F DIAST BP <80 MM HG: CPT | Performed by: FAMILY MEDICINE

## 2024-02-27 RX ORDER — GABAPENTIN 100 MG/1
100 CAPSULE ORAL NIGHTLY
Qty: 30 CAPSULE | Refills: 1 | Status: SHIPPED | OUTPATIENT
Start: 2024-02-27 | End: 2024-04-27

## 2024-02-27 RX ORDER — PANTOPRAZOLE SODIUM 40 MG/1
40 TABLET, DELAYED RELEASE ORAL
Qty: 30 TABLET | Refills: 1 | Status: SHIPPED | OUTPATIENT
Start: 2024-02-27 | End: 2024-03-01

## 2024-02-27 ASSESSMENT — PATIENT HEALTH QUESTIONNAIRE - PHQ9
1. LITTLE INTEREST OR PLEASURE IN DOING THINGS: 0
SUM OF ALL RESPONSES TO PHQ QUESTIONS 1-9: 0
2. FEELING DOWN, DEPRESSED OR HOPELESS: 0
SUM OF ALL RESPONSES TO PHQ QUESTIONS 1-9: 0
SUM OF ALL RESPONSES TO PHQ9 QUESTIONS 1 & 2: 0
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
## 2024-02-27 NOTE — PROGRESS NOTES
Chief Complaint   Patient presents with    Foot Swelling     C/o right foot swelling x 2 weeks,  denies injury    Fall     Ground level fall yesterday, tripped over walker, denies injury       \"Have you been to the ER, urgent care clinic since your last visit?  Hospitalized since your last visit?\"    NO    “Have you seen or consulted any other health care providers outside of [facility] since your last visit?”    NO       Vitals:    24 1505   BP: 136/69   Pulse: 63   Resp: 14   Temp: 96.8 °F (36 °C)   SpO2: 100%      Health Maintenance Due   Topic Date Due    Respiratory Syncytial Virus (RSV) Pregnant or age 60 yrs+ (1 - 1-dose 60+ series) Never done    Diabetic retinal exam  2018    Diabetic Alb to Cr ratio (uACR) test  10/10/2020    Diabetic foot exam  2022    A1C test (Diabetic or Prediabetic)  2023    Annual Wellness Visit (Medicare Advantage)  2024      The patient, Ana Paula Bailee العلي, identity was verified by name and .

## 2024-02-28 ENCOUNTER — TELEPHONE (OUTPATIENT)
Age: 74
End: 2024-02-28

## 2024-02-28 NOTE — TELEPHONE ENCOUNTER
Patient daughter Jocelin would like to get a call from Ir[facility] regarding her mother going to [facility] please give her a call @ [phone]

## 2024-02-28 NOTE — TELEPHONE ENCOUNTER
Pt declined in  home therapy, requesting out patient therapy due to home renovations,  Verbal Order  give with [facility] for physical therapy  per Benigno Noonan MD , referral faxed to [facility]

## 2024-02-29 NOTE — TELEPHONE ENCOUNTER
Pharmacy is requesting to dispense a 90 d/s    Last appointment: 2/27/24  Next appointment: none    Requested Prescriptions     Pending Prescriptions Disp Refills    pantoprazole (PROTONIX) 40 MG tablet [Pharmacy Med Name: PANTOPRAZOLE 40MG TABLETS] 90 tablet 0     Sig: TAKE 1 TABLET BY MOUTH BEFORE DINNER 30-45 MINUTES BEFORE MEAL NIGHTLY         For Pharmacy Admin Tracking Only    Program: Medication Refill  CPA in place:    Recommendation Provided To:   Intervention Detail: New Rx: 1, reason: Patient Preference  Intervention Accepted By:   Gap Closed?:    Time Spent (min): 5

## 2024-03-01 RX ORDER — PANTOPRAZOLE SODIUM 40 MG/1
TABLET, DELAYED RELEASE ORAL
Qty: 90 TABLET | Refills: 0 | Status: SHIPPED | OUTPATIENT
Start: 2024-03-01

## 2024-04-30 DIAGNOSIS — G62.9 NEUROPATHY: ICD-10-CM
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
## 2024-04-30 DIAGNOSIS — M79.671 FOOT PAIN, RIGHT: ICD-10-CM
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
## 2024-05-01 DIAGNOSIS — G62.9 NEUROPATHY: ICD-10-CM

## 2024-05-01 NOTE — TELEPHONE ENCOUNTER
[facility] is requesting a 90 day supply  Previous Rx's were sent to [facility]    Last appointment: 2/27/24  Next appointment: none    Requested Prescriptions     Pending Prescriptions Disp Refills    diclofenac sodium (VOLTAREN) 1 % GEL [Pharmacy Med Name: DICLOFENAC SODIUM 1 % Gel] 400 g 5     Sig: APPLY 4 GRAMS TOPICALLY 4 TIMES DAILY    gabapentin (NEURONTIN) 100 MG capsule 90 capsule 1     Sig: Take 1 capsule by mouth nightly for 180 days. Max Daily Amount: 100 mg    hydroCHLOROthiazide 12.5 MG tablet 90 tablet 1     Sig: Take 1 tablet by mouth daily    pantoprazole (PROTONIX) 40 MG tablet 90 tablet 1     Sig: TAKE 1 TABLET BY MOUTH BEFORE DINNER 30-45 MINUTES BEFORE MEAL NIGHTLY         For Pharmacy Admin Tracking Only    Program: Medication Refill  CPA in place:    Recommendation Provided To:   Intervention Detail: New Rx: 4, reason: Patient Preference  Intervention Accepted By:   Gap Closed?:    Time Spent (min): 5

## 2024-05-02 RX ORDER — GABAPENTIN 100 MG/1
100 CAPSULE ORAL NIGHTLY
Qty: 90 CAPSULE | Refills: 1 | Status: SHIPPED | OUTPATIENT
Start: 2024-05-02 | End: 2024-10-29

## 2024-05-02 RX ORDER — PANTOPRAZOLE SODIUM 40 MG/1
TABLET, DELAYED RELEASE ORAL
Refills: 0 | OUTPATIENT
Start: 2024-05-02

## 2024-05-02 RX ORDER — HYDROCHLOROTHIAZIDE 12.5 MG/1
TABLET ORAL
Refills: 0 | OUTPATIENT
Start: 2024-05-02

## 2024-05-02 RX ORDER — GABAPENTIN 100 MG/1
CAPSULE ORAL
Refills: 0 | OUTPATIENT
Start: 2024-05-02

## 2024-05-02 RX ORDER — HYDROCHLOROTHIAZIDE 12.5 MG/1
12.5 TABLET ORAL DAILY
Qty: 90 TABLET | Refills: 1 | Status: SHIPPED | OUTPATIENT
Start: 2024-05-02

## 2024-05-02 RX ORDER — PANTOPRAZOLE SODIUM 40 MG/1
TABLET, DELAYED RELEASE ORAL
Qty: 90 TABLET | Refills: 1 | Status: SHIPPED | OUTPATIENT
Start: 2024-05-02

## 2024-05-02 NOTE — TELEPHONE ENCOUNTER
Pending in another encounter - duplicate    For Pharmacy Admin Tracking Only    Program: Medication Refill  CPA in place:    Recommendation Provided To:   Intervention Detail: Discontinued Rx: 3, reason: Duplicate Therapy  Intervention Accepted By:   Gap Closed?:    Time Spent (min): 5

## 2024-05-31 RX ORDER — MIRTAZAPINE 7.5 MG/1
7.5 TABLET, FILM COATED ORAL NIGHTLY
Qty: 90 TABLET | Refills: 3 | Status: SHIPPED | OUTPATIENT
Start: 2024-05-31

## 2024-06-11 ENCOUNTER — OFFICE VISIT (OUTPATIENT)
Age: 74
End: 2024-06-11
Payer: MEDICARE

## 2024-06-11 VITALS
TEMPERATURE: 97.1 F | SYSTOLIC BLOOD PRESSURE: 138 MMHG | RESPIRATION RATE: 17 BRPM | HEART RATE: 82 BPM | DIASTOLIC BLOOD PRESSURE: 76 MMHG | OXYGEN SATURATION: 99 % | BODY MASS INDEX: 21.62 KG/M2 | WEIGHT: 122 LBS | HEIGHT: 63 IN

## 2024-06-11 DIAGNOSIS — E11.21 TYPE 2 DIABETES MELLITUS WITH DIABETIC NEPHROPATHY, UNSPECIFIED WHETHER LONG TERM INSULIN USE (HCC): ICD-10-CM

## 2024-06-11 DIAGNOSIS — M54.2 NECK PAIN: ICD-10-CM

## 2024-06-11 DIAGNOSIS — Z01.818 PREOPERATIVE EXAMINATION: Primary | ICD-10-CM

## 2024-06-11 PROBLEM — E11.40 TYPE 2 DIABETES MELLITUS WITH DIABETIC NEUROPATHY (HCC): Status: RESOLVED | Noted: 2018-01-12 | Resolved: 2024-06-11

## 2024-06-11 PROBLEM — C34.90 ADENOCARCINOMA OF LUNG (HCC): Status: ACTIVE | Noted: 2017-09-19

## 2024-06-11 LAB — HBA1C MFR BLD: 4.9 %

## 2024-06-11 PROCEDURE — G8399 PT W/DXA RESULTS DOCUMENT: HCPCS | Performed by: FAMILY MEDICINE

## 2024-06-11 PROCEDURE — 99213 OFFICE O/P EST LOW 20 MIN: CPT | Performed by: FAMILY MEDICINE

## 2024-06-11 PROCEDURE — 83036 HEMOGLOBIN GLYCOSYLATED A1C: CPT | Performed by: FAMILY MEDICINE

## 2024-06-11 PROCEDURE — PBSHW AMB POC HEMOGLOBIN A1C: Performed by: FAMILY MEDICINE

## 2024-06-11 PROCEDURE — 3078F DIAST BP <80 MM HG: CPT | Performed by: FAMILY MEDICINE

## 2024-06-11 PROCEDURE — 3046F HEMOGLOBIN A1C LEVEL >9.0%: CPT | Performed by: FAMILY MEDICINE

## 2024-06-11 PROCEDURE — 2022F DILAT RTA XM EVC RTNOPTHY: CPT | Performed by: FAMILY MEDICINE

## 2024-06-11 PROCEDURE — 1123F ACP DISCUSS/DSCN MKR DOCD: CPT | Performed by: FAMILY MEDICINE

## 2024-06-11 PROCEDURE — G8427 DOCREV CUR MEDS BY ELIG CLIN: HCPCS | Performed by: FAMILY MEDICINE

## 2024-06-11 PROCEDURE — 1036F TOBACCO NON-USER: CPT | Performed by: FAMILY MEDICINE

## 2024-06-11 PROCEDURE — 3017F COLORECTAL CA SCREEN DOC REV: CPT | Performed by: FAMILY MEDICINE

## 2024-06-11 PROCEDURE — 1090F PRES/ABSN URINE INCON ASSESS: CPT | Performed by: FAMILY MEDICINE

## 2024-06-11 PROCEDURE — G8420 CALC BMI NORM PARAMETERS: HCPCS | Performed by: FAMILY MEDICINE

## 2024-06-11 PROCEDURE — 3075F SYST BP GE 130 - 139MM HG: CPT | Performed by: FAMILY MEDICINE

## 2024-06-11 ASSESSMENT — PATIENT HEALTH QUESTIONNAIRE - PHQ9
SUM OF ALL RESPONSES TO PHQ9 QUESTIONS 1 & 2: 0
2. FEELING DOWN, DEPRESSED OR HOPELESS: NOT AT ALL
SUM OF ALL RESPONSES TO PHQ QUESTIONS 1-9: 0
1. LITTLE INTEREST OR PLEASURE IN DOING THINGS: NOT AT ALL
SUM OF ALL RESPONSES TO PHQ QUESTIONS 1-9: 0

## 2024-06-11 NOTE — PROGRESS NOTES
Chief Complaint   Patient presents with    Pre-op Exam       \"Have you been to the ER, urgent care clinic since your last visit?  Hospitalized since your last visit?\"    NO    “Have you seen or consulted any other health care providers outside of [facility] since your last visit?”    NO              Vitals:    24 1232 24 1235   BP: (!) 148/73 138/76   Site: Left Upper Arm Right Upper Arm   Position: Sitting Sitting   Cuff Size: Large Adult Large Adult   Pulse: 82    Resp: 17    Temp: 97.1 °F (36.2 °C)    TempSrc: Infrared    SpO2: 99%    Weight: 55.3 kg (122 lb)          Health Maintenance Due   Topic Date Due    Diabetic retinal exam  2018    Diabetic Alb to Cr ratio (uACR) test  10/10/2020    Diabetic foot exam  2022    A1C test (Diabetic or Prediabetic)  2023        The patient, Ana Paula Bailee Brown, identity was verified by name and

## 2024-06-11 NOTE — PROGRESS NOTES
Subjective:      Ana Paula العلي is a 74 y.o. female who presents to the office today for a preoperative consultation at the request of surgeon,  who presents for preoperative evaluation with current medical hx of eyes disorder  pt has been in a stable conditions,  and seeking alternative surgical correction for current medical condition,   patient is scheduled for trabeculectomy,   pt's physical functioning is capable of doing <2 blocks of walking due to decrease vision, at this time there is a cane as an assistive devices used physically,   Patient with a good social support which include living at home, the pt is not  self cared, and the pt's other primary care giver is at home,  no recent major trauma, does not have any history of blood clots, patient currently on daily plavix as the only blood thinner for many years stating that the pt has had no hx of abnormal bleeding or easy bruisabiltity.  In addition pt is currently not taking any herbal supplements, nor any illicit drugs,  pt is stable      Today A1c is at 4.9 on no diabetic meds    Past Medical History:   Diagnosis Date    Arthritis     Breast cancer ([facility]) 04/2017    lumpectomy left breast, radiation; lung    Cancer ([facility])     breast cancer- left    Cerebrovascular accident (stroke) ([facility]) 2007    no deficiets    CVA (cerebral vascular accident) ([facility])     11/12/2018 no deficiets    Gallstones     asymptomatic, pt denies having    GERD (gastroesophageal reflux disease)     pt denies    Hypercholesterolemia     Hypertension     Ill-defined condition     gout    Long term current use of anticoagulant therapy     Non-nicotine vapor product user     used to use    PAD (peripheral artery disease) ([facility]) 02/20/2013    right SFA angioplasty and athrectomy    Primary open angle glaucoma     Radiation therapy complication     Solitary lung nodule 05/09/2017    Type II diabetes mellitus, uncontrolled 02/01/2017    pt denies    Visual disturbance following

## 2024-07-17 ENCOUNTER — OFFICE VISIT (OUTPATIENT)
Age: 74
End: 2024-07-17
Payer: MEDICARE

## 2024-07-17 VITALS
RESPIRATION RATE: 16 BRPM | WEIGHT: 120 LBS | TEMPERATURE: 97.6 F | HEART RATE: 70 BPM | SYSTOLIC BLOOD PRESSURE: 137 MMHG | OXYGEN SATURATION: 98 % | BODY MASS INDEX: 21.26 KG/M2 | DIASTOLIC BLOOD PRESSURE: 80 MMHG

## 2024-07-17 DIAGNOSIS — T30.0 BURN: ICD-10-CM

## 2024-07-17 DIAGNOSIS — H40.2233 CHRONIC PRIMARY ANGLE-CLOSURE GLAUCOMA OF BOTH EYES, SEVERE STAGE: Primary | ICD-10-CM

## 2024-07-17 DIAGNOSIS — T31.10 BURN (ANY DEGREE) INVOLVING 10-19% OF BODY SURFACE: ICD-10-CM

## 2024-07-17 DIAGNOSIS — T14.8XXA WOUND INFECTION: ICD-10-CM

## 2024-07-17 DIAGNOSIS — L08.9 WOUND INFECTION: ICD-10-CM

## 2024-07-17 DIAGNOSIS — I73.9 PAD (PERIPHERAL ARTERY DISEASE) (HCC): ICD-10-CM

## 2024-07-17 PROCEDURE — 16030 DRESS/DEBRID P-THICK BURN L: CPT | Performed by: FAMILY MEDICINE

## 2024-07-17 PROCEDURE — 99214 OFFICE O/P EST MOD 30 MIN: CPT | Performed by: FAMILY MEDICINE

## 2024-07-17 PROCEDURE — 16000 INITIAL TREATMENT OF BURN(S): CPT | Performed by: FAMILY MEDICINE

## 2024-07-17 PROCEDURE — 90715 TDAP VACCINE 7 YRS/> IM: CPT | Performed by: FAMILY MEDICINE

## 2024-07-17 RX ORDER — CEPHALEXIN 500 MG/1
500 CAPSULE ORAL 4 TIMES DAILY
Qty: 40 CAPSULE | Refills: 0 | Status: SHIPPED | OUTPATIENT
Start: 2024-07-17 | End: 2024-07-27

## 2024-07-17 RX ORDER — CEPHALEXIN 500 MG/1
500 CAPSULE ORAL 4 TIMES DAILY
COMMUNITY
End: 2024-07-17 | Stop reason: SDUPTHER

## 2024-07-17 ASSESSMENT — PATIENT HEALTH QUESTIONNAIRE - PHQ9
SUM OF ALL RESPONSES TO PHQ QUESTIONS 1-9: 0
SUM OF ALL RESPONSES TO PHQ9 QUESTIONS 1 & 2: 0
1. LITTLE INTEREST OR PLEASURE IN DOING THINGS: NOT AT ALL
SUM OF ALL RESPONSES TO PHQ QUESTIONS 1-9: 0
2. FEELING DOWN, DEPRESSED OR HOPELESS: NOT AT ALL
SUM OF ALL RESPONSES TO PHQ QUESTIONS 1-9: 0
SUM OF ALL RESPONSES TO PHQ QUESTIONS 1-9: 0
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
## 2024-07-17 NOTE — PATIENT INSTRUCTIONS
[facility]: Care Instructions  Overview     Burns injure the skin and can also injure other parts of your body, such as your muscles, nerves, lungs, and eyes. Burns may also become infected easily. Pain from a burn may get worse in the first few weeks as the burn heals.  The color, texture, and feel of your skin will change as new skin and scar tissue form. You may notice that the burned area feels tight and hard while it is healing. It is important to continue to move the area as the burn heals to prevent loss of motion or loss of function in the area.  Complete healing of a burn may take from a few months to up to a year. Recovering from a burn can be a painful and trying process, but there are steps you can take to make sure that the burn heals as well as possible.  Follow-up care is a key part of your treatment and safety. Be sure to make and go to all appointments, and call your doctor if you are having problems. It's also a good idea to know your test results and keep a list of the medicines you take.  How can you care for yourself at home?  Follow your doctor's instructions for caring for your burn. You may need special bandages or a compression garment if you have a very deep burn.  Keep your burn clean and dry.  Wash the burn every day with a mild soap and water.  Gently pat the burn dry after you wash and rinse it.  Protect your burn while it is healing. Cover your burn if you are going out in the cold or the sun.  If the burn is on your hands or arms, wear long sleeves.  Wear a hat if the burn is on your face.  Wear shoes and socks if the burn is on your feet.  If your doctor prescribed antibiotics, take them as directed. Do not stop taking them just because you feel better. You need to take the full course of antibiotics.  Take an over-the-counter pain medicine, such as acetaminophen (Tylenol), ibuprofen (Advil, Motrin), or naproxen (Aleve), as needed. Read and follow all instructions on the label.

## 2024-07-19 ENCOUNTER — OFFICE VISIT (OUTPATIENT)
Age: 74
End: 2024-07-19
Payer: MEDICARE

## 2024-07-19 VITALS
SYSTOLIC BLOOD PRESSURE: 132 MMHG | DIASTOLIC BLOOD PRESSURE: 76 MMHG | BODY MASS INDEX: 21.26 KG/M2 | RESPIRATION RATE: 17 BRPM | HEIGHT: 63 IN | TEMPERATURE: 97.8 F | WEIGHT: 120 LBS | HEART RATE: 74 BPM

## 2024-07-19 DIAGNOSIS — T31.10 BURN (ANY DEGREE) INVOLVING 10-19% OF BODY SURFACE: Primary | ICD-10-CM

## 2024-07-19 PROBLEM — E11.9 TYPE 2 DIABETES MELLITUS (HCC): Status: ACTIVE | Noted: 2024-07-19

## 2024-07-19 PROCEDURE — G8420 CALC BMI NORM PARAMETERS: HCPCS | Performed by: FAMILY MEDICINE

## 2024-07-19 PROCEDURE — 3078F DIAST BP <80 MM HG: CPT | Performed by: FAMILY MEDICINE

## 2024-07-19 PROCEDURE — 3075F SYST BP GE 130 - 139MM HG: CPT | Performed by: FAMILY MEDICINE

## 2024-07-19 PROCEDURE — 99213 OFFICE O/P EST LOW 20 MIN: CPT | Performed by: FAMILY MEDICINE

## 2024-07-19 PROCEDURE — PBSHW PBB SHADOW CHARGE: Performed by: FAMILY MEDICINE

## 2024-07-19 PROCEDURE — G8427 DOCREV CUR MEDS BY ELIG CLIN: HCPCS | Performed by: FAMILY MEDICINE

## 2024-07-19 PROCEDURE — 1090F PRES/ABSN URINE INCON ASSESS: CPT | Performed by: FAMILY MEDICINE

## 2024-07-19 PROCEDURE — 3017F COLORECTAL CA SCREEN DOC REV: CPT | Performed by: FAMILY MEDICINE

## 2024-07-19 PROCEDURE — G8399 PT W/DXA RESULTS DOCUMENT: HCPCS | Performed by: FAMILY MEDICINE

## 2024-07-19 PROCEDURE — 1123F ACP DISCUSS/DSCN MKR DOCD: CPT | Performed by: FAMILY MEDICINE

## 2024-07-19 PROCEDURE — 1036F TOBACCO NON-USER: CPT | Performed by: FAMILY MEDICINE

## 2024-07-19 RX ORDER — CEFTRIAXONE SODIUM 250 MG/1
250 INJECTION, POWDER, FOR SOLUTION INTRAMUSCULAR; INTRAVENOUS ONCE
Status: COMPLETED | OUTPATIENT
Start: 2024-07-19 | End: 2024-07-19

## 2024-07-19 RX ADMIN — CEFTRIAXONE SODIUM 250 MG: 250 INJECTION, POWDER, FOR SOLUTION INTRAMUSCULAR; INTRAVENOUS at 15:49

## 2024-07-19 NOTE — PROGRESS NOTES
Chief Complaint   Patient presents with    Dressing Change     Right thigh burn        \"Have you been to the ER, urgent care clinic since your last visit?  Hospitalized since your last visit?\"    NO    “Have you seen or consulted any other health care providers outside of [facility] since your last visit?”    NO            Vitals:    24 1310   BP: 132/76   Pulse: 74   Resp: 17   Temp: 97.8 °F (36.6 °C)   TempSrc: Infrared   Weight: 54.4 kg (120 lb)   Height: 1.6 m (5' 3\")        The patient, Ana Paula Bailee Brown, identity was verified by name and
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
## 2024-07-22 ENCOUNTER — OFFICE VISIT (OUTPATIENT)
Age: 74
End: 2024-07-22
Payer: MEDICARE

## 2024-07-22 VITALS
RESPIRATION RATE: 17 BRPM | HEART RATE: 72 BPM | BODY MASS INDEX: 21.26 KG/M2 | HEIGHT: 63 IN | OXYGEN SATURATION: 98 % | SYSTOLIC BLOOD PRESSURE: 130 MMHG | TEMPERATURE: 97.8 F | DIASTOLIC BLOOD PRESSURE: 79 MMHG | WEIGHT: 120 LBS

## 2024-07-22 DIAGNOSIS — T31.10 BURN (ANY DEGREE) INVOLVING 10-19% OF BODY SURFACE: Primary | ICD-10-CM

## 2024-07-22 DIAGNOSIS — Z51.89 ENCOUNTER FOR WOUND CARE: ICD-10-CM

## 2024-07-22 PROCEDURE — 99213 OFFICE O/P EST LOW 20 MIN: CPT | Performed by: FAMILY MEDICINE

## 2024-07-22 PROCEDURE — 3078F DIAST BP <80 MM HG: CPT | Performed by: FAMILY MEDICINE

## 2024-07-22 PROCEDURE — 3017F COLORECTAL CA SCREEN DOC REV: CPT | Performed by: FAMILY MEDICINE

## 2024-07-22 PROCEDURE — 1090F PRES/ABSN URINE INCON ASSESS: CPT | Performed by: FAMILY MEDICINE

## 2024-07-22 PROCEDURE — G8399 PT W/DXA RESULTS DOCUMENT: HCPCS | Performed by: FAMILY MEDICINE

## 2024-07-22 PROCEDURE — G8420 CALC BMI NORM PARAMETERS: HCPCS | Performed by: FAMILY MEDICINE

## 2024-07-22 PROCEDURE — 1036F TOBACCO NON-USER: CPT | Performed by: FAMILY MEDICINE

## 2024-07-22 PROCEDURE — 1123F ACP DISCUSS/DSCN MKR DOCD: CPT | Performed by: FAMILY MEDICINE

## 2024-07-22 PROCEDURE — G8427 DOCREV CUR MEDS BY ELIG CLIN: HCPCS | Performed by: FAMILY MEDICINE

## 2024-07-22 PROCEDURE — 3075F SYST BP GE 130 - 139MM HG: CPT | Performed by: FAMILY MEDICINE

## 2024-07-22 RX ORDER — CIPROFLOXACIN 500 MG/1
500 TABLET, FILM COATED ORAL 3 TIMES DAILY
Qty: 21 TABLET | Refills: 0 | Status: SHIPPED | OUTPATIENT
Start: 2024-07-22 | End: 2024-07-29
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
## 2024-07-22 NOTE — PROGRESS NOTES
Chief Complaint   Patient presents with    Dressing Change       \"Have you been to the ER, urgent care clinic since your last visit?  Hospitalized since your last visit?\"    NO    “Have you seen or consulted any other health care providers outside of [facility] since your last visit?”    NO            Vitals:    24 1230   BP: 130/79   Pulse: 72   Resp: 17   Temp: 97.8 °F (36.6 °C)   TempSrc: Infrared   SpO2: 98%   Weight: 54.4 kg (120 lb)   Height: 1.6 m (5' 3\")           Health Maintenance Due   Topic Date Due    Diabetic retinal exam  2018    Diabetic Alb to Cr ratio (uACR) test  10/10/2020    Diabetic foot exam  2022        The patient, Ana Paulalang العلي, identity was verified by name and

## 2024-07-26 ENCOUNTER — OFFICE VISIT (OUTPATIENT)
Age: 74
End: 2024-07-26
Payer: MEDICARE

## 2024-07-26 DIAGNOSIS — T30.0 BURN: Primary | ICD-10-CM

## 2024-07-26 DIAGNOSIS — Z51.89 ENCOUNTER FOR WOUND CARE: ICD-10-CM

## 2024-07-26 PROCEDURE — 99213 OFFICE O/P EST LOW 20 MIN: CPT | Performed by: FAMILY MEDICINE

## 2024-07-26 PROCEDURE — 1123F ACP DISCUSS/DSCN MKR DOCD: CPT | Performed by: FAMILY MEDICINE

## 2024-07-26 PROCEDURE — 1090F PRES/ABSN URINE INCON ASSESS: CPT | Performed by: FAMILY MEDICINE

## 2024-07-26 PROCEDURE — 1036F TOBACCO NON-USER: CPT | Performed by: FAMILY MEDICINE

## 2024-07-26 PROCEDURE — G8399 PT W/DXA RESULTS DOCUMENT: HCPCS | Performed by: FAMILY MEDICINE

## 2024-07-26 PROCEDURE — G8420 CALC BMI NORM PARAMETERS: HCPCS | Performed by: FAMILY MEDICINE

## 2024-07-26 PROCEDURE — G8427 DOCREV CUR MEDS BY ELIG CLIN: HCPCS | Performed by: FAMILY MEDICINE

## 2024-07-26 PROCEDURE — 3017F COLORECTAL CA SCREEN DOC REV: CPT | Performed by: FAMILY MEDICINE

## 2024-07-26 RX ORDER — ACETAMINOPHEN AND CODEINE PHOSPHATE 300; 30 MG/1; MG/1
1 TABLET ORAL EVERY 6 HOURS PRN
Qty: 18 TABLET | Refills: 0 | Status: SHIPPED | OUTPATIENT
Start: 2024-07-26 | End: 2024-08-02

## 2024-07-26 NOTE — PROGRESS NOTES
Ana Paula العلي, was evaluated through a synchronous (real-time) audio-video encounter. The patient (or guardian if applicable) is aware that this is a billable service, which includes applicable co-pays. This Virtual Visit was conducted with patient's (and/or legal guardian's) consent. Patient identification was verified, and a caregiver was present when appropriate.   The patient was located at Home: [address] [place] [id number]  Provider was located at {[facility] POS - Provider:[id number]}  Confirm you are appropriately licensed, registered, or certified to deliver care in the state where the patient is located as indicated above. If you are not or unsure, please re-schedule the visit: {[facility] State License:[id number]}    Ana Paula العلي (:  1950) is a {New vs Established:[id number]::\"Established patient\"}, presenting virtually for evaluation of the following:    Assessment & Plan   Below is the assessment and plan developed based on review of pertinent history, physical exam, labs, studies, and medications.  1. Burn  -     acetaminophen-codeine (TYLENOL/CODEINE #3) 300-30 MG per tablet; Take 1 tablet by mouth every 6 hours as needed for Pain for up to 7 days. Intended supply: 3 days. Take lowest dose possible to manage pain Max Daily Amount: 4 tablets, Disp-18 tablet, R-0Normal    No follow-ups on file.       Subjective   HPI  Review of Systems       Objective   Patient-Reported Vitals  No data recorded     Physical Exam  {Virtual visit PE with detailed exam Optional:177257953}       {Time Documentation Optional:730034504}    --Benigno Noonan MD
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
## 2024-07-29 ENCOUNTER — OFFICE VISIT (OUTPATIENT)
Age: 74
End: 2024-07-29
Payer: MEDICARE

## 2024-07-29 VITALS
BODY MASS INDEX: 21.43 KG/M2 | RESPIRATION RATE: 16 BRPM | HEART RATE: 74 BPM | SYSTOLIC BLOOD PRESSURE: 129 MMHG | WEIGHT: 121 LBS | TEMPERATURE: 97.5 F | DIASTOLIC BLOOD PRESSURE: 75 MMHG | OXYGEN SATURATION: 97 %

## 2024-07-29 DIAGNOSIS — Z51.89 ENCOUNTER FOR WOUND CARE: ICD-10-CM

## 2024-07-29 DIAGNOSIS — T30.0 BURN: Primary | ICD-10-CM

## 2024-07-29 PROCEDURE — 3017F COLORECTAL CA SCREEN DOC REV: CPT | Performed by: FAMILY MEDICINE

## 2024-07-29 PROCEDURE — 99213 OFFICE O/P EST LOW 20 MIN: CPT | Performed by: FAMILY MEDICINE

## 2024-07-29 PROCEDURE — 97597 DBRDMT OPN WND 1ST 20 CM/<: CPT | Performed by: FAMILY MEDICINE

## 2024-07-29 PROCEDURE — 1036F TOBACCO NON-USER: CPT | Performed by: FAMILY MEDICINE

## 2024-07-29 PROCEDURE — 3078F DIAST BP <80 MM HG: CPT | Performed by: FAMILY MEDICINE

## 2024-07-29 PROCEDURE — 3074F SYST BP LT 130 MM HG: CPT | Performed by: FAMILY MEDICINE

## 2024-07-29 PROCEDURE — 1123F ACP DISCUSS/DSCN MKR DOCD: CPT | Performed by: FAMILY MEDICINE

## 2024-07-29 PROCEDURE — G8427 DOCREV CUR MEDS BY ELIG CLIN: HCPCS | Performed by: FAMILY MEDICINE

## 2024-07-29 PROCEDURE — 1090F PRES/ABSN URINE INCON ASSESS: CPT | Performed by: FAMILY MEDICINE

## 2024-07-29 PROCEDURE — G8399 PT W/DXA RESULTS DOCUMENT: HCPCS | Performed by: FAMILY MEDICINE

## 2024-07-29 PROCEDURE — G8420 CALC BMI NORM PARAMETERS: HCPCS | Performed by: FAMILY MEDICINE

## 2024-07-29 ASSESSMENT — PATIENT HEALTH QUESTIONNAIRE - PHQ9
SUM OF ALL RESPONSES TO PHQ QUESTIONS 1-9: 0
1. LITTLE INTEREST OR PLEASURE IN DOING THINGS: NOT AT ALL
2. FEELING DOWN, DEPRESSED OR HOPELESS: NOT AT ALL
SUM OF ALL RESPONSES TO PHQ QUESTIONS 1-9: 0
SUM OF ALL RESPONSES TO PHQ9 QUESTIONS 1 & 2: 0
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
## 2024-07-29 NOTE — PROGRESS NOTES
Chief Complaint   Patient presents with    Dressing Change     Burn on right leg       \"Have you been to the ER, urgent care clinic since your last visit?  Hospitalized since your last visit?\"    NO    “Have you seen or consulted any other health care providers outside of [facility] since your last visit?”    NO            Vitals:    24 1643   BP: 129/75   Pulse: 74   Resp: 16   Temp: 97.5 °F (36.4 °C)   TempSrc: Infrared   SpO2: 97%   Weight: 54.9 kg (121 lb)        Health Maintenance Due   Topic Date Due    Diabetic retinal exam  2018    Diabetic Alb to Cr ratio (uACR) test  10/10/2020    Diabetic foot exam  2022        The patient, Ana Paula Bailee Brown, identity was verified by name and

## 2024-07-31 ENCOUNTER — OFFICE VISIT (OUTPATIENT)
Age: 74
End: 2024-07-31
Payer: MEDICARE

## 2024-07-31 VITALS
BODY MASS INDEX: 21.61 KG/M2 | RESPIRATION RATE: 17 BRPM | TEMPERATURE: 97.6 F | HEART RATE: 78 BPM | DIASTOLIC BLOOD PRESSURE: 77 MMHG | WEIGHT: 122 LBS | SYSTOLIC BLOOD PRESSURE: 124 MMHG | OXYGEN SATURATION: 96 %

## 2024-07-31 DIAGNOSIS — T30.0 BURN: Primary | ICD-10-CM

## 2024-07-31 DIAGNOSIS — C34.90 ADENOCARCINOMA OF LUNG, UNSPECIFIED LATERALITY (HCC): ICD-10-CM

## 2024-07-31 PROCEDURE — 1036F TOBACCO NON-USER: CPT | Performed by: FAMILY MEDICINE

## 2024-07-31 PROCEDURE — 3078F DIAST BP <80 MM HG: CPT | Performed by: FAMILY MEDICINE

## 2024-07-31 PROCEDURE — 3074F SYST BP LT 130 MM HG: CPT | Performed by: FAMILY MEDICINE

## 2024-07-31 PROCEDURE — 99213 OFFICE O/P EST LOW 20 MIN: CPT | Performed by: FAMILY MEDICINE

## 2024-07-31 PROCEDURE — G8399 PT W/DXA RESULTS DOCUMENT: HCPCS | Performed by: FAMILY MEDICINE

## 2024-07-31 PROCEDURE — 3017F COLORECTAL CA SCREEN DOC REV: CPT | Performed by: FAMILY MEDICINE

## 2024-07-31 PROCEDURE — 1123F ACP DISCUSS/DSCN MKR DOCD: CPT | Performed by: FAMILY MEDICINE

## 2024-07-31 PROCEDURE — G8427 DOCREV CUR MEDS BY ELIG CLIN: HCPCS | Performed by: FAMILY MEDICINE

## 2024-07-31 PROCEDURE — G8420 CALC BMI NORM PARAMETERS: HCPCS | Performed by: FAMILY MEDICINE

## 2024-07-31 PROCEDURE — 1090F PRES/ABSN URINE INCON ASSESS: CPT | Performed by: FAMILY MEDICINE

## 2024-07-31 NOTE — PROGRESS NOTES
Chief Complaint   Patient presents with    Dressing Change     Burn on right leg        \"Have you been to the ER, urgent care clinic since your last visit?  Hospitalized since your last visit?\"    NO    “Have you seen or consulted any other health care providers outside of [facility] since your last visit?”    NO       Vitals:    24 1617   BP: 124/77   Pulse: 78   Resp: 17   Temp: 97.6 °F (36.4 °C)   TempSrc: Infrared   SpO2: 96%   Weight: 55.3 kg (122 lb)        Health Maintenance Due   Topic Date Due    Diabetic retinal exam  2018    Diabetic Alb to Cr ratio (uACR) test  10/10/2020    Diabetic foot exam  2022        The patient, Ana Paula Bailee Brown, identity was verified by name and
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
## 2024-08-02 ENCOUNTER — OFFICE VISIT (OUTPATIENT)
Age: 74
End: 2024-08-02
Payer: MEDICARE

## 2024-08-02 VITALS
WEIGHT: 122.3 LBS | OXYGEN SATURATION: 98 % | RESPIRATION RATE: 16 BRPM | BODY MASS INDEX: 21.66 KG/M2 | TEMPERATURE: 97.3 F | HEART RATE: 74 BPM | DIASTOLIC BLOOD PRESSURE: 74 MMHG | SYSTOLIC BLOOD PRESSURE: 126 MMHG

## 2024-08-02 DIAGNOSIS — Z51.89 VISIT FOR WOUND CARE: ICD-10-CM

## 2024-08-02 DIAGNOSIS — L60.0 INGROWN TOENAIL: ICD-10-CM

## 2024-08-02 DIAGNOSIS — N89.8 VAGINAL DISCHARGE: Primary | ICD-10-CM

## 2024-08-02 PROCEDURE — 99213 OFFICE O/P EST LOW 20 MIN: CPT | Performed by: FAMILY MEDICINE

## 2024-08-02 PROCEDURE — 11732 AVLSN NAIL PLATE SIMPLE EACH: CPT | Performed by: FAMILY MEDICINE

## 2024-08-02 PROCEDURE — 11730 AVULSION NAIL PLATE SIMPLE 1: CPT | Performed by: FAMILY MEDICINE

## 2024-08-02 RX ORDER — FLUCONAZOLE 150 MG/1
150 TABLET ORAL
Qty: 2 TABLET | Refills: 0 | Status: SHIPPED | OUTPATIENT
Start: 2024-08-02 | End: 2024-08-08

## 2024-08-02 ASSESSMENT — PATIENT HEALTH QUESTIONNAIRE - PHQ9
SUM OF ALL RESPONSES TO PHQ QUESTIONS 1-9: 0
1. LITTLE INTEREST OR PLEASURE IN DOING THINGS: NOT AT ALL
SUM OF ALL RESPONSES TO PHQ9 QUESTIONS 1 & 2: 0
SUM OF ALL RESPONSES TO PHQ QUESTIONS 1-9: 0
2. FEELING DOWN, DEPRESSED OR HOPELESS: NOT AT ALL
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
## 2024-08-02 NOTE — PROGRESS NOTES
Chief Complaint   Patient presents with    Wound Check       \"Have you been to the ER, urgent care clinic since your last visit?  Hospitalized since your last visit?\"    NO    “Have you seen or consulted any other health care providers outside of [facility] since your last visit?”    NO      Click Here for Release of Records Request       Vitals:    24 1328   BP: 126/74   Pulse: 74   Resp: 16   Temp: 97.3 °F (36.3 °C)   SpO2: 98%      Health Maintenance Due   Topic Date Due    Diabetic retinal exam  2018    Diabetic Alb to Cr ratio (uACR) test  10/10/2020    Diabetic foot exam  2022    Flu vaccine (1) 2024        The patient, Ana Paula Baileecristino العلي, identity was verified by name and .

## 2024-08-08 ENCOUNTER — OFFICE VISIT (OUTPATIENT)
Age: 74
End: 2024-08-08
Payer: MEDICARE

## 2024-08-08 VITALS
HEART RATE: 71 BPM | DIASTOLIC BLOOD PRESSURE: 72 MMHG | RESPIRATION RATE: 16 BRPM | TEMPERATURE: 97.5 F | SYSTOLIC BLOOD PRESSURE: 121 MMHG | BODY MASS INDEX: 21.62 KG/M2 | HEIGHT: 63 IN | WEIGHT: 122 LBS

## 2024-08-08 DIAGNOSIS — T30.0 BURN: Primary | ICD-10-CM

## 2024-08-08 DIAGNOSIS — Z51.89 ENCOUNTER FOR WOUND CARE: ICD-10-CM

## 2024-08-08 PROCEDURE — 3078F DIAST BP <80 MM HG: CPT | Performed by: FAMILY MEDICINE

## 2024-08-08 PROCEDURE — G8420 CALC BMI NORM PARAMETERS: HCPCS | Performed by: FAMILY MEDICINE

## 2024-08-08 PROCEDURE — 1036F TOBACCO NON-USER: CPT | Performed by: FAMILY MEDICINE

## 2024-08-08 PROCEDURE — 3074F SYST BP LT 130 MM HG: CPT | Performed by: FAMILY MEDICINE

## 2024-08-08 PROCEDURE — 1090F PRES/ABSN URINE INCON ASSESS: CPT | Performed by: FAMILY MEDICINE

## 2024-08-08 PROCEDURE — 99213 OFFICE O/P EST LOW 20 MIN: CPT | Performed by: FAMILY MEDICINE

## 2024-08-08 PROCEDURE — 1123F ACP DISCUSS/DSCN MKR DOCD: CPT | Performed by: FAMILY MEDICINE

## 2024-08-08 PROCEDURE — G8399 PT W/DXA RESULTS DOCUMENT: HCPCS | Performed by: FAMILY MEDICINE

## 2024-08-08 PROCEDURE — G8427 DOCREV CUR MEDS BY ELIG CLIN: HCPCS | Performed by: FAMILY MEDICINE

## 2024-08-08 PROCEDURE — 3017F COLORECTAL CA SCREEN DOC REV: CPT | Performed by: FAMILY MEDICINE
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
## 2024-08-08 NOTE — PROGRESS NOTES
Chief Complaint   Patient presents with    Wound Check     Dressing change        \"Have you been to the ER, urgent care clinic since your last visit?  Hospitalized since your last visit?\"    NO    “Have you seen or consulted any other health care providers outside of [facility] since your last visit?”    NO            Click Here for Release of Records Request     No results found for this visit on 24.   Vitals:    24 1635   BP: 121/72   Pulse: 71   Resp: 16   Temp: 97.5 °F (36.4 °C)   TempSrc: Infrared   Weight: 55.3 kg (122 lb)   Height: 1.6 m (5' 3\")          The patient, Ana Paula Bailee Brown, identity was verified by name and .

## 2024-09-03 ENCOUNTER — TELEPHONE (OUTPATIENT)
Age: 74
End: 2024-09-03
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
## 2024-09-04 ENCOUNTER — OFFICE VISIT (OUTPATIENT)
Age: 74
End: 2024-09-04
Payer: MEDICARE

## 2024-09-04 VITALS
HEIGHT: 63 IN | BODY MASS INDEX: 20.73 KG/M2 | HEART RATE: 72 BPM | SYSTOLIC BLOOD PRESSURE: 137 MMHG | WEIGHT: 117 LBS | OXYGEN SATURATION: 99 % | RESPIRATION RATE: 16 BRPM | DIASTOLIC BLOOD PRESSURE: 74 MMHG | TEMPERATURE: 97.3 F

## 2024-09-04 DIAGNOSIS — T30.0 BURN: Primary | ICD-10-CM

## 2024-09-04 PROCEDURE — 99213 OFFICE O/P EST LOW 20 MIN: CPT | Performed by: FAMILY MEDICINE

## 2024-09-04 PROCEDURE — 3075F SYST BP GE 130 - 139MM HG: CPT | Performed by: FAMILY MEDICINE

## 2024-09-04 PROCEDURE — 3017F COLORECTAL CA SCREEN DOC REV: CPT | Performed by: FAMILY MEDICINE

## 2024-09-04 PROCEDURE — G8399 PT W/DXA RESULTS DOCUMENT: HCPCS | Performed by: FAMILY MEDICINE

## 2024-09-04 PROCEDURE — 3078F DIAST BP <80 MM HG: CPT | Performed by: FAMILY MEDICINE

## 2024-09-04 PROCEDURE — G8420 CALC BMI NORM PARAMETERS: HCPCS | Performed by: FAMILY MEDICINE

## 2024-09-04 PROCEDURE — G8427 DOCREV CUR MEDS BY ELIG CLIN: HCPCS | Performed by: FAMILY MEDICINE

## 2024-09-04 PROCEDURE — 1090F PRES/ABSN URINE INCON ASSESS: CPT | Performed by: FAMILY MEDICINE

## 2024-09-04 PROCEDURE — 1036F TOBACCO NON-USER: CPT | Performed by: FAMILY MEDICINE

## 2024-09-04 PROCEDURE — 1123F ACP DISCUSS/DSCN MKR DOCD: CPT | Performed by: FAMILY MEDICINE

## 2024-09-04 RX ORDER — LEVOFLOXACIN 500 MG/1
500 TABLET, FILM COATED ORAL DAILY
Qty: 10 TABLET | Refills: 0 | Status: SHIPPED | OUTPATIENT
Start: 2024-09-04 | End: 2024-09-14

## 2024-09-04 RX ORDER — DOXYCYCLINE HYCLATE 100 MG
100 TABLET ORAL 2 TIMES DAILY
Qty: 20 TABLET | Refills: 0 | Status: SHIPPED | OUTPATIENT
Start: 2024-09-04 | End: 2024-09-14
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
## 2024-09-06 ENCOUNTER — OFFICE VISIT (OUTPATIENT)
Age: 74
End: 2024-09-06
Payer: MEDICARE

## 2024-09-06 VITALS
HEART RATE: 74 BPM | DIASTOLIC BLOOD PRESSURE: 72 MMHG | RESPIRATION RATE: 16 BRPM | SYSTOLIC BLOOD PRESSURE: 132 MMHG | OXYGEN SATURATION: 97 % | BODY MASS INDEX: 20.73 KG/M2 | WEIGHT: 117 LBS | TEMPERATURE: 97.6 F

## 2024-09-06 DIAGNOSIS — Z51.89 ENCOUNTER FOR WOUND CARE: ICD-10-CM

## 2024-09-06 DIAGNOSIS — T31.10 BURN (ANY DEGREE) INVOLVING 10-19% OF BODY SURFACE: ICD-10-CM

## 2024-09-06 DIAGNOSIS — T30.0 BURN: Primary | ICD-10-CM

## 2024-09-06 PROCEDURE — G8399 PT W/DXA RESULTS DOCUMENT: HCPCS | Performed by: FAMILY MEDICINE

## 2024-09-06 PROCEDURE — 3075F SYST BP GE 130 - 139MM HG: CPT | Performed by: FAMILY MEDICINE

## 2024-09-06 PROCEDURE — G8427 DOCREV CUR MEDS BY ELIG CLIN: HCPCS | Performed by: FAMILY MEDICINE

## 2024-09-06 PROCEDURE — 3078F DIAST BP <80 MM HG: CPT | Performed by: FAMILY MEDICINE

## 2024-09-06 PROCEDURE — 1090F PRES/ABSN URINE INCON ASSESS: CPT | Performed by: FAMILY MEDICINE

## 2024-09-06 PROCEDURE — 1123F ACP DISCUSS/DSCN MKR DOCD: CPT | Performed by: FAMILY MEDICINE

## 2024-09-06 PROCEDURE — 99213 OFFICE O/P EST LOW 20 MIN: CPT | Performed by: FAMILY MEDICINE

## 2024-09-06 PROCEDURE — 3017F COLORECTAL CA SCREEN DOC REV: CPT | Performed by: FAMILY MEDICINE

## 2024-09-06 PROCEDURE — G8420 CALC BMI NORM PARAMETERS: HCPCS | Performed by: FAMILY MEDICINE

## 2024-09-06 PROCEDURE — 1036F TOBACCO NON-USER: CPT | Performed by: FAMILY MEDICINE

## 2024-09-06 NOTE — PROGRESS NOTES
Ana Paula العلي (:  1950) is a 74 y.o. female,Established patient, here for evaluation of the following chief complaint(s):  No chief complaint on file.  Pt present for f/u the patient was started on another course of Abx high dose for the next 10 days, in addition patient was advised to wash daily with well water, and has been putting Neosporin on it   DSNG changed today a and had some debridement necrotic tissue removed nd so far happy with progress and has been compliant with meds and medical advised,   at this time no drainage but skin is red erythematous and tender to touch     Constitutional: no chills and fever,  , nad     HENT: no ear pain or nosebleeds. No blurred vision  Respiratory: no shortness of breath, wheezing cough   Cardiovascular: Has no chest pain, ,and racing heart .   Gastrointestinal: No constipation, diarrhea, nausea and vomiting.   Genitourinary: No frequency.   Musculoskeletal: Negative for joint pain.   Skin: ++ itching, ++rash.   Neurological: Negative for dizziness, no tremors  Psychiatric/Behavioral: Negative for depression   is not nervous/anxious.   and not depressed the patient is not nervous/anxious.    General:  alert cooperative appears stated for the age  HEENT; normocephalic and atraumatic PERRLA extraocular motor intact normal tympanic membrane normal external ear bilaterally, mucosal normal no drainage  Neck: supple no adenopathy no JVD no bruit  Lungs: Clear to auscultation bilaterally no rales rhonchi or wheezes  Heart: Normal regular S1-S2 ,  no murmur  Breast exam deferred  Abdomen: Soft nontender normal bowel sounds   Extremities: Normal range of motion no point tenderness normal pulses no edema  Pelvic/: No lesion, no lymphadenopathy  Skin: abormal no lesion maculopapular pruritic scaly rash       Assessment & Plan  Burn     Orders:    ACTIVE WOUND CARE/20 CM OR <; Future    Encounter for wound care     Orders:    ACTIVE WOUND CARE/20 CM OR <;
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
## 2024-09-07 LAB — BACTERIA SPEC AEROBE CULT: ABNORMAL

## 2024-09-09 ENCOUNTER — OFFICE VISIT (OUTPATIENT)
Age: 74
End: 2024-09-09
Payer: MEDICARE

## 2024-09-09 VITALS
TEMPERATURE: 97.8 F | RESPIRATION RATE: 15 BRPM | WEIGHT: 117 LBS | DIASTOLIC BLOOD PRESSURE: 78 MMHG | HEART RATE: 78 BPM | SYSTOLIC BLOOD PRESSURE: 129 MMHG | BODY MASS INDEX: 20.73 KG/M2 | OXYGEN SATURATION: 97 %

## 2024-09-09 DIAGNOSIS — Z51.89 ENCOUNTER FOR WOUND CARE: ICD-10-CM

## 2024-09-09 DIAGNOSIS — T30.0 BURN: Primary | ICD-10-CM

## 2024-09-09 PROCEDURE — G8427 DOCREV CUR MEDS BY ELIG CLIN: HCPCS | Performed by: FAMILY MEDICINE

## 2024-09-09 PROCEDURE — 3078F DIAST BP <80 MM HG: CPT | Performed by: FAMILY MEDICINE

## 2024-09-09 PROCEDURE — 99213 OFFICE O/P EST LOW 20 MIN: CPT | Performed by: FAMILY MEDICINE

## 2024-09-09 PROCEDURE — 1090F PRES/ABSN URINE INCON ASSESS: CPT | Performed by: FAMILY MEDICINE

## 2024-09-09 PROCEDURE — 1036F TOBACCO NON-USER: CPT | Performed by: FAMILY MEDICINE

## 2024-09-09 PROCEDURE — 1123F ACP DISCUSS/DSCN MKR DOCD: CPT | Performed by: FAMILY MEDICINE

## 2024-09-09 PROCEDURE — G8399 PT W/DXA RESULTS DOCUMENT: HCPCS | Performed by: FAMILY MEDICINE

## 2024-09-09 PROCEDURE — 3017F COLORECTAL CA SCREEN DOC REV: CPT | Performed by: FAMILY MEDICINE

## 2024-09-09 PROCEDURE — 3074F SYST BP LT 130 MM HG: CPT | Performed by: FAMILY MEDICINE

## 2024-09-09 PROCEDURE — G8420 CALC BMI NORM PARAMETERS: HCPCS | Performed by: FAMILY MEDICINE

## 2024-09-11 LAB
BACTERIA SPEC AEROBE CULT: ABNORMAL
BACTERIA SPEC ANAEROBE CULT: ABNORMAL

## 2024-09-12 ENCOUNTER — TELEPHONE (OUTPATIENT)
Age: 74
End: 2024-09-12

## 2024-09-12 DIAGNOSIS — B37.9 YEAST INFECTION: Primary | ICD-10-CM

## 2024-09-12 RX ORDER — FLUCONAZOLE 150 MG/1
150 TABLET ORAL ONCE
Qty: 1 TABLET | Refills: 0 | Status: SHIPPED | OUTPATIENT
Start: 2024-09-12 | End: 2024-09-12

## 2024-09-13 ENCOUNTER — OFFICE VISIT (OUTPATIENT)
Age: 74
End: 2024-09-13
Payer: MEDICARE

## 2024-09-13 VITALS
SYSTOLIC BLOOD PRESSURE: 120 MMHG | TEMPERATURE: 97.8 F | WEIGHT: 117.8 LBS | HEART RATE: 76 BPM | RESPIRATION RATE: 16 BRPM | DIASTOLIC BLOOD PRESSURE: 73 MMHG | BODY MASS INDEX: 20.87 KG/M2

## 2024-09-13 DIAGNOSIS — Z51.89 ENCOUNTER FOR WOUND CARE: ICD-10-CM

## 2024-09-13 DIAGNOSIS — T31.10 BURN (ANY DEGREE) INVOLVING 10-19% OF BODY SURFACE: Primary | ICD-10-CM

## 2024-09-13 PROCEDURE — G8399 PT W/DXA RESULTS DOCUMENT: HCPCS | Performed by: FAMILY MEDICINE

## 2024-09-13 PROCEDURE — 99213 OFFICE O/P EST LOW 20 MIN: CPT | Performed by: FAMILY MEDICINE

## 2024-09-13 PROCEDURE — 1036F TOBACCO NON-USER: CPT | Performed by: FAMILY MEDICINE

## 2024-09-13 PROCEDURE — G8420 CALC BMI NORM PARAMETERS: HCPCS | Performed by: FAMILY MEDICINE

## 2024-09-13 PROCEDURE — 1090F PRES/ABSN URINE INCON ASSESS: CPT | Performed by: FAMILY MEDICINE

## 2024-09-13 PROCEDURE — 3078F DIAST BP <80 MM HG: CPT | Performed by: FAMILY MEDICINE

## 2024-09-13 PROCEDURE — 3017F COLORECTAL CA SCREEN DOC REV: CPT | Performed by: FAMILY MEDICINE

## 2024-09-13 PROCEDURE — 1123F ACP DISCUSS/DSCN MKR DOCD: CPT | Performed by: FAMILY MEDICINE

## 2024-09-13 PROCEDURE — G8427 DOCREV CUR MEDS BY ELIG CLIN: HCPCS | Performed by: FAMILY MEDICINE

## 2024-09-13 PROCEDURE — 3074F SYST BP LT 130 MM HG: CPT | Performed by: FAMILY MEDICINE

## 2024-09-24 RX ORDER — ALLOPURINOL 300 MG/1
TABLET ORAL
Qty: 90 TABLET | Refills: 3 | Status: SHIPPED | OUTPATIENT
Start: 2024-09-24

## 2024-09-24 RX ORDER — HYDROCHLOROTHIAZIDE 12.5 MG/1
12.5 TABLET ORAL DAILY
Qty: 90 TABLET | Refills: 3 | Status: SHIPPED | OUTPATIENT
Start: 2024-09-24

## 2024-10-02 NOTE — TELEPHONE ENCOUNTER
I show this was d/c on 6/11/24. Please sign if appropriate.    Last appointment: 9/13/24  Next appointment: 10/10/24  Previous refill encounter(s): 5/2/24 #90 with 1 refill    Requested Prescriptions     Pending Prescriptions Disp Refills    pantoprazole (PROTONIX) 40 MG tablet [Pharmacy Med Name: Pantoprazole Sodium Oral Tablet Delayed Release 40 MG] 90 tablet 3     Sig: TAKE 1 TABLET BY MOUTH 30 TO 45 MINUTES BEFORE DINNER NIGHTLY         For Pharmacy Admin Tracking Only    Program: Medication Refill  CPA in place:    Recommendation Provided To:   Intervention Detail: New Rx: 1, reason: Patient Preference  Intervention Accepted By:   Gap Closed?:    Time Spent (min): 5

## 2024-10-03 RX ORDER — PANTOPRAZOLE SODIUM 40 MG/1
TABLET, DELAYED RELEASE ORAL
Qty: 90 TABLET | Refills: 3 | Status: SHIPPED | OUTPATIENT
Start: 2024-10-03

## 2024-10-10 ENCOUNTER — OFFICE VISIT (OUTPATIENT)
Age: 74
End: 2024-10-10
Payer: MEDICARE

## 2024-10-10 DIAGNOSIS — T30.0 BURN: Primary | ICD-10-CM

## 2024-10-10 DIAGNOSIS — Z51.89 VISIT FOR WOUND CARE: ICD-10-CM

## 2024-10-10 PROCEDURE — 1090F PRES/ABSN URINE INCON ASSESS: CPT | Performed by: FAMILY MEDICINE

## 2024-10-10 PROCEDURE — 1036F TOBACCO NON-USER: CPT | Performed by: FAMILY MEDICINE

## 2024-10-10 PROCEDURE — G8484 FLU IMMUNIZE NO ADMIN: HCPCS | Performed by: FAMILY MEDICINE

## 2024-10-10 PROCEDURE — 99213 OFFICE O/P EST LOW 20 MIN: CPT | Performed by: FAMILY MEDICINE

## 2024-10-10 PROCEDURE — G8428 CUR MEDS NOT DOCUMENT: HCPCS | Performed by: FAMILY MEDICINE

## 2024-10-10 PROCEDURE — G8420 CALC BMI NORM PARAMETERS: HCPCS | Performed by: FAMILY MEDICINE

## 2024-10-10 PROCEDURE — 1123F ACP DISCUSS/DSCN MKR DOCD: CPT | Performed by: FAMILY MEDICINE

## 2024-10-10 PROCEDURE — 3017F COLORECTAL CA SCREEN DOC REV: CPT | Performed by: FAMILY MEDICINE

## 2024-10-10 PROCEDURE — G8399 PT W/DXA RESULTS DOCUMENT: HCPCS | Performed by: FAMILY MEDICINE

## 2024-11-11 ENCOUNTER — OFFICE VISIT (OUTPATIENT)
Age: 74
End: 2024-11-11
Payer: MEDICARE

## 2024-11-11 VITALS
BODY MASS INDEX: 22.04 KG/M2 | OXYGEN SATURATION: 100 % | DIASTOLIC BLOOD PRESSURE: 71 MMHG | SYSTOLIC BLOOD PRESSURE: 145 MMHG | HEART RATE: 69 BPM | TEMPERATURE: 97.7 F | WEIGHT: 124.4 LBS | RESPIRATION RATE: 16 BRPM

## 2024-11-11 DIAGNOSIS — Z23 NEED FOR VACCINATION: ICD-10-CM

## 2024-11-11 DIAGNOSIS — Z51.89 VISIT FOR WOUND CARE: Primary | ICD-10-CM

## 2024-11-11 DIAGNOSIS — T30.0 BURN: ICD-10-CM

## 2024-11-11 PROCEDURE — 91320 SARSCV2 VAC 30MCG TRS-SUC IM: CPT | Performed by: FAMILY MEDICINE

## 2024-11-11 PROCEDURE — 99214 OFFICE O/P EST MOD 30 MIN: CPT | Performed by: FAMILY MEDICINE

## 2024-11-11 PROCEDURE — 90653 IIV ADJUVANT VACCINE IM: CPT | Performed by: FAMILY MEDICINE

## 2024-11-11 RX ORDER — AMMONIUM LACTATE 12 G/100G
CREAM TOPICAL
Qty: 140 G | Refills: 2 | Status: SHIPPED | OUTPATIENT
Start: 2024-11-11 | End: 2024-12-11

## 2024-11-11 SDOH — ECONOMIC STABILITY: INCOME INSECURITY: HOW HARD IS IT FOR YOU TO PAY FOR THE VERY BASICS LIKE FOOD, HOUSING, MEDICAL CARE, AND HEATING?: NOT VERY HARD

## 2024-11-11 SDOH — ECONOMIC STABILITY: FOOD INSECURITY: WITHIN THE PAST 12 MONTHS, YOU WORRIED THAT YOUR FOOD WOULD RUN OUT BEFORE YOU GOT MONEY TO BUY MORE.: NEVER TRUE

## 2024-11-11 SDOH — ECONOMIC STABILITY: FOOD INSECURITY: WITHIN THE PAST 12 MONTHS, THE FOOD YOU BOUGHT JUST DIDN'T LAST AND YOU DIDN'T HAVE MONEY TO GET MORE.: NEVER TRUE

## 2024-11-11 NOTE — PROGRESS NOTES
Chief Complaint   Patient presents with    Wound Check     Well healed , no drainage to right upper thigh wound       \"Have you been to the ER, urgent care clinic since your last visit?  Hospitalized since your last visit?\"    NO    “Have you seen or consulted any other health care providers outside of [facility] since your last visit?”    NO      Click Here for Release of Records Request     No results found for this visit on 24.   Vitals:    24 1552 24 1558   BP: (!) 147/70 (!) 145/71   Pulse: 69    Resp: 16    Temp: 97.7 °F (36.5 °C)    TempSrc: Oral    SpO2: 100%    Weight: 56.4 kg (124 lb 6.4 oz)       Health Maintenance Due   Topic Date Due    Diabetic Alb to Cr ratio (uACR) test  10/10/2020    Diabetic foot exam  2022    Lipids  10/11/2024    GFR test (Diabetes, CKD 3-4, OR last GFR 15-59)  10/11/2024    Annual Wellness Visit (Medicare)  10/11/2024        The patient, Ana Paula Bailee Brown, identity was verified by name and .  present    Per verbal orders of Dr. Noonan , injection of flu and covid vaccine was given in the Right deltoid . Patient tolerated it well. Patient instructed to report any adverse reaction to me immediately.
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
## 2024-11-11 NOTE — PATIENT INSTRUCTIONS
Patient Education        COVID-19 Vaccine: What You Need to Know  Why get vaccinated?  COVID-19 vaccine can prevent COVID-19 disease. Vaccination can help reduce the severity of COVID-19 disease if you get sick.  COVID-19 is caused by a coronavirus called SARS-CoV-2 that spreads easily from person to person. COVID-19 can cause mild to moderate illness lasting only a few days, or severe illness requiring hospitalization, intensive care, or a ventilator to help with breathing. COVID-19 can result in death.  If an infected person has symptoms, they may appear 2 to 14 days after exposure to the virus. Anyone can have mild to severe symptoms.  Possible symptoms include fever or chills, cough, shortness of breath or difficulty breathing, fatigue (tiredness), muscle or body aches, headache, new loss of taste or smell, sore throat, congestion or runny nose, nausea or vomiting, or diarrhea.  More serious symptoms can include trouble breathing, persistent pain or pressure in the chest, new confusion, inability to wake or stay awake, or pale, gray, or blue-colored skin, lips, or nail beds, depending on skin tone.  Older adults and people with certain underlying medical conditions (like heart or lung disease or diabetes) are more likely to get very sick from COVID-19.  COVID-19 vaccine  Updated (4118-3511 Formula) COVID-19 vaccine is recommended for everyone 6 months of age and older.  COVID-19 vaccines for infants and children 6 months through 11 years of age are available under Emergency Use Authorization from the U. S. Food and Drug Administration (FDA). Please refer to the Fact Sheets for Recipients and Caregivers for more information.  For people 12 years of age and older, updated COVID-19 vaccines, manufactured by [employer] or Pf[employer] Inc., are approved by FDA.  Everyone 12 years and older should get 1 dose of an FDA-approved, updated 9972-3382 COVID-19 vaccine. If you have received a COVID-19 vaccine recently, you

## 2024-12-06 NOTE — TELEPHONE ENCOUNTER
Last appointment: 11/11/24  Next appointment: 12/13/24  Previous refill encounter(s): 2/16/24 #90 with 3 refills    Requested Prescriptions     Pending Prescriptions Disp Refills    clopidogrel (PLAVIX) 75 MG tablet [Pharmacy Med Name: Clopidogrel Bisulfate Oral Tablet 75 MG] 90 tablet 3     Sig: TAKE 1 TABLET EVERY DAY         For Pharmacy Admin Tracking Only    Program: Medication Refill  CPA in place:    Recommendation Provided To:   Intervention Detail: New Rx: 1, reason: Patient Preference  Intervention Accepted By:   Gap Closed?:    Time Spent (min): 5

## 2024-12-09 RX ORDER — CLOPIDOGREL BISULFATE 75 MG/1
75 TABLET ORAL DAILY
Qty: 90 TABLET | Refills: 3 | Status: SHIPPED | OUTPATIENT
Start: 2024-12-09

## 2024-12-13 ENCOUNTER — HOSPITAL ENCOUNTER (OUTPATIENT)
Facility: HOSPITAL | Age: 74
Discharge: HOME | End: 2024-12-16
Attending: FAMILY MEDICINE
Payer: MEDICARE

## 2024-12-13 ENCOUNTER — OFFICE VISIT (OUTPATIENT)
Age: 74
End: 2024-12-13
Payer: MEDICARE

## 2024-12-13 VITALS
DIASTOLIC BLOOD PRESSURE: 82 MMHG | TEMPERATURE: 97.5 F | OXYGEN SATURATION: 98 % | HEART RATE: 67 BPM | RESPIRATION RATE: 15 BRPM | BODY MASS INDEX: 21.79 KG/M2 | WEIGHT: 123 LBS | SYSTOLIC BLOOD PRESSURE: 188 MMHG

## 2024-12-13 DIAGNOSIS — R22.31 MASS OF FINGER OF RIGHT HAND: ICD-10-CM

## 2024-12-13 DIAGNOSIS — I73.9 PERIPHERAL VASCULAR DISEASE (HCC): ICD-10-CM

## 2024-12-13 DIAGNOSIS — I10 PRIMARY HYPERTENSION: ICD-10-CM

## 2024-12-13 DIAGNOSIS — I63.19 CEREBROVASCULAR ACCIDENT (CVA) DUE TO EMBOLISM OF OTHER PRECEREBRAL ARTERY (HCC): ICD-10-CM

## 2024-12-13 DIAGNOSIS — H54.8 BLINDNESS, LEGAL: ICD-10-CM

## 2024-12-13 DIAGNOSIS — Z00.00 MEDICARE ANNUAL WELLNESS VISIT, SUBSEQUENT: Primary | ICD-10-CM

## 2024-12-13 PROBLEM — E11.9 TYPE 2 DIABETES MELLITUS (HCC): Status: RESOLVED | Noted: 2024-07-19 | Resolved: 2024-12-13

## 2024-12-13 LAB
ALBUMIN SERPL-MCNC: 4.1 G/DL (ref 3.5–5)
ALBUMIN/GLOB SERPL: 1.2 (ref 1.1–2.2)
ALP SERPL-CCNC: 94 U/L (ref 45–117)
ALT SERPL-CCNC: 23 U/L (ref 12–78)
ANION GAP SERPL CALC-SCNC: 6 MMOL/L (ref 2–12)
APPEARANCE UR: CLEAR
AST SERPL-CCNC: 29 U/L (ref 15–37)
BACTERIA URNS QL MICRO: NEGATIVE /HPF
BASOPHILS # BLD: 0 K/UL (ref 0–0.1)
BASOPHILS NFR BLD: 1 % (ref 0–1)
BILIRUB SERPL-MCNC: 0.2 MG/DL (ref 0.2–1)
BILIRUB UR QL: NEGATIVE
BUN SERPL-MCNC: 24 MG/DL (ref 6–20)
BUN/CREAT SERPL: 20 (ref 12–20)
CALCIUM SERPL-MCNC: 10.2 MG/DL (ref 8.5–10.1)
CHLORIDE SERPL-SCNC: 107 MMOL/L (ref 97–108)
CHOLEST SERPL-MCNC: 255 MG/DL
CO2 SERPL-SCNC: 24 MMOL/L (ref 21–32)
COLOR UR: ABNORMAL
CREAT SERPL-MCNC: 1.22 MG/DL (ref 0.55–1.02)
DIFFERENTIAL METHOD BLD: ABNORMAL
EOSINOPHIL # BLD: 0.1 K/UL (ref 0–0.4)
EOSINOPHIL NFR BLD: 2 % (ref 0–7)
EPITH CASTS URNS QL MICRO: ABNORMAL /LPF
ERYTHROCYTE [DISTWIDTH] IN BLOOD BY AUTOMATED COUNT: 13.2 % (ref 11.5–14.5)
GLOBULIN SER CALC-MCNC: 3.3 G/DL (ref 2–4)
GLUCOSE SERPL-MCNC: 88 MG/DL (ref 65–100)
GLUCOSE UR STRIP.AUTO-MCNC: NEGATIVE MG/DL
HCT VFR BLD AUTO: 33.3 % (ref 35–47)
HDLC SERPL-MCNC: 84 MG/DL
HDLC SERPL: 3 (ref 0–5)
HGB BLD-MCNC: 11 G/DL (ref 11.5–16)
HGB UR QL STRIP: NEGATIVE
HYALINE CASTS URNS QL MICRO: ABNORMAL /LPF (ref 0–5)
IMM GRANULOCYTES # BLD AUTO: 0 K/UL (ref 0–0.04)
IMM GRANULOCYTES NFR BLD AUTO: 0 % (ref 0–0.5)
KETONES UR QL STRIP.AUTO: NEGATIVE MG/DL
LDLC SERPL CALC-MCNC: 118.6 MG/DL (ref 0–100)
LEUKOCYTE ESTERASE UR QL STRIP.AUTO: ABNORMAL
LYMPHOCYTES # BLD: 1.7 K/UL (ref 0.8–3.5)
LYMPHOCYTES NFR BLD: 36 % (ref 12–49)
MCH RBC QN AUTO: 32.1 PG (ref 26–34)
MCHC RBC AUTO-ENTMCNC: 33 G/DL (ref 30–36.5)
MCV RBC AUTO: 97.1 FL (ref 80–99)
MONOCYTES # BLD: 0.6 K/UL (ref 0–1)
MONOCYTES NFR BLD: 13 % (ref 5–13)
NEUTS SEG # BLD: 2.3 K/UL (ref 1.8–8)
NEUTS SEG NFR BLD: 48 % (ref 32–75)
NITRITE UR QL STRIP.AUTO: NEGATIVE
NRBC # BLD: 0 K/UL (ref 0–0.01)
NRBC BLD-RTO: 0 PER 100 WBC
PH UR STRIP: 5.5 (ref 5–8)
PLATELET # BLD AUTO: 213 K/UL (ref 150–400)
PMV BLD AUTO: 9.5 FL (ref 8.9–12.9)
POTASSIUM SERPL-SCNC: 3.8 MMOL/L (ref 3.5–5.1)
PROT SERPL-MCNC: 7.4 G/DL (ref 6.4–8.2)
PROT UR STRIP-MCNC: NEGATIVE MG/DL
RBC # BLD AUTO: 3.43 M/UL (ref 3.8–5.2)
RBC #/AREA URNS HPF: ABNORMAL /HPF (ref 0–5)
SODIUM SERPL-SCNC: 137 MMOL/L (ref 136–145)
SP GR UR REFRACTOMETRY: 1.02 (ref 1–1.03)
SPECIMEN HOLD: NORMAL
T4 FREE SERPL-MCNC: 0.8 NG/DL (ref 0.8–1.5)
TRIGL SERPL-MCNC: 262 MG/DL
TSH SERPL DL<=0.05 MIU/L-ACNC: 0.85 UIU/ML (ref 0.36–3.74)
UROBILINOGEN UR QL STRIP.AUTO: 0.2 EU/DL (ref 0.2–1)
VLDLC SERPL CALC-MCNC: 52.4 MG/DL
WBC # BLD AUTO: 4.7 K/UL (ref 3.6–11)
WBC URNS QL MICRO: ABNORMAL /HPF (ref 0–4)

## 2024-12-13 PROCEDURE — 1160F RVW MEDS BY RX/DR IN RCRD: CPT | Performed by: FAMILY MEDICINE

## 2024-12-13 PROCEDURE — 3078F DIAST BP <80 MM HG: CPT | Performed by: FAMILY MEDICINE

## 2024-12-13 PROCEDURE — 3017F COLORECTAL CA SCREEN DOC REV: CPT | Performed by: FAMILY MEDICINE

## 2024-12-13 PROCEDURE — 1123F ACP DISCUSS/DSCN MKR DOCD: CPT | Performed by: FAMILY MEDICINE

## 2024-12-13 PROCEDURE — 1126F AMNT PAIN NOTED NONE PRSNT: CPT | Performed by: FAMILY MEDICINE

## 2024-12-13 PROCEDURE — G8482 FLU IMMUNIZE ORDER/ADMIN: HCPCS | Performed by: FAMILY MEDICINE

## 2024-12-13 PROCEDURE — G0439 PPPS, SUBSEQ VISIT: HCPCS | Performed by: FAMILY MEDICINE

## 2024-12-13 PROCEDURE — 1159F MED LIST DOCD IN RCRD: CPT | Performed by: FAMILY MEDICINE

## 2024-12-13 PROCEDURE — 73120 X-RAY EXAM OF HAND: CPT

## 2024-12-13 PROCEDURE — 3077F SYST BP >= 140 MM HG: CPT | Performed by: FAMILY MEDICINE

## 2024-12-13 RX ORDER — TRIAMCINOLONE ACETONIDE 1 MG/G
CREAM TOPICAL NIGHTLY
Qty: 45 G | Refills: 3 | Status: SHIPPED | OUTPATIENT
Start: 2024-12-13

## 2024-12-13 ASSESSMENT — PATIENT HEALTH QUESTIONNAIRE - PHQ9
SUM OF ALL RESPONSES TO PHQ9 QUESTIONS 1 & 2: 0
SUM OF ALL RESPONSES TO PHQ QUESTIONS 1-9: 0
1. LITTLE INTEREST OR PLEASURE IN DOING THINGS: NOT AT ALL
2. FEELING DOWN, DEPRESSED OR HOPELESS: NOT AT ALL
SUM OF ALL RESPONSES TO PHQ QUESTIONS 1-9: 0

## 2024-12-13 ASSESSMENT — LIFESTYLE VARIABLES
HOW MANY STANDARD DRINKS CONTAINING ALCOHOL DO YOU HAVE ON A TYPICAL DAY: PATIENT DOES NOT DRINK
HOW OFTEN DO YOU HAVE A DRINK CONTAINING ALCOHOL: NEVER

## 2024-12-13 NOTE — PATIENT INSTRUCTIONS
Learning About Being Active as an Older Adult  Why is being active important as you get older?     Being active is one of the best things you can do for your health. And it's never too late to start. Being active--or getting active, if you aren't already--has definite benefits. It can:  Give you more energy,  Keep your mind sharp.  Improve balance to reduce your risk of falls.  Help you manage chronic illness with fewer medicines.  No matter how old you are, how fit you are, or what health problems you have, there is a form of activity that will work for you. And the more physical activity you can do, the better your overall health will be.  What kinds of activity can help you stay healthy?  Being more active will make your daily activities easier. Physical activity includes planned exercise and things you do in daily life. There are four types of activity:  Aerobic.  Doing aerobic activity makes your heart and lungs strong.  Includes walking, dancing, and gardening.  Aim for at least 2½ hours spread throughout the week.  It improves your energy and can help you sleep better.  Muscle-strengthening.  This type of activity can help maintain muscle and strengthen bones.  Includes climbing stairs, using resistance bands, and lifting or carrying heavy loads.  Aim for at least twice a week.  It can help protect the knees and other joints.  Stretching.  Stretching gives you better range of motion in joints and muscles.  Includes upper arm stretches, calf stretches, and gentle yoga.  Aim for at least twice a week, preferably after your muscles are warmed up from other activities.  It can help you function better in daily life.  Balancing.  This helps you stay coordinated and have good posture.  Includes heel-to-toe walking, shantell chi, and certain types of yoga.  Aim for at least 3 days a week.  It can reduce your risk of falling.  Even if you have a hard time meeting the recommendations, it's better to be more active

## 2024-12-13 NOTE — PROGRESS NOTES
Chief Complaint   Patient presents with    Medicare AWV    Pain     Right hand and right foot pain        \"Have you been to the ER, urgent care clinic since your last visit?  Hospitalized since your last visit?\"    NO    “Have you seen or consulted any other health care providers outside of [facility] since your last visit?”    NO            Click Here for Release of Records Request     No results found for this visit on 24.   Vitals:    24 1141 24 1142   BP: (!) 185/71 (!) 188/82   Site: Right Upper Arm Left Upper Arm   Position: Sitting Sitting   Cuff Size: Large Adult Large Adult   Pulse: 67    Resp: 15    Temp: 97.5 °F (36.4 °C)    TempSrc: Infrared    SpO2: 98%    Weight: 55.8 kg (123 lb)         The patient, Ana Paula Bailee Brown, identity was verified by name and .   
wheezing, rales or rhonchi.,  speaking in full sentences, has no accessory muscle used.  Abdomen: Bowel sounds are normal. Having no distension, no palpable mass. Soft,  No tenderness, rebound or guarding.   Musculoskeletal: No peripheral edema, having normal ROM  Skin:   warm and dry. No diaphoresis, rashes, or lesions.   Neurological: Alert, awake,  oriented x3 ,  normal speech.                      Allergies   Allergen Reactions    Statins Myalgia and Other (See Comments)     Prior to Visit Medications    Medication Sig Taking? Authorizing Provider   clopidogrel (PLAVIX) 75 MG tablet TAKE 1 TABLET EVERY DAY Yes Benigno Noonan MD   pantoprazole (PROTONIX) 40 MG tablet TAKE 1 TABLET BY MOUTH 30 TO 45 MINUTES BEFORE DINNER NIGHTLY Yes Benigno Noonan MD   allopurinol (ZYLOPRIM) 300 MG tablet TAKE 1 TABLET EVERY DAY Yes Benigno Noonan MD   hydroCHLOROthiazide 12.5 MG tablet TAKE 1 TABLET EVERY DAY Yes Benigno Noonan MD   diclofenac sodium (VOLTAREN) 1 % GEL APPLY 4 GRAMS TOPICALLY 4 TIMES DAILY Yes Benigno Noonan MD   acetaminophen (TYLENOL) 500 MG tablet Take 2 tablets by mouth every 6 hours as needed Yes Automatic Reconciliation, Ar   aspirin 81 MG chewable tablet Take 1 tablet by mouth daily Yes Automatic Reconciliation, Ar   brimonidine (ALPHAGAN P) 0.15 % ophthalmic solution Apply 1 drop to eye 2 times daily Yes Automatic Reconciliation, Ar   dorzolamide (TRUSOPT) 2 % ophthalmic solution instill 1 drop into both eyes twice a day Yes Automatic Reconciliation, Ar   latanoprost (XALATAN) 0.005 % ophthalmic solution Apply 1 drop to eye Yes Automatic Reconciliation, Ar   prednisoLONE acetate (PRED FORTE) 1 % ophthalmic suspension SHAKE LIQUID AND INSTILL 1 DROP IN AFFECTED EYE FOUR TIMES DAILY AFTER SURGERY AS DIRECTED Yes Automatic Reconciliation, Ar       CareTeam (Including outside providers/suppliers regularly involved in providing care):   Patient Care Team:  Benigno Noonan MD as PCP -

## 2024-12-16 NOTE — RESULT ENCOUNTER NOTE
Dear Patient, I am writting to tell you that your xray results are normal except for the arthritis finding on the x-ray  for which you need to further been evaluated by the orthopedic Doctor and continue on with physical therapy and strengthening exercises, 2-3 Advill at the time per day for the pain, call if there is any other concern

## 2025-01-29 NOTE — H&P
HISTORY OF PRESENT ILLNESS  Chief Complaint: Pain of the Right Hand  Age: 74 y.o. Sex: female    Hand-dominance: Right    History of present illness: Ms. Ana Paula العلي presents to the clinic with pain in the right long finger. This has been present for several months. Denies any known injury. Also has a bump in the palm.. She notes progressive worsening of locking of the finger primarily first thing in the morning. No diabetes or smoking.    Pain rating = 2 out of 10    ROS, PMHx, and SocHx reviewed with no changes.    Review of Systems  1/14/2025    HEENT: Vision Loss: Positive  Hematologic: Bruises/Bleeds Easily: Positive  Immunological: Seasonal Allergies: Positive    Current Outpatient Medications:  allopurinol (ZYLOPRIM) 300 MG tablet, Take 1 tablet by mouth once daily, Disp: , Rfl:  aspirin 81 MG EC tablet, Take 81 mg by mouth daily, Disp: , Rfl:  hydrochlorothiazide (HYDRODIURIL) 12.5 MG tablet, Take 12.5 mg by mouth daily, Disp: , Rfl:  Plavix 75 MG tablet, daily, Disp: , Rfl:    Allergies  Allergen Reactions  Statins    Past Medical History:  Diagnosis Date  Gout  Hypertension  Stroke  TIA (transient ischemic attack)    Past Surgical History:  Procedure Laterality Date  NO RELEVANT SURGERIES    Social History    Socioeconomic History  Marital status:   Tobacco Use  Smoking status: Former  Types: Cigarettes  Smokeless tobacco: Never  Substance and Sexual Activity  Alcohol use: Yes  Drug use: Never    Family History  Problem Relation Age of Onset  No Known Problems Mother  No Known Problems Father  No Known Problems Brother  No Known Problems Sister  No Known Problems Son  No Known Problems Daughter  No Known Problems Other  Diabetes Neg Hx  Coronary artery disease Neg Hx  Clotting disorder Neg Hx  Anesthesia problems Neg Hx    OBJECTIVE  Vital Signs: height is 5' 3\" and weight is 123 lb. Her blood pressure is 164/82 (abnormal).  Constitutional: No acute distress. Her body mass index is 21.79

## 2025-01-29 NOTE — PERIOP NOTE
Called Jocelin, as requested at 1:14 and she called pt, continued call with both parties on the call, with pt's consent.    Called Sophia at Dr. Polk's office, reviewed pt's vascular, neuro and social history along with taking both baby aspirin and plavix.  Pt plans stopping Plavix today, as she normally stops 5 days before procedures, but has not reached out to Dr. Antonio.  Dr. Polk does NOT want pt to stop aspirin or Plavix for this surgery.  I called pt's daughter, Jocelin, and after verifying pt's name and , verbally instructed her that pt is NOT to stop her aspirin or Plavix for this procedure, and I needed her to call and make sure pt is taking these medications and understands.  Jocelin verbalized understanding and assured me she would do this.

## 2025-01-29 NOTE — PERIOP NOTE
[facility]  Ambulatory Surgery Unit  Pre-operative Instructions    Surgery/Procedure Date  Monday, February 3, 2025            Tentative Arrival Time TBD      1. On the day of your surgery/procedure, please report to the Ambulatory Surgery Unit Registration Desk and sign in at your designated time. The Ambulatory Surgery Unit is located in [facility] on the [facility] across from the [facility]. Please have all of your health insurance cards, co-payment, and a photo ID.    **TWO adults may accompany you the day of the procedure.  We have limited seating available.      2. You cannot be dropped off for surgery.  Please make arrangements for a responsible adult friend or family member to remain on the hospital campus during your procedure, and drive you home, as you should not drive for 24 hours following anesthesia. Make arrangements for a responsible adult to stay with you for at least the first 24 hours after your surgery.    3. Do not have anything to eat or drink (including water, gum, mints, coffee, juice) after 11:59 PM, Sunday. This may not apply to medications prescribed by your physician.  (Please note below the special instructions with medications to take the morning of surgery, if applicable.)    4. We recommend you do not drink any alcoholic beverages for 24 hours before and after your surgery.    5. Contact your surgeon’s office for instructions on the following medications: non-steroidal anti-inflammatory drugs (i.e. Advil, Aleve), vitamins, and supplements. (Some surgeon’s will want you to stop these medications prior to surgery and others may allow you to take them)   **If you are currently taking Plavix, Coumadin, Aspirin and/or other blood-thinning agents, contact your surgeon for instructions.** Your surgeon will partner with the physician prescribing these medications to determine if it is safe to stop or if you need to continue taking.  than a bath for cleaning your skin.  If needed, ask someone to help you reach all areas of your body. Don’t forget to clean your belly button with every shower.    The night before your surgery:   If you lose your Hibiclens/chlorhexidine please contact surgery center or you can purchase it at a local pharmacy  On the night before your surgery, shower with an antibacterial soap, such as Dial, or the soap provided at your preassessment appointment.   With bottle of Hibiclens/Chlorhexidine in hand, turn water off.  Apply Hibiclens antiseptic skin cleanser with a clean, freshly washed washcloth.  Gently apply to your body from chin to toes (except the genital area) and especially the area(s) where your incision(s) will be.  Leave Hibiclens/Chlorhexidine on your skin for at least 20 seconds.    CAUTION: If needed, Hibiclens/chlorhexidine may be used to clean the folds of skin of the legs (such as in the area of the groin) and on your buttocks and hips. However, do not use Hibiclens/Chlorhexidine above the neck or in the genital area (your bottom) or put inside any area of your body.  Turn the water back on and rinse.  Dry gently with a clean, freshly washed towel.  After your shower, do not use any powder, deodorant, perfumes or lotion.  Use clean, freshly washed towels and washcloths every time you shower.  Wear clean, freshly washed pajamas to bed the night before surgery.  Sleep on clean, freshly washed sheets.  Do not allow pets to sleep in your bed with you.        The Morning of your surgery:  Shower again thoroughly with an antibacterial soap, such as Dial or the soap provided at your preassessment appointment. If needed, ask someone for help to reach all areas of your body. Don’t forget to clean your belly button! Rinse.  With bottle of Hibiclens/Chlorhexidine in hand, turn water off.  Apply Hibiclens antiseptic skin cleanser with a clean, freshly washed washcloth.  Gently apply to your body from chin to toes

## 2025-01-31 ENCOUNTER — ANESTHESIA EVENT (OUTPATIENT)
Facility: HOSPITAL | Age: 75
End: 2025-01-31
Payer: MEDICARE

## 2025-01-31 RX ORDER — SODIUM CHLORIDE 9 MG/ML
INJECTION, SOLUTION INTRAVENOUS PRN
Status: CANCELLED | OUTPATIENT
Start: 2025-01-31

## 2025-01-31 RX ORDER — OXYCODONE HYDROCHLORIDE 5 MG/1
10 TABLET ORAL PRN
Status: CANCELLED | OUTPATIENT
Start: 2025-01-31 | End: 2025-01-31

## 2025-01-31 RX ORDER — SODIUM CHLORIDE 0.9 % (FLUSH) 0.9 %
5-40 SYRINGE (ML) INJECTION PRN
Status: CANCELLED | OUTPATIENT
Start: 2025-01-31

## 2025-01-31 RX ORDER — OXYCODONE HYDROCHLORIDE 5 MG/1
5 TABLET ORAL PRN
Status: CANCELLED | OUTPATIENT
Start: 2025-01-31 | End: 2025-01-31

## 2025-01-31 RX ORDER — ACETAMINOPHEN 500 MG
1000 TABLET ORAL
Status: CANCELLED | OUTPATIENT
Start: 2025-01-31 | End: 2025-02-01

## 2025-01-31 RX ORDER — ONDANSETRON 2 MG/ML
4 INJECTION INTRAMUSCULAR; INTRAVENOUS
Status: CANCELLED | OUTPATIENT
Start: 2025-01-31 | End: 2025-02-01

## 2025-01-31 RX ORDER — KETOROLAC TROMETHAMINE 30 MG/ML
15 INJECTION, SOLUTION INTRAMUSCULAR; INTRAVENOUS
Status: CANCELLED | OUTPATIENT
Start: 2025-01-31 | End: 2025-02-01

## 2025-01-31 RX ORDER — FENTANYL CITRATE 50 UG/ML
25 INJECTION, SOLUTION INTRAMUSCULAR; INTRAVENOUS EVERY 5 MIN PRN
Status: CANCELLED | OUTPATIENT
Start: 2025-01-31

## 2025-01-31 RX ORDER — NALOXONE HYDROCHLORIDE 0.4 MG/ML
INJECTION, SOLUTION INTRAMUSCULAR; INTRAVENOUS; SUBCUTANEOUS PRN
Status: CANCELLED | OUTPATIENT
Start: 2025-01-31

## 2025-01-31 RX ORDER — SODIUM CHLORIDE 0.9 % (FLUSH) 0.9 %
5-40 SYRINGE (ML) INJECTION EVERY 12 HOURS SCHEDULED
Status: CANCELLED | OUTPATIENT
Start: 2025-01-31

## 2025-02-03 ENCOUNTER — HOSPITAL ENCOUNTER (OUTPATIENT)
Facility: HOSPITAL | Age: 75
Setting detail: OUTPATIENT SURGERY
Discharge: HOME | End: 2025-02-03
Attending: ORTHOPAEDIC SURGERY | Admitting: ORTHOPAEDIC SURGERY
Payer: MEDICARE

## 2025-02-03 ENCOUNTER — ANESTHESIA (OUTPATIENT)
Facility: HOSPITAL | Age: 75
End: 2025-02-03
Payer: MEDICARE

## 2025-02-03 VITALS
OXYGEN SATURATION: 99 % | SYSTOLIC BLOOD PRESSURE: 114 MMHG | BODY MASS INDEX: 22.32 KG/M2 | RESPIRATION RATE: 9 BRPM | TEMPERATURE: 97.8 F | WEIGHT: 126 LBS | HEART RATE: 71 BPM | DIASTOLIC BLOOD PRESSURE: 57 MMHG | HEIGHT: 63 IN

## 2025-02-03 DIAGNOSIS — G89.18 POST-OP PAIN: Primary | ICD-10-CM

## 2025-02-03 PROCEDURE — 3600000002 HC SURGERY LEVEL 2 BASE: Performed by: ORTHOPAEDIC SURGERY

## 2025-02-03 PROCEDURE — 7100000000 HC PACU RECOVERY - FIRST 15 MIN: Performed by: ORTHOPAEDIC SURGERY

## 2025-02-03 PROCEDURE — 3700000001 HC ADD 15 MINUTES (ANESTHESIA): Performed by: ORTHOPAEDIC SURGERY

## 2025-02-03 PROCEDURE — 7100000001 HC PACU RECOVERY - ADDTL 15 MIN: Performed by: ORTHOPAEDIC SURGERY

## 2025-02-03 PROCEDURE — 7100000010 HC PHASE II RECOVERY - FIRST 15 MIN: Performed by: ORTHOPAEDIC SURGERY

## 2025-02-03 PROCEDURE — 6360000002 HC RX W HCPCS: Performed by: ORTHOPAEDIC SURGERY

## 2025-02-03 PROCEDURE — 3700000000 HC ANESTHESIA ATTENDED CARE: Performed by: ORTHOPAEDIC SURGERY

## 2025-02-03 PROCEDURE — 6360000002 HC RX W HCPCS: Performed by: NURSE ANESTHETIST, CERTIFIED REGISTERED

## 2025-02-03 PROCEDURE — 2580000003 HC RX 258: Performed by: PHYSICIAN ASSISTANT

## 2025-02-03 PROCEDURE — 3600000012 HC SURGERY LEVEL 2 ADDTL 15MIN: Performed by: ORTHOPAEDIC SURGERY

## 2025-02-03 PROCEDURE — 2709999900 HC NON-CHARGEABLE SUPPLY: Performed by: ORTHOPAEDIC SURGERY

## 2025-02-03 PROCEDURE — 6360000002 HC RX W HCPCS

## 2025-02-03 PROCEDURE — 2500000003 HC RX 250 WO HCPCS: Performed by: ORTHOPAEDIC SURGERY

## 2025-02-03 RX ORDER — SODIUM CHLORIDE 0.9 % (FLUSH) 0.9 %
5-40 SYRINGE (ML) INJECTION PRN
Status: DISCONTINUED | OUTPATIENT
Start: 2025-02-03 | End: 2025-02-03 | Stop reason: HOSPADM

## 2025-02-03 RX ORDER — PHENYLEPHRINE HCL IN 0.9% NACL 0.4MG/10ML
SYRINGE (ML) INTRAVENOUS
Status: DISCONTINUED | OUTPATIENT
Start: 2025-02-03 | End: 2025-02-03 | Stop reason: SDUPTHER

## 2025-02-03 RX ORDER — LIDOCAINE HYDROCHLORIDE 20 MG/ML
INJECTION, SOLUTION EPIDURAL; INFILTRATION; INTRACAUDAL; PERINEURAL
Status: DISCONTINUED | OUTPATIENT
Start: 2025-02-03 | End: 2025-02-03 | Stop reason: SDUPTHER

## 2025-02-03 RX ORDER — PROPOFOL 10 MG/ML
INJECTION, EMULSION INTRAVENOUS
Status: DISCONTINUED | OUTPATIENT
Start: 2025-02-03 | End: 2025-02-03 | Stop reason: SDUPTHER

## 2025-02-03 RX ORDER — SODIUM CHLORIDE 9 MG/ML
INJECTION, SOLUTION INTRAVENOUS PRN
Status: DISCONTINUED | OUTPATIENT
Start: 2025-02-03 | End: 2025-02-03 | Stop reason: HOSPADM

## 2025-02-03 RX ORDER — FENTANYL CITRATE 50 UG/ML
INJECTION, SOLUTION INTRAMUSCULAR; INTRAVENOUS
Status: DISCONTINUED | OUTPATIENT
Start: 2025-02-03 | End: 2025-02-03 | Stop reason: SDUPTHER

## 2025-02-03 RX ORDER — CEFAZOLIN SODIUM 1 G/3ML
INJECTION, POWDER, FOR SOLUTION INTRAMUSCULAR; INTRAVENOUS
Status: DISCONTINUED
Start: 2025-02-03 | End: 2025-02-03 | Stop reason: HOSPADM

## 2025-02-03 RX ORDER — VANCOMYCIN 1 G/200ML
1000 INJECTION, SOLUTION INTRAVENOUS ONCE
Status: DISCONTINUED | OUTPATIENT
Start: 2025-02-03 | End: 2025-02-03 | Stop reason: HOSPADM

## 2025-02-03 RX ORDER — WATER 10 ML/10ML
INJECTION INTRAMUSCULAR; INTRAVENOUS; SUBCUTANEOUS
Status: DISCONTINUED
Start: 2025-02-03 | End: 2025-02-03 | Stop reason: HOSPADM

## 2025-02-03 RX ORDER — VANCOMYCIN HYDROCHLORIDE 1 G/20ML
INJECTION, POWDER, LYOPHILIZED, FOR SOLUTION INTRAVENOUS
Status: COMPLETED
Start: 2025-02-03 | End: 2025-02-03

## 2025-02-03 RX ORDER — ONDANSETRON 2 MG/ML
INJECTION INTRAMUSCULAR; INTRAVENOUS
Status: DISCONTINUED | OUTPATIENT
Start: 2025-02-03 | End: 2025-02-03 | Stop reason: SDUPTHER

## 2025-02-03 RX ORDER — LIDOCAINE HYDROCHLORIDE 10 MG/ML
1 INJECTION, SOLUTION EPIDURAL; INFILTRATION; INTRACAUDAL; PERINEURAL
Status: DISCONTINUED | OUTPATIENT
Start: 2025-02-03 | End: 2025-02-03 | Stop reason: HOSPADM

## 2025-02-03 RX ORDER — SODIUM CHLORIDE, SODIUM LACTATE, POTASSIUM CHLORIDE, CALCIUM CHLORIDE 600; 310; 30; 20 MG/100ML; MG/100ML; MG/100ML; MG/100ML
INJECTION, SOLUTION INTRAVENOUS CONTINUOUS
Status: DISCONTINUED | OUTPATIENT
Start: 2025-02-03 | End: 2025-02-03 | Stop reason: HOSPADM

## 2025-02-03 RX ORDER — DEXAMETHASONE SODIUM PHOSPHATE 4 MG/ML
INJECTION, SOLUTION INTRA-ARTICULAR; INTRALESIONAL; INTRAMUSCULAR; INTRAVENOUS; SOFT TISSUE
Status: DISCONTINUED | OUTPATIENT
Start: 2025-02-03 | End: 2025-02-03 | Stop reason: SDUPTHER

## 2025-02-03 RX ORDER — SODIUM CHLORIDE 0.9 % (FLUSH) 0.9 %
5-40 SYRINGE (ML) INJECTION EVERY 12 HOURS SCHEDULED
Status: DISCONTINUED | OUTPATIENT
Start: 2025-02-03 | End: 2025-02-03 | Stop reason: HOSPADM

## 2025-02-03 RX ORDER — TRAMADOL HYDROCHLORIDE 50 MG/1
50 TABLET ORAL EVERY 6 HOURS PRN
Qty: 20 TABLET | Refills: 0 | Status: SHIPPED | OUTPATIENT
Start: 2025-02-03 | End: 2025-02-08

## 2025-02-03 RX ADMIN — SODIUM CHLORIDE: 9 INJECTION, SOLUTION INTRAVENOUS at 10:41

## 2025-02-03 RX ADMIN — FENTANYL CITRATE 25 MCG: 50 INJECTION, SOLUTION INTRAMUSCULAR; INTRAVENOUS at 11:28

## 2025-02-03 RX ADMIN — PROPOFOL 60 MCG/KG/MIN: 10 INJECTION, EMULSION INTRAVENOUS at 11:22

## 2025-02-03 RX ADMIN — LIDOCAINE HYDROCHLORIDE 60 MG: 20 INJECTION, SOLUTION EPIDURAL; INFILTRATION; INTRACAUDAL; PERINEURAL at 11:21

## 2025-02-03 RX ADMIN — VANCOMYCIN HYDROCHLORIDE 1000 MG: 1 INJECTION, POWDER, LYOPHILIZED, FOR SOLUTION INTRAVENOUS at 10:49

## 2025-02-03 RX ADMIN — FENTANYL CITRATE 25 MCG: 50 INJECTION, SOLUTION INTRAMUSCULAR; INTRAVENOUS at 11:27

## 2025-02-03 RX ADMIN — FENTANYL CITRATE 50 MCG: 50 INJECTION, SOLUTION INTRAMUSCULAR; INTRAVENOUS at 11:21

## 2025-02-03 RX ADMIN — PROPOFOL 50 MG: 10 INJECTION, EMULSION INTRAVENOUS at 11:21

## 2025-02-03 RX ADMIN — WATER 2000 MG: 1 INJECTION INTRAMUSCULAR; INTRAVENOUS; SUBCUTANEOUS at 11:27

## 2025-02-03 RX ADMIN — Medication 120 MCG: at 11:37

## 2025-02-03 RX ADMIN — DEXAMETHASONE SODIUM PHOSPHATE 8 MG: 4 INJECTION, SOLUTION INTRAMUSCULAR; INTRAVENOUS at 11:28

## 2025-02-03 RX ADMIN — ONDANSETRON 4 MG: 2 INJECTION INTRAMUSCULAR; INTRAVENOUS at 11:28

## 2025-02-03 ASSESSMENT — PAIN - FUNCTIONAL ASSESSMENT: PAIN_FUNCTIONAL_ASSESSMENT: 0-10

## 2025-02-03 NOTE — PERIOP NOTE
Ana Paula العلي  1950  [id number]    Situation:  Verbal report given from: RN and CRNA  Procedure: Procedure(s):  RIGHT LONG FINGER TRIGGER FINGER RELEASE, RIGHT HAND GANGLION CYST EXCISION (MAC WITH LOCAL)    Background:    Preoperative diagnosis: Trigger middle finger of right hand [M65.331]  Ganglion cyst of flexor tendon sheath of finger of right hand [M67.441]    Postoperative diagnosis: * No post-op diagnosis entered *    :  Dr. Polk    Assistant(s): Circulator: Kavita Braden RN  Relief Circulator: Yue Colon RN  Scrub Person First: Michael Grayson  Physician Assistant: Anuja Langston PA-C    Specimens: * No specimens in log *    Assessment:  Intra-procedure medications         Anesthesia gave intra-procedure sedation and medications, see anesthesia flow sheet     Intravenous fluids: LR@ KVO     Vital signs stable. Pt denies pain and heater applied for chill.       Recommendation:    Permission to share finding with  :  yes

## 2025-02-03 NOTE — ANESTHESIA PRE PROCEDURE
Department of Anesthesiology  Preprocedure Note       Name:  Ana Paula العلي   Age:  74 y.o.  :  1950                                          MRN:  [id number]         Date:  2/3/2025      Surgeon: Surgeon(s):  Michael Polk MD    Procedure: Procedure(s):  RIGHT LONG FINGER TRIGGER FINGER RELEASE, RIGHT HAND GANGLION CYST EXCISION (MAC WITH LOCAL)    Medications prior to admission:   Prior to Admission medications    Medication Sig Start Date End Date Taking? Authorizing Provider   triamcinolone (KENALOG) 0.1 % cream Apply topically nightly Apply topically on the rt leg  Patient taking differently: Apply topically as needed Apply topically on the rt leg 24  Yes Benigno Noonan MD   clopidogrel (PLAVIX) 75 MG tablet TAKE 1 TABLET EVERY DAY 24  Yes Benigno Noonan MD   allopurinol (ZYLOPRIM) 300 MG tablet TAKE 1 TABLET EVERY DAY 24  Yes Benigno Noonan MD   hydroCHLOROthiazide 12.5 MG tablet TAKE 1 TABLET EVERY DAY 24  Yes Benigno Noonan MD   diclofenac sodium (VOLTAREN) 1 % GEL APPLY 4 GRAMS TOPICALLY 4 TIMES DAILY 24  Yes Benigno Noonan MD   acetaminophen (TYLENOL) 500 MG tablet Take 2 tablets by mouth every 6 hours as needed 10/15/18  Yes Automatic Reconciliation, Ar   aspirin 81 MG chewable tablet Take 1 tablet by mouth daily 18  Yes Automatic Reconciliation, Ar       Current medications:    Current Facility-Administered Medications   Medication Dose Route Frequency Provider Last Rate Last Admin   • sodium chloride flush 0.9 % injection 5-40 mL  5-40 mL IntraVENous 2 times per day Anuja Langston PA-C       • sodium chloride flush 0.9 % injection 5-40 mL  5-40 mL IntraVENous PRN Anuja Langston PA-C       • 0.9 % sodium chloride infusion   IntraVENous PRN Anuja Langston PA-C       • ceFAZolin (ANCEF) 2,000 mg in sterile water 20 mL IV syringe  2,000 mg IntraVENous Once Michael Polk MD       • vancomycin (VANCOCIN) 1000 mg in 200 mL IVPB

## 2025-02-03 NOTE — ANESTHESIA POSTPROCEDURE EVALUATION
Department of Anesthesiology  Postprocedure Note    Patient: Ana Paula العلي  MRN: [id number]  YOB: 1950  Date of evaluation: 2/3/2025    Procedure Summary       Date: 02/03/25 Room / Location: [facility] ASU  / [facility] AMBULATORY OR    Anesthesia Start: 1116 Anesthesia Stop: 1151    Procedure: RIGHT LONG FINGER TRIGGER FINGER RELEASE, RIGHT HAND GANGLION CYST EXCISION (MAC WITH LOCAL) (Right: Hand) Diagnosis:       Trigger middle finger of right hand      Ganglion cyst of flexor tendon sheath of finger of right hand      (Trigger middle finger of right hand [M65.331])      (Ganglion cyst of flexor tendon sheath of finger of right hand [M67.441])    Surgeons: Michael Polk MD Responsible Provider: Elissa Pastor MD    Anesthesia Type: MAC, TIVA, Regional ASA Status: 3            Anesthesia Type: MAC, TIVA, Regional    Matt Phase I: Matt Score: 10    Matt Phase II: Matt Score: 10    Anesthesia Post Evaluation    Patient location during evaluation: PACU  Patient participation: complete - patient participated  Level of consciousness: awake and alert  Pain score: 0  Airway patency: patent  Nausea & Vomiting: no vomiting and no nausea  Cardiovascular status: blood pressure returned to baseline and hemodynamically stable  Respiratory status: acceptable  Hydration status: stable  Comments: Pt has Supraclavicular block; sling postop until block resolves.  Multimodal analgesia pain management approach  Pain management: satisfactory to patient      No notable events documented.

## 2025-02-03 NOTE — DISCHARGE INSTRUCTIONS
[facility]  Post-operative instructions  For: Ana Paula العلي    Your first postop appointment should be scheduled with Dr. Polk for 2 weeks post-op.    [facility]  [address], [address]  [place] [address]  Phone: [phone]  Fax: [phone]    Please follow these instructions for a safe and speedy recovery:    1. Surgical Bandage: Leave the bandage in place until we remove it. Please keep it clean and dry. To shower or bathe, apply a plastic bag or GLAD Press'n Seal® plastic wrap around the bandage or simply sponge bathe.    2. Elevation: Hand swelling is best prevented by keeping your hand elevated above the level of your heart at all times, night and day. The opposite, dangling your hand below your waist, will cause additional pain, swelling, and later stiffness. You can elevate the hand in a sling or by propping it on a pillow at night. Ice compresses may help but do not rep[lace elevation. Frequently, extreme pain is caused by a tight bandage, which should be loosened. If pain is severe and progressive, call us at [phone] during the day (ask for immediate connection to Dr. Polk's Team) or during the night [phone] (ask for the on-call physician).    3. Medication: You will be provided with an appropriate pain medication (over-the-counter or prescription). Please fill this at a pharmacy promptly so you will have it available when all local anesthetic wears off. Take this to relieve pain as directed on the bottle. Please refrain from driving, drinking alcohol, and making important medical decisions while taking the medication. Please call us if you need something stronger. Medication changes or refills must be made before 5pm or through your pharmacy.    4. Weight bearing: Do NOT bear any weight on the operative extremity.    I want to thank you for choosing us for your hand care needs. My staff

## 2025-02-03 NOTE — PERIOP NOTE
Pt. Alert. Denies pain or chill. Discharge instructions reviewed with caregiver and patient. Allowed and answered questions. Tolerating PO fluids. Both state ready for discharge. 1255 Discharged to car without incident with white cane and purse.

## 2025-02-03 NOTE — OP NOTE
PATIENT NAME:  Ana Paula العلي    SURGEON:  Michael Polk MD    DATE OF SURGERY:  2/3/2025    LOCATION: [facility] ASU    PREOPERATIVE DIAGNOSIS:   right long finger trigger digit, right hand ganglion cyst    POSTOPERATIVE DIAGNOSIS:  Same    PROCEDURE:  right long finger trigger release , right hand ganglion cyst excision            ANESTHESIA:  Local 0.25% bupivicaine with 1% lidocaine with sedation    BLOOD LOSS:  Minimal    Tourniquet time: 6 min    Assistant: Anuja Langston PA-C     OPERATIVE INDICATIONS:INDICATIONS FOR OPERATION:      The patient is a 74 y.o. old female who has developed a persistent right long finger trigger digit and right hand mass, unresponsive to all conservative treatment. The patient has elected to undergo surgical management. She understands the alternatives to surgery, the nature of this elective procedure, the usual recovery, possible variations in healing, and the potential for shortcomings and complications ( including but not exclusive to bleeding, infection, scar tenderness,  weakness, ).     DESCRIPTION  OF PROCEDURE: After satisfactory sedation had been attained, the right hand and forearm were prepped and draped in a sterile field. A timeout was taken and the operative site was confirmed. Local anesthesia was instilled into the incision site. The extremity was then elevated and exsanguinated and a forearm tourniquet was inflated to 200 mmHg. A small vertical incision was made in the distal palm over the A1 pulley of the long finger extending proximally in order to remove the cyst as well.  Dissection was carried through the subcutaneous tissue and digital nerves were protected. The A1 annular pulley was reelased after which there was no evidence of triggering with flexion and extension of the digit. The flexor tendons were elevated out of the wound and noted to be without adhesions. I then moved proximally to the cyst. There was a large ganglion cyst between the

## 2025-02-03 NOTE — H&P
HISTORY OF PRESENT ILLNESS  Chief Complaint: Pain of the Right Hand  Age: 74 y.o. Sex: female    Hand-dominance: Right    History of present illness: Ms. Ana Paula العلي presents to the clinic with pain in the right long finger. This has been present for several months. Denies any known injury. Also has a bump in the palm.. She notes progressive worsening of locking of the finger primarily first thing in the morning. No diabetes or smoking.    Pain rating = 2 out of 10     ROS, PMHx, and SocHx reviewed with no changes.    Review of Systems  1/14/2025    HEENT: Vision Loss: Positive  Hematologic: Bruises/Bleeds Easily: Positive  Immunological: Seasonal Allergies: Positive    Current Outpatient Medications:  allopurinol (ZYLOPRIM) 300 MG tablet, Take 1 tablet by mouth once daily, Disp: , Rfl:  aspirin 81 MG EC tablet, Take 81 mg by mouth daily, Disp: , Rfl:  hydrochlorothiazide (HYDRODIURIL) 12.5 MG tablet, Take 12.5 mg by mouth daily, Disp: , Rfl:  Plavix 75 MG tablet, daily, Disp: , Rfl:    Allergies  Allergen Reactions  Statins    Past Medical History:  Diagnosis Date  Gout  Hypertension  Stroke  TIA (transient ischemic attack)    Past Surgical History:  Procedure Laterality Date  NO RELEVANT SURGERIES    Social History    Socioeconomic History  Marital status:   Tobacco Use  Smoking status: Former  Types: Cigarettes  Smokeless tobacco: Never  Substance and Sexual Activity  Alcohol use: Yes  Drug use: Never    Family History  Problem Relation Age of Onset  No Known Problems Mother  No Known Problems Father  No Known Problems Brother  No Known Problems Sister  No Known Problems Son  No Known Problems Daughter  No Known Problems Other  Diabetes Neg Hx  Coronary artery disease Neg Hx  Clotting disorder Neg Hx  Anesthesia problems Neg Hx    OBJECTIVE  Vital Signs: height is 5' 3\" and weight is 123 lb. Her blood pressure is 164/82 (abnormal).  Constitutional: No acute distress. Her body mass index is

## 2025-03-18 ENCOUNTER — TELEPHONE (OUTPATIENT)
Age: 75
End: 2025-03-18

## 2025-03-18 DIAGNOSIS — C34.90 ADENOCARCINOMA OF LUNG, UNSPECIFIED LATERALITY (HCC): Primary | ICD-10-CM

## 2025-03-18 DIAGNOSIS — Z12.2 SCREENING FOR MALIGNANT NEOPLASM OF RESPIRATORY ORGAN: ICD-10-CM

## 2025-03-18 DIAGNOSIS — Z12.31 ENCOUNTER FOR SCREENING MAMMOGRAM FOR BREAST CANCER: ICD-10-CM

## 2025-03-18 NOTE — TELEPHONE ENCOUNTER
Patient requesting an order for a Mammogram and Lung Scan. Patient can be reached at [phone].

## 2025-03-19 NOTE — TELEPHONE ENCOUNTER
Verbal Order  give with Readback for mammogram and ct low dose lung screening  per Benigno Noonan MD

## 2025-03-27 NOTE — TELEPHONE ENCOUNTER
I show this was d/c on 6/11/24. Please sign if appropriate.    Last appointment: 12/13/24  Next appointment: Advised to follow-up 6/13/25  Previous refill encounter(s): 5/31/24    Requested Prescriptions     Pending Prescriptions Disp Refills    mirtazapine (REMERON) 7.5 MG tablet [Pharmacy Med Name: Mirtazapine Oral Tablet 7.5 MG] 90 tablet 3     Sig: TAKE 1 TABLET EVERY NIGHT         For Pharmacy Admin Tracking Only    Program: Medication Refill  CPA in place:    Recommendation Provided To:   Intervention Detail: New Rx: 1, reason: Patient Preference  Intervention Accepted By:   Gap Closed?:    Time Spent (min): 5

## 2025-03-31 RX ORDER — MIRTAZAPINE 7.5 MG/1
7.5 TABLET, FILM COATED ORAL NIGHTLY
Qty: 90 TABLET | Refills: 3 | Status: SHIPPED | OUTPATIENT
Start: 2025-03-31

## 2025-04-22 NOTE — PROGRESS NOTES
Addended by: VINICIUS CAIN on: 4/22/2025 08:37 AM     Modules accepted: Orders     Oncology Progress note        Patient: Flora Boston MRN: [id number]  SSN: [id number]    YOB: 1950  Age: 79 y.o. Sex: female        Reason for Visit:   Flora Boston is a 79 y.o. female who is seen by synchronous (real-time) audio-video technology for follow up of left breast carcinoma. Diagnosis:     1. Left breast carcinoma:  T1b N0 M0 (Stage IA) infiltrating ductal carcinoma, Tumor size 1.0 cm, LN -ve, grade 1, %, SD 70%, Her 2 -ve  Dx: 6/22/2017    Treatment:     1. S/p lumpectomy on 6/22/2017  2. Adjuvant radiation completed on 1/5/2018  3. Letrozole - started 1/12/2018    Subjective:      Flora Boston is a 79 y.o. female who I am seeing for a diagnosis of left sided invasive ductal carcinoma of the breast. She has been referred by Dr. Claudell Brownie. She underwent a screening mammogram on 3/27/2017 which demonstrated a density in the medial aspect of the left breast. She underwent dx mammogram and US demonstrating a 1.0 x 0.9 x 0.8 cm at 9:00, 2 cm medial to the nipple in the left breast.  US core bx demonstrated a G1 IDC ER pos SD pos Her2/jud unamplified tumor. She underwent a lumpectomy on 06/22/2017. She was also noted to have a JUDSON FDG avid lung mass. She is s/p left upper lobectomy by Dr. Marge Springer at [facility]. She completed adjuvant breast radiation. She has been taking Letrozole without any difficulty. She is doing well and denies any new symptoms.     Review of Systems:    Constitutional: negative  Eyes: negative  Ears, Nose, Mouth, Throat, and Face: negative  Respiratory: negative  Cardiovascular: negative  Gastrointestinal: negative  Genitourinary:negative  Integument/Breast: negative  Hematologic/Lymphatic: negative  Musculoskeletal:negative  Neurological: negative      Past Medical History:   Diagnosis Date    Arthritis     Breast cancer ([facility] [address] 04/2017    lumpectomy left breast, radiation; lung    Cancer ([facility] [address]     breast cancer- left    Cerebrovascular accident (stroke) [facility] [address]     no deficiets    CVA (cerebral vascular accident) ([facility] [address]     2018    Diabetes mellitus type 2, controlled ([facility] [address]     no longer medicated    Gallstones     asymptomatic, pt denies having    GERD (gastroesophageal reflux disease)     dx with resolution of symptoms on ppi- pt denies having    Hypercholesterolemia     Hypertension     Ill-defined condition     gout    Long term current use of anticoagulant therapy     Non-nicotine vapor product user     used to use    PAD (peripheral artery disease) ([facility] [address] 2013    right SFA angioplasty and athrectomy    Primary open angle glaucoma     Radiation therapy complication     Solitary lung nodule 2017    Type II diabetes mellitus, uncontrolled ([facility] [address] 2017     Past Surgical History:   Procedure Laterality Date    CHEST SURGERY PROCEDURE UNLISTED Left     COLONOSCOPY N/A 2020    COLONOSCOPY, performed by Abi Fung MD at [address] [place] [address]  2020         HX BREAST BIOPSY Left 2017    HX BREAST LUMPECTOMY Left 2017    LEFT LUMPECTOMY WITH INTRAOPERATIVE ULTRASOUND GUIDED, LEFT AXILLARY SENTINEL LYMPH NODE BIOPSY, POSSIBLE AXILLARY DISECTION performed by Dougie Taylor MD at [facility] MAIN OR    HX HEENT Bilateral     eye lids surgery    VASCULAR SURGERY PROCEDURE UNLIST  2016    right leg stent      Family History   Problem Relation Age of Onset    Hypertension Mother     Diabetes Mother     Diabetes Son      Social History     Tobacco Use    Smoking status: Former Smoker     Packs/day: 0.25     Years: 47.00     Pack years: 11.75     Types: Cigarettes     Start date: 1966     Last attempt to quit: 3/13/2013     Years since quittin.7    Smokeless tobacco: Former User    Tobacco comment: estimate is one pack per week/ used to vape   Substance Use Topics    Alcohol use:  Yes     Alcohol/week: 21.0 standard drinks     Types: 21 Cans of beer per week     Comment: 2 to 3 beers per day per patient      Prior to Admission medications    Medication Sig Start Date End Date Taking? Authorizing Provider   potassium chloride (K-DUR, KLOR-CON) 20 mEq tablet TAKE 1 TABLET BY MOUTH EVERY DAY 11/23/20  Yes Hope Stoner MD   allopurinoL (ZYLOPRIM) 100 mg tablet TAKE 2 TABLETS BY MOUTH EVERY DAY 11/16/20  Yes Hope Stoner MD   asenapine maleate (SAPHRIS, BLACK CHERRY, SL) by SubLINGual route. Yes Provider, Historical   clopidogreL (PLAVIX) 75 mg tab TAKE 1 TABLET BY MOUTH ONCE DAILY 3/23/20  Yes Hope Stoner MD   alcohol swabs (BD SINGLE USE SWABS REGULAR) padm Use to sanitize the affected area prior to application of medication as indicated. 3/2/20  Yes Hope Stoner MD   ergocalciferol (ERGOCALCIFEROL) 50,000 unit capsule Take 1 Cap by mouth every seven (7) days. 9/3/19  Yes Durwin Hodgkins, MD   aspirin 81 mg chewable tablet Take 1 Tab by mouth daily. 11/17/18  Yes Magdaleno Lund MD   acetaminophen (TYLENOL) 500 mg tablet Take 2 Tabs by mouth every six (6) hours as needed for Pain. Indications: Pain 10/15/18  Yes Hope Stoner MD   timolol (TIMOPTIC) 0.5 % ophthalmic solution Administer  to both eyes two (2) times a day. 9/4/18  Yes Provider, Historical   dorzolamide (TRUSOPT) 2 % ophthalmic solution instill 1 drop into both eyes twice a day 10/9/17  Yes Provider, Historical   latanoprost (XALATAN) 0.005 % ophthalmic solution Administer 1 Drop to both eyes nightly. 5/17/16  Yes Ella Guzman MD   brimonidine (ALPHAGAN) 0.15 % ophthalmic solution Administer 1 Drop to both eyes two (2) times a day. 10/26/15  Yes Ella Guzman MD   letrozole [facility]) 2.5 mg tablet take 1 tablet by mouth once daily 11/30/20   Tracy Clinton MD   diclofenac (VOLTAREN) 1 % gel Apply 4 g to affected area four (4) times daily.  1/3/20   Hope Stoner MD          Allergies   Allergen Reactions    Statins-Hmg-Coa Reductase Inhibitors Myalgia           Objective:     General: alert, cooperative, no distress   Mental  status: normal mood, behavior, speech, dress, motor activity, and thought processes, able to follow commands   HENT: NCAT   Neck: no visualized mass   Resp: no respiratory distress   Neuro: no gross deficits   Skin: no discoloration or lesions of concern on visible areas   Psychiatric: normal affect, consistent with stated mood, no evidence of hallucinations       Due to this being a TeleHealth evaluation ([facility]), many elements of the physical examination are unable to be assessed. Evaluation of the following organ systems was limited: Vitals/Constitutional/EENT/Resp/CV/GI//MS/Neuro/Skin/Heme-Lymph-Imm. Assessment:     1. Left breast carcinoma:  T1b N0 M0 (Stage IA) infiltrating ductal carcinoma, Tumor size 1.0 cm, LN -ve, grade 1, %, SC 70%, Her 2 -ve  Dx: 6/22/2017    ECOG PS 0  Intent of treatment - curative    S/P left sided lumpectomy and sentinel LN excision. Completed adjuvant radiation to the left breast on 1/5/2018     Letrozole 2.5 mg daily - started 1/12/2018  Tolerating well  No side effects attributed to AI  In remission    Mammogram (09/03/3030): normal     Symptom management form reviewed with patient. 2. Lung cancer    S/p left upper lob[facility] from [facility] Katheryn Beverly at [facility]      3. Vitamin D deficiency    > Patient stopped weekly Vitamin D 50,000 after 1 month  > Recheck Vitamin D      Plan:       > Continue Letrozole  > Recheck Vitamin D  > Follow-up in 1 year      Signed by: Jonathan Edmonds MD                     December 6, 2020        CC. Court Layton MD  CC. MD Roselyn Flores MD Elio Sicks, MD        I was in the office while conducting this encounter. The patient was at her home.     Consent:  She and/or her healthcare decision maker is aware that this patient-initiated Telehealth encounter is a billable service, with coverage as determined by her insurance carrier. She is aware that she may receive a bill and has provided verbal consent to proceed: Yes    Pursuant to the emergency declaration under the [address] and the [place] [address] waiver authority and the [employer] and [employer] Act, this Virtual  Visit was conducted, with patient's (and/or legal guardian's) consent, to reduce the patient's risk of exposure to COVID-19 and provide necessary medical care. Services were provided through a video synchronous discussion virtually to substitute for in-person visit.

## 2025-05-21 NOTE — TELEPHONE ENCOUNTER
Pt is requesting this be sent to [facility]. Previous Rx was sent to mailorder.    Last appointment: 12/13/24  Next appointment: Advised to follow-up 6/13/25  Previous refill encounter(s): 9/24/24    Requested Prescriptions     Pending Prescriptions Disp Refills    hydroCHLOROthiazide 12.5 MG tablet 90 tablet 3     Sig: Take 1 tablet by mouth daily         For Pharmacy Admin Tracking Only    Program: Medication Refill  CPA in place:    Recommendation Provided To:   Intervention Detail: New Rx: 1, reason: Patient Preference  Intervention Accepted By:   Gap Closed?:    Time Spent (min): 5

## 2025-05-24 RX ORDER — HYDROCHLOROTHIAZIDE 12.5 MG/1
12.5 TABLET ORAL DAILY
Qty: 90 TABLET | Refills: 3 | Status: SHIPPED | OUTPATIENT
Start: 2025-05-24

## 2025-06-25 RX ORDER — CLOPIDOGREL BISULFATE 75 MG/1
75 TABLET ORAL DAILY
Qty: 90 TABLET | Refills: 3 | Status: SHIPPED | OUTPATIENT
Start: 2025-06-25

## 2025-07-02 ENCOUNTER — OFFICE VISIT (OUTPATIENT)
Age: 75
End: 2025-07-02
Payer: MEDICARE

## 2025-07-02 VITALS
BODY MASS INDEX: 22.5 KG/M2 | TEMPERATURE: 98 F | SYSTOLIC BLOOD PRESSURE: 131 MMHG | DIASTOLIC BLOOD PRESSURE: 72 MMHG | WEIGHT: 127 LBS | RESPIRATION RATE: 17 BRPM | HEART RATE: 68 BPM | OXYGEN SATURATION: 98 %

## 2025-07-02 DIAGNOSIS — C34.90 ADENOCARCINOMA OF LUNG, UNSPECIFIED LATERALITY (HCC): ICD-10-CM

## 2025-07-02 DIAGNOSIS — I73.9 PAD (PERIPHERAL ARTERY DISEASE): ICD-10-CM

## 2025-07-02 DIAGNOSIS — N30.00 ACUTE CYSTITIS WITHOUT HEMATURIA: ICD-10-CM

## 2025-07-02 DIAGNOSIS — Z72.0 TOBACCO ABUSE DISORDER: ICD-10-CM

## 2025-07-02 DIAGNOSIS — H54.7 LOSS OF VISION: ICD-10-CM

## 2025-07-02 DIAGNOSIS — Z86.73 HISTORY OF STROKE: ICD-10-CM

## 2025-07-02 DIAGNOSIS — Z02.89 ENCOUNTER FOR COMPLETION OF FORM WITH PATIENT: ICD-10-CM

## 2025-07-02 DIAGNOSIS — M79.604 LEG PAIN, BILATERAL: ICD-10-CM

## 2025-07-02 DIAGNOSIS — Z00.00 MEDICARE ANNUAL WELLNESS VISIT, SUBSEQUENT: Primary | ICD-10-CM

## 2025-07-02 DIAGNOSIS — M79.605 LEG PAIN, BILATERAL: ICD-10-CM

## 2025-07-02 DIAGNOSIS — H44.513 ABSOLUTE GLAUCOMA OF BOTH EYES: ICD-10-CM

## 2025-07-02 DIAGNOSIS — I63.9 CEREBROVASCULAR ACCIDENT (CVA), UNSPECIFIED MECHANISM (HCC): ICD-10-CM

## 2025-07-02 DIAGNOSIS — I10 PRIMARY HYPERTENSION: ICD-10-CM

## 2025-07-02 PROCEDURE — 99213 OFFICE O/P EST LOW 20 MIN: CPT | Performed by: FAMILY MEDICINE

## 2025-07-02 SDOH — ECONOMIC STABILITY: FOOD INSECURITY: WITHIN THE PAST 12 MONTHS, YOU WORRIED THAT YOUR FOOD WOULD RUN OUT BEFORE YOU GOT MONEY TO BUY MORE.: NEVER TRUE

## 2025-07-02 SDOH — ECONOMIC STABILITY: FOOD INSECURITY: WITHIN THE PAST 12 MONTHS, THE FOOD YOU BOUGHT JUST DIDN'T LAST AND YOU DIDN'T HAVE MONEY TO GET MORE.: NEVER TRUE

## 2025-07-02 ASSESSMENT — LIFESTYLE VARIABLES
HOW OFTEN DO YOU HAVE A DRINK CONTAINING ALCOHOL: NEVER
HOW MANY STANDARD DRINKS CONTAINING ALCOHOL DO YOU HAVE ON A TYPICAL DAY: PATIENT DOES NOT DRINK

## 2025-07-02 ASSESSMENT — PATIENT HEALTH QUESTIONNAIRE - PHQ9
SUM OF ALL RESPONSES TO PHQ QUESTIONS 1-9: 0
1. LITTLE INTEREST OR PLEASURE IN DOING THINGS: NOT AT ALL
SUM OF ALL RESPONSES TO PHQ QUESTIONS 1-9: 0
2. FEELING DOWN, DEPRESSED OR HOPELESS: NOT AT ALL
SUM OF ALL RESPONSES TO PHQ QUESTIONS 1-9: 0
SUM OF ALL RESPONSES TO PHQ QUESTIONS 1-9: 0

## 2025-07-02 NOTE — PATIENT INSTRUCTIONS
acetaminophen (Tylenol).   When should you call for help?   Call 911 if you have symptoms of a heart attack. These may include:    Chest pain or pressure, or a strange feeling in the chest.     Sweating.     Shortness of breath.     Pain, pressure, or a strange feeling in the back, neck, jaw, or upper belly or in one or both shoulders or arms.     Lightheadedness or sudden weakness.     A fast or irregular heartbeat.   After you call 911, the  may tell you to chew 1 adult-strength or 2 to 4 low-dose aspirin. Wait for an ambulance. Do not try to drive yourself.  Watch closely for changes in your health, and be sure to contact your doctor if you have any problems.  Where can you learn more?  Go to https://www.[employer].net/patientEd and enter F075 to learn more about \"A Healthy Heart: Care Instructions.\"  Current as of: July 31, 2024  Content Version: 14.5  © 2283-2179 [employer].   Care instructions adapted under license by [employer]. If you have questions about a medical condition or this instruction, always ask your healthcare professional. [employer] disclaims any warranty or liability for your use of this information.    Personalized Preventive Plan for Ana Paula العلي - 7/2/2025  Medicare offers a range of preventive health benefits. Some of the tests and screenings are paid in full while other may be subject to a deductible, co-insurance, and/or copay.  Some of these benefits include a comprehensive review of your medical history including lifestyle, illnesses that may run in your family, and various assessments and screenings as appropriate.  After reviewing your medical record and screening and assessments performed today your provider may have ordered immunizations, labs, imaging, and/or referrals for you.  A list of these orders (if applicable) as well as your Preventive Care list are included within your After Visit Summary for your review.

## 2025-07-02 NOTE — PROGRESS NOTES
Chief Complaint   Patient presents with    Medicare AWV       \"Have you been to the ER, urgent care clinic since your last visit?  Hospitalized since your last visit?\"    NO    “Have you seen or consulted any other health care providers outside of [facility] since your last visit?”    NO        Click Here for Release of Records Request     No results found for this visit on 25.   Vitals:    25 1510   BP: 131/72   Pulse: 68   Resp: 17   Temp: 98 °F (36.7 °C)   TempSrc: Temporal   SpO2: 98%   Weight: 57.6 kg (127 lb)          The patient, Ana Paula Bailee Brown, identity was verified by name and .   
currently walk with a walker denies any recent fall has no recent fracture unfortunatelythe patient is not active as wanted to,  Unable to use the cane or WC,  requesting to have a walker with a seat's,  Pt cannot walk more than 1 block secondary to feeling weak and tired      Patient presents for annual wellness visit, patient is not currently up todate w/ all vaccination, last tetanus vaccine was 2024 vaccination offered but opted , patient has had hx of fall, not feeling depressed, pt with improvig interests and enjoy to do things, not anxious, patient has not seen blood in the stool nor tarry stool,     pt is physically functional and ++physically active with nl gait  and nicely independent and walks w/out mobility device ,  mentaly is very functional,  very Alert and oriented, unfortunately,  BMI for pt's age is into abnl index, the  abnl BMI r/w'd and information given,      Pt has No family hx of breast cancer, last mammog was nl and 2021,  last pap smear normal and was in 2022,  pt has no family hx of colon cancer, and is uptodate with the colonoscopy,   patient having safe sex and pt is trying to be sexaully active,  compliant w/ meds, ++Rf needed for today for currentt meds.        Patient's complete Health Risk Assessment and screening values have been reviewed and are found in Flowsheets. The following problems were reviewed today and where indicated follow up appointments were made and/or referrals ordered.    Positive Risk Factor Screenings with Interventions:     Cognitive:      Words recalled: 2 Words Recalled     Total Score Interpretation: Abnormal Mini-Cog  Interventions:  Patient advised to follow-up in this office for further evaluation and treatment            Inactivity:  On average, how many days per week do you engage in moderate to strenuous exercise (like a brisk walk)?: 0 days (!) Abnormal  On average, how many minutes do you engage in exercise at this level?: 0

## 2025-07-03 LAB
APPEARANCE UR: ABNORMAL
BACTERIA URNS QL MICRO: ABNORMAL /HPF
BILIRUB UR QL: NEGATIVE
COLOR UR: ABNORMAL
CREAT UR-MCNC: 162 MG/DL
EPITH CASTS URNS QL MICRO: ABNORMAL /LPF
GLUCOSE UR STRIP.AUTO-MCNC: NEGATIVE MG/DL
HGB UR QL STRIP: NEGATIVE
HYALINE CASTS URNS QL MICRO: ABNORMAL /LPF (ref 0–5)
KETONES UR QL STRIP.AUTO: ABNORMAL MG/DL
LEUKOCYTE ESTERASE UR QL STRIP.AUTO: ABNORMAL
MICROALBUMIN UR-MCNC: 1.56 MG/DL
MICROALBUMIN/CREAT UR-RTO: 10 MG/G (ref 0–30)
NITRITE UR QL STRIP.AUTO: NEGATIVE
PH UR STRIP: 5.5 (ref 5–8)
PROT UR STRIP-MCNC: NEGATIVE MG/DL
RBC #/AREA URNS HPF: ABNORMAL /HPF (ref 0–5)
SP GR UR REFRACTOMETRY: 1.02 (ref 1–1.03)
URINE CULTURE IF INDICATED: ABNORMAL
UROBILINOGEN UR QL STRIP.AUTO: 0.2 EU/DL (ref 0.2–1)
WBC URNS QL MICRO: ABNORMAL /HPF (ref 0–4)

## 2025-07-04 LAB
ALBUMIN SERPL-MCNC: 4.1 G/DL (ref 3.5–5)
ALBUMIN/GLOB SERPL: 1.3 (ref 1.1–2.2)
ALP SERPL-CCNC: 93 U/L (ref 45–117)
ALT SERPL-CCNC: 26 U/L (ref 12–78)
ANION GAP SERPL CALC-SCNC: 5 MMOL/L (ref 2–12)
AST SERPL-CCNC: 21 U/L (ref 15–37)
BILIRUB SERPL-MCNC: 0.3 MG/DL (ref 0.2–1)
BUN SERPL-MCNC: 26 MG/DL (ref 6–20)
BUN/CREAT SERPL: 20 (ref 12–20)
CALCIUM SERPL-MCNC: 10.1 MG/DL (ref 8.5–10.1)
CHLORIDE SERPL-SCNC: 106 MMOL/L (ref 97–108)
CHOLEST SERPL-MCNC: 236 MG/DL
CO2 SERPL-SCNC: 26 MMOL/L (ref 21–32)
CREAT SERPL-MCNC: 1.32 MG/DL (ref 0.55–1.02)
ERYTHROCYTE [DISTWIDTH] IN BLOOD BY AUTOMATED COUNT: 13.4 % (ref 11.5–14.5)
EST. AVERAGE GLUCOSE BLD GHB EST-MCNC: 108 MG/DL
GLOBULIN SER CALC-MCNC: 3.2 G/DL (ref 2–4)
GLUCOSE SERPL-MCNC: 102 MG/DL (ref 65–100)
HBA1C MFR BLD: 5.4 % (ref 4–5.6)
HCT VFR BLD AUTO: 32.4 % (ref 35–47)
HDLC SERPL-MCNC: 85 MG/DL
HDLC SERPL: 2.8 (ref 0–5)
HGB BLD-MCNC: 10.5 G/DL (ref 11.5–16)
LDLC SERPL CALC-MCNC: 107 MG/DL (ref 0–100)
MCH RBC QN AUTO: 32.8 PG (ref 26–34)
MCHC RBC AUTO-ENTMCNC: 32.4 G/DL (ref 30–36.5)
MCV RBC AUTO: 101.3 FL (ref 80–99)
NRBC # BLD: 0 K/UL (ref 0–0.01)
NRBC BLD-RTO: 0 PER 100 WBC
PLATELET # BLD AUTO: 208 K/UL (ref 150–400)
PMV BLD AUTO: 10.1 FL (ref 8.9–12.9)
POTASSIUM SERPL-SCNC: 4.5 MMOL/L (ref 3.5–5.1)
PROT SERPL-MCNC: 7.3 G/DL (ref 6.4–8.2)
RBC # BLD AUTO: 3.2 M/UL (ref 3.8–5.2)
SODIUM SERPL-SCNC: 137 MMOL/L (ref 136–145)
T4 FREE SERPL-MCNC: 0.9 NG/DL (ref 0.8–1.5)
TRIGL SERPL-MCNC: 220 MG/DL
TSH SERPL DL<=0.05 MIU/L-ACNC: 1.09 UIU/ML (ref 0.36–3.74)
VLDLC SERPL CALC-MCNC: 44 MG/DL
WBC # BLD AUTO: 6 K/UL (ref 3.6–11)

## 2025-07-05 LAB
BACTERIA SPEC CULT: ABNORMAL
CC UR VC: ABNORMAL
SERVICE CMNT-IMP: ABNORMAL

## 2025-07-08 ENCOUNTER — RESULTS FOLLOW-UP (OUTPATIENT)
Age: 75
End: 2025-07-08

## 2025-07-08 RX ORDER — CIPROFLOXACIN 500 MG/1
500 TABLET, FILM COATED ORAL 2 TIMES DAILY
Qty: 14 TABLET | Refills: 0 | Status: SHIPPED | OUTPATIENT
Start: 2025-07-08 | End: 2025-07-15

## (undated) DEVICE — BANDAGE COBAN 4 IN COMPR W4INXL5YD FOAM COHESIVE QUIK STK SELF ADH SFT

## (undated) DEVICE — ZINACTIVE USE 2641837 CLIP LIG M BLU TI HRT SHP WIRE HORZ 600 PER BX

## (undated) DEVICE — BLANKET WRM AD PREM MISTRAL-AIR

## (undated) DEVICE — REM POLYHESIVE ADULT PATIENT RETURN ELECTRODE: Brand: VALLEYLAB

## (undated) DEVICE — BASIN EMSIS 16OZ GRAPHITE PLAS KID SHP MOLD GRAD FOR ORAL

## (undated) DEVICE — KENDALL SCD EXPRESS SLEEVES, KNEE LENGTH, MEDIUM: Brand: KENDALL SCD

## (undated) DEVICE — NEONATAL-ADULT SPO2 SENSOR: Brand: NELLCOR

## (undated) DEVICE — Device

## (undated) DEVICE — SMOKE EVACUATION PENCIL: Brand: VALLEYLAB

## (undated) DEVICE — NEEDLE HYPO 25GA L1.5IN BVL ORIENTED ECLIPSE

## (undated) DEVICE — SOLIDIFIER MEDC 1200ML -- CONVERT TO 356117

## (undated) DEVICE — 1200 GUARD II KIT W/5MM TUBE W/O VAC TUBE: Brand: GUARDIAN

## (undated) DEVICE — NEEDLE HYPO 18GA L1.5IN PNK S STL HUB POLYPR SHLD REG BVL

## (undated) DEVICE — TRAP,MUCUS SPECIMEN, 80CC: Brand: MEDLINE

## (undated) DEVICE — TOURNIQUET PHLEB M AD FNGR RED SIL RNG DISP TOURNI-COT

## (undated) DEVICE — ELECTRODE,RADIOTRANSLUCENT,FOAM,5PK: Brand: MEDLINE

## (undated) DEVICE — INFECTION CONTROL KIT SYS

## (undated) DEVICE — LIGHT HANDLE: Brand: DEVON

## (undated) DEVICE — BIPOLAR FORCEPS CORD: Brand: VALLEYLAB

## (undated) DEVICE — SOL INJ L R 1000ML BG --

## (undated) DEVICE — HAND-MRMCASU: Brand: MEDLINE INDUSTRIES, INC.

## (undated) DEVICE — GLOVE SURG SZ 7 L12IN FNGR THK79MIL GRN LTX FREE

## (undated) DEVICE — SLING ARM LIFETEC ORTH UNIV --

## (undated) DEVICE — SOLUTION IRRIG 1000ML 0.9% SOD CHL USP POUR PLAS BTL

## (undated) DEVICE — GARMENT,MEDLINE,DVT,INT,CALF,MED, GEN2: Brand: MEDLINE

## (undated) DEVICE — DRAPE CAM W7XL96IN W/ BLU KRATON TIP FOR LSR VID

## (undated) DEVICE — BANDAGE,GAUZE,BULKEE II,4.5"X4.1YD,STRL: Brand: MEDLINE

## (undated) DEVICE — STERILE POLYISOPRENE POWDER-FREE SURGICAL GLOVES: Brand: PROTEXIS

## (undated) DEVICE — SNARE ENDOSCP M L240CM W27MM SHTH DIA2.4MM CHN 2.8MM OVL

## (undated) DEVICE — SUTURE VCRL RAPIDE SZ 4-0 L18IN ABSRB UD L19MM PS-2 1/2 CIR VR496

## (undated) DEVICE — GOWN,SIRUS,FABRNF,XL,20/CS: Brand: MEDLINE

## (undated) DEVICE — SOLUTION IV 1000ML 0.9% SOD CHL

## (undated) DEVICE — DRAPE,CHEST,FENES,15X10,STERIL: Brand: MEDLINE

## (undated) DEVICE — CATH IV AUTOGRD BC PNK 20GA 25 -- INSYTE

## (undated) DEVICE — KENDALL DL 5 LEADS DUAL CONNECT SYSTEM BLENDED CASE - SINGLE-PATIENT-USE: Brand: SUSTAINABLE TECHNOLOGIES

## (undated) DEVICE — (D)SYR 10ML 1/5ML GRAD NSAF -- PKGING CHANGE USE ITEM 338027

## (undated) DEVICE — CONTAINER SPEC 20 ML LID NEUT BUFF FORMALIN 10 % POLYPR STS

## (undated) DEVICE — SUTURE VCRL SZ 3-0 L27IN ABSRB UD L26MM SH 1/2 CIR J416H

## (undated) DEVICE — ROCKER SWITCH PENCIL BLADE ELECTRODE, HOLSTER: Brand: EDGE

## (undated) DEVICE — TOWEL SURG W17XL27IN STD BLU COT NONFENESTRATED PREWASHED

## (undated) DEVICE — WRAP COHESIVE W2INXL5YD TAN SELF ADH BNDG HND NON STERILE TEAR CARING

## (undated) DEVICE — SUTURE VCRL SZ 2-0 L27IN ABSRB UD L26MM SH 1/2 CIR J417H

## (undated) DEVICE — MARKER,SKIN,WI/RULER AND LABELS: Brand: MEDLINE

## (undated) DEVICE — SUTURE VCRL SZ 3-0 L18IN ABSRB UD L26MM SH 1/2 CIR J864D

## (undated) DEVICE — SYR 3ML LL TIP 1/10ML GRAD --

## (undated) DEVICE — BANDAGE GZ W2XL75IN ST RAYON POLY CNFRM STRTCH LTWT

## (undated) DEVICE — GOWN,PREVENTION PLUS,XL,ST,24/CS: Brand: MEDLINE

## (undated) DEVICE — SUTURE VCRL SZ 3-0 L54IN ABSRB VLT LIGAPAK REEL NDL J205G

## (undated) DEVICE — GLOVE ORANGE PI 7   MSG9070

## (undated) DEVICE — GLOVE SURG SZ 65 THK91MIL LTX FREE SYN POLYISOPRENE

## (undated) DEVICE — Z DISCONTINUED PER MEDLINE LINE GAS SAMPLING O2/CO2 LNG AD 13 FT NSL W/ TBNG FILTERLINE

## (undated) DEVICE — SUTURE VCRL SZ 4-0 L27IN ABSRB UD L19MM PS-2 3/8 CIR PRIM J426H

## (undated) DEVICE — (D)PREP SKN CHLRAPRP APPL 26ML -- CONVERT TO ITEM 371833

## (undated) DEVICE — DEVON™ KNEE AND BODY STRAP 60" X 3" (1.5 M X 7.6 CM): Brand: DEVON

## (undated) DEVICE — SUTURE ETHILON SZ 3-0 L18IN NONABSORBABLE BLK PS-2 L19MM 3/8 1669H

## (undated) DEVICE — DRAPE,REIN 53X77,STERILE: Brand: MEDLINE

## (undated) DEVICE — CORD ES L12FT BPLR FRCP

## (undated) DEVICE — SET ADMIN 16ML TBNG L100IN 2 Y INJ SITE IV PIGGY BK DISP

## (undated) DEVICE — GLOVE ORANGE PI 8   MSG9080

## (undated) DEVICE — NON-REM POLYHESIVE PATIENT RETURN ELECTRODE: Brand: VALLEYLAB

## (undated) DEVICE — TOWEL 4 PLY TISS 19X30 SUE WHT

## (undated) DEVICE — CATHETER ETER IV 20GA L125IN POLYUR STR RADPQ INTROCAN SFTY

## (undated) DEVICE — SUT SLK 2-0SH 30IN BLK --

## (undated) DEVICE — SYR 10ML LUER LOK 1/5ML GRAD --

## (undated) DEVICE — HANDLE LT SNAP ON ULT DURABLE LENS FOR TRUMPF ALC DISPOSABLE

## (undated) DEVICE — DERMABOND SKIN ADH 0.7ML -- DERMABOND ADVANCED 12/BX

## (undated) DEVICE — STRAINER URIN CALC RNL MSH -- CONVERT TO ITEM 357634

## (undated) DEVICE — SURGICAL PROCEDURE PACK BASIN MAJ SET CUST NO CAUT

## (undated) DEVICE — ZIMMER® STERILE DISPOSABLE TOURNIQUET CUFF WITH PLC, DUAL PORT, SINGLE BLADDER, 18 IN. (46 CM)